# Patient Record
Sex: MALE | Race: WHITE | NOT HISPANIC OR LATINO | Employment: UNEMPLOYED | URBAN - NONMETROPOLITAN AREA
[De-identification: names, ages, dates, MRNs, and addresses within clinical notes are randomized per-mention and may not be internally consistent; named-entity substitution may affect disease eponyms.]

---

## 2023-09-26 ENCOUNTER — APPOINTMENT (OUTPATIENT)
Dept: GENERAL RADIOLOGY | Facility: HOSPITAL | Age: 78
DRG: 281 | End: 2023-09-26
Payer: COMMERCIAL

## 2023-09-26 ENCOUNTER — APPOINTMENT (OUTPATIENT)
Dept: CARDIOLOGY | Facility: HOSPITAL | Age: 78
DRG: 281 | End: 2023-09-26
Payer: COMMERCIAL

## 2023-09-26 ENCOUNTER — HOSPITAL ENCOUNTER (INPATIENT)
Facility: HOSPITAL | Age: 78
LOS: 2 days | Discharge: HOME | DRG: 281 | End: 2023-09-28
Attending: HOSPITALIST | Admitting: HOSPITALIST
Payer: COMMERCIAL

## 2023-09-26 DIAGNOSIS — I21.4 NSTEMI (NON-ST ELEVATED MYOCARDIAL INFARCTION): Primary | ICD-10-CM

## 2023-09-26 LAB
ALBUMIN SERPL-MCNC: 3.7 G/DL (ref 3.5–5.2)
ALBUMIN/GLOB SERPL: 1.1 G/DL
ALP SERPL-CCNC: 183 U/L (ref 39–117)
ALT SERPL W P-5'-P-CCNC: 20 U/L (ref 1–41)
ANION GAP SERPL CALCULATED.3IONS-SCNC: 10.3 MMOL/L (ref 5–15)
APTT PPP: >200 SECONDS (ref 26.5–34.5)
AST SERPL-CCNC: 48 U/L (ref 1–40)
BASOPHILS # BLD AUTO: 0 10*3/MM3 (ref 0–0.2)
BASOPHILS NFR BLD AUTO: 0 % (ref 0–1.5)
BH CV ECHO MEAS - ACS: 1.7 CM
BH CV ECHO MEAS - AI P1/2T: 1153 MSEC
BH CV ECHO MEAS - AO MAX PG: 11 MMHG
BH CV ECHO MEAS - AO MEAN PG: 5 MMHG
BH CV ECHO MEAS - AO ROOT DIAM: 3.7 CM
BH CV ECHO MEAS - AO V2 MAX: 166 CM/SEC
BH CV ECHO MEAS - AO V2 VTI: 39.9 CM
BH CV ECHO MEAS - EDV(CUBED): 249.5 ML
BH CV ECHO MEAS - EDV(MOD-SP4): 197 ML
BH CV ECHO MEAS - EF(MOD-SP4): 56.2 %
BH CV ECHO MEAS - ESV(CUBED): 123.1 ML
BH CV ECHO MEAS - ESV(MOD-SP4): 86.3 ML
BH CV ECHO MEAS - FS: 21 %
BH CV ECHO MEAS - IVS/LVPW: 0.87 CM
BH CV ECHO MEAS - IVSD: 0.98 CM
BH CV ECHO MEAS - LA DIMENSION: 5.1 CM
BH CV ECHO MEAS - LAT PEAK E' VEL: 8.3 CM/SEC
BH CV ECHO MEAS - LV DIASTOLIC VOL/BSA (35-75): 86.4 CM2
BH CV ECHO MEAS - LV MASS(C)D: 287.2 GRAMS
BH CV ECHO MEAS - LV SYSTOLIC VOL/BSA (12-30): 37.9 CM2
BH CV ECHO MEAS - LVIDD: 6.3 CM
BH CV ECHO MEAS - LVIDS: 5 CM
BH CV ECHO MEAS - LVOT AREA: 3.1 CM2
BH CV ECHO MEAS - LVOT DIAM: 2 CM
BH CV ECHO MEAS - LVPWD: 1.13 CM
BH CV ECHO MEAS - MED PEAK E' VEL: 5.8 CM/SEC
BH CV ECHO MEAS - MR MAX PG: 78.5 MMHG
BH CV ECHO MEAS - MR MAX VEL: 443 CM/SEC
BH CV ECHO MEAS - MR MEAN PG: 50 MMHG
BH CV ECHO MEAS - MR MEAN VEL: 329 CM/SEC
BH CV ECHO MEAS - MR VTI: 155 CM
BH CV ECHO MEAS - MV A MAX VEL: 35.2 CM/SEC
BH CV ECHO MEAS - MV E MAX VEL: 111 CM/SEC
BH CV ECHO MEAS - MV E/A: 3.2
BH CV ECHO MEAS - PA ACC TIME: 0.11 SEC
BH CV ECHO MEAS - RAP SYSTOLE: 10 MMHG
BH CV ECHO MEAS - RVSP: 32.7 MMHG
BH CV ECHO MEAS - SI(MOD-SP4): 48.6 ML/M2
BH CV ECHO MEAS - SV(MOD-SP4): 110.7 ML
BH CV ECHO MEAS - TAPSE (>1.6): 2.09 CM
BH CV ECHO MEAS - TR MAX PG: 22.7 MMHG
BH CV ECHO MEAS - TR MAX VEL: 238 CM/SEC
BH CV ECHO MEASUREMENTS AVERAGE E/E' RATIO: 15.74
BILIRUB SERPL-MCNC: 0.8 MG/DL (ref 0–1.2)
BUN SERPL-MCNC: 17 MG/DL (ref 8–23)
BUN/CREAT SERPL: 20.7 (ref 7–25)
CALCIUM SPEC-SCNC: 9.4 MG/DL (ref 8.6–10.5)
CHLORIDE SERPL-SCNC: 105 MMOL/L (ref 98–107)
CO2 SERPL-SCNC: 22.7 MMOL/L (ref 22–29)
CREAT SERPL-MCNC: 0.82 MG/DL (ref 0.76–1.27)
DEPRECATED RDW RBC AUTO: 51.8 FL (ref 37–54)
EGFRCR SERPLBLD CKD-EPI 2021: 91 ML/MIN/1.73
EOSINOPHIL # BLD AUTO: 0 10*3/MM3 (ref 0–0.4)
EOSINOPHIL NFR BLD AUTO: 0 % (ref 0.3–6.2)
ERYTHROCYTE [DISTWIDTH] IN BLOOD BY AUTOMATED COUNT: 14.7 % (ref 12.3–15.4)
GEN 5 2HR TROPONIN T REFLEX: 616 NG/L
GLOBULIN UR ELPH-MCNC: 3.5 GM/DL
GLUCOSE SERPL-MCNC: 161 MG/DL (ref 65–99)
HCT VFR BLD AUTO: 36.9 % (ref 37.5–51)
HGB BLD-MCNC: 11.9 G/DL (ref 13–17.7)
IMM GRANULOCYTES # BLD AUTO: 0.02 10*3/MM3 (ref 0–0.05)
IMM GRANULOCYTES NFR BLD AUTO: 0.4 % (ref 0–0.5)
INR PPP: 1.36 (ref 0.9–1.1)
LEFT ATRIUM VOLUME INDEX: 39.9 ML/M2
LYMPHOCYTES # BLD AUTO: 1.1 10*3/MM3 (ref 0.7–3.1)
LYMPHOCYTES NFR BLD AUTO: 23.7 % (ref 19.6–45.3)
MAGNESIUM SERPL-MCNC: 2.5 MG/DL (ref 1.6–2.4)
MCH RBC QN AUTO: 31 PG (ref 26.6–33)
MCHC RBC AUTO-ENTMCNC: 32.2 G/DL (ref 31.5–35.7)
MCV RBC AUTO: 96.1 FL (ref 79–97)
MONOCYTES # BLD AUTO: 0.07 10*3/MM3 (ref 0.1–0.9)
MONOCYTES NFR BLD AUTO: 1.5 % (ref 5–12)
NEUTROPHILS NFR BLD AUTO: 3.46 10*3/MM3 (ref 1.7–7)
NEUTROPHILS NFR BLD AUTO: 74.4 % (ref 42.7–76)
NRBC BLD AUTO-RTO: 0 /100 WBC (ref 0–0.2)
NT-PROBNP SERPL-MCNC: 2019 PG/ML (ref 0–1800)
PLATELET # BLD AUTO: 214 10*3/MM3 (ref 140–450)
PMV BLD AUTO: 9.4 FL (ref 6–12)
POTASSIUM SERPL-SCNC: 4.1 MMOL/L (ref 3.5–5.2)
PROT SERPL-MCNC: 7.2 G/DL (ref 6–8.5)
PROTHROMBIN TIME: 17.3 SECONDS (ref 12.1–14.7)
QT INTERVAL: 490 MS
QTC INTERVAL: 455 MS
RBC # BLD AUTO: 3.84 10*6/MM3 (ref 4.14–5.8)
SODIUM SERPL-SCNC: 138 MMOL/L (ref 136–145)
TROPONIN T DELTA: -62 NG/L
TROPONIN T SERPL HS-MCNC: 678 NG/L
TSH SERPL DL<=0.05 MIU/L-ACNC: 0.97 UIU/ML (ref 0.27–4.2)
UFH PPP CHRO-ACNC: >1.1 IU/ML (ref 0.3–0.7)
WBC NRBC COR # BLD: 4.65 10*3/MM3 (ref 3.4–10.8)

## 2023-09-26 PROCEDURE — C1769 GUIDE WIRE: HCPCS | Performed by: INTERNAL MEDICINE

## 2023-09-26 PROCEDURE — 93005 ELECTROCARDIOGRAM TRACING: CPT | Performed by: HOSPITALIST

## 2023-09-26 PROCEDURE — 85520 HEPARIN ASSAY: CPT | Performed by: HOSPITALIST

## 2023-09-26 PROCEDURE — 80053 COMPREHEN METABOLIC PANEL: CPT | Performed by: HOSPITALIST

## 2023-09-26 PROCEDURE — 25010000002 FENTANYL CITRATE (PF) 50 MCG/ML SOLUTION: Performed by: INTERNAL MEDICINE

## 2023-09-26 PROCEDURE — 83735 ASSAY OF MAGNESIUM: CPT | Performed by: HOSPITALIST

## 2023-09-26 PROCEDURE — 25510000001 IOPAMIDOL PER 1 ML: Performed by: INTERNAL MEDICINE

## 2023-09-26 PROCEDURE — 4A023N7 MEASUREMENT OF CARDIAC SAMPLING AND PRESSURE, LEFT HEART, PERCUTANEOUS APPROACH: ICD-10-PCS | Performed by: INTERNAL MEDICINE

## 2023-09-26 PROCEDURE — 99152 MOD SED SAME PHYS/QHP 5/>YRS: CPT | Performed by: INTERNAL MEDICINE

## 2023-09-26 PROCEDURE — 93306 TTE W/DOPPLER COMPLETE: CPT | Performed by: SPECIALIST

## 2023-09-26 PROCEDURE — 99222 1ST HOSP IP/OBS MODERATE 55: CPT | Performed by: INTERNAL MEDICINE

## 2023-09-26 PROCEDURE — 93454 CORONARY ARTERY ANGIO S&I: CPT | Performed by: INTERNAL MEDICINE

## 2023-09-26 PROCEDURE — 84443 ASSAY THYROID STIM HORMONE: CPT | Performed by: HOSPITALIST

## 2023-09-26 PROCEDURE — 83880 ASSAY OF NATRIURETIC PEPTIDE: CPT | Performed by: HOSPITALIST

## 2023-09-26 PROCEDURE — C1894 INTRO/SHEATH, NON-LASER: HCPCS | Performed by: INTERNAL MEDICINE

## 2023-09-26 PROCEDURE — 25010000002 MIDAZOLAM PER 1 MG: Performed by: INTERNAL MEDICINE

## 2023-09-26 PROCEDURE — 94799 UNLISTED PULMONARY SVC/PX: CPT

## 2023-09-26 PROCEDURE — 93010 ELECTROCARDIOGRAM REPORT: CPT | Performed by: INTERNAL MEDICINE

## 2023-09-26 PROCEDURE — 71045 X-RAY EXAM CHEST 1 VIEW: CPT | Performed by: RADIOLOGY

## 2023-09-26 PROCEDURE — 85610 PROTHROMBIN TIME: CPT | Performed by: HOSPITALIST

## 2023-09-26 PROCEDURE — 85025 COMPLETE CBC W/AUTO DIFF WBC: CPT | Performed by: HOSPITALIST

## 2023-09-26 PROCEDURE — 94640 AIRWAY INHALATION TREATMENT: CPT

## 2023-09-26 PROCEDURE — 84484 ASSAY OF TROPONIN QUANT: CPT | Performed by: HOSPITALIST

## 2023-09-26 PROCEDURE — 93306 TTE W/DOPPLER COMPLETE: CPT

## 2023-09-26 PROCEDURE — 94761 N-INVAS EAR/PLS OXIMETRY MLT: CPT

## 2023-09-26 PROCEDURE — 99223 1ST HOSP IP/OBS HIGH 75: CPT | Performed by: INTERNAL MEDICINE

## 2023-09-26 PROCEDURE — 71045 X-RAY EXAM CHEST 1 VIEW: CPT

## 2023-09-26 PROCEDURE — 94664 DEMO&/EVAL PT USE INHALER: CPT

## 2023-09-26 PROCEDURE — C1760 CLOSURE DEV, VASC: HCPCS | Performed by: INTERNAL MEDICINE

## 2023-09-26 PROCEDURE — 85730 THROMBOPLASTIN TIME PARTIAL: CPT | Performed by: HOSPITALIST

## 2023-09-26 PROCEDURE — B2111ZZ FLUOROSCOPY OF MULTIPLE CORONARY ARTERIES USING LOW OSMOLAR CONTRAST: ICD-10-PCS | Performed by: INTERNAL MEDICINE

## 2023-09-26 RX ORDER — POLYETHYLENE GLYCOL 3350 17 G/17G
17 POWDER, FOR SOLUTION ORAL DAILY PRN
Status: DISCONTINUED | OUTPATIENT
Start: 2023-09-26 | End: 2023-09-28 | Stop reason: HOSPADM

## 2023-09-26 RX ORDER — ALLOPURINOL 300 MG/1
300 TABLET ORAL DAILY
Status: CANCELLED | OUTPATIENT
Start: 2023-09-26

## 2023-09-26 RX ORDER — CARVEDILOL 3.12 MG/1
3.12 TABLET ORAL 2 TIMES DAILY WITH MEALS
Status: CANCELLED | OUTPATIENT
Start: 2023-09-26

## 2023-09-26 RX ORDER — GUAIFENESIN, PSEUDOEPHEDRINE HYDROCHLORIDE 600; 60 MG/1; MG/1
1 TABLET, EXTENDED RELEASE ORAL 2 TIMES DAILY
Status: CANCELLED | OUTPATIENT
Start: 2023-09-26

## 2023-09-26 RX ORDER — TAMSULOSIN HYDROCHLORIDE 0.4 MG/1
0.4 CAPSULE ORAL DAILY
Status: DISCONTINUED | OUTPATIENT
Start: 2023-09-26 | End: 2023-09-28 | Stop reason: HOSPADM

## 2023-09-26 RX ORDER — ATORVASTATIN CALCIUM 40 MG/1
80 TABLET, FILM COATED ORAL DAILY
Status: CANCELLED | OUTPATIENT
Start: 2023-09-26

## 2023-09-26 RX ORDER — LEVOTHYROXINE SODIUM 0.05 MG/1
50 TABLET ORAL NIGHTLY
Status: CANCELLED | OUTPATIENT
Start: 2023-09-26

## 2023-09-26 RX ORDER — BUMETANIDE 0.5 MG/1
1.5 TABLET ORAL 2 TIMES DAILY
COMMUNITY

## 2023-09-26 RX ORDER — BISACODYL 10 MG
10 SUPPOSITORY, RECTAL RECTAL DAILY PRN
Status: DISCONTINUED | OUTPATIENT
Start: 2023-09-26 | End: 2023-09-28 | Stop reason: HOSPADM

## 2023-09-26 RX ORDER — FEXOFENADINE HCL 180 MG/1
180 TABLET ORAL NIGHTLY
COMMUNITY

## 2023-09-26 RX ORDER — HEPARIN SODIUM 10000 [USP'U]/100ML
9.6 INJECTION, SOLUTION INTRAVENOUS
Status: DISCONTINUED | OUTPATIENT
Start: 2023-09-26 | End: 2023-09-26

## 2023-09-26 RX ORDER — FINASTERIDE 5 MG/1
5 TABLET, FILM COATED ORAL EVERY MORNING
Status: CANCELLED | OUTPATIENT
Start: 2023-09-26

## 2023-09-26 RX ORDER — MIDAZOLAM HYDROCHLORIDE 1 MG/ML
INJECTION INTRAMUSCULAR; INTRAVENOUS
Status: DISCONTINUED | OUTPATIENT
Start: 2023-09-26 | End: 2023-09-26 | Stop reason: HOSPADM

## 2023-09-26 RX ORDER — TAMSULOSIN HYDROCHLORIDE 0.4 MG/1
0.4 CAPSULE ORAL DAILY
Status: CANCELLED | OUTPATIENT
Start: 2023-09-26

## 2023-09-26 RX ORDER — ASPIRIN 81 MG/1
81 TABLET, CHEWABLE ORAL DAILY
Status: DISCONTINUED | OUTPATIENT
Start: 2023-09-26 | End: 2023-09-28 | Stop reason: HOSPADM

## 2023-09-26 RX ORDER — CARVEDILOL 3.12 MG/1
3.12 TABLET ORAL 2 TIMES DAILY WITH MEALS
COMMUNITY
End: 2023-09-28 | Stop reason: HOSPADM

## 2023-09-26 RX ORDER — PANTOPRAZOLE SODIUM 40 MG/1
40 TABLET, DELAYED RELEASE ORAL DAILY
COMMUNITY

## 2023-09-26 RX ORDER — GABAPENTIN 300 MG/1
300 CAPSULE ORAL 3 TIMES DAILY
COMMUNITY

## 2023-09-26 RX ORDER — FINASTERIDE 5 MG/1
5 TABLET, FILM COATED ORAL EVERY MORNING
Status: DISCONTINUED | OUTPATIENT
Start: 2023-09-26 | End: 2023-09-28 | Stop reason: HOSPADM

## 2023-09-26 RX ORDER — POTASSIUM CHLORIDE 20 MEQ/1
20 TABLET, EXTENDED RELEASE ORAL 2 TIMES DAILY
Status: CANCELLED | OUTPATIENT
Start: 2023-09-26

## 2023-09-26 RX ORDER — PANTOPRAZOLE SODIUM 40 MG/1
40 TABLET, DELAYED RELEASE ORAL DAILY
Status: CANCELLED | OUTPATIENT
Start: 2023-09-26

## 2023-09-26 RX ORDER — COLCHICINE 0.6 MG/1
0.6 TABLET ORAL 2 TIMES DAILY PRN
COMMUNITY

## 2023-09-26 RX ORDER — SODIUM CHLORIDE 0.9 % (FLUSH) 0.9 %
10 SYRINGE (ML) INJECTION EVERY 12 HOURS SCHEDULED
Status: DISCONTINUED | OUTPATIENT
Start: 2023-09-26 | End: 2023-09-28 | Stop reason: HOSPADM

## 2023-09-26 RX ORDER — POTASSIUM CHLORIDE 20 MEQ/1
20 TABLET, EXTENDED RELEASE ORAL 2 TIMES DAILY
Status: DISCONTINUED | OUTPATIENT
Start: 2023-09-26 | End: 2023-09-28 | Stop reason: HOSPADM

## 2023-09-26 RX ORDER — PANTOPRAZOLE SODIUM 40 MG/1
40 TABLET, DELAYED RELEASE ORAL DAILY
Status: DISCONTINUED | OUTPATIENT
Start: 2023-09-26 | End: 2023-09-28 | Stop reason: HOSPADM

## 2023-09-26 RX ORDER — ALLOPURINOL 300 MG/1
300 TABLET ORAL DAILY
COMMUNITY

## 2023-09-26 RX ORDER — HEPARIN SODIUM 5000 [USP'U]/ML
2000 INJECTION, SOLUTION INTRAVENOUS; SUBCUTANEOUS AS NEEDED
Status: DISCONTINUED | OUTPATIENT
Start: 2023-09-26 | End: 2023-09-26

## 2023-09-26 RX ORDER — LISINOPRIL 2.5 MG/1
2.5 TABLET ORAL NIGHTLY
Status: DISCONTINUED | OUTPATIENT
Start: 2023-09-26 | End: 2023-09-28 | Stop reason: HOSPADM

## 2023-09-26 RX ORDER — GUAIFENESIN, PSEUDOEPHEDRINE HYDROCHLORIDE 600; 60 MG/1; MG/1
1 TABLET, EXTENDED RELEASE ORAL 2 TIMES DAILY
COMMUNITY
End: 2023-09-28 | Stop reason: HOSPADM

## 2023-09-26 RX ORDER — COLCHICINE 0.6 MG/1
0.6 TABLET ORAL 2 TIMES DAILY PRN
Status: CANCELLED | OUTPATIENT
Start: 2023-09-26

## 2023-09-26 RX ORDER — SODIUM CHLORIDE 0.9 % (FLUSH) 0.9 %
10 SYRINGE (ML) INJECTION AS NEEDED
Status: DISCONTINUED | OUTPATIENT
Start: 2023-09-26 | End: 2023-09-28 | Stop reason: HOSPADM

## 2023-09-26 RX ORDER — ALLOPURINOL 300 MG/1
300 TABLET ORAL DAILY
Status: DISCONTINUED | OUTPATIENT
Start: 2023-09-26 | End: 2023-09-28 | Stop reason: HOSPADM

## 2023-09-26 RX ORDER — BUMETANIDE 1 MG/1
1.5 TABLET ORAL 2 TIMES DAILY
Status: DISCONTINUED | OUTPATIENT
Start: 2023-09-26 | End: 2023-09-28 | Stop reason: HOSPADM

## 2023-09-26 RX ORDER — AMOXICILLIN 250 MG
2 CAPSULE ORAL 2 TIMES DAILY
Status: DISCONTINUED | OUTPATIENT
Start: 2023-09-26 | End: 2023-09-28 | Stop reason: HOSPADM

## 2023-09-26 RX ORDER — CETIRIZINE HYDROCHLORIDE 10 MG/1
10 TABLET ORAL DAILY
Status: DISCONTINUED | OUTPATIENT
Start: 2023-09-26 | End: 2023-09-28 | Stop reason: HOSPADM

## 2023-09-26 RX ORDER — BUMETANIDE 1 MG/1
1.5 TABLET ORAL 2 TIMES DAILY
Status: CANCELLED | OUTPATIENT
Start: 2023-09-26

## 2023-09-26 RX ORDER — GABAPENTIN 300 MG/1
300 CAPSULE ORAL 3 TIMES DAILY
Status: DISCONTINUED | OUTPATIENT
Start: 2023-09-26 | End: 2023-09-28 | Stop reason: HOSPADM

## 2023-09-26 RX ORDER — FENTANYL CITRATE 50 UG/ML
INJECTION, SOLUTION INTRAMUSCULAR; INTRAVENOUS
Status: DISCONTINUED | OUTPATIENT
Start: 2023-09-26 | End: 2023-09-26 | Stop reason: HOSPADM

## 2023-09-26 RX ORDER — POTASSIUM CHLORIDE 20 MEQ/1
20 TABLET, EXTENDED RELEASE ORAL 2 TIMES DAILY
COMMUNITY

## 2023-09-26 RX ORDER — SODIUM CHLORIDE 9 MG/ML
40 INJECTION, SOLUTION INTRAVENOUS AS NEEDED
Status: DISCONTINUED | OUTPATIENT
Start: 2023-09-26 | End: 2023-09-28 | Stop reason: HOSPADM

## 2023-09-26 RX ORDER — CETIRIZINE HYDROCHLORIDE 10 MG/1
10 TABLET ORAL DAILY
Status: CANCELLED | OUTPATIENT
Start: 2023-09-26

## 2023-09-26 RX ORDER — ACETAMINOPHEN 325 MG/1
650 TABLET ORAL EVERY 4 HOURS PRN
Status: DISCONTINUED | OUTPATIENT
Start: 2023-09-26 | End: 2023-09-28 | Stop reason: HOSPADM

## 2023-09-26 RX ORDER — ATORVASTATIN CALCIUM 80 MG/1
80 TABLET, FILM COATED ORAL DAILY
COMMUNITY

## 2023-09-26 RX ORDER — LISINOPRIL 2.5 MG/1
2.5 TABLET ORAL NIGHTLY
COMMUNITY

## 2023-09-26 RX ORDER — SODIUM CHLORIDE 9 MG/ML
INJECTION, SOLUTION INTRAVENOUS
Status: COMPLETED | OUTPATIENT
Start: 2023-09-26 | End: 2023-09-26

## 2023-09-26 RX ORDER — GABAPENTIN 300 MG/1
300 CAPSULE ORAL 3 TIMES DAILY
Status: CANCELLED | OUTPATIENT
Start: 2023-09-26

## 2023-09-26 RX ORDER — LIDOCAINE HYDROCHLORIDE 20 MG/ML
INJECTION, SOLUTION INFILTRATION; PERINEURAL
Status: DISCONTINUED | OUTPATIENT
Start: 2023-09-26 | End: 2023-09-26 | Stop reason: HOSPADM

## 2023-09-26 RX ORDER — FINASTERIDE 5 MG/1
5 TABLET, FILM COATED ORAL EVERY MORNING
COMMUNITY

## 2023-09-26 RX ORDER — LEVOTHYROXINE SODIUM 0.05 MG/1
50 TABLET ORAL NIGHTLY
Status: DISCONTINUED | OUTPATIENT
Start: 2023-09-26 | End: 2023-09-28 | Stop reason: HOSPADM

## 2023-09-26 RX ORDER — COLCHICINE 0.6 MG/1
0.6 TABLET ORAL 2 TIMES DAILY PRN
Status: DISCONTINUED | OUTPATIENT
Start: 2023-09-26 | End: 2023-09-28 | Stop reason: HOSPADM

## 2023-09-26 RX ORDER — LEVOTHYROXINE SODIUM 0.05 MG/1
50 TABLET ORAL NIGHTLY
COMMUNITY

## 2023-09-26 RX ORDER — ATORVASTATIN CALCIUM 40 MG/1
80 TABLET, FILM COATED ORAL DAILY
Status: DISCONTINUED | OUTPATIENT
Start: 2023-09-26 | End: 2023-09-28 | Stop reason: HOSPADM

## 2023-09-26 RX ORDER — NITROGLYCERIN 0.4 MG/1
0.4 TABLET SUBLINGUAL
Status: DISCONTINUED | OUTPATIENT
Start: 2023-09-26 | End: 2023-09-28 | Stop reason: HOSPADM

## 2023-09-26 RX ORDER — HEPARIN SODIUM 5000 [USP'U]/ML
4000 INJECTION, SOLUTION INTRAVENOUS; SUBCUTANEOUS AS NEEDED
Status: DISCONTINUED | OUTPATIENT
Start: 2023-09-26 | End: 2023-09-26

## 2023-09-26 RX ORDER — SODIUM CHLORIDE 9 MG/ML
1 INJECTION, SOLUTION INTRAVENOUS CONTINUOUS
Status: ACTIVE | OUTPATIENT
Start: 2023-09-26 | End: 2023-09-26

## 2023-09-26 RX ORDER — BISACODYL 5 MG/1
5 TABLET, DELAYED RELEASE ORAL DAILY PRN
Status: DISCONTINUED | OUTPATIENT
Start: 2023-09-26 | End: 2023-09-28 | Stop reason: HOSPADM

## 2023-09-26 RX ORDER — BUDESONIDE 0.5 MG/2ML
0.5 INHALANT ORAL
Status: DISCONTINUED | OUTPATIENT
Start: 2023-09-26 | End: 2023-09-28 | Stop reason: HOSPADM

## 2023-09-26 RX ORDER — TAMSULOSIN HYDROCHLORIDE 0.4 MG/1
1 CAPSULE ORAL DAILY
COMMUNITY

## 2023-09-26 RX ORDER — ARFORMOTEROL TARTRATE 15 UG/2ML
15 SOLUTION RESPIRATORY (INHALATION)
Status: DISCONTINUED | OUTPATIENT
Start: 2023-09-26 | End: 2023-09-28 | Stop reason: HOSPADM

## 2023-09-26 RX ADMIN — ASPIRIN 81 MG: 81 TABLET, CHEWABLE ORAL at 08:41

## 2023-09-26 RX ADMIN — IPRATROPIUM BROMIDE 0.5 MG: 0.5 SOLUTION RESPIRATORY (INHALATION) at 13:52

## 2023-09-26 RX ADMIN — BUDESONIDE 0.5 MG: 0.5 SUSPENSION RESPIRATORY (INHALATION) at 10:45

## 2023-09-26 RX ADMIN — GABAPENTIN 300 MG: 300 CAPSULE ORAL at 21:53

## 2023-09-26 RX ADMIN — Medication 10 ML: at 22:09

## 2023-09-26 RX ADMIN — ATORVASTATIN CALCIUM 80 MG: 40 TABLET, FILM COATED ORAL at 08:41

## 2023-09-26 RX ADMIN — GABAPENTIN 300 MG: 300 CAPSULE ORAL at 15:46

## 2023-09-26 RX ADMIN — DOCUSATE SODIUM 50 MG AND SENNOSIDES 8.6 MG 2 TABLET: 8.6; 5 TABLET, FILM COATED ORAL at 08:41

## 2023-09-26 RX ADMIN — ALLOPURINOL 300 MG: 300 TABLET ORAL at 08:40

## 2023-09-26 RX ADMIN — ARFORMOTEROL TARTRATE 15 MCG: 15 SOLUTION RESPIRATORY (INHALATION) at 18:43

## 2023-09-26 RX ADMIN — ARFORMOTEROL TARTRATE 15 MCG: 15 SOLUTION RESPIRATORY (INHALATION) at 10:44

## 2023-09-26 RX ADMIN — APIXABAN 5 MG: 5 TABLET, FILM COATED ORAL at 21:53

## 2023-09-26 RX ADMIN — FINASTERIDE 5 MG: 5 TABLET, FILM COATED ORAL at 08:41

## 2023-09-26 RX ADMIN — BUMETANIDE 1.5 MG: 1 TABLET ORAL at 21:53

## 2023-09-26 RX ADMIN — ACETAMINOPHEN 650 MG: 325 TABLET ORAL at 12:37

## 2023-09-26 RX ADMIN — DOCUSATE SODIUM 50 MG AND SENNOSIDES 8.6 MG 2 TABLET: 8.6; 5 TABLET, FILM COATED ORAL at 21:53

## 2023-09-26 RX ADMIN — BUMETANIDE 1.5 MG: 1 TABLET ORAL at 08:41

## 2023-09-26 RX ADMIN — BUDESONIDE 0.5 MG: 0.5 SUSPENSION RESPIRATORY (INHALATION) at 18:43

## 2023-09-26 RX ADMIN — GABAPENTIN 300 MG: 300 CAPSULE ORAL at 08:42

## 2023-09-26 RX ADMIN — POTASSIUM CHLORIDE 20 MEQ: 1500 TABLET, EXTENDED RELEASE ORAL at 21:53

## 2023-09-26 RX ADMIN — LEVOTHYROXINE SODIUM 50 MCG: 50 TABLET ORAL at 21:53

## 2023-09-26 RX ADMIN — IPRATROPIUM BROMIDE 0.5 MG: 0.5 SOLUTION RESPIRATORY (INHALATION) at 18:43

## 2023-09-26 RX ADMIN — Medication 10 ML: at 08:42

## 2023-09-26 RX ADMIN — TAMSULOSIN HYDROCHLORIDE 0.4 MG: 0.4 CAPSULE ORAL at 08:40

## 2023-09-26 RX ADMIN — PANTOPRAZOLE SODIUM 40 MG: 40 TABLET, DELAYED RELEASE ORAL at 08:41

## 2023-09-26 RX ADMIN — POTASSIUM CHLORIDE 20 MEQ: 1500 TABLET, EXTENDED RELEASE ORAL at 08:41

## 2023-09-26 RX ADMIN — SODIUM CHLORIDE 1 ML/KG/HR: 9 INJECTION, SOLUTION INTRAVENOUS at 11:58

## 2023-09-26 RX ADMIN — CETIRIZINE HYDROCHLORIDE 10 MG: 10 TABLET, FILM COATED ORAL at 08:40

## 2023-09-26 NOTE — NURSING NOTE
Cardiac Rehab referral received on patient. After reviewing patient information there is no qualifying diagnosis noted at this time .Qualifications for Cardiac Rehab include Coronary Stenting, AMI (NSTEMI Type 1, STEMI), Stable Angina, CABG, Heart Valve Repair/Replacement, Heart Transplant or Stable CHF (with these specific qualifications, Left Ventricular Ejection Fraction of 35% or less, NYHA class II to IV symptoms despite optimal heart failure therapy for at least 6 weeks and has not had a recent (less than or equal to 6 weeks) or planned (less than or equal to 6 months) major cardiovascular hospitalizations or procedures. Due to patient being in the hospital, does not meet criteria at this time) Please contact us at [phone] with questions.    If the diagnosis is CHF when the patient meets the CHF criteria for Cardiac Rehab with a new order we will gladly schedule them.

## 2023-09-26 NOTE — CONSULTS
.Patient Identification:  Name:  Héctor Zheng  Age:  78 y.o.  Sex:  male  :  1945  MRN:  [id number]  Visit Number:  [id number]  Date of Admit: 2023  Date of Consult: 23  Provider, No Known    Chief Complaint:   Evaluate for left heart cardiac catheterization    Subjective:    Patient is a 78-year-old gentleman past medical history significant for coronary disease s/p 5 PCI in the past also with A-fib on chronic anticoagulation.  Last dose of Eliquis was on  a.m.  Patient was driving yesterday when he started having discomfort in the chest with palpitations.  His heart rate was noted to be in 200 range.  Patient was noted to have mildly elevated troponin and was transferred to our facility.  Overnight his troponins high-sensitivity are up to 600 range.  Patient is not complaining of any chest pain currently.  He has IV heparin running.      Past Medical History:   Diagnosis Date    CHF (congestive heart failure)     COPD (chronic obstructive pulmonary disease)     on 5L oxygen concentrator at home    Coronary artery disease     s/p 5 stents    Lung nodule     Permanent atrial fibrillation     Prostate cancer     monitoring, no active treatment    Thyroid nodule      Past Surgical History:   Procedure Laterality Date    CARDIAC CATHETERIZATION      5 stents total in the past     History reviewed. No pertinent family history.  Social History     Tobacco Use    Smoking status: Never   Vaping Use    Vaping Use: Never used   Substance Use Topics    Alcohol use: Never    Drug use: Never     Medications Prior to Admission   Medication Sig Dispense Refill Last Dose    allopurinol (ZYLOPRIM) 300 MG tablet Take 1 tablet by mouth Daily.   2023    apixaban (ELIQUIS) 5 MG tablet tablet Take 1 tablet by mouth 2 (Two) Times a Day.   2023    atorvastatin (LIPITOR) 80 MG tablet Take 1 tablet by mouth Daily.   2023    bumetanide (BUMEX) 0.5 MG tablet Take 3 tablets by mouth 2 (Two) Times  a Day.   9/25/2023    colchicine 0.6 MG tablet Take 1 tablet by mouth 2 (Two) Times a Day As Needed for Muscle / Joint Pain.   9/25/2023    fexofenadine (ALLEGRA) 180 MG tablet Take 1 tablet by mouth Every Night.   9/25/2023    finasteride (PROSCAR) 5 MG tablet Take 1 tablet by mouth Every Morning.   9/25/2023    gabapentin (NEURONTIN) 300 MG capsule Take 1 capsule by mouth 3 (Three) Times a Day.   9/25/2023    levothyroxine (SYNTHROID, LEVOTHROID) 50 MCG tablet Take 1 tablet by mouth Every Night.   9/25/2023    lisinopril (PRINIVIL,ZESTRIL) 2.5 MG tablet Take 1 tablet by mouth Every Night.   9/25/2023    pantoprazole (PROTONIX) 40 MG EC tablet Take 1 tablet by mouth Daily.   9/25/2023    potassium chloride (K-DUR,KLOR-CON) 20 MEQ CR tablet Take 1 tablet by mouth 2 (Two) Times a Day.   9/25/2023    pseudoephedrine-guaifenesin (MUCINEX D)  MG per 12 hr tablet Take 1 tablet by mouth 2 (Two) Times a Day.   9/25/2023    tamsulosin (FLOMAX) 0.4 MG capsule 24 hr capsule Take 1 capsule by mouth Daily.   9/25/2023    carvedilol (COREG) 3.125 MG tablet Take 1 tablet by mouth 2 (Two) Times a Day With Meals.        Allergies:  Patient has no known allergies.        ----------------------------------------------------------------------------------------------------------------------  Current Hospital Meds:  allopurinol, 300 mg, Oral, Daily  arformoterol, 15 mcg, Nebulization, BID - RT  aspirin, 81 mg, Oral, Daily  atorvastatin, 80 mg, Oral, Daily  budesonide, 0.5 mg, Nebulization, BID - RT  bumetanide, 1.5 mg, Oral, BID  cetirizine, 10 mg, Oral, Daily  finasteride, 5 mg, Oral, QAM  gabapentin, 300 mg, Oral, TID  ipratropium, 0.5 mg, Nebulization, 4x Daily - RT  levothyroxine, 50 mcg, Oral, Nightly  lisinopril, 2.5 mg, Oral, Nightly  pantoprazole, 40 mg, Oral, Daily  potassium chloride, 20 mEq, Oral, BID  senna-docusate sodium, 2 tablet, Oral, BID  sodium chloride, 10 mL, Intravenous, Q12H  tamsulosin, 0.4 mg, Oral,  Daily      heparin, 9.6 Units/kg/hr (Dosing Weight), Last Rate: 9.6 Units/kg/hr (09/26/23 0655)  Pharmacy to Dose Heparin,       ----------------------------------------------------------------------------------------------------------------------  Vital Signs:  Temp:  [97.7 °F (36.5 °C)] 97.7 °F (36.5 °C)  Heart Rate:  [39-51] 49  Resp:  [16-18] 18  BP: (101-141)/(56-71) 101/56      09/26/23  0453   Weight: 104 kg (229 lb 15 oz)     Body mass index is 30.34 kg/m².    Intake/Output Summary (Last 24 hours) at 9/26/2023 1056  Last data filed at 9/26/2023 0655  Gross per 24 hour   Intake 19.97 ml   Output 200 ml   Net -180.03 ml     NPO Diet NPO Type: Sips with Meds  ----------------------------------------------------------------------------------------------------------------------  Physical exam:  Constitutional:    HENT:  Head:  Normocephalic and atraumatic.    Eyes:  Conjunctivae and EOM are normal.  Pupils are equal, round, and reactive to light.  No scleral icterus.    Neck:  Neck supple.  No JVD present.    Cardiovascular: Normal rate, irregular rhythm, S1 S2+, NO S3 / S4  Pulmonary/Chest:  Vesicular breath sounds B/L  Abdominal:  Soft.  Bowel sounds are normal.  No distension and no tenderness.      Neurological:  Alert and oriented to person, place, and time. No focal deficits.  Skin:  Skin is warm and dry. No rash noted. No pallor.   Musculoskeletal:  No edema, no tenderness, and no deformity.  No red or swollen joints anywhere.   Peripheral vascular:  2+ Pulses B/L DP  ----------------------------------------------------------------------------------------------------------------------    ----------------------------------------------------------------------------------------------------------------------  Results from last 7 days   Lab Units 09/26/23  0656 09/26/23  0458   HSTROP T ng/L 616* 678*     Results from last 7 days   Lab Units 09/26/23  0515 09/26/23 0458   WBC 10*3/mm3  --  4.65   HEMOGLOBIN  g/dL  --  11.9*   HEMATOCRIT %  --  36.9*   MCV fL  --  96.1   MCHC g/dL  --  32.2   PLATELETS 10*3/mm3  --  214   INR  1.36*  --          Results from last 7 days   Lab Units 09/26/23  0458   SODIUM mmol/L 138   POTASSIUM mmol/L 4.1   MAGNESIUM mg/dL 2.5*   CHLORIDE mmol/L 105   CO2 mmol/L 22.7   BUN mg/dL 17   CREATININE mg/dL 0.82   CALCIUM mg/dL 9.4   GLUCOSE mg/dL 161*   ALBUMIN g/dL 3.7   BILIRUBIN mg/dL 0.8   ALK PHOS U/L 183*   AST (SGOT) U/L 48*   ALT (SGPT) U/L 20   Estimated Creatinine Clearance: 97 mL/min (by C-G formula based on SCr of 0.82 mg/dL).    No results found for: AMMONIA      No results found for: BLOODCX  No results found for: URINECX  No results found for: WOUNDCX  No results found for: STOOLCX  Echo:    ECG/EMG Results (last 24 hours)       Procedure Component Value Units Date/Time    ECG 12 Lead Bradycardia [[id number]] Collected: 09/26/23 0520     Updated: 09/26/23 0521     QT Interval 490 ms      QTC Interval 455 ms     Narrative:      Test Reason : Bradycardia  Blood Pressure :   */*   mmHG  Vent. Rate :  52 BPM     Atrial Rate :  67 BPM     P-R Int :   * ms          QRS Dur : 112 ms      QT Int : 490 ms       P-R-T Axes :   *  61  62 degrees     QTc Int : 455 ms    Atrial fibrillation with slow ventricular response with premature ventricular or aberrantly conducted complexes and with ventricular escape complexes  Incomplete left bundle branch block  Abnormal ECG  No previous ECGs available    Referred By:            Confirmed By:     SCANNED - TELEMETRY   [[id number]] Resulted: 09/26/23     Updated: 09/26/23 0706    Adult Transthoracic Echo Complete w/ Color, Spectral and Contrast if necessary per protocol [[id number]] Resulted: 09/26/23 0938     Updated: 09/26/23 1015          Imaging Results (Last 72 Hours)       Procedure Component Value Units Date/Time    XR Chest 1 View [[id number]] Collected: 09/26/23 0829     Updated: 09/26/23 0831    Narrative:      EXAM:    XR Chest, 1 View      EXAM DATE:    9/26/2023 7:12 AM     CLINICAL HISTORY:    short of breath     TECHNIQUE:    Frontal view of the chest.     COMPARISON:    No relevant prior studies available.     FINDINGS:    LUNGS AND PLEURAL SPACES:  Coarsened interstitial markings noted  throughout the lungs.  No pneumothorax.    HEART:  Mild cardiomegaly.    MEDIASTINUM:  Unremarkable as visualized.    BONES/JOINTS:  Unremarkable as visualized.       Impression:      1.  Mild cardiomegaly.  2.  Coarsened interstitial markings noted throughout the lungs.        This report was finalized on 9/26/2023 8:29 AM by Dr. Radhames Enriquez MD.                   I have personally looked at the labs and they are summarized above.  ----------------------------------------------------------------------------------------------------------------------  Imaging Results (Last 24 Hours)       Procedure Component Value Units Date/Time    XR Chest 1 View [[id number]] Collected: 09/26/23 0829     Updated: 09/26/23 0831    Narrative:      EXAM:    XR Chest, 1 View     EXAM DATE:    9/26/2023 7:12 AM     CLINICAL HISTORY:    short of breath     TECHNIQUE:    Frontal view of the chest.     COMPARISON:    No relevant prior studies available.     FINDINGS:    LUNGS AND PLEURAL SPACES:  Coarsened interstitial markings noted  throughout the lungs.  No pneumothorax.    HEART:  Mild cardiomegaly.    MEDIASTINUM:  Unremarkable as visualized.    BONES/JOINTS:  Unremarkable as visualized.       Impression:      1.  Mild cardiomegaly.  2.  Coarsened interstitial markings noted throughout the lungs.        This report was finalized on 9/26/2023 8:29 AM by Dr. Radhames Enriquez MD.             ----------------------------------------------------------------------------------------------------------------------    Assessment:  ACS with elevated high-sensitivity troponin  A-fib with fast ventricular response    Plan:  #1 cardiac.  Patient with history of coronary disease with multiple  stents in the past.  Came in with complaints of palpitations and chest pain.  Noted to be in A-fib with fast ventricular response.  Patient bumped his troponins in 600 range.  He is off his Eliquis for more than 24 hours.  We will get a left heart Cardiac catheterization done.  Aggressive risk factor modification for coronary disease to continue.      This document has been electronically signed by Kale Duran MD [facility], Interventional Cardiology  September 26, 2023 10:56 EDT

## 2023-09-26 NOTE — H&P
"Hospitalist History and Physical        Patient Identification  Name: Héctor Zheng  Age/Sex: 78 y.o. male  :  1945        MRN: [id number]  Visit Number: [id number]  Admit Date: 2023   PCP: Provider, No Known          Chief complaint chest pounding, short of breath    History of Present Illness:  Patient is a 78 y.o. male with history of CHF (unspecified), COPD on 5L oxygen concentrator at home, CAD s/p 5 stents total in the past, and and permanent afib s/p multiple failed cardioversions and ablations in the past, who presented to an outside facility on the afternoon of  with complaints of acute onset shortness of breath and palpitations. He reported a brief episode of chest pain as well which he described as \"pounding\" that had already resolved when he arrived. He was found to be in atrial fibrillation with RVR, with HR as high as 200. He dropped to the 60's without intervention soon after arrival, however, and HR has remained in the 40-60s since then. He denies any further palpitations or dyspnea. He does report ongoing belching and attributes this to a hiatal hernia which he notes has caused chest pain-like symptoms in the past. At the outside facility, pertinent labs include Na 140, K+ 3.5 and Cr 0.9. He had PVCs on telemetry monitoring for which he was given calcium and magnesium that the ED provider claimed \"smoothed out\" the PVCs. He was given full dose ASA after fist troponin came back mildly elevated at 0.78. Initially, it was felt he likely had a troponin leak from the severity of his afib w/RVR episode, and the ED provider planned to discharge him if his repeat troponin came back at a similar level. However, repeat troponin increased significantly to 3.93, raising concern for true NSTEMI. Thus, IV Heparin infusion was initiated and the patient was transferred to our facility for cardiology evaluation. Patient states currently he is chest pain free and is not short of breath. He is wearing " 2L NC and O2 saturation is upper 90s. BP is stable in the 110-120s systolic but HR is in the 40s mostly. STAT labs, EKG and CXR are pending.     Review of Systems  Review of Systems   Constitutional:  Negative for activity change, appetite change, chills, diaphoresis, fatigue, fever and unexpected weight change.   HENT:  Negative for congestion, postnasal drip, rhinorrhea, sinus pressure, sinus pain and sore throat.    Eyes:  Negative for photophobia, pain, discharge, redness, itching and visual disturbance.   Respiratory:  Positive for shortness of breath. Negative for cough and wheezing.    Cardiovascular:  Positive for chest pain and palpitations. Negative for leg swelling.   Gastrointestinal:  Negative for abdominal distention, abdominal pain, constipation, diarrhea, nausea and vomiting.   Endocrine: Negative for cold intolerance, heat intolerance, polydipsia, polyphagia and polyuria.   Genitourinary:  Negative for difficulty urinating, dysuria, flank pain, frequency and hematuria.   Musculoskeletal:  Negative for arthralgias, back pain, joint swelling, neck pain and neck stiffness.   Skin:  Negative for color change, pallor, rash and wound.   Neurological:  Negative for dizziness, seizures, light-headedness, numbness and headaches.   Hematological:  Negative for adenopathy. Does not bruise/bleed easily.   Psychiatric/Behavioral:  Negative for agitation, behavioral problems and confusion.      History  Past Medical History:   Diagnosis Date    CHF (congestive heart failure)     COPD (chronic obstructive pulmonary disease)     on 5L oxygen concentrator at home    Coronary artery disease     s/p 5 stents    Lung nodule     Permanent atrial fibrillation     Prostate cancer     monitoring, no active treatment    Thyroid nodule      Past Surgical History:   Procedure Laterality Date    CARDIAC CATHETERIZATION      5 stents total in the past     History reviewed. No pertinent family history.     No medications prior  to admission.     Allergies:  Patient has no known allergies.    Objective     Vital Signs     Body mass index is 30.34 kg/m².    Physical Exam:  Physical Exam  Constitutional:       General: He is not in acute distress.     Appearance: He is ill-appearing (chronically so).   HENT:      Head: Normocephalic and atraumatic.      Right Ear: External ear normal.      Left Ear: External ear normal.      Nose: Nose normal.      Mouth/Throat:      Mouth: Mucous membranes are moist.      Pharynx: Oropharynx is clear.   Eyes:      Extraocular Movements: Extraocular movements intact.      Conjunctiva/sclera: Conjunctivae normal.      Pupils: Pupils are equal, round, and reactive to light.   Cardiovascular:      Rate and Rhythm: Bradycardia present. Rhythm irregular.      Pulses: Normal pulses.      Heart sounds: Normal heart sounds. No murmur heard.  Pulmonary:      Effort: Pulmonary effort is normal. No respiratory distress.      Breath sounds: Normal breath sounds. No wheezing or rales.   Abdominal:      General: Abdomen is flat. Bowel sounds are normal. There is no distension.      Palpations: Abdomen is soft.      Tenderness: There is no abdominal tenderness.   Musculoskeletal:         General: Normal range of motion.      Cervical back: Normal range of motion and neck supple. No tenderness.      Right lower leg: No edema.      Left lower leg: No edema.   Lymphadenopathy:      Cervical: No cervical adenopathy.   Skin:     General: Skin is warm and dry.      Capillary Refill: Capillary refill takes less than 2 seconds.      Coloration: Skin is not jaundiced.      Findings: No bruising or lesion.   Neurological:      General: No focal deficit present.      Mental Status: He is alert and oriented to person, place, and time.   Psychiatric:         Mood and Affect: Mood normal.         Behavior: Behavior normal.         Results Review:       Lab Results:  Results from last 7 days   Lab Units 09/26/23  0458   WBC 10*3/mm3 4.65    HEMOGLOBIN g/dL 11.9*   PLATELETS 10*3/mm3 214                 No results found for: HGBA1C                        I have reviewed the patient's laboratory results.    Imaging:  Imaging Results (Last 72 Hours)       ** No results found for the last 72 hours. **            I have personally reviewed the patient's radiologic imaging.        EKG:   Atrial fibrillation with slow ventricular response with premature ventricular or aberrantly conducted complexes and with ventricular escape complexes, HR 52, QTc 455  Incomplete left bundle branch block  Abnormal ECG  No previous ECGs available    I have personally reviewed the patient's EKG. Frequent PVCs noted both on EKG and telemetry strip.         Assessment & Plan     - NSTEMI (non-ST elevated myocardial infarction): type 1 vs type 2 related to severely uncontrolled afib. Last dose of eliquis was morning of 9/25. Now on IV heparin infusion to treat possible NSTEMI. Given full dose ASA at outside ED; continue daily baby ASA here. Monitor on telemetry. Obtain ECHO in the morning. Await further recommendations from cardiology.   - Permanent afib, w/RVR initially, now SVR: HR up to 200 per outside facility reports, now down to 40s. Asymptomatic currently. Patient recalls he may be on metoprolol at home, awaiting pharmacy reconciliation of home meds. Heparinized as noted above. K+ 3.5 so will replace per protocol. Check mag and replace as well if indicated.   - CHF, unspecified: appears compensated at this time, but will get BNP and CXR to evaluate further, along with above-mentioned ECHO.   - COPD: appears compensated at this time. Review home meds once reconciled by pharmacy.     DVT Prophylaxis: IV heparin infusion    Estimated Length of Stay >2 midnights    I discussed the patient's findings, assessment and plan with the patient, his wife at bedside, nursing staff in the PCU, and ED provider Kurtis [facility]p at [facility].    Patient is high risk due to NSTEMI,  permanent afib with RVR initially and now SVR    Ej Burdick MD  09/26/23  05:15 EDT

## 2023-09-26 NOTE — CONSULTS
[facility]  CONSULT  NOTE      Patient information:  Date of Admit: 9/26/2023  Date of Consult: 09/26/23  Hospitalist/Referring MD:Ej Burdick MD  PCP: Provider, No Known  MRN:  [id number]  Visit Number:  [id number]    LOS: 0  CODE STATUS:  Code Status and Medical Interventions:   Ordered at: 09/26/23 1143     Level Of Support Discussed With:    Patient     Code Status (Patient has no pulse and is not breathing):    CPR (Attempt to Resuscitate)     Medical Interventions (Patient has pulse or is breathing):    Full Support       PROBLEM LIST: Principal Problem:    NSTEMI (non-ST elevated myocardial infarction)        General Cardiology Consulting Physician: Dr. Elvis Bain MD, [facility]    Assessment      Acute non-ST elevation myocardial infarction.  ASCVD, status post previous multiple PCI's in [place] (details unavailable).  Previous myocardial infarction in 2005 followed by PCI.  Ischemic cardiomyopathy, appears compensated at this time.  Paroxysmal atrial fibrillation with rapid ventricle response at admission, patient was converted back to sinus rhythm with sinus bradycardia in the 40s.  COPD.    Recommendations     Continue with aspirin and IV heparin.  Hold Eliquis in anticipation of cardiac catheterization.  Hold beta-blockers due to baseline sinus bradycardia.  Continue with atorvastatin.  I have recommended further cardiac evaluation cardiac catheterization.  I have discussed with Dr. Duran interventional cardiologist who is planning to do this today.  I have discussed his cardiac status in detail with patient's wife at bedside and his rosa Parekh in [place] on the telephone.    Reason for Cardiology consultation: NSTEMI with atrial fibrillation with RVR    Subjective Data   ADMISSION INFORMATION:    History of Present Illness    Héctor Zheng is a 78 y.o. male with a past medical history significant for history of CHF (unspecified), COPD on 5L  "oxygen concentrator at home, CAD s/p 5 stents total in the past, and and permanent afib s/p multiple failed cardioversions and ablations in the past, who presented to an outside facility on the afternoon of 9/25 with complaints of acute onset shortness of breath and palpitations. He reported a brief episode of chest pain as well which he described as \"pounding\" that had already resolved when he arrived. He was found to be in atrial fibrillation with RVR, with HR as high as 200. He dropped to the 60's without intervention soon after arrival, however, and HR has remained in the 40-60s since then. He denies any further palpitations or dyspnea. He does report ongoing belching and attributes this to a hiatal hernia which he notes has caused chest pain-like symptoms in the past. At the outside facility, pertinent labs include Na 140, K+ 3.5 and Cr 0.9. He had PVCs on telemetry monitoring for which he was given calcium and magnesium that the ED provider claimed \"smoothed out\" the PVCs. He was given full dose ASA after fist troponin 678-> 616.  proBNP was 2019.0.     Cardiology has been consulted for further evaluation and management NSTEMI with atrial fibrillation with RVR.     Primary Cardiologist has been [facility] cardiology     Patient is in room  and was examined by Dr. Dr. Bain.  Patient is lying in bed resting quietly.  No acute distress noted at this time.  Telemetry reveals sinus bradycardia 39 to 50s with frequent PVCs.  Patient currently denies any chest pain or shortness of breath.  Patient does report increased shortness of breath in the last 2 weeks that has progressively gotten worse.  [place] [place] he developed increased shortness of breath and chest pain.  After driving an hour he did stop and [place] at the [employer].  Police department did call EMS and they found him to have a heart rate around 200 at that time.  " Patient reports he is followed by [facility].  Patient reports his last MI was in 2005.  Patient report reports compliancy with his medicines and states his last dose of Eliquis was Monday morning.  He denies noticing any hematuria or bleeding through his bowels.  Patient does report a history of COPD and the use of his sleep and machine that recycles room air for him.  Patient reports he quit smoking in 2008.      ORDERS:   Consult for interventional cardiology for consideration of cardiac catheterization      Cardiac risk factors:arteriosclerotic heart disease, hypercholesterolemia, and hypertension      Last Echo:  None found in patient's chart      Last Stress: None found in patient's chart      Last Cath: Results for orders placed during the hospital encounter of 09/26/23    Cardiac Catheterization/Vascular Study (Needs Review)  This result has not been signed. Information might be incomplete.    Narrative    Mid LAD lesion is 30% stenosed.    Mid Cx lesion is 20% stenosed.    Prox RCA lesion is 20% stenosed.    Mid RCA lesion is 20% stenosed.    1.  Cardiac.  Patient with nonobstructive coronary artery disease.  Medical management will be continued                            Past Medical History:   Diagnosis Date    CHF (congestive heart failure)     COPD (chronic obstructive pulmonary disease)     on 5L oxygen concentrator at home    Coronary artery disease     s/p 5 stents    Lung nodule     Permanent atrial fibrillation     Prostate cancer     monitoring, no active treatment    Thyroid nodule      Past Surgical History:   Procedure Laterality Date    CARDIAC CATHETERIZATION      5 stents total in the past     History reviewed. No pertinent family history.  Social History     Tobacco Use    Smoking status: Never   Vaping Use    Vaping Use: Never used   Substance Use Topics    Alcohol use: Never    Drug use: Never       ALLERGIES: Patient has no known allergies.    Medications listed below  are reported home medications pulling from within the system:  Medications Prior to Admission   Medication Sig Dispense Refill Last Dose    allopurinol (ZYLOPRIM) 300 MG tablet Take 1 tablet by mouth Daily.   9/25/2023    apixaban (ELIQUIS) 5 MG tablet tablet Take 1 tablet by mouth 2 (Two) Times a Day.   9/25/2023    atorvastatin (LIPITOR) 80 MG tablet Take 1 tablet by mouth Daily.   9/25/2023    bumetanide (BUMEX) 0.5 MG tablet Take 3 tablets by mouth 2 (Two) Times a Day.   9/25/2023    colchicine 0.6 MG tablet Take 1 tablet by mouth 2 (Two) Times a Day As Needed for Muscle / Joint Pain.   9/25/2023    fexofenadine (ALLEGRA) 180 MG tablet Take 1 tablet by mouth Every Night.   9/25/2023    finasteride (PROSCAR) 5 MG tablet Take 1 tablet by mouth Every Morning.   9/25/2023    gabapentin (NEURONTIN) 300 MG capsule Take 1 capsule by mouth 3 (Three) Times a Day.   9/25/2023    levothyroxine (SYNTHROID, LEVOTHROID) 50 MCG tablet Take 1 tablet by mouth Every Night.   9/25/2023    lisinopril (PRINIVIL,ZESTRIL) 2.5 MG tablet Take 1 tablet by mouth Every Night.   9/25/2023    pantoprazole (PROTONIX) 40 MG EC tablet Take 1 tablet by mouth Daily.   9/25/2023    potassium chloride (K-DUR,KLOR-CON) 20 MEQ CR tablet Take 1 tablet by mouth 2 (Two) Times a Day.   9/25/2023    pseudoephedrine-guaifenesin (MUCINEX D)  MG per 12 hr tablet Take 1 tablet by mouth 2 (Two) Times a Day.   9/25/2023    tamsulosin (FLOMAX) 0.4 MG capsule 24 hr capsule Take 1 capsule by mouth Daily.   9/25/2023    carvedilol (COREG) 3.125 MG tablet Take 1 tablet by mouth 2 (Two) Times a Day With Meals.          Review of Systems   Constitutional:  Negative for activity change, diaphoresis and unexpected weight change.   HENT:  Negative for facial swelling and trouble swallowing.    Eyes: Negative.  Negative for visual disturbance.   Respiratory:  Positive for shortness of breath. Negative for apnea, cough, chest tightness, wheezing and stridor.     Cardiovascular:  Positive for chest pain and palpitations. Negative for leg swelling.   Gastrointestinal:  Negative for abdominal distention, nausea and vomiting.   Genitourinary: Negative.    Musculoskeletal: Negative.    Skin: Negative.    Neurological:  Negative for dizziness, syncope, speech difficulty and weakness.   Psychiatric/Behavioral:  Negative for agitation and behavioral problems.      Objective Data      Vital Signs  Temp:  [97.7 °F (36.5 °C)] 97.7 °F (36.5 °C)  Heart Rate:  [37-51] 37  Resp:  [12-18] 12  BP: (101-141)/(56-71) 106/58  Flow (L/min):  [2] 2  Device (Oxygen Therapy): nasal cannula  Vital Signs (last 72 hrs)         09/23 0700  09/24 0659 09/24 0700  09/25 0659 09/25 0700  09/26 0659 09/26 0700  09/26 1201   Most Recent      Temp (°F)       97.7      97.7     97.7 (36.5) 09/26 0800    Heart Rate       50    37 -  51     37 09/26 1157    Resp       18    12 -  18     12 09/26 1157    BP       141/67    101/56 -  125/62     106/58 09/26 1157    SpO2 (%)       100      100     100 09/26 1157          BMI:Body mass index is 30.34 kg/m².  WEIGHT:      09/26/23  0453   Weight: 104 kg (229 lb 15 oz)     DIET:Diet: Cardiac Diets; Healthy Heart (2-3 Na+); Texture: Regular Texture (IDDSI 7); Fluid Consistency: Thin (IDDSI 0)  I&O:  Intake/Output Summary (Last 24 hours) at 9/26/2023 1201  Last data filed at 9/26/2023 1030  Gross per 24 hour   Intake 19.97 ml   Output 600 ml   Net -580.03 ml       Physical Exam  Constitutional:       General: He is not in acute distress.     Appearance: Normal appearance. He is not ill-appearing, toxic-appearing or diaphoretic.   HENT:      Head: Normocephalic and atraumatic.      Mouth/Throat:      Mouth: Mucous membranes are moist.   Eyes:      Extraocular Movements: Extraocular movements intact.      Pupils: Pupils are equal, round, and reactive to light.   Cardiovascular:      Rate and Rhythm: Bradycardia present. Rhythm irregular.      Heart sounds: Normal  heart sounds.   Pulmonary:      Effort: Pulmonary effort is normal.      Breath sounds: Normal breath sounds.   Abdominal:      General: Bowel sounds are normal.      Palpations: Abdomen is soft.   Musculoskeletal:         General: Normal range of motion.      Cervical back: Normal range of motion.   Skin:     General: Skin is warm and dry.   Neurological:      General: No focal deficit present.      Mental Status: He is alert and oriented to person, place, and time. Mental status is at baseline.   Psychiatric:         Mood and Affect: Mood normal.         Behavior: Behavior normal.         Thought Content: Thought content normal.         Judgment: Judgment normal.       Results review   Results Review:    I have reviewed the patient's new clinical results.    Results from last 7 days   Lab Units 09/26/23  0656 09/26/23  0458   HSTROP T ng/L 616* 678*     Lab Results   Component Value Date    PROBNP 2,019.0 (H) 09/26/2023     Results from last 7 days   Lab Units 09/26/23  0458   WBC 10*3/mm3 4.65   HEMOGLOBIN g/dL 11.9*   PLATELETS 10*3/mm3 214     Results from last 7 days   Lab Units 09/26/23  0458   SODIUM mmol/L 138   POTASSIUM mmol/L 4.1   CHLORIDE mmol/L 105   CO2 mmol/L 22.7   BUN mg/dL 17   CREATININE mg/dL 0.82   CALCIUM mg/dL 9.4   GLUCOSE mg/dL 161*   ALT (SGPT) U/L 20   AST (SGOT) U/L 48*     Lab Results   Component Value Date    MG 2.5 (H) 09/26/2023    MG 1.9 08/24/2021     No results found for: CHOL, TRIG, HDL, LDL  Estimated Creatinine Clearance: 97 mL/min (by C-G formula based on SCr of 0.82 mg/dL).  No results found.  No results found for: HGBA1C  Lab Results   Component Value Date    INR 1.36 (H) 09/26/2023         Lab Results   Component Value Date    TSH 0.970 09/26/2023      No results found for: URICACID  Pain Management Panel           No data to display              Microbiology Results (last 10 days)       ** No results found for the last 240 hours. **           No results found for:  BLOODCX        ECG:        ECG/EMG Results (last 24 hours)       Procedure Component Value Units Date/Time    ECG 12 Lead Bradycardia [[id number]] Collected: 09/26/23 0520     Updated: 09/26/23 0521     QT Interval 490 ms      QTC Interval 455 ms     Narrative:      Test Reason : Bradycardia  Blood Pressure :   */*   mmHG  Vent. Rate :  52 BPM     Atrial Rate :  67 BPM     P-R Int :   * ms          QRS Dur : 112 ms      QT Int : 490 ms       P-R-T Axes :   *  61  62 degrees     QTc Int : 455 ms    Atrial fibrillation with slow ventricular response with premature ventricular or aberrantly conducted complexes and with ventricular escape complexes  Incomplete left bundle branch block  Abnormal ECG  No previous ECGs available    Referred By:            Confirmed By:     SCANNED - TELEMETRY   [[id number]] Resulted: 09/26/23     Updated: 09/26/23 0706    Adult Transthoracic Echo Complete w/ Color, Spectral and Contrast if necessary per protocol [[id number]] Resulted: 09/26/23 0938     Updated: 09/26/23 1015            TELEMETRY:     Atrial fibrillation 40 with PVC        RADIOLOGY STUDIES:  Imaging Results (Last 72 Hours)       Procedure Component Value Units Date/Time    XR Chest 1 View [[id number]] Collected: 09/26/23 0829     Updated: 09/26/23 0831    Narrative:      EXAM:    XR Chest, 1 View     EXAM DATE:    9/26/2023 7:12 AM     CLINICAL HISTORY:    short of breath     TECHNIQUE:    Frontal view of the chest.     COMPARISON:    No relevant prior studies available.     FINDINGS:    LUNGS AND PLEURAL SPACES:  Coarsened interstitial markings noted  throughout the lungs.  No pneumothorax.    HEART:  Mild cardiomegaly.    MEDIASTINUM:  Unremarkable as visualized.    BONES/JOINTS:  Unremarkable as visualized.       Impression:      1.  Mild cardiomegaly.  2.  Coarsened interstitial markings noted throughout the lungs.        This report was finalized on 9/26/2023 8:29 AM by Dr. Radhames Enriquez MD.               ALLERGIES:  Patient has no known allergies.    CURRENT MEDICATIONS:  Current list of medications may not reflect those currently placed in orders that are not signed or are being held.     sodium chloride, 1 mL/kg/hr, Last Rate: 1 mL/kg/hr (09/26/23 1158)        acetaminophen    senna-docusate sodium **AND** polyethylene glycol **AND** bisacodyl **AND** bisacodyl    colchicine    Magnesium Cardiology Dose Replacement - Follow Nurse / BPA Driven Protocol    nitroglycerin    Potassium Replacement - Follow Nurse / BPA Driven Protocol    sodium chloride    sodium chloride    Thank you very much for asking us to be involved in this patient's care.  We will follow along with you. Please do not hesitate to call for any questions or concerns.     I have discussed the patients findings and recommendations with patient.    Electronically signed by JEAN Villa, 09/26/23, 12:08 PM EDT.                       Please note that portions of this note were copied and has been reviewed and is accurate as of 9/26/2023 .      Please note that portions of this note were completed with a voice recognition program.

## 2023-09-26 NOTE — PAYOR COMM NOTE
"CONTACT: CORI TOBIAS RN  [facility] [place] [address]  PHONE: [phone]  FAX: [phone]      INPATIENT AUTH REQUEST    Roosevelt Gaytan (78 y.o. Male)       Date of Birth   1945    Social Security Number       Address   [address] [place] [address]    Home Phone   [phone]    MRN   [id number]       Orthodox   None    Marital Status   Unknown                            Admission Date   23    Admission Type   Urgent    Admitting Provider   Ej Burdick MD    Attending Provider   Nereyda Cortez MD    Department, Room/Bed   [facility] PROGRESS CARE, P214/S2       Discharge Date       Discharge Disposition       Discharge Destination                                 Attending Provider: Nereyda Cortez MD    Allergies: No Known Allergies    Isolation: None   Infection: None   Code Status: CPR    Ht: 185.4 cm (73\")   Wt: 104 kg (229 lb 15 oz)    Admission Cmt: None   Principal Problem: NSTEMI (non-ST elevated myocardial infarction) [I21.4]                   Active Insurance as of 2023       Primary Coverage       Payor Plan Insurance Group Employer/Plan Group    [employer] COMMERCIAL AETNA [id number]       Payor Plan Address Payor Plan Phone Number Payor Plan Fax Number Effective Dates    [address] [address]   2022 - None Entered    [place] [address]         Subscriber Name Subscriber Birth Date Member ID       ROOSEVELT GAYTAN 1945 [id number]                     Emergency Contacts        (Rel.) Home Phone Work Phone Mobile Phone    Keyla Gaytan (Other) [phone] -- --    Sanaz Castillo (Daughter) -- -- [phone]                 History & Physical        Ej Burdick MD at 23 0512          Hospitalist History and Physical        Patient Identification  Name: Roosevelt Gaytan  Age/Sex: 78 y.o. male  :  1945        MRN: [id number]  Visit Number: [id number]  Admit " "Date: 9/26/2023   PCP: Provider, No Known          Chief complaint chest pounding, short of breath    History of Present Illness:  Patient is a 78 y.o. male with history of CHF (unspecified), COPD on 5L oxygen concentrator at home, CAD s/p 5 stents total in the past, and and permanent afib s/p multiple failed cardioversions and ablations in the past, who presented to an outside facility on the afternoon of 9/25 with complaints of acute onset shortness of breath and palpitations. He reported a brief episode of chest pain as well which he described as \"pounding\" that had already resolved when he arrived. He was found to be in atrial fibrillation with RVR, with HR as high as 200. He dropped to the 60's without intervention soon after arrival, however, and HR has remained in the 40-60s since then. He denies any further palpitations or dyspnea. He does report ongoing belching and attributes this to a hiatal hernia which he notes has caused chest pain-like symptoms in the past. At the outside facility, pertinent labs include Na 140, K+ 3.5 and Cr 0.9. He had PVCs on telemetry monitoring for which he was given calcium and magnesium that the ED provider claimed \"smoothed out\" the PVCs. He was given full dose ASA after fist troponin came back mildly elevated at 0.78. Initially, it was felt he likely had a troponin leak from the severity of his afib w/RVR episode, and the ED provider planned to discharge him if his repeat troponin came back at a similar level. However, repeat troponin increased significantly to 3.93, raising concern for true NSTEMI. Thus, IV Heparin infusion was initiated and the patient was transferred to our facility for cardiology evaluation. Patient states currently he is chest pain free and is not short of breath. He is wearing 2L NC and O2 saturation is upper 90s. BP is stable in the 110-120s systolic but HR is in the 40s mostly. STAT labs, EKG and CXR are pending.     Review of Systems  Review of " Systems   Constitutional:  Negative for activity change, appetite change, chills, diaphoresis, fatigue, fever and unexpected weight change.   HENT:  Negative for congestion, postnasal drip, rhinorrhea, sinus pressure, sinus pain and sore throat.    Eyes:  Negative for photophobia, pain, discharge, redness, itching and visual disturbance.   Respiratory:  Positive for shortness of breath. Negative for cough and wheezing.    Cardiovascular:  Positive for chest pain and palpitations. Negative for leg swelling.   Gastrointestinal:  Negative for abdominal distention, abdominal pain, constipation, diarrhea, nausea and vomiting.   Endocrine: Negative for cold intolerance, heat intolerance, polydipsia, polyphagia and polyuria.   Genitourinary:  Negative for difficulty urinating, dysuria, flank pain, frequency and hematuria.   Musculoskeletal:  Negative for arthralgias, back pain, joint swelling, neck pain and neck stiffness.   Skin:  Negative for color change, pallor, rash and wound.   Neurological:  Negative for dizziness, seizures, light-headedness, numbness and headaches.   Hematological:  Negative for adenopathy. Does not bruise/bleed easily.   Psychiatric/Behavioral:  Negative for agitation, behavioral problems and confusion.      History  Past Medical History:   Diagnosis Date    CHF (congestive heart failure)     COPD (chronic obstructive pulmonary disease)     on 5L oxygen concentrator at home    Coronary artery disease     s/p 5 stents    Lung nodule     Permanent atrial fibrillation     Prostate cancer     monitoring, no active treatment    Thyroid nodule      Past Surgical History:   Procedure Laterality Date    CARDIAC CATHETERIZATION      5 stents total in the past     History reviewed. No pertinent family history.     No medications prior to admission.     Allergies:  Patient has no known allergies.    Objective    Vital Signs     Body mass index is 30.34 kg/m².    Physical Exam:  Physical Exam  Constitutional:        General: He is not in acute distress.     Appearance: He is ill-appearing (chronically so).   HENT:      Head: Normocephalic and atraumatic.      Right Ear: External ear normal.      Left Ear: External ear normal.      Nose: Nose normal.      Mouth/Throat:      Mouth: Mucous membranes are moist.      Pharynx: Oropharynx is clear.   Eyes:      Extraocular Movements: Extraocular movements intact.      Conjunctiva/sclera: Conjunctivae normal.      Pupils: Pupils are equal, round, and reactive to light.   Cardiovascular:      Rate and Rhythm: Bradycardia present. Rhythm irregular.      Pulses: Normal pulses.      Heart sounds: Normal heart sounds. No murmur heard.  Pulmonary:      Effort: Pulmonary effort is normal. No respiratory distress.      Breath sounds: Normal breath sounds. No wheezing or rales.   Abdominal:      General: Abdomen is flat. Bowel sounds are normal. There is no distension.      Palpations: Abdomen is soft.      Tenderness: There is no abdominal tenderness.   Musculoskeletal:         General: Normal range of motion.      Cervical back: Normal range of motion and neck supple. No tenderness.      Right lower leg: No edema.      Left lower leg: No edema.   Lymphadenopathy:      Cervical: No cervical adenopathy.   Skin:     General: Skin is warm and dry.      Capillary Refill: Capillary refill takes less than 2 seconds.      Coloration: Skin is not jaundiced.      Findings: No bruising or lesion.   Neurological:      General: No focal deficit present.      Mental Status: He is alert and oriented to person, place, and time.   Psychiatric:         Mood and Affect: Mood normal.         Behavior: Behavior normal.         Results Review:       Lab Results:  Results from last 7 days   Lab Units 09/26/23  0458   WBC 10*3/mm3 4.65   HEMOGLOBIN g/dL 11.9*   PLATELETS 10*3/mm3 214                 No results found for: HGBA1C                        I have reviewed the patient's laboratory  results.    Imaging:  Imaging Results (Last 72 Hours)       ** No results found for the last 72 hours. **            I have personally reviewed the patient's radiologic imaging.        EKG:   Atrial fibrillation with slow ventricular response with premature ventricular or aberrantly conducted complexes and with ventricular escape complexes, HR 52, QTc 455  Incomplete left bundle branch block  Abnormal ECG  No previous ECGs available    I have personally reviewed the patient's EKG. Frequent PVCs noted both on EKG and telemetry strip.         Assessment & Plan    - NSTEMI (non-ST elevated myocardial infarction): type 1 vs type 2 related to severely uncontrolled afib. Last dose of eliquis was morning of 9/25. Now on IV heparin infusion to treat possible NSTEMI. Given full dose ASA at outside ED; continue daily baby ASA here. Monitor on telemetry. Obtain ECHO in the morning. Await further recommendations from cardiology.   - Permanent afib, w/RVR initially, now SVR: HR up to 200 per outside facility reports, now down to 40s. Asymptomatic currently. Patient recalls he may be on metoprolol at home, awaiting pharmacy reconciliation of home meds. Heparinized as noted above. K+ 3.5 so will replace per protocol. Check mag and replace as well if indicated.   - CHF, unspecified: appears compensated at this time, but will get BNP and CXR to evaluate further, along with above-mentioned ECHO.   - COPD: appears compensated at this time. Review home meds once reconciled by pharmacy.     DVT Prophylaxis: IV heparin infusion    Estimated Length of Stay >2 midnights    I discussed the patient's findings, assessment and plan with the patient, his wife at bedside, nursing staff in the PCU, and ED provider Kurtis Yip at [facility].    Patient is high risk due to NSTEMI, permanent afib with RVR initially and now SVR    Ej Burdick MD  09/26/23  05:15 EDT      Electronically signed by Ej Burdick MD at  09/26/23 0535       Facility-Administered Medications as of 9/26/2023   Medication Dose Route Frequency Provider Last Rate Last Admin    acetaminophen (TYLENOL) tablet 650 mg  650 mg Oral Q4H PRN Kale Duran MD        allopurinol (ZYLOPRIM) tablet 300 mg  300 mg Oral Daily Kale Duran MD   300 mg at 09/26/23 0840    arformoterol (BROVANA) nebulizer solution 15 mcg  15 mcg Nebulization BID - RT Kale Duran MD   15 mcg at 09/26/23 1044    aspirin chewable tablet 81 mg  81 mg Oral Daily Kale Duran MD   81 mg at 09/26/23 0841    atorvastatin (LIPITOR) tablet 80 mg  80 mg Oral Daily Kale Duran MD   80 mg at 09/26/23 0841    sennosides-docusate (PERICOLACE) 8.6-50 MG per tablet 2 tablet  2 tablet Oral BID Kale Duran MD   2 tablet at 09/26/23 0841    And    polyethylene glycol (MIRALAX) packet 17 g  17 g Oral Daily PRN Kale Duran MD        And    bisacodyl (DULCOLAX) EC tablet 5 mg  5 mg Oral Daily PRN Kale Duran MD        And    bisacodyl (DULCOLAX) suppository 10 mg  10 mg Rectal Daily PRN Kale Duran MD        budesonide (PULMICORT) nebulizer solution 0.5 mg  0.5 mg Nebulization BID - RT Kale Duran MD   0.5 mg at 09/26/23 1045    bumetanide (BUMEX) tablet 1.5 mg  1.5 mg Oral BID Kale Duran MD   1.5 mg at 09/26/23 0841    cetirizine (zyrTEC) tablet 10 mg  10 mg Oral Daily Kale Duran MD   10 mg at 09/26/23 0840    colchicine tablet 0.6 mg  0.6 mg Oral BID PRN Kale Duran MD        finasteride (PROSCAR) tablet 5 mg  5 mg Oral QAM Kale Duran MD   5 mg at 09/26/23 0841    gabapentin (NEURONTIN) capsule 300 mg  300 mg Oral TID Kale Duran MD   300 mg at 09/26/23 0842    ipratropium (ATROVENT) nebulizer solution 0.5 mg  0.5 mg Nebulization 4x Daily - RT Kale Duran MD        levothyroxine (SYNTHROID, LEVOTHROID) tablet 50 mcg  50 mcg Oral Nightly Kale Duran MD        lisinopril (PRINIVIL,ZESTRIL) tablet 2.5 mg  2.5 mg Oral Nightly Kale Duran MD        Magnesium  Cardiology Dose Replacement - Follow Nurse / BPA Driven Protocol   Does not apply PRN Kale Duran MD        nitroglycerin (NITROSTAT) SL tablet 0.4 mg  0.4 mg Sublingual Q5 Min PRN Kale Duran MD        pantoprazole (PROTONIX) EC tablet 40 mg  40 mg Oral Daily Kale Duran MD   40 mg at 09/26/23 0841    potassium chloride (K-DUR,KLOR-CON) CR tablet 20 mEq  20 mEq Oral BID Kale Duran MD   20 mEq at 09/26/23 0841    Potassium Replacement - Follow Nurse / BPA Driven Protocol   Does not apply PRN Kale Duran MD        sodium chloride 0.9 % flush 10 mL  10 mL Intravenous Q12H Kale Duran MD   10 mL at 09/26/23 0842    sodium chloride 0.9 % flush 10 mL  10 mL Intravenous PRN Kale Duran MD        sodium chloride 0.9 % infusion 40 mL  40 mL Intravenous PRN Kale Duran MD        [COMPLETED] sodium chloride 0.9 % infusion    Code / Trauma / Sedation Continuous Med Kale Duran MD 75 mL/hr at 09/26/23 1107 75 mL/hr at 09/26/23 1107    sodium chloride 0.9 % infusion  1 mL/kg/hr Intravenous Continuous Kale Duran  mL/hr at 09/26/23 1158 1 mL/kg/hr at 09/26/23 1158    tamsulosin (FLOMAX) 24 hr capsule 0.4 mg  0.4 mg Oral Daily Kale Duran MD   0.4 mg at 09/26/23 0840     Orders (all)        Start     Ordered    09/27/23 0700  ECG 12 Lead Chest Pain  Once         09/26/23 1143    09/27/23 0600  CBC & Differential  Every Third Day      Comments: Discontinue After Heparin Stopped      09/26/23 0513    09/27/23 0600  CBC & Differential  Morning Draw         09/26/23 1018    09/27/23 0600  Basic Metabolic Panel  Morning Draw         09/26/23 1018    09/27/23 0600  CBC (No Diff)  Morning Draw         09/26/23 1143    09/27/23 0600  Hemoglobin A1c  Morning Draw         09/26/23 1143    09/27/23 0600  Lipid Panel  Morning Draw        Comments: Fasting      09/26/23 1143    09/26/23 2100  lisinopril (PRINIVIL,ZESTRIL) tablet 2.5 mg  Nightly         09/26/23 0632    09/26/23 2100  levothyroxine  (SYNTHROID, LEVOTHROID) tablet 50 mcg  Nightly         09/26/23 0632    09/26/23 1300  ipratropium (ATROVENT) nebulizer solution 0.5 mg  4 Times Daily - RT         09/26/23 1018    09/26/23 1200  aPTT  Timed,   Status:  Canceled         09/26/23 0547    09/26/23 1200  Strict Intake & Output  Every 4 Hours       09/26/23 1143    09/26/23 1200  Incentive Spirometry  Every 2 Hours While Awake       09/26/23 1143    09/26/23 1145  sodium chloride 0.9 % infusion  Continuous         09/26/23 1143    09/26/23 1144  Vital Signs, Site Check & Distal Extremity Check for Warmth, Color, Sensation & Pulses  Per Order Details         09/26/23 1143    09/26/23 1144  Continuous Cardiac Monitoring  Continuous        Comments: Follow Standing Orders As Outlined in Process Instructions (Open Order Report to View Full Instructions)    09/26/23 1143    09/26/23 1144  Telemetry - Maintain IV Access  Continuous         09/26/23 1143    09/26/23 1144  Telemetry - Place Orders & Notify Provider of Results When Patient Experiences Acute Chest Pain, Dysrhythmia or Respiratory Distress  Until Discontinued         09/26/23 1143    09/26/23 1144  May Be Off Telemetry for Tests  Continuous         09/26/23 1143    09/26/23 1144  Encourage Fluids  Until Discontinued         09/26/23 1143    09/26/23 1144  Continuous Pulse Oximetry  Continuous         09/26/23 1143    09/26/23 1144  Advance Diet As Tolerated -  Until Discontinued         09/26/23 1143    09/26/23 1144  Notify MD if platelet count is less than 100,000, is less than 1/2 baseline, or if Hgb drops by more than 3mg/dl.  Until Discontinued         09/26/23 1143    09/26/23 1144  Notify MD of hypotension (SBP less than 95), bleeding, or dysrythmia and follow Sheath Removal Policy if needed.  Continuous         09/26/23 1143    09/26/23 1144  Cardiac Rehab Evaluation and Enrollment  Once        Provider:  (Not yet assigned)    09/26/23 1143    09/26/23 1144  Hold metFORMIN (GLUCOPHAGE) for  48 Hours  Continuous         09/26/23 1143    09/26/23 1144  Code Status and Medical Interventions:  Continuous         09/26/23 1143    09/26/23 1144  Sheath Pulled in Lab  Once         09/26/23 1143    09/26/23 1144  Assess Puncture Site, Vital Signs, Distal Pulses & Observe Site for Bleeding or Swelling  Per Order Details        Comments: Every 15 Minutes x4, Every 30 Minutes x4, & Every 1 Hour x2    09/26/23 1143    09/26/23 1144  Closure Device Used  Once         09/26/23 1143    09/26/23 1144  Assess Puncture Site, Vital Signs, Distal Pulses & Observe Site for Bleeding or Swelling  Per Order Details        Comments: Every 15 Minutes x4, Every 30 Minutes x4, & Every 1 Hour x2    09/26/23 1143    09/26/23 1144  Diet: Cardiac Diets; Healthy Heart (2-3 Na+); Texture: Regular Texture (IDDSI 7); Fluid Consistency: Thin (IDDSI 0)  Diet Effective Now         09/26/23 1143    09/26/23 1143  acetaminophen (TYLENOL) tablet 650 mg  Every 4 Hours PRN         09/26/23 1143    09/26/23 1130  iopamidol (ISOVUE-370) 76 % injection  Code / Trauma / Sedation Medication,   Status:  Discontinued         09/26/23 1130    09/26/23 1115  budesonide (PULMICORT) nebulizer solution 0.5 mg  2 Times Daily - RT         09/26/23 1018    09/26/23 1115  arformoterol (BROVANA) nebulizer solution 15 mcg  2 Times Daily - RT         09/26/23 1018    09/26/23 1111  midazolam (VERSED) injection  Code / Trauma / Sedation Medication,   Status:  Discontinued         09/26/23 1111    09/26/23 1111  fentaNYL citrate (PF) (SUBLIMAZE) injection  Code / Trauma / Sedation Medication,   Status:  Discontinued         09/26/23 1111    09/26/23 1111  lidocaine (XYLOCAINE) 2% injection  Code / Trauma / Sedation Medication,   Status:  Discontinued         09/26/23 1112    09/26/23 1107  sodium chloride 0.9 % infusion  Code / Trauma / Sedation Continuous Med         09/26/23 1108    09/26/23 1054  Case Request Cath Lab: Left Heart Cath  Once         09/26/23 1053     09/26/23 1039  Cardiac Catheterization/Vascular Study  Once         09/26/23 1038    09/26/23 1002  Case Request Cath Lab: Left Heart Cath  Once         09/26/23 1001    09/26/23 0954  Inpatient Interventional Cardiology Consult  Once        Specialty:  Interventional Cardiology  Provider:  Kale Duran MD    09/26/23 0957    09/26/23 0932  Spiritual Care Consult  Once        Provider:  (Not yet assigned)    09/26/23 0931    09/26/23 0900  sodium chloride 0.9 % flush 10 mL  Every 12 Hours Scheduled         09/26/23 0443    09/26/23 0900  sennosides-docusate (PERICOLACE) 8.6-50 MG per tablet 2 tablet  2 Times Daily        See Hyperspace for full Linked Orders Report.    09/26/23 0443    09/26/23 0900  aspirin chewable tablet 81 mg  Daily         09/26/23 0443    09/26/23 0900  allopurinol (ZYLOPRIM) tablet 300 mg  Daily         09/26/23 0632    09/26/23 0900  atorvastatin (LIPITOR) tablet 80 mg  Daily         09/26/23 0632    09/26/23 0900  bumetanide (BUMEX) tablet 1.5 mg  2 Times Daily         09/26/23 0632    09/26/23 0900  cetirizine (zyrTEC) tablet 10 mg  Daily         09/26/23 0632    09/26/23 0900  gabapentin (NEURONTIN) capsule 300 mg  3 Times Daily         09/26/23 0632    09/26/23 0900  pantoprazole (PROTONIX) EC tablet 40 mg  Daily         09/26/23 0632    09/26/23 0900  potassium chloride (K-DUR,KLOR-CON) CR tablet 20 mEq  2 Times Daily         09/26/23 0632    09/26/23 0900  tamsulosin (FLOMAX) 24 hr capsule 0.4 mg  Daily         09/26/23 0632    09/26/23 0800  Vital Signs  Every 8 Hours        Comments: Per per hospital policy    09/26/23 0443    09/26/23 0800  Oral Care  2 Times Daily       09/26/23 0443    09/26/23 0730  finasteride (PROSCAR) tablet 5 mg  Every Morning         09/26/23 0632    09/26/23 0714  PT Consult: Eval & Treat Functional Mobility Below Baseline  Once         09/26/23 0713    09/26/23 0707  High Sensitivity Troponin T  STAT,   Status:  Canceled         09/26/23 0707     09/26/23 0700  Adult Transthoracic Echo Complete w/ Color, Spectral and Contrast if necessary per protocol  Once         09/26/23 0443    09/26/23 0658  High Sensitivity Troponin T 2Hr  PROCEDURE ONCE         09/26/23 0552    09/26/23 0633  TSH  STAT         09/26/23 0632    09/26/23 0630  colchicine tablet 0.6 mg  2 Times Daily PRN         09/26/23 0632    09/26/23 0600  Strict Intake & Output  Every 6 Hours         09/26/23 0443    09/26/23 0600  heparin 24762 units/250 mL (100 units/mL) in 0.45 % NaCl infusion  Titrated,   Status:  Discontinued         09/26/23 0513    09/26/23 0547  aPTT  STAT        Comments: Prior to Initial Heparin Bolus      09/26/23 0513    09/26/23 0534  BNP  STAT         09/26/23 0533    09/26/23 0533  High Sensitivity Troponin T  STAT         09/26/23 0533    09/26/23 0525  Magnesium  STAT         09/26/23 0524    09/26/23 0524  Magnesium Cardiology Dose Replacement - Follow Nurse / BPA Driven Protocol  As Needed         09/26/23 0524    09/26/23 0524  Potassium Replacement - Follow Nurse / BPA Driven Protocol  As Needed         09/26/23 0524    09/26/23 0524  XR Chest 1 View  1 Time Imaging         09/26/23 0523    09/26/23 0514  Initiate & Follow Heparin Protocol  Continuous,   Status:  Canceled         09/26/23 0513    09/26/23 0514  Notify Provider Platelet Count Less Than 78768  Until Discontinued         09/26/23 0513    09/26/23 0514  Stop Infusion & Notify Provider if Bleeding Occurs  Until Discontinued         09/26/23 0513    09/26/23 0514  CBC & Differential  STAT,   Status:  Canceled        Comments: Prior to Initial Heparin Bolus      09/26/23 0513    09/26/23 0514  Heparin Anti-Xa  STAT        Comments: Prior to Initial Heparin Bolus      09/26/23 0513    09/26/23 0514  Protime-INR  STAT        Comments: Prior to Initial Heparin Bolus      09/26/23 0513    09/26/23 0514  CBC Auto Differential  PROCEDURE ONCE,   Status:  Canceled        Comments: Prior to Initial Heparin  Bolus      09/26/23 0513    09/26/23 0513  heparin (porcine) 5000 UNIT/ML injection 4,000 Units  As Needed,   Status:  Discontinued         09/26/23 0513    09/26/23 0513  heparin (porcine) 5000 UNIT/ML injection 2,000 Units  As Needed,   Status:  Discontinued         09/26/23 0513    09/26/23 0513  Pharmacy to Dose Heparin  Continuous PRN,   Status:  Discontinued         09/26/23 0513    09/26/23 0513  ECG 12 Lead Bradycardia  STAT         09/26/23 0513    09/26/23 0444  Daily Weights  Daily       09/26/23 0443    09/26/23 0443  Inpatient Cardiology Consult  Once        Specialty:  Cardiology  Provider:  Rosa Govea MD    09/26/23 0443    09/26/23 0443  CBC Auto Differential  STAT         09/26/23 0443    09/26/23 0443  Comprehensive Metabolic Panel  STAT         09/26/23 0443    09/26/23 0442  Inpatient Admission  Once         09/26/23 0443    09/26/23 0442  Notify Physician (with Parameters)  Until Discontinued         09/26/23 0443    09/26/23 0442  Insert Peripheral IV  Once         09/26/23 0443    09/26/23 0442  Saline Lock & Maintain IV Access  Continuous,   Status:  Canceled         09/26/23 0443    09/26/23 0442  Code Status and Medical Interventions:  Continuous,   Status:  Canceled         09/26/23 0443    09/26/23 0442  VTE Prophylaxis Not Indicated: Other: Patient Currently Anticoagulated / Receiving Prophylaxis  Once         09/26/23 0443    09/26/23 0442  Continuous Cardiac Monitoring  Continuous,   Status:  Canceled        Comments: Follow Standing Orders As Outlined in Process Instructions (Open Order Report to View Full Instructions)    09/26/23 0443    09/26/23 0442  Telemetry - Maintain IV Access  Continuous,   Status:  Canceled         09/26/23 0443    09/26/23 0442  Telemetry - Place Orders & Notify Provider of Results When Patient Experiences Acute Chest Pain, Dysrhythmia or Respiratory Distress  Until Discontinued         09/26/23 0443    09/26/23 0442  May Be Off Telemetry for Tests   Continuous         09/26/23 0443 09/26/23 0442  Pulse Oximetry, Continuous  Continuous,   Status:  Canceled         09/26/23 0443 09/26/23 0442  NPO Diet NPO Type: Sips with Meds  Diet Effective Now,   Status:  Canceled         09/26/23 0443 09/26/23 0441  nitroglycerin (NITROSTAT) SL tablet 0.4 mg  Every 5 Minutes PRN         09/26/23 0443 09/26/23 0441  sodium chloride 0.9 % flush 10 mL  As Needed         09/26/23 0443 09/26/23 0441  sodium chloride 0.9 % infusion 40 mL  As Needed         09/26/23 0443 09/26/23 0441  polyethylene glycol (MIRALAX) packet 17 g  Daily PRN        See Hyperspace for full Linked Orders Report.    09/26/23 0443 09/26/23 0441  bisacodyl (DULCOLAX) EC tablet 5 mg  Daily PRN        See Hyperspace for full Linked Orders Report.    09/26/23 0443 09/26/23 0441  bisacodyl (DULCOLAX) suppository 10 mg  Daily PRN        See Hyperspace for full Linked Orders Report.    09/26/23 0443    Unscheduled  Up With Assistance  As Needed       09/26/23 0443    Unscheduled  Change Site Dressing  As Needed       09/26/23 1143    Unscheduled  Oxygen Therapy- Nasal Cannula; Titrate 1-6 LPM Per SpO2; 90 - 95%  Continuous PRN       09/26/23 1143    Unscheduled  Head of Bed: 30 degrees; 4 Hours; Elevate Head of Bed: 30 Degrees; 4 Hours; Activity Level: Strict Bed Rest; If No Bleeding; Total Bedrest Time? 4 Hours  As Needed       09/26/23 1143    Unscheduled  Head of Bed: 30 degrees; 4 Hours; Elevate Head of Bed: 30 Degrees; 4 Hours; Activity Level: Strict Bed Rest; Keep Affected Extremity/ Extremities Straight; Total Bedrest Time? 4 Hours  As Needed       09/26/23 1143    --  allopurinol (ZYLOPRIM) 300 MG tablet  Daily         09/26/23 0556    --  atorvastatin (LIPITOR) 80 MG tablet  Daily         09/26/23 0556    --  bumetanide (BUMEX) 0.5 MG tablet  2 Times Daily         09/26/23 0556    --  carvedilol (COREG) 3.125 MG tablet  2 Times Daily With Meals         09/26/23 0556    --   colchicine 0.6 MG tablet  2 Times Daily PRN         09/26/23 0556    --  fexofenadine (ALLEGRA) 180 MG tablet  Nightly         09/26/23 0556    --  finasteride (PROSCAR) 5 MG tablet  Every Morning         09/26/23 0556    --  gabapentin (NEURONTIN) 300 MG capsule  3 Times Daily         09/26/23 0556    --  levothyroxine (SYNTHROID, LEVOTHROID) 50 MCG tablet  Nightly         09/26/23 0556    --  pantoprazole (PROTONIX) 40 MG EC tablet  Daily         09/26/23 0556    --  potassium chloride (K-DUR,KLOR-CON) 20 MEQ CR tablet  2 Times Daily         09/26/23 0556    --  tamsulosin (FLOMAX) 0.4 MG capsule 24 hr capsule  Daily         09/26/23 0556    --  pseudoephedrine-guaifenesin (MUCINEX D)  MG per 12 hr tablet  2 Times Daily         09/26/23 0556    --  apixaban (ELIQUIS) 5 MG tablet tablet  2 Times Daily         09/26/23 0607    --  lisinopril (PRINIVIL,ZESTRIL) 2.5 MG tablet  Nightly         09/26/23 0622    --  SCANNED - TELEMETRY           09/26/23 0000                     Physician Progress Notes (all)        Nereyda Cortez MD at 09/26/23 1013          -History of chronic persistent atrial fibrillation with episode of RVR  -Troponin elevation with chest pain non-STEMI type I versus type II  -CHF with evidence of mild decompensation awaiting 2D echo EF evaluation  -COPD no signs of exacerbation at this time  -History of BPH    Awaiting for cardiology recommendation, keep the patient n.p.o. in case the patient needs stress test or procedure.  Start neb treatment with inhaled corticosteroids and long-acting beta agonist.  Monitor CBC and platelet count, electrolytes.        Electronically signed by Nereyda Cortez MD at 09/26/23 1017          Consult Notes (all)        Kale Duran MD at 09/26/23 1056        Consult Orders    1. Inpatient Interventional Cardiology Consult [[id number]] ordered by Cara Shultz APRN at 09/26/23 0957                 .Patient Identification:  Name:  Héctor  Zheng  Age:  78 y.o.  Sex:  male  :  1945  MRN:  [id number]  Visit Number:  [id number]  Date of Admit: 2023  Date of Consult: 23  Provider, No Known    Chief Complaint:   Evaluate for left heart cardiac catheterization    Subjective:    Patient is a 78-year-old gentleman past medical history significant for coronary disease s/p 5 PCI in the past also with A-fib on chronic anticoagulation.  Last dose of Eliquis was on  a.m.  Patient was driving yesterday when he started having discomfort in the chest with palpitations.  His heart rate was noted to be in 200 range.  Patient was noted to have mildly elevated troponin and was transferred to our facility.  Overnight his troponins high-sensitivity are up to 600 range.  Patient is not complaining of any chest pain currently.  He has IV heparin running.      Past Medical History:   Diagnosis Date    CHF (congestive heart failure)     COPD (chronic obstructive pulmonary disease)     on 5L oxygen concentrator at home    Coronary artery disease     s/p 5 stents    Lung nodule     Permanent atrial fibrillation     Prostate cancer     monitoring, no active treatment    Thyroid nodule      Past Surgical History:   Procedure Laterality Date    CARDIAC CATHETERIZATION      5 stents total in the past     History reviewed. No pertinent family history.  Social History     Tobacco Use    Smoking status: Never   Vaping Use    Vaping Use: Never used   Substance Use Topics    Alcohol use: Never    Drug use: Never     Medications Prior to Admission   Medication Sig Dispense Refill Last Dose    allopurinol (ZYLOPRIM) 300 MG tablet Take 1 tablet by mouth Daily.   2023    apixaban (ELIQUIS) 5 MG tablet tablet Take 1 tablet by mouth 2 (Two) Times a Day.   2023    atorvastatin (LIPITOR) 80 MG tablet Take 1 tablet by mouth Daily.   2023    bumetanide (BUMEX) 0.5 MG tablet Take 3 tablets by mouth 2 (Two) Times a Day.   2023    colchicine 0.6 MG  tablet Take 1 tablet by mouth 2 (Two) Times a Day As Needed for Muscle / Joint Pain.   9/25/2023    fexofenadine (ALLEGRA) 180 MG tablet Take 1 tablet by mouth Every Night.   9/25/2023    finasteride (PROSCAR) 5 MG tablet Take 1 tablet by mouth Every Morning.   9/25/2023    gabapentin (NEURONTIN) 300 MG capsule Take 1 capsule by mouth 3 (Three) Times a Day.   9/25/2023    levothyroxine (SYNTHROID, LEVOTHROID) 50 MCG tablet Take 1 tablet by mouth Every Night.   9/25/2023    lisinopril (PRINIVIL,ZESTRIL) 2.5 MG tablet Take 1 tablet by mouth Every Night.   9/25/2023    pantoprazole (PROTONIX) 40 MG EC tablet Take 1 tablet by mouth Daily.   9/25/2023    potassium chloride (K-DUR,KLOR-CON) 20 MEQ CR tablet Take 1 tablet by mouth 2 (Two) Times a Day.   9/25/2023    pseudoephedrine-guaifenesin (MUCINEX D)  MG per 12 hr tablet Take 1 tablet by mouth 2 (Two) Times a Day.   9/25/2023    tamsulosin (FLOMAX) 0.4 MG capsule 24 hr capsule Take 1 capsule by mouth Daily.   9/25/2023    carvedilol (COREG) 3.125 MG tablet Take 1 tablet by mouth 2 (Two) Times a Day With Meals.        Allergies:  Patient has no known allergies.        ----------------------------------------------------------------------------------------------------------------------  Current [facility] Meds:  allopurinol, 300 mg, Oral, Daily  arformoterol, 15 mcg, Nebulization, BID - RT  aspirin, 81 mg, Oral, Daily  atorvastatin, 80 mg, Oral, Daily  budesonide, 0.5 mg, Nebulization, BID - RT  bumetanide, 1.5 mg, Oral, BID  cetirizine, 10 mg, Oral, Daily  finasteride, 5 mg, Oral, QAM  gabapentin, 300 mg, Oral, TID  ipratropium, 0.5 mg, Nebulization, 4x Daily - RT  levothyroxine, 50 mcg, Oral, Nightly  lisinopril, 2.5 mg, Oral, Nightly  pantoprazole, 40 mg, Oral, Daily  potassium chloride, 20 mEq, Oral, BID  senna-docusate sodium, 2 tablet, Oral, BID  sodium chloride, 10 mL, Intravenous, Q12H  tamsulosin, 0.4 mg, Oral, Daily      heparin, 9.6 Units/kg/hr (Dosing  Weight), Last Rate: 9.6 Units/kg/hr (09/26/23 0655)  Pharmacy to Dose Heparin,       ----------------------------------------------------------------------------------------------------------------------  Vital Signs:  Temp:  [97.7 °F (36.5 °C)] 97.7 °F (36.5 °C)  Heart Rate:  [39-51] 49  Resp:  [16-18] 18  BP: (101-141)/(56-71) 101/56      09/26/23  0453   Weight: 104 kg (229 lb 15 oz)     Body mass index is 30.34 kg/m².    Intake/Output Summary (Last 24 hours) at 9/26/2023 1056  Last data filed at 9/26/2023 0655  Gross per 24 hour   Intake 19.97 ml   Output 200 ml   Net -180.03 ml     NPO Diet NPO Type: Sips with Meds  ----------------------------------------------------------------------------------------------------------------------  Physical exam:  Constitutional:    HENT:  Head:  Normocephalic and atraumatic.    Eyes:  Conjunctivae and EOM are normal.  Pupils are equal, round, and reactive to light.  No scleral icterus.    Neck:  Neck supple.  No JVD present.    Cardiovascular: Normal rate, irregular rhythm, S1 S2+, NO S3 / S4  Pulmonary/Chest:  Vesicular breath sounds B/L  Abdominal:  Soft.  Bowel sounds are normal.  No distension and no tenderness.      Neurological:  Alert and oriented to person, place, and time. No focal deficits.  Skin:  Skin is warm and dry. No rash noted. No pallor.   Musculoskeletal:  No edema, no tenderness, and no deformity.  No red or swollen joints anywhere.   Peripheral vascular:  2+ Pulses B/L DP  ----------------------------------------------------------------------------------------------------------------------    ----------------------------------------------------------------------------------------------------------------------  Results from last 7 days   Lab Units 09/26/23  0656 09/26/23  0458   HSTROP T ng/L 616* 678*     Results from last 7 days   Lab Units 09/26/23  0515 09/26/23  0458   WBC 10*3/mm3  --  4.65   HEMOGLOBIN g/dL  --  11.9*   HEMATOCRIT %  --  36.9*    MCV fL  --  96.1   MCHC g/dL  --  32.2   PLATELETS 10*3/mm3  --  214   INR  1.36*  --          Results from last 7 days   Lab Units 09/26/23  0458   SODIUM mmol/L 138   POTASSIUM mmol/L 4.1   MAGNESIUM mg/dL 2.5*   CHLORIDE mmol/L 105   CO2 mmol/L 22.7   BUN mg/dL 17   CREATININE mg/dL 0.82   CALCIUM mg/dL 9.4   GLUCOSE mg/dL 161*   ALBUMIN g/dL 3.7   BILIRUBIN mg/dL 0.8   ALK PHOS U/L 183*   AST (SGOT) U/L 48*   ALT (SGPT) U/L 20   Estimated Creatinine Clearance: 97 mL/min (by C-G formula based on SCr of 0.82 mg/dL).    No results found for: AMMONIA      No results found for: BLOODCX  No results found for: URINECX  No results found for: WOUNDCX  No results found for: STOOLCX  Echo:    ECG/EMG Results (last 24 hours)       Procedure Component Value Units Date/Time    ECG 12 Lead Bradycardia [[id number]] Collected: 09/26/23 0520     Updated: 09/26/23 0521     QT Interval 490 ms      QTC Interval 455 ms     Narrative:      Test Reason : Bradycardia  Blood Pressure :   */*   mmHG  Vent. Rate :  52 BPM     Atrial Rate :  67 BPM     P-R Int :   * ms          QRS Dur : 112 ms      QT Int : 490 ms       P-R-T Axes :   *  61  62 degrees     QTc Int : 455 ms    Atrial fibrillation with slow ventricular response with premature ventricular or aberrantly conducted complexes and with ventricular escape complexes  Incomplete left bundle branch block  Abnormal ECG  No previous ECGs available    Referred By:            Confirmed By:     SCANNED - TELEMETRY   [[id number]] Resulted: 09/26/23     Updated: 09/26/23 0706    Adult Transthoracic Echo Complete w/ Color, Spectral and Contrast if necessary per protocol [[id number]] Resulted: 09/26/23 0938     Updated: 09/26/23 1015          Imaging Results (Last 72 Hours)       Procedure Component Value Units Date/Time    XR Chest 1 View [[id number]] Collected: 09/26/23 0829     Updated: 09/26/23 0831    Narrative:      EXAM:    XR Chest, 1 View     EXAM DATE:    9/26/2023 7:12 AM     CLINICAL  HISTORY:    short of breath     TECHNIQUE:    Frontal view of the chest.     COMPARISON:    No relevant prior studies available.     FINDINGS:    LUNGS AND PLEURAL SPACES:  Coarsened interstitial markings noted  throughout the lungs.  No pneumothorax.    HEART:  Mild cardiomegaly.    MEDIASTINUM:  Unremarkable as visualized.    BONES/JOINTS:  Unremarkable as visualized.       Impression:      1.  Mild cardiomegaly.  2.  Coarsened interstitial markings noted throughout the lungs.        This report was finalized on 9/26/2023 8:29 AM by Dr. Radhames Enriquez MD.                   I have personally looked at the labs and they are summarized above.  ----------------------------------------------------------------------------------------------------------------------  Imaging Results (Last 24 Hours)       Procedure Component Value Units Date/Time    XR Chest 1 View [[id number]] Collected: 09/26/23 0829     Updated: 09/26/23 0831    Narrative:      EXAM:    XR Chest, 1 View     EXAM DATE:    9/26/2023 7:12 AM     CLINICAL HISTORY:    short of breath     TECHNIQUE:    Frontal view of the chest.     COMPARISON:    No relevant prior studies available.     FINDINGS:    LUNGS AND PLEURAL SPACES:  Coarsened interstitial markings noted  throughout the lungs.  No pneumothorax.    HEART:  Mild cardiomegaly.    MEDIASTINUM:  Unremarkable as visualized.    BONES/JOINTS:  Unremarkable as visualized.       Impression:      1.  Mild cardiomegaly.  2.  Coarsened interstitial markings noted throughout the lungs.        This report was finalized on 9/26/2023 8:29 AM by Dr. Radhames Enriquez MD.             ----------------------------------------------------------------------------------------------------------------------    Assessment:  ACS with elevated high-sensitivity troponin  A-fib with fast ventricular response    Plan:  #1 cardiac.  Patient with history of coronary disease with multiple stents in the past.  Came in with complaints of  palpitations and chest pain.  Noted to be in A-fib with fast ventricular response.  Patient bumped his troponins in 600 range.  He is off his Eliquis for more than 24 hours.  We will get a left heart Cardiac catheterization done.  Aggressive risk factor modification for coronary disease to continue.      This document has been electronically signed by Kale Duran MD [facility], Interventional Cardiology  September 26, 2023 10:56 EDT     Electronically signed by Kale Duran MD at 09/26/23 7954

## 2023-09-26 NOTE — PLAN OF CARE
Problem: Adult Inpatient Plan of Care  Goal: Plan of Care Review  Outcome: Ongoing, Progressing   Pt resting comfortably at this time. Clean heart cath. No c/o pain/discomfort. VSS on 2LNC. Pt and spouse at bedside updated on plan of care. No questions/concerns at this time.

## 2023-09-26 NOTE — NURSING NOTE
Patients family in emotional distress in the hallway. After speaking with patient, she calmed down. Spiritual consult requested and  contacted and en route.

## 2023-09-26 NOTE — Clinical Note
During bedside shift report patient and spouse voiced concerns with possible discharge tomorrow. Pt's wife stated she was working on a schedule of who would be with her  when he returned home. She feared that due to everybody's work schedule, and their teenage son attending [employer], that he may be home for a long period of time. Pt's wife asked if the palliative or hospice HH would be visiting him once or twice a day for 30 minutes, or if they would be there for a longer period of time. Pt's spouse concerned that he may need assistance at home while family is away. This RN and dayshift RN informed the couple that the  will be notified of their concerns so that they could discuss it tomorrow.  Electronically signed by Tatyana Waller RN on 4/23/2021 at 12:29 AM The patient was draped in a sterile fashion.

## 2023-09-26 NOTE — SIGNIFICANT NOTE
09/26/23 1408   OTHER   Discipline physical therapist   Rehab Time/Intention   Session Not Performed patient unavailable for evaluation

## 2023-09-26 NOTE — PROGRESS NOTES
-History of chronic persistent atrial fibrillation with episode of RVR  -Troponin elevation with chest pain non-STEMI type I versus type II  -CHF with evidence of mild decompensation awaiting 2D echo EF evaluation  -COPD no signs of exacerbation at this time  -History of BPH    Awaiting for cardiology recommendation, keep the patient n.p.o. in case the patient needs stress test or procedure.  Start neb treatment with inhaled corticosteroids and long-acting beta agonist.  Monitor CBC and platelet count, electrolytes.

## 2023-09-27 LAB
ANION GAP SERPL CALCULATED.3IONS-SCNC: 7.6 MMOL/L (ref 5–15)
BASOPHILS # BLD AUTO: 0.01 10*3/MM3 (ref 0–0.2)
BASOPHILS NFR BLD AUTO: 0.1 % (ref 0–1.5)
BUN SERPL-MCNC: 20 MG/DL (ref 8–23)
BUN/CREAT SERPL: 22.5 (ref 7–25)
CALCIUM SPEC-SCNC: 8.8 MG/DL (ref 8.6–10.5)
CHLORIDE SERPL-SCNC: 105 MMOL/L (ref 98–107)
CHOLEST SERPL-MCNC: 105 MG/DL (ref 0–200)
CO2 SERPL-SCNC: 25.4 MMOL/L (ref 22–29)
CREAT SERPL-MCNC: 0.89 MG/DL (ref 0.76–1.27)
DEPRECATED RDW RBC AUTO: 52.2 FL (ref 37–54)
EGFRCR SERPLBLD CKD-EPI 2021: 88.8 ML/MIN/1.73
EOSINOPHIL # BLD AUTO: 0.02 10*3/MM3 (ref 0–0.4)
EOSINOPHIL NFR BLD AUTO: 0.2 % (ref 0.3–6.2)
ERYTHROCYTE [DISTWIDTH] IN BLOOD BY AUTOMATED COUNT: 14.8 % (ref 12.3–15.4)
GLUCOSE SERPL-MCNC: 119 MG/DL (ref 65–99)
HBA1C MFR BLD: 5.7 % (ref 4.8–5.6)
HCT VFR BLD AUTO: 32.4 % (ref 37.5–51)
HDLC SERPL-MCNC: 57 MG/DL (ref 40–60)
HGB BLD-MCNC: 10.2 G/DL (ref 13–17.7)
IMM GRANULOCYTES # BLD AUTO: 0.06 10*3/MM3 (ref 0–0.05)
IMM GRANULOCYTES NFR BLD AUTO: 0.6 % (ref 0–0.5)
LDLC SERPL CALC-MCNC: 36 MG/DL (ref 0–100)
LDLC/HDLC SERPL: 0.66 {RATIO}
LYMPHOCYTES # BLD AUTO: 2.08 10*3/MM3 (ref 0.7–3.1)
LYMPHOCYTES NFR BLD AUTO: 19.4 % (ref 19.6–45.3)
MCH RBC QN AUTO: 30.6 PG (ref 26.6–33)
MCHC RBC AUTO-ENTMCNC: 31.5 G/DL (ref 31.5–35.7)
MCV RBC AUTO: 97.3 FL (ref 79–97)
MONOCYTES # BLD AUTO: 0.84 10*3/MM3 (ref 0.1–0.9)
MONOCYTES NFR BLD AUTO: 7.8 % (ref 5–12)
NEUTROPHILS NFR BLD AUTO: 7.73 10*3/MM3 (ref 1.7–7)
NEUTROPHILS NFR BLD AUTO: 71.9 % (ref 42.7–76)
NRBC BLD AUTO-RTO: 0 /100 WBC (ref 0–0.2)
PLATELET # BLD AUTO: 192 10*3/MM3 (ref 140–450)
PMV BLD AUTO: 9.9 FL (ref 6–12)
POTASSIUM SERPL-SCNC: 4 MMOL/L (ref 3.5–5.2)
RBC # BLD AUTO: 3.33 10*6/MM3 (ref 4.14–5.8)
SODIUM SERPL-SCNC: 138 MMOL/L (ref 136–145)
TRIGL SERPL-MCNC: 51 MG/DL (ref 0–150)
VLDLC SERPL-MCNC: 12 MG/DL (ref 5–40)
WBC NRBC COR # BLD: 10.74 10*3/MM3 (ref 3.4–10.8)

## 2023-09-27 PROCEDURE — 94761 N-INVAS EAR/PLS OXIMETRY MLT: CPT

## 2023-09-27 PROCEDURE — 85025 COMPLETE CBC W/AUTO DIFF WBC: CPT | Performed by: INTERNAL MEDICINE

## 2023-09-27 PROCEDURE — 93010 ELECTROCARDIOGRAM REPORT: CPT | Performed by: INTERNAL MEDICINE

## 2023-09-27 PROCEDURE — 99232 SBSQ HOSP IP/OBS MODERATE 35: CPT | Performed by: SPECIALIST

## 2023-09-27 PROCEDURE — 94799 UNLISTED PULMONARY SVC/PX: CPT

## 2023-09-27 PROCEDURE — 83036 HEMOGLOBIN GLYCOSYLATED A1C: CPT | Performed by: INTERNAL MEDICINE

## 2023-09-27 PROCEDURE — 93005 ELECTROCARDIOGRAM TRACING: CPT | Performed by: INTERNAL MEDICINE

## 2023-09-27 PROCEDURE — 93005 ELECTROCARDIOGRAM TRACING: CPT | Performed by: HOSPITALIST

## 2023-09-27 PROCEDURE — 80061 LIPID PANEL: CPT | Performed by: INTERNAL MEDICINE

## 2023-09-27 PROCEDURE — 97162 PT EVAL MOD COMPLEX 30 MIN: CPT

## 2023-09-27 PROCEDURE — 94664 DEMO&/EVAL PT USE INHALER: CPT

## 2023-09-27 PROCEDURE — 80048 BASIC METABOLIC PNL TOTAL CA: CPT | Performed by: INTERNAL MEDICINE

## 2023-09-27 RX ORDER — SODIUM CHLORIDE 0.9 % (FLUSH) 0.9 %
10 SYRINGE (ML) INJECTION AS NEEDED
Status: DISCONTINUED | OUTPATIENT
Start: 2023-09-27 | End: 2023-09-28 | Stop reason: HOSPADM

## 2023-09-27 RX ORDER — SODIUM CHLORIDE 9 MG/ML
40 INJECTION, SOLUTION INTRAVENOUS AS NEEDED
Status: DISCONTINUED | OUTPATIENT
Start: 2023-09-27 | End: 2023-09-28 | Stop reason: HOSPADM

## 2023-09-27 RX ORDER — SODIUM CHLORIDE 0.9 % (FLUSH) 0.9 %
10 SYRINGE (ML) INJECTION EVERY 12 HOURS SCHEDULED
Status: DISCONTINUED | OUTPATIENT
Start: 2023-09-27 | End: 2023-09-28 | Stop reason: HOSPADM

## 2023-09-27 RX ORDER — GABAPENTIN 300 MG/1
CAPSULE ORAL
Status: COMPLETED
Start: 2023-09-27 | End: 2023-09-27

## 2023-09-27 RX ADMIN — CETIRIZINE HYDROCHLORIDE 10 MG: 10 TABLET, FILM COATED ORAL at 08:51

## 2023-09-27 RX ADMIN — GABAPENTIN 300 MG: 300 CAPSULE ORAL at 21:19

## 2023-09-27 RX ADMIN — IPRATROPIUM BROMIDE 0.5 MG: 0.5 SOLUTION RESPIRATORY (INHALATION) at 00:16

## 2023-09-27 RX ADMIN — IPRATROPIUM BROMIDE 0.5 MG: 0.5 SOLUTION RESPIRATORY (INHALATION) at 19:21

## 2023-09-27 RX ADMIN — TAMSULOSIN HYDROCHLORIDE 0.4 MG: 0.4 CAPSULE ORAL at 08:51

## 2023-09-27 RX ADMIN — ARFORMOTEROL TARTRATE 15 MCG: 15 SOLUTION RESPIRATORY (INHALATION) at 06:52

## 2023-09-27 RX ADMIN — Medication 10 ML: at 21:19

## 2023-09-27 RX ADMIN — APIXABAN 5 MG: 5 TABLET, FILM COATED ORAL at 08:51

## 2023-09-27 RX ADMIN — BUMETANIDE 1.5 MG: 1 TABLET ORAL at 08:52

## 2023-09-27 RX ADMIN — BUDESONIDE 0.5 MG: 0.5 SUSPENSION RESPIRATORY (INHALATION) at 06:50

## 2023-09-27 RX ADMIN — ARFORMOTEROL TARTRATE 15 MCG: 15 SOLUTION RESPIRATORY (INHALATION) at 19:21

## 2023-09-27 RX ADMIN — GABAPENTIN 300 MG: 300 CAPSULE ORAL at 08:49

## 2023-09-27 RX ADMIN — POTASSIUM CHLORIDE 20 MEQ: 1500 TABLET, EXTENDED RELEASE ORAL at 21:15

## 2023-09-27 RX ADMIN — LEVOTHYROXINE SODIUM 50 MCG: 50 TABLET ORAL at 21:17

## 2023-09-27 RX ADMIN — BUDESONIDE 0.5 MG: 0.5 SUSPENSION RESPIRATORY (INHALATION) at 19:21

## 2023-09-27 RX ADMIN — DOCUSATE SODIUM 50 MG AND SENNOSIDES 8.6 MG 2 TABLET: 8.6; 5 TABLET, FILM COATED ORAL at 21:15

## 2023-09-27 RX ADMIN — APIXABAN 5 MG: 5 TABLET, FILM COATED ORAL at 21:17

## 2023-09-27 RX ADMIN — ASPIRIN 81 MG: 81 TABLET, CHEWABLE ORAL at 08:51

## 2023-09-27 RX ADMIN — ATORVASTATIN CALCIUM 80 MG: 40 TABLET, FILM COATED ORAL at 08:49

## 2023-09-27 RX ADMIN — Medication 10 ML: at 08:52

## 2023-09-27 RX ADMIN — PANTOPRAZOLE SODIUM 40 MG: 40 TABLET, DELAYED RELEASE ORAL at 08:51

## 2023-09-27 RX ADMIN — IPRATROPIUM BROMIDE 0.5 MG: 0.5 SOLUTION RESPIRATORY (INHALATION) at 06:50

## 2023-09-27 RX ADMIN — FINASTERIDE 5 MG: 5 TABLET, FILM COATED ORAL at 06:00

## 2023-09-27 RX ADMIN — ALLOPURINOL 300 MG: 300 TABLET ORAL at 08:51

## 2023-09-27 RX ADMIN — IPRATROPIUM BROMIDE 0.5 MG: 0.5 SOLUTION RESPIRATORY (INHALATION) at 13:16

## 2023-09-27 RX ADMIN — POTASSIUM CHLORIDE 20 MEQ: 1500 TABLET, EXTENDED RELEASE ORAL at 08:49

## 2023-09-27 NOTE — PAYOR COMM NOTE
"CONTACT: CORI TOBIAS RN  [employer] [place] [id number]  PHONE: [phone]  FAX: [phone]      INPATIENT AUTH REQUEST    REF#[id number]         Roosevelt Gaytan (78 y.o. Male)       Date of Birth   1945    Social Security Number       Address   [address]  [place] [address]    Home Phone   [phone]    MRN   [id number]       Christianity   None    Marital Status   Unknown                            Admission Date   23    Admission Type   Urgent    Admitting Provider   Ej Burdick MD    Attending Provider   Nereyda Cortez MD    Department, Room/Bed   [facility] PROGRESS CARE, P214/S2       Discharge Date       Discharge Disposition       Discharge Destination                                 Attending Provider: Nereyda Cortez MD    Allergies: No Known Allergies    Isolation: None   Infection: None   Code Status: CPR    Ht: 185.4 cm (73\")   Wt: 106 kg (233 lb 4.8 oz)    Admission Cmt: None   Principal Problem: NSTEMI (non-ST elevated myocardial infarction) [I21.4]                   Active Insurance as of 2023       Primary Coverage       Payor Plan Insurance Group Employer/Plan Group    [employer]CIAL AETNA [id number]       Payor Plan Address Payor Plan Phone Number Payor Plan Fax Number Effective Dates    [address] [address]   2022 - None Entered    [place] [address]         Subscriber Name Subscriber Birth Date Member ID       ROOSEVELT GAYTAN 1945 [id number]                     Emergency Contacts        (Rel.) Home Phone Work Phone Mobile Phone    Keyla Gaytan (Other) [phone] -- --    JonathanSanaz (Daughter) -- -- [phone]                 History & Physical        Ej Burdick MD at 23 0512          Hospitalist History and Physical        Patient Identification  Name: Roosevelt Gaytan  Age/Sex: 78 y.o. male  :  1945        MRN: [id number]  Visit " "Number: [id number]  Admit Date: 9/26/2023   PCP: Provider, No Known          Chief complaint chest pounding, short of breath    History of Present Illness:  Patient is a 78 y.o. male with history of CHF (unspecified), COPD on 5L oxygen concentrator at home, CAD s/p 5 stents total in the past, and and permanent afib s/p multiple failed cardioversions and ablations in the past, who presented to an outside facility on the afternoon of 9/25 with complaints of acute onset shortness of breath and palpitations. He reported a brief episode of chest pain as well which he described as \"pounding\" that had already resolved when he arrived. He was found to be in atrial fibrillation with RVR, with HR as high as 200. He dropped to the 60's without intervention soon after arrival, however, and HR has remained in the 40-60s since then. He denies any further palpitations or dyspnea. He does report ongoing belching and attributes this to a hiatal hernia which he notes has caused chest pain-like symptoms in the past. At the outside facility, pertinent labs include Na 140, K+ 3.5 and Cr 0.9. He had PVCs on telemetry monitoring for which he was given calcium and magnesium that the ED provider claimed \"smoothed out\" the PVCs. He was given full dose ASA after fist troponin came back mildly elevated at 0.78. Initially, it was felt he likely had a troponin leak from the severity of his afib w/RVR episode, and the ED provider planned to discharge him if his repeat troponin came back at a similar level. However, repeat troponin increased significantly to 3.93, raising concern for true NSTEMI. Thus, IV Heparin infusion was initiated and the patient was transferred to our facility for cardiology evaluation. Patient states currently he is chest pain free and is not short of breath. He is wearing 2L NC and O2 saturation is upper 90s. BP is stable in the 110-120s systolic but HR is in the 40s mostly. STAT labs, EKG and CXR are pending.     Review " of Systems  Review of Systems   Constitutional:  Negative for activity change, appetite change, chills, diaphoresis, fatigue, fever and unexpected weight change.   HENT:  Negative for congestion, postnasal drip, rhinorrhea, sinus pressure, sinus pain and sore throat.    Eyes:  Negative for photophobia, pain, discharge, redness, itching and visual disturbance.   Respiratory:  Positive for shortness of breath. Negative for cough and wheezing.    Cardiovascular:  Positive for chest pain and palpitations. Negative for leg swelling.   Gastrointestinal:  Negative for abdominal distention, abdominal pain, constipation, diarrhea, nausea and vomiting.   Endocrine: Negative for cold intolerance, heat intolerance, polydipsia, polyphagia and polyuria.   Genitourinary:  Negative for difficulty urinating, dysuria, flank pain, frequency and hematuria.   Musculoskeletal:  Negative for arthralgias, back pain, joint swelling, neck pain and neck stiffness.   Skin:  Negative for color change, pallor, rash and wound.   Neurological:  Negative for dizziness, seizures, light-headedness, numbness and headaches.   Hematological:  Negative for adenopathy. Does not bruise/bleed easily.   Psychiatric/Behavioral:  Negative for agitation, behavioral problems and confusion.      History  Past Medical History:   Diagnosis Date    CHF (congestive heart failure)     COPD (chronic obstructive pulmonary disease)     on 5L oxygen concentrator at home    Coronary artery disease     s/p 5 stents    Lung nodule     Permanent atrial fibrillation     Prostate cancer     monitoring, no active treatment    Thyroid nodule      Past Surgical History:   Procedure Laterality Date    CARDIAC CATHETERIZATION      5 stents total in the past     History reviewed. No pertinent family history.     No medications prior to admission.     Allergies:  Patient has no known allergies.    Objective    Vital Signs     Body mass index is 30.34 kg/m².    Physical Exam:  Physical  Exam  Constitutional:       General: He is not in acute distress.     Appearance: He is ill-appearing (chronically so).   HENT:      Head: Normocephalic and atraumatic.      Right Ear: External ear normal.      Left Ear: External ear normal.      Nose: Nose normal.      Mouth/Throat:      Mouth: Mucous membranes are moist.      Pharynx: Oropharynx is clear.   Eyes:      Extraocular Movements: Extraocular movements intact.      Conjunctiva/sclera: Conjunctivae normal.      Pupils: Pupils are equal, round, and reactive to light.   Cardiovascular:      Rate and Rhythm: Bradycardia present. Rhythm irregular.      Pulses: Normal pulses.      Heart sounds: Normal heart sounds. No murmur heard.  Pulmonary:      Effort: Pulmonary effort is normal. No respiratory distress.      Breath sounds: Normal breath sounds. No wheezing or rales.   Abdominal:      General: Abdomen is flat. Bowel sounds are normal. There is no distension.      Palpations: Abdomen is soft.      Tenderness: There is no abdominal tenderness.   Musculoskeletal:         General: Normal range of motion.      Cervical back: Normal range of motion and neck supple. No tenderness.      Right lower leg: No edema.      Left lower leg: No edema.   Lymphadenopathy:      Cervical: No cervical adenopathy.   Skin:     General: Skin is warm and dry.      Capillary Refill: Capillary refill takes less than 2 seconds.      Coloration: Skin is not jaundiced.      Findings: No bruising or lesion.   Neurological:      General: No focal deficit present.      Mental Status: He is alert and oriented to person, place, and time.   Psychiatric:         Mood and Affect: Mood normal.         Behavior: Behavior normal.         Results Review:       Lab Results:  Results from last 7 days   Lab Units 09/26/23  0458   WBC 10*3/mm3 4.65   HEMOGLOBIN g/dL 11.9*   PLATELETS 10*3/mm3 214                 No results found for: HGBA1C                        I have reviewed the patient's  laboratory results.    Imaging:  Imaging Results (Last 72 Hours)       ** No results found for the last 72 hours. **            I have personally reviewed the patient's radiologic imaging.        EKG:   Atrial fibrillation with slow ventricular response with premature ventricular or aberrantly conducted complexes and with ventricular escape complexes, HR 52, QTc 455  Incomplete left bundle branch block  Abnormal ECG  No previous ECGs available    I have personally reviewed the patient's EKG. Frequent PVCs noted both on EKG and telemetry strip.         Assessment & Plan    - NSTEMI (non-ST elevated myocardial infarction): type 1 vs type 2 related to severely uncontrolled afib. Last dose of eliquis was morning of 9/25. Now on IV heparin infusion to treat possible NSTEMI. Given full dose ASA at outside ED; continue daily baby ASA here. Monitor on telemetry. Obtain ECHO in the morning. Await further recommendations from cardiology.   - Permanent afib, w/RVR initially, now SVR: HR up to 200 per outside facility reports, now down to 40s. Asymptomatic currently. Patient recalls he may be on metoprolol at home, awaiting pharmacy reconciliation of home meds. Heparinized as noted above. K+ 3.5 so will replace per protocol. Check mag and replace as well if indicated.   - CHF, unspecified: appears compensated at this time, but will get BNP and CXR to evaluate further, along with above-mentioned ECHO.   - COPD: appears compensated at this time. Review home meds once reconciled by pharmacy.     DVT Prophylaxis: IV heparin infusion    Estimated Length of Stay >2 midnights    I discussed the patient's findings, assessment and plan with the patient, his wife at bedside, nursing staff in the PCU, and ED provider Kurtis Yip at [facility].    Patient is high risk due to NSTEMI, permanent afib with RVR initially and now SVR    Ej Burdick MD  09/26/23  05:15 EDT      Electronically signed by Ej Burdick,  MD at 09/26/23 0535       Facility-Administered Medications as of 9/27/2023   Medication Dose Route Frequency Provider Last Rate Last Admin    acetaminophen (TYLENOL) tablet 650 mg  650 mg Oral Q4H PRN Nereyda Cortez MD   650 mg at 09/26/23 1237    allopurinol (ZYLOPRIM) tablet 300 mg  300 mg Oral Daily Nereyda Cortez MD   300 mg at 09/27/23 0851    apixaban (ELIQUIS) tablet 5 mg  5 mg Oral Q12H Nereyda Cortez MD   5 mg at 09/27/23 0851    arformoterol (BROVANA) nebulizer solution 15 mcg  15 mcg Nebulization BID - RT Nereyda Cortez MD   15 mcg at 09/27/23 0652    aspirin chewable tablet 81 mg  81 mg Oral Daily Nereyda Cortez MD   81 mg at 09/27/23 0851    atorvastatin (LIPITOR) tablet 80 mg  80 mg Oral Daily Nereyda Cortez MD   80 mg at 09/27/23 0849    sennosides-docusate (PERICOLACE) 8.6-50 MG per tablet 2 tablet  2 tablet Oral BID Nereyda Cortez MD   2 tablet at 09/26/23 2153    And    polyethylene glycol (MIRALAX) packet 17 g  17 g Oral Daily PRN Nereyda Cortez MD        And    bisacodyl (DULCOLAX) EC tablet 5 mg  5 mg Oral Daily PRN Nereyda Cortez MD        And    bisacodyl (DULCOLAX) suppository 10 mg  10 mg Rectal Daily PRN Nereyda Cortez MD        budesonide (PULMICORT) nebulizer solution 0.5 mg  0.5 mg Nebulization BID - RT Nereyda Cortez MD   0.5 mg at 09/27/23 0650    bumetanide (BUMEX) tablet 1.5 mg  1.5 mg Oral BID Nereyda Cortez MD   1.5 mg at 09/27/23 0852    cetirizine (zyrTEC) tablet 10 mg  10 mg Oral Daily Nereyda Cortez MD   10 mg at 09/27/23 0851    colchicine tablet 0.6 mg  0.6 mg Oral BID PRN Nereyda Cortez MD        finasteride (PROSCAR) tablet 5 mg  5 mg Oral QAM Nereyda Cortez MD   5 mg at 09/27/23 0600    gabapentin (NEURONTIN) capsule 300 mg  300 mg Oral TID Nereyda Cortez MD   300 mg at 09/27/23 0849    ipratropium (ATROVENT) nebulizer solution 0.5 mg  0.5 mg Nebulization 4x Daily - RT Diego,  Nereyda Richardson MD   0.5 mg at 23 1316    levothyroxine (SYNTHROID, LEVOTHROID) tablet 50 mcg  50 mcg Oral Nightly Nereyda Cortez MD   50 mcg at 23 2153    lisinopril (PRINIVIL,ZESTRIL) tablet 2.5 mg  2.5 mg Oral Nightly Nereyda Cortez MD        Magnesium Cardiology Dose Replacement - Follow Nurse / BPA Driven Protocol   Does not apply PRN Nereyda Cortez MD        nitroglycerin (NITROSTAT) SL tablet 0.4 mg  0.4 mg Sublingual Q5 Min PRN Nereyda Cortez MD        pantoprazole (PROTONIX) EC tablet 40 mg  40 mg Oral Daily Nereyda Cortez MD   40 mg at 23 0851    potassium chloride (K-DUR,KLOR-CON) CR tablet 20 mEq  20 mEq Oral BID Nereyda Cortez MD   20 mEq at 23 0849    Potassium Replacement - Follow Nurse / BPA Driven Protocol   Does not apply PRN Nereyda Cortez MD        sodium chloride 0.9 % flush 10 mL  10 mL Intravenous Q12H Nereyda Cortez MD   10 mL at 23 0852    sodium chloride 0.9 % flush 10 mL  10 mL Intravenous PRN Nereyda Cortez MD        sodium chloride 0.9 % flush 10 mL  10 mL Intravenous Q12H Nereyda Cortez MD        sodium chloride 0.9 % flush 10 mL  10 mL Intravenous PRN Nereyda Cortez MD        sodium chloride 0.9 % infusion 40 mL  40 mL Intravenous PRN Nereyda Cortez MD        sodium chloride 0.9 % infusion 40 mL  40 mL Intravenous PRN Nereyda Cortez MD        [COMPLETED] sodium chloride 0.9 % infusion    Code / Trauma / Sedation Continuous Med Kale Duran MD 75 mL/hr at 23 1107 75 mL/hr at 23 1107    [] sodium chloride 0.9 % infusion  1 mL/kg/hr Intravenous Continuous Kale Duran  mL/hr at 23 1158 1 mL/kg/hr at 23 1158    tamsulosin (FLOMAX) 24 hr capsule 0.4 mg  0.4 mg Oral Daily Oculam, Nereyda Chin, MD   0.4 mg at 23 0851     Lab Results (all)       Procedure Component Value Units Date/Time    Hemoglobin A1c [[id number]]  (Abnormal) Collected:  09/27/23 0143    Specimen: Blood Updated: 09/27/23 0439     Hemoglobin A1C 5.70 %     Narrative:      Hemoglobin A1C Ranges:    Increased Risk for Diabetes  5.7% to 6.4%  Diabetes                     >= 6.5%  Diabetic Goal                < 7.0%    CBC & Differential [[id number]]  (Abnormal) Collected: 09/27/23 0143    Specimen: Blood Updated: 09/27/23 0427    Narrative:      The following orders were created for panel order CBC & Differential.  Procedure                               Abnormality         Status                     ---------                               -----------         ------                     CBC Auto Differential[[id number]]        Abnormal            Final result                 Please view results for these tests on the individual orders.    CBC Auto Differential [[id number]]  (Abnormal) Collected: 09/27/23 0143    Specimen: Blood Updated: 09/27/23 0427     WBC 10.74 10*3/mm3      RBC 3.33 10*6/mm3      Hemoglobin 10.2 g/dL      Hematocrit 32.4 %      MCV 97.3 fL      MCH 30.6 pg      MCHC 31.5 g/dL      RDW 14.8 %      RDW-SD 52.2 fl      MPV 9.9 fL      Platelets 192 10*3/mm3      Neutrophil % 71.9 %      Lymphocyte % 19.4 %      Monocyte % 7.8 %      Eosinophil % 0.2 %      Basophil % 0.1 %      Immature Grans % 0.6 %      Neutrophils, Absolute 7.73 10*3/mm3      Lymphocytes, Absolute 2.08 10*3/mm3      Monocytes, Absolute 0.84 10*3/mm3      Eosinophils, Absolute 0.02 10*3/mm3      Basophils, Absolute 0.01 10*3/mm3      Immature Grans, Absolute 0.06 10*3/mm3      nRBC 0.0 /100 WBC     Basic Metabolic Panel [[id number]]  (Abnormal) Collected: 09/27/23 0143    Specimen: Blood Updated: 09/27/23 0313     Glucose 119 mg/dL      BUN 20 mg/dL      Creatinine 0.89 mg/dL      Sodium 138 mmol/L      Potassium 4.0 mmol/L      Chloride 105 mmol/L      CO2 25.4 mmol/L      Calcium 8.8 mg/dL      BUN/Creatinine Ratio 22.5     Anion Gap 7.6 mmol/L      eGFR 88.8 mL/min/1.73     Narrative:      GFR Normal  >60  Chronic Kidney Disease <60  Kidney Failure <15    The GFR formula is only valid for adults with stable renal function between ages 18 and 70.    Lipid Panel [[id number]] Collected: 09/27/23 0143    Specimen: Blood Updated: 09/27/23 0313     Total Cholesterol 105 mg/dL      Triglycerides 51 mg/dL      HDL Cholesterol 57 mg/dL      LDL Cholesterol  36 mg/dL      VLDL Cholesterol 12 mg/dL      LDL/HDL Ratio 0.66    Narrative:      Cholesterol Reference Ranges  (U.S. Department of Health and Human Services ATP III Classifications)    Desirable          <200 mg/dL  Borderline High    200-239 mg/dL  High Risk          >240 mg/dL      Triglyceride Reference Ranges  (U.S. Department of Health and Human Services ATP III Classifications)    Normal           <150 mg/dL  Borderline High  150-199 mg/dL  High             200-499 mg/dL  Very High        >500 mg/dL    HDL Reference Ranges  (U.S. Department of Health and Human Services ATP III Classifications)    Low     <40 mg/dl (major risk factor for CHD)  High    >60 mg/dl ('negative' risk factor for CHD)        LDL Reference Ranges  (U.S. Department of Health and Human Services ATP III Classifications)    Optimal          <100 mg/dL  Near Optimal     100-129 mg/dL  Borderline High  130-159 mg/dL  High             160-189 mg/dL  Very High        >189 mg/dL    High Sensitivity Troponin T 2Hr [[id number]]  (Abnormal) Collected: 09/26/23 0656    Specimen: Blood Updated: 09/26/23 0809     HS Troponin T 616 ng/L      Troponin T Delta -62 ng/L     Narrative:      High Sensitive Troponin T Reference Range:  <10.0 ng/L- Negative Female for AMI  <15.0 ng/L- Negative Male for AMI  >=10 - Abnormal Female indicating possible myocardial injury.  >=15 - Abnormal Male indicating possible myocardial injury.   Clinicians would have to utilize clinical acumen, EKG, Troponin, and serial changes to determine if it is an Acute Myocardial Infarction or myocardial injury due to an underlying  chronic condition.         TSH [[id number]]  (Normal) Collected: 09/26/23 0458    Specimen: Blood Updated: 09/26/23 0704     TSH 0.970 uIU/mL     aPTT [[id number]]  (Abnormal) Collected: 09/26/23 0552    Specimen: Blood Updated: 09/26/23 0622     PTT >200.0 seconds     Narrative:      PTT Heparin Therapeutic Range:  59 - 95 seconds      High Sensitivity Troponin T [[id number]]  (Abnormal) Collected: 09/26/23 0458    Specimen: Blood Updated: 09/26/23 0552     HS Troponin T 678 ng/L     Narrative:      High Sensitive Troponin T Reference Range:  <10.0 ng/L- Negative Female for AMI  <15.0 ng/L- Negative Male for AMI  >=10 - Abnormal Female indicating possible myocardial injury.  >=15 - Abnormal Male indicating possible myocardial injury.   Clinicians would have to utilize clinical acumen, EKG, Troponin, and serial changes to determine if it is an Acute Myocardial Infarction or myocardial injury due to an underlying chronic condition.         BNP [[id number]]  (Abnormal) Collected: 09/26/23 0458    Specimen: Blood Updated: 09/26/23 0548     proBNP 2,019.0 pg/mL     Narrative:      Among patients with dyspnea, NT-proBNP is highly sensitive for the detection of acute congestive heart failure. In addition NT-proBNP of <300 pg/ml effectively rules out acute congestive heart failure with 99% negative predictive value.      Magnesium [[id number]]  (Abnormal) Collected: 09/26/23 0458    Specimen: Blood Updated: 09/26/23 0543     Magnesium 2.5 mg/dL     Heparin Anti-Xa [[id number]]  (Abnormal) Collected: 09/26/23 0515    Specimen: Blood Updated: 09/26/23 0538     Heparin Anti-Xa (UFH) >1.10 IU/ml     Protime-INR [[id number]]  (Abnormal) Collected: 09/26/23 0515    Specimen: Blood Updated: 09/26/23 0536     Protime 17.3 Seconds      INR 1.36    Narrative:      Suggested INR therapeutic range for stable oral anticoagulant therapy:    Low Intensity therapy:   1.5-2.0  Moderate Intensity therapy:   2.0-3.0  High Intensity therapy:    2.5-4.0    Comprehensive Metabolic Panel [[id number]]  (Abnormal) Collected: 09/26/23 0458    Specimen: Blood Updated: 09/26/23 0528     Glucose 161 mg/dL      BUN 17 mg/dL      Creatinine 0.82 mg/dL      Sodium 138 mmol/L      Potassium 4.1 mmol/L      Chloride 105 mmol/L      CO2 22.7 mmol/L      Calcium 9.4 mg/dL      Total Protein 7.2 g/dL      Albumin 3.7 g/dL      ALT (SGPT) 20 U/L      AST (SGOT) 48 U/L      Alkaline Phosphatase 183 U/L      Total Bilirubin 0.8 mg/dL      Globulin 3.5 gm/dL      A/G Ratio 1.1 g/dL      BUN/Creatinine Ratio 20.7     Anion Gap 10.3 mmol/L      eGFR 91.0 mL/min/1.73     Narrative:      GFR Normal >60  Chronic Kidney Disease <60  Kidney Failure <15    The GFR formula is only valid for adults with stable renal function between ages 18 and 70.    CBC Auto Differential [[id number]]  (Abnormal) Collected: 09/26/23 0458    Specimen: Blood Updated: 09/26/23 0504     WBC 4.65 10*3/mm3      RBC 3.84 10*6/mm3      Hemoglobin 11.9 g/dL      Hematocrit 36.9 %      MCV 96.1 fL      MCH 31.0 pg      MCHC 32.2 g/dL      RDW 14.7 %      RDW-SD 51.8 fl      MPV 9.4 fL      Platelets 214 10*3/mm3      Neutrophil % 74.4 %      Lymphocyte % 23.7 %      Monocyte % 1.5 %      Eosinophil % 0.0 %      Basophil % 0.0 %      Immature Grans % 0.4 %      Neutrophils, Absolute 3.46 10*3/mm3      Lymphocytes, Absolute 1.10 10*3/mm3      Monocytes, Absolute 0.07 10*3/mm3      Eosinophils, Absolute 0.00 10*3/mm3      Basophils, Absolute 0.00 10*3/mm3      Immature Grans, Absolute 0.02 10*3/mm3      nRBC 0.0 /100 WBC           Imaging Results (All)       Procedure Component Value Units Date/Time    XR Chest 1 View [[id number]] Collected: 09/26/23 0829     Updated: 09/26/23 0831    Narrative:      EXAM:    XR Chest, 1 View     EXAM DATE:    9/26/2023 7:12 AM     CLINICAL HISTORY:    short of breath     TECHNIQUE:    Frontal view of the chest.     COMPARISON:    No relevant prior studies available.     FINDINGS:     LUNGS AND PLEURAL SPACES:  Coarsened interstitial markings noted  throughout the lungs.  No pneumothorax.    HEART:  Mild cardiomegaly.    MEDIASTINUM:  Unremarkable as visualized.    BONES/JOINTS:  Unremarkable as visualized.       Impression:      1.  Mild cardiomegaly.  2.  Coarsened interstitial markings noted throughout the lungs.        This report was finalized on 9/26/2023 8:29 AM by Dr. Radhames Enriquez MD.             ECG/EMG Results (all)       Procedure Component Value Units Date/Time    SCANNED - TELEMETRY   [[id number]] Resulted: 09/26/23     Updated: 09/26/23 0706    ECG 12 Lead Bradycardia [[id number]] Collected: 09/26/23 0520     Updated: 09/26/23 1323     QT Interval 490 ms      QTC Interval 455 ms     Narrative:      Test Reason : Bradycardia  Blood Pressure :   */*   mmHG  Vent. Rate :  52 BPM     Atrial Rate :  67 BPM     P-R Int :   * ms          QRS Dur : 112 ms      QT Int : 490 ms       P-R-T Axes :   *  61  62 degrees     QTc Int : 455 ms    Atrial fibrillation with slow ventricular response with premature ventricular or aberrantly conducted complexes and with ventricular escape complexes  Incomplete left bundle branch block  Abnormal ECG  No previous ECGs available  Confirmed by Kale Duran ([id number]) on 9/26/2023 1:19:57 PM    Referred By:            Confirmed By: Kale Duran    Adult Transthoracic Echo Complete w/ Color, Spectral and Contrast if necessary per protocol [[id number]] Resulted: 09/26/23 1612     Updated: 09/26/23 1617     LVIDd 6.3 cm      LVIDs 5.0 cm      IVSd 0.98 cm      LVPWd 1.13 cm      FS 21.0 %      IVS/LVPW 0.87 cm      ESV(cubed) 123.1 ml      LV Sys Vol (BSA corrected) 37.9 cm2      EDV(cubed) 249.5 ml      LV Eaton Vol (BSA corrected) 86.4 cm2      LVOT area 3.1 cm2      LV mass(C)d 287.2 grams      LVOT diam 2.00 cm      EDV(MOD-sp4) 197.0 ml      ESV(MOD-sp4) 86.3 ml      SV(MOD-sp4) 110.7 ml      SI(MOD-sp4) 48.6 ml/m2      EF(MOD-sp4) 56.2 %      MV E max kris 111.0  cm/sec      MV A max francisco 35.2 cm/sec      MV E/A 3.2     LA ESV Index (BP) 39.9 ml/m2      Med Peak E' Francisco 5.8 cm/sec      Lat Peak E' Francisco 8.3 cm/sec      Avg E/e' ratio 15.74     TAPSE (>1.6) 2.09 cm      LA dimension (2D)  5.1 cm      Ao pk francisco 166.0 cm/sec      Ao max PG 11.0 mmHg      Ao mean PG 5.0 mmHg      Ao V2 VTI 39.9 cm      AI P1/2t 1,153 msec      MR max francisco 443.0 cm/sec      MR max PG 78.5 mmHg      MR mean francisco 329.0 cm/sec      MR mean PG 50.0 mmHg      MR .0 cm      TR max francisco 238.0 cm/sec      TR max PG 22.7 mmHg      RVSP(TR) 32.7 mmHg      RAP systole 10.0 mmHg      PA acc time 0.11 sec      Ao root diam 3.7 cm      ACS 1.70 cm     Narrative:        Left ventricular systolic function is moderately decreased. Left   ventricular ejection fraction appears to be 36 - 40%.    Left ventricular diastolic function is consistent with (grade III   w/high LAP) fixed restrictive pattern.    The right ventricular cavity is mildly dilated.    The left atrial cavity is moderately dilated.    The right atrial cavity is mild to moderately  dilated.    Moderate mitral valve regurgitation is present.    Estimated right ventricular systolic pressure from tricuspid   regurgitation is normal (<35 mmHg).      SCANNED - TELEMETRY   [[id number]] Resulted: 09/26/23     Updated: 09/26/23 2017    SCANNED - TELEMETRY   [[id number]] Resulted: 09/26/23     Updated: 09/26/23 2017    SCANNED - TELEMETRY   [[id number]] Resulted: 09/26/23     Updated: 09/26/23 2017    SCANNED - TELEMETRY   [[id number]] Resulted: 09/26/23     Updated: 09/26/23 2113    ECG 12 Lead Chest Pain [[id number]] Collected: 09/27/23 0635     Updated: 09/27/23 0637     QT Interval 522 ms      QTC Interval 435 ms     Narrative:      Test Reason : Chest Pain  Blood Pressure :   */*   mmHG  Vent. Rate :  42 BPM     Atrial Rate :  54 BPM     P-R Int :   * ms          QRS Dur : 112 ms      QT Int : 522 ms       P-R-T Axes :   *  57  28 degrees     QTc Int : 435  ms    Atrial fibrillation with slow ventricular response with premature ventricular or aberrantly conducted complexes and with ventricular escape complexes  Incomplete left bundle branch block  Abnormal ECG  When compared with ECG of 26-SEP-2023 05:20,  No significant change was found    Referred By: KATHERINE           Confirmed By:     SCANNED - TELEMETRY   [[id number]] Resulted: 09/26/23     Updated: 09/27/23 0653    SCANNED - TELEMETRY   [[id number]] Resulted: 09/26/23     Updated: 09/27/23 0728    ECG 12 Lead Chest Pain [[id number]] Collected: 09/27/23 1024     Updated: 09/27/23 1026     QT Interval 478 ms      QTC Interval 453 ms     Narrative:      Test Reason : Chest Pain  Blood Pressure :   */*   mmHG  Vent. Rate :  54 BPM     Atrial Rate :  57 BPM     P-R Int :   * ms          QRS Dur : 112 ms      QT Int : 478 ms       P-R-T Axes :   *  53  -7 degrees     QTc Int : 453 ms    Atrial fibrillation with slow ventricular response with premature ventricular or aberrantly conducted complexes and with ventricular escape complexes  Abnormal QRS-T angle, consider primary T wave abnormality  Abnormal ECG  When compared with ECG of 27-SEP-2023 06:35, (Unconfirmed)  Incomplete left bundle branch block is no longer present    Referred By: RAINER           Confirmed By:     SCANNED - TELEMETRY   [[id number]] Resulted: 09/26/23     Updated: 09/27/23 1420    SCANNED - TELEMETRY   [[id number]] Resulted: 09/26/23     Updated: 09/27/23 1420          Orders (all)        Start     Ordered    09/28/23 0600  Basic Metabolic Panel  Morning Draw         09/27/23 1355    09/27/23 1445  sodium chloride 0.9 % flush 10 mL  Every 12 Hours Scheduled         09/27/23 1359    09/27/23 1400  Connectors / Hubs Must Be Scrubbed 15 Seconds Using 70% Alcohol Before Access - Allow to Dry Before Accessing Line  Continuous         09/27/23 1359    09/27/23 1359  sodium chloride 0.9 % flush 10 mL  As Needed         09/27/23 1359    09/27/23 1359  sodium  chloride 0.9 % infusion 40 mL  As Needed         09/27/23 1359    09/27/23 1354  Transfer Patient  Once         09/27/23 1354    09/27/23 1018  ECG 12 Lead Chest Pain  STAT         09/27/23 1017    09/27/23 0803  Cardiac Catheterization/Vascular Study  Once         09/27/23 0802    09/27/23 0700  ECG 12 Lead Chest Pain  Once         09/26/23 1143    09/27/23 0600  CBC & Differential  Every Third Day      Comments: Discontinue After Heparin Stopped      09/26/23 0513    09/27/23 0600  CBC & Differential  Morning Draw,   Status:  Canceled         09/26/23 1018    09/27/23 0600  Basic Metabolic Panel  Morning Draw         09/26/23 1018    09/27/23 0600  CBC (No Diff)  Morning Draw,   Status:  Canceled         09/26/23 1143    09/27/23 0600  Hemoglobin A1c  Morning Draw         09/26/23 1143    09/27/23 0600  Lipid Panel  Morning Draw        Comments: Fasting      09/26/23 1143    09/27/23 0600  CBC Auto Differential  PROCEDURE ONCE        Comments: Discontinue After Heparin Stopped      09/26/23 2203    09/26/23 2100  lisinopril (PRINIVIL,ZESTRIL) tablet 2.5 mg  Nightly         09/26/23 0632    09/26/23 2100  levothyroxine (SYNTHROID, LEVOTHROID) tablet 50 mcg  Nightly         09/26/23 0632    09/26/23 2100  apixaban (ELIQUIS) tablet 5 mg  Every 12 Hours Scheduled         09/26/23 1537    09/26/23 1300  ipratropium (ATROVENT) nebulizer solution 0.5 mg  4 Times Daily - RT         09/26/23 1018    09/26/23 1200  aPTT  Timed,   Status:  Canceled         09/26/23 0547    09/26/23 1200  Strict Intake & Output  Every 4 Hours       09/26/23 1143    09/26/23 1200  Incentive Spirometry  Every 2 Hours While Awake       09/26/23 1143    09/26/23 1145  sodium chloride 0.9 % infusion  Continuous         09/26/23 1143    09/26/23 1144  Vital Signs, Site Check & Distal Extremity Check for Warmth, Color, Sensation & Pulses  Per Order Details         09/26/23 1143    09/26/23 1144  Continuous Cardiac Monitoring  Continuous         Comments: Follow Standing Orders As Outlined in Process Instructions (Open Order Report to View Full Instructions)    09/26/23 1143    09/26/23 1144  Telemetry - Maintain IV Access  Continuous         09/26/23 1143    09/26/23 1144  Telemetry - Place Orders & Notify Provider of Results When Patient Experiences Acute Chest Pain, Dysrhythmia or Respiratory Distress  Until Discontinued         09/26/23 1143    09/26/23 1144  May Be Off Telemetry for Tests  Continuous         09/26/23 1143    09/26/23 1144  Encourage Fluids  Until Discontinued         09/26/23 1143    09/26/23 1144  Continuous Pulse Oximetry  Continuous         09/26/23 1143    09/26/23 1144  Advance Diet As Tolerated -  Until Discontinued         09/26/23 1143    09/26/23 1144  Notify MD if platelet count is less than 100,000, is less than 1/2 baseline, or if Hgb drops by more than 3mg/dl.  Until Discontinued         09/26/23 1143    09/26/23 1144  Notify MD of hypotension (SBP less than 95), bleeding, or dysrythmia and follow Sheath Removal Policy if needed.  Continuous         09/26/23 1143    09/26/23 1144  Cardiac Rehab Evaluation and Enrollment  Once        Provider:  (Not yet assigned)    09/26/23 1143    09/26/23 1144  Hold metFORMIN (GLUCOPHAGE) for 48 Hours  Continuous         09/26/23 1143    09/26/23 1144  Code Status and Medical Interventions:  Continuous         09/26/23 1143    09/26/23 1144  Sheath Pulled in Lab  Once         09/26/23 1143    09/26/23 1144  Assess Puncture Site, Vital Signs, Distal Pulses & Observe Site for Bleeding or Swelling  Per Order Details        Comments: Every 15 Minutes x4, Every 30 Minutes x4, & Every 1 Hour x2    09/26/23 1143    09/26/23 1144  Closure Device Used  Once         09/26/23 1143    09/26/23 1144  Assess Puncture Site, Vital Signs, Distal Pulses & Observe Site for Bleeding or Swelling  Per Order Details        Comments: Every 15 Minutes x4, Every 30 Minutes x4, & Every 1 Hour x2    09/26/23 1143     09/26/23 1144  Diet: Cardiac Diets; Healthy Heart (2-3 Na+); Texture: Regular Texture (IDDSI 7); Fluid Consistency: Thin (IDDSI 0)  Diet Effective Now         09/26/23 1143    09/26/23 1143  acetaminophen (TYLENOL) tablet 650 mg  Every 4 Hours PRN         09/26/23 1143    09/26/23 1130  iopamidol (ISOVUE-370) 76 % injection  Code / Trauma / Sedation Medication,   Status:  Discontinued         09/26/23 1130    09/26/23 1115  budesonide (PULMICORT) nebulizer solution 0.5 mg  2 Times Daily - RT         09/26/23 1018    09/26/23 1115  arformoterol (BROVANA) nebulizer solution 15 mcg  2 Times Daily - RT         09/26/23 1018    09/26/23 1111  midazolam (VERSED) injection  Code / Trauma / Sedation Medication,   Status:  Discontinued         09/26/23 1111    09/26/23 1111  fentaNYL citrate (PF) (SUBLIMAZE) injection  Code / Trauma / Sedation Medication,   Status:  Discontinued         09/26/23 1111    09/26/23 1111  lidocaine (XYLOCAINE) 2% injection  Code / Trauma / Sedation Medication,   Status:  Discontinued         09/26/23 1112    09/26/23 1107  sodium chloride 0.9 % infusion  Code / Trauma / Sedation Continuous Med         09/26/23 1108    09/26/23 1054  Case Request Cath Lab: Left Heart Cath  Once         09/26/23 1053    09/26/23 1039  Cardiac Catheterization/Vascular Study  Once         09/26/23 1038    09/26/23 1002  Case Request Cath Lab: Left Heart Cath  Once         09/26/23 1001    09/26/23 0954  Inpatient Interventional Cardiology Consult  Once        Specialty:  Interventional Cardiology  Provider:  Kale Duran MD    09/26/23 0957    09/26/23 0932  Spiritual Care Consult  Once        Provider:  (Not yet assigned)    09/26/23 0931    09/26/23 0900  sodium chloride 0.9 % flush 10 mL  Every 12 Hours Scheduled         09/26/23 0443    09/26/23 0900  sennosides-docusate (PERICOLACE) 8.6-50 MG per tablet 2 tablet  2 Times Daily        See Hyperspace for full Linked Orders Report.    09/26/23 0443     09/26/23 0900  aspirin chewable tablet 81 mg  Daily         09/26/23 0443    09/26/23 0900  allopurinol (ZYLOPRIM) tablet 300 mg  Daily         09/26/23 0632    09/26/23 0900  atorvastatin (LIPITOR) tablet 80 mg  Daily         09/26/23 0632    09/26/23 0900  bumetanide (BUMEX) tablet 1.5 mg  2 Times Daily         09/26/23 0632    09/26/23 0900  cetirizine (zyrTEC) tablet 10 mg  Daily         09/26/23 0632    09/26/23 0900  gabapentin (NEURONTIN) capsule 300 mg  3 Times Daily         09/26/23 0632    09/26/23 0900  pantoprazole (PROTONIX) EC tablet 40 mg  Daily         09/26/23 0632    09/26/23 0900  potassium chloride (K-DUR,KLOR-CON) CR tablet 20 mEq  2 Times Daily         09/26/23 0632    09/26/23 0900  tamsulosin (FLOMAX) 24 hr capsule 0.4 mg  Daily         09/26/23 0632    09/26/23 0800  Vital Signs  Every 8 Hours        Comments: Per per hospital policy    09/26/23 0443    09/26/23 0800  Oral Care  2 Times Daily       09/26/23 0443    09/26/23 0730  finasteride (PROSCAR) tablet 5 mg  Every Morning         09/26/23 0632    09/26/23 0714  PT Consult: Eval & Treat Functional Mobility Below Baseline  Once         09/26/23 0713    09/26/23 0707  High Sensitivity Troponin T  STAT,   Status:  Canceled         09/26/23 0707    09/26/23 0700  Adult Transthoracic Echo Complete w/ Color, Spectral and Contrast if necessary per protocol  Once         09/26/23 0443    09/26/23 0658  High Sensitivity Troponin T 2Hr  PROCEDURE ONCE         09/26/23 0552    09/26/23 0633  TSH  STAT         09/26/23 0632    09/26/23 0630  colchicine tablet 0.6 mg  2 Times Daily PRN         09/26/23 0632    09/26/23 0600  Strict Intake & Output  Every 6 Hours         09/26/23 0443    09/26/23 0600  heparin 04247 units/250 mL (100 units/mL) in 0.45 % NaCl infusion  Titrated,   Status:  Discontinued         09/26/23 0513    09/26/23 0547  aPTT  STAT        Comments: Prior to Initial Heparin Bolus      09/26/23 0513    09/26/23 0534  BNP  STAT          09/26/23 0533    09/26/23 0533  High Sensitivity Troponin T  STAT         09/26/23 0533    09/26/23 0525  Magnesium  STAT         09/26/23 0524    09/26/23 0524  Magnesium Cardiology Dose Replacement - Follow Nurse / BPA Driven Protocol  As Needed         09/26/23 0524    09/26/23 0524  Potassium Replacement - Follow Nurse / BPA Driven Protocol  As Needed         09/26/23 0524    09/26/23 0524  XR Chest 1 View  1 Time Imaging         09/26/23 0523    09/26/23 0514  Initiate & Follow Heparin Protocol  Continuous,   Status:  Canceled         09/26/23 0513    09/26/23 0514  Notify Provider Platelet Count Less Than 53191  Until Discontinued         09/26/23 0513    09/26/23 0514  Stop Infusion & Notify Provider if Bleeding Occurs  Until Discontinued         09/26/23 0513    09/26/23 0514  CBC & Differential  STAT,   Status:  Canceled        Comments: Prior to Initial Heparin Bolus      09/26/23 0513    09/26/23 0514  Heparin Anti-Xa  STAT        Comments: Prior to Initial Heparin Bolus      09/26/23 0513    09/26/23 0514  Protime-INR  STAT        Comments: Prior to Initial Heparin Bolus      09/26/23 0513    09/26/23 0514  CBC Auto Differential  PROCEDURE ONCE,   Status:  Canceled        Comments: Prior to Initial Heparin Bolus      09/26/23 0513    09/26/23 0513  heparin (porcine) 5000 UNIT/ML injection 4,000 Units  As Needed,   Status:  Discontinued         09/26/23 0513    09/26/23 0513  heparin (porcine) 5000 UNIT/ML injection 2,000 Units  As Needed,   Status:  Discontinued         09/26/23 0513    09/26/23 0513  Pharmacy to Dose Heparin  Continuous PRN,   Status:  Discontinued         09/26/23 0513    09/26/23 0513  ECG 12 Lead Bradycardia  STAT         09/26/23 0513    09/26/23 0444  Daily Weights  Daily       09/26/23 0443    09/26/23 0443  Inpatient Cardiology Consult  Once        Specialty:  Cardiology  Provider:  Rosa Govea MD    09/26/23 0443    09/26/23 0443  CBC Auto Differential  STAT          09/26/23 0443    09/26/23 0443  Comprehensive Metabolic Panel  STAT         09/26/23 0443    09/26/23 0442  Inpatient Admission  Once         09/26/23 0443    09/26/23 0442  Notify Physician (with Parameters)  Until Discontinued         09/26/23 0443    09/26/23 0442  Insert Peripheral IV  Once         09/26/23 0443    09/26/23 0442  Saline Lock & Maintain IV Access  Continuous,   Status:  Canceled         09/26/23 0443    09/26/23 0442  Code Status and Medical Interventions:  Continuous,   Status:  Canceled         09/26/23 0443    09/26/23 0442  VTE Prophylaxis Not Indicated: Other: Patient Currently Anticoagulated / Receiving Prophylaxis  Once         09/26/23 0443    09/26/23 0442  Continuous Cardiac Monitoring  Continuous,   Status:  Canceled        Comments: Follow Standing Orders As Outlined in Process Instructions (Open Order Report to View Full Instructions)    09/26/23 0443    09/26/23 0442  Telemetry - Maintain IV Access  Continuous,   Status:  Canceled         09/26/23 0443    09/26/23 0442  Telemetry - Place Orders & Notify Provider of Results When Patient Experiences Acute Chest Pain, Dysrhythmia or Respiratory Distress  Until Discontinued         09/26/23 0443    09/26/23 0442  May Be Off Telemetry for Tests  Continuous         09/26/23 0443    09/26/23 0442  Pulse Oximetry, Continuous  Continuous,   Status:  Canceled         09/26/23 0443    09/26/23 0442  NPO Diet NPO Type: Sips with Meds  Diet Effective Now,   Status:  Canceled         09/26/23 0443    09/26/23 0441  nitroglycerin (NITROSTAT) SL tablet 0.4 mg  Every 5 Minutes PRN         09/26/23 0443    09/26/23 0441  sodium chloride 0.9 % flush 10 mL  As Needed         09/26/23 0443    09/26/23 0441  sodium chloride 0.9 % infusion 40 mL  As Needed         09/26/23 0443    09/26/23 0441  polyethylene glycol (MIRALAX) packet 17 g  Daily PRN        See Hyperspace for full Linked Orders Report.    09/26/23 0443    09/26/23 0441  bisacodyl (DULCOLAX)  EC tablet 5 mg  Daily PRN        See Hyperspace for full Linked Orders Report.    09/26/23 0443 09/26/23 0441  bisacodyl (DULCOLAX) suppository 10 mg  Daily PRN        See Hyperspace for full Linked Orders Report.    09/26/23 0443    Unscheduled  Up With Assistance  As Needed       09/26/23 0443    Unscheduled  Change Site Dressing  As Needed       09/26/23 1143    Unscheduled  Oxygen Therapy- Nasal Cannula; Titrate 1-6 LPM Per SpO2; 90 - 95%  Continuous PRN       09/26/23 1143    Unscheduled  Head of Bed: 30 degrees; 4 Hours; Elevate Head of Bed: 30 Degrees; 4 Hours; Activity Level: Strict Bed Rest; If No Bleeding; Total Bedrest Time? 4 Hours  As Needed       09/26/23 1143    Unscheduled  Head of Bed: 30 degrees; 4 Hours; Elevate Head of Bed: 30 Degrees; 4 Hours; Activity Level: Strict Bed Rest; Keep Affected Extremity/ Extremities Straight; Total Bedrest Time? 4 Hours  As Needed       09/26/23 1143    Unscheduled  Change Dressing to IV Site As Needed When Damp, Loose or Soiled  As Needed       09/27/23 1359    Unscheduled  Change Needleless Connectors  As Needed      Comments: Change Needleless Connectors When:  - Administration Set Changed  - Dressing Changed  - Removed For Any Reason  - Residual Blood or Debris Within Connector  - Prior to Drawing Blood Cultures  - Contamination of Connector  - After Administration of Blood or Blood Components    09/27/23 1359    Unscheduled  Insert New Peripheral IV  As Needed      Comments: Frequency Per Facility Policy    09/27/23 1359    --  allopurinol (ZYLOPRIM) 300 MG tablet  Daily         09/26/23 0556    --  atorvastatin (LIPITOR) 80 MG tablet  Daily         09/26/23 0556    --  bumetanide (BUMEX) 0.5 MG tablet  2 Times Daily         09/26/23 0556    --  carvedilol (COREG) 3.125 MG tablet  2 Times Daily With Meals         09/26/23 0556    --  colchicine 0.6 MG tablet  2 Times Daily PRN         09/26/23 0556    --  fexofenadine (ALLEGRA) 180 MG tablet  Nightly          23    --  finasteride (PROSCAR) 5 MG tablet  Every Morning         23    --  gabapentin (NEURONTIN) 300 MG capsule  3 Times Daily         23    --  levothyroxine (SYNTHROID, LEVOTHROID) 50 MCG tablet  Nightly         23    --  pantoprazole (PROTONIX) 40 MG EC tablet  Daily         23    --  potassium chloride (K-DUR,KLOR-CON) 20 MEQ CR tablet  2 Times Daily         23    --  tamsulosin (FLOMAX) 0.4 MG capsule 24 hr capsule  Daily         23    --  pseudoephedrine-guaifenesin (MUCINEX D)  MG per 12 hr tablet  2 Times Daily         23    --  apixaban (ELIQUIS) 5 MG tablet tablet  2 Times Daily         23    --  lisinopril (PRINIVIL,ZESTRIL) 2.5 MG tablet  Nightly         23    --  SCANNED - TELEMETRY           23 0000    --  SCANNED - TELEMETRY           23 0000    --  SCANNED - TELEMETRY           23 0000    --  SCANNED - TELEMETRY           23 0000    --  SCANNED - TELEMETRY           23 0000    --  SCANNED - TELEMETRY           23 0000    --  SCANNED - TELEMETRY           23 0000    --  SCANNED - TELEMETRY           23 0000    --  SCANNED - TELEMETRY           23 0000                  Operative/Procedure Notes (all)    No notes of this type exist for this encounter.          Physician Progress Notes (all)        Nereyda Cortez MD at 23 1346              [facility]     Patient Identification:  Name:  Héctor Zheng  Age:  78 y.o.  Sex:  male  :  1945  MRN:  [id number]  Visit Number:  [id number]  Primary Care Provider:  Provider, No Known    Length of stay:  1    Subjective: Patient seen and examined, he is not orthopneic, heart rate mid 40s beat per minute, no desaturation, patient heart rate increased to 65 bpm while sitting at the edge of the bed, no more A-fib with RVR, cardiac cath finding  nonobstructive coronary disease recommended medical management.    Chief Complaint: Short of breath  ----------------------------------------------------------------------------------------------------------------------  Current Hospital Meds:  allopurinol, 300 mg, Oral, Daily  apixaban, 5 mg, Oral, Q12H  arformoterol, 15 mcg, Nebulization, BID - RT  aspirin, 81 mg, Oral, Daily  atorvastatin, 80 mg, Oral, Daily  budesonide, 0.5 mg, Nebulization, BID - RT  bumetanide, 1.5 mg, Oral, BID  cetirizine, 10 mg, Oral, Daily  finasteride, 5 mg, Oral, QAM  gabapentin, 300 mg, Oral, TID  ipratropium, 0.5 mg, Nebulization, 4x Daily - RT  levothyroxine, 50 mcg, Oral, Nightly  lisinopril, 2.5 mg, Oral, Nightly  pantoprazole, 40 mg, Oral, Daily  potassium chloride, 20 mEq, Oral, BID  senna-docusate sodium, 2 tablet, Oral, BID  sodium chloride, 10 mL, Intravenous, Q12H  tamsulosin, 0.4 mg, Oral, Daily         ----------------------------------------------------------------------------------------------------------------------  Vital Signs:  Temp:  [96.3 °F (35.7 °C)-97.9 °F (36.6 °C)] 97.6 °F (36.4 °C)  Heart Rate:  [36-67] 52  Resp:  [15-25] 16  BP: ()/(52-87) 101/64       Tele: Sinus rhythm 45 bpm while at rest, increased to 6 7 bpm      09/26/23  0453 09/27/23  0400   Weight: 104 kg (229 lb 15 oz) 106 kg (233 lb 4.8 oz)     Body mass index is 30.78 kg/m².    Intake/Output Summary (Last 24 hours) at 9/27/2023 1346  Last data filed at 9/27/2023 0800  Gross per 24 hour   Intake 480 ml   Output 2450 ml   Net -1970 ml     Diet: Cardiac Diets; Healthy Heart (2-3 Na+); Texture: Regular Texture (IDDSI 7); Fluid Consistency: Thin (IDDSI 0)  ----------------------------------------------------------------------------------------------------------------------  Physical exam:  General: Comfortable,awake, alert, oriented to self, place, and time, well-developed and well-nourished.  No respiratory distress.    Skin:  Skin is warm and  dry. No rash noted. No pallor.    HENT:  Head:  Normocephalic and atraumatic.  Mouth:  Moist mucous membranes.    Eyes:  Conjunctivae and EOM are normal.  Pupils are equal, round, and reactive to light.  No scleral icterus.    Neck:  Neck supple.  No JVD present.  No hepatojugular reflux  Pulmonary/Chest:  No respiratory distress, no wheezes, no crackles, with normal breath sounds and good air movement.  Cardiovascular:  Normal rate, regular rhythm and normal heart sounds with no murmur.  Abdominal:  Soft.  Bowel sounds are normal.  No distension and no tenderness.   Extremities:  No edema, no tenderness, and no deformity.  No red or swollen joints anywhere.  Strong pulses in all 4 extremities with no clubbing, no cyanosis, no edema.  Neurological:  Motor strength equal no obvious deficit, sensory grossly intact.   No cranial nerve deficit.  No tongue deviation.  No facial droop.  No slurred speech.    Genitourinary: No Patricia catheter  Back:  ----------------------------------------------------------------------------------------------------------------------  ----------------------------------------------------------------------------------------------------------------------  Results from last 7 days   Lab Units 09/26/23  0656 09/26/23  0458   HSTROP T ng/L 616* 678*     Results from last 7 days   Lab Units 09/27/23  0143 09/26/23  0515 09/26/23  0458   WBC 10*3/mm3 10.74  --  4.65   HEMOGLOBIN g/dL 10.2*  --  11.9*   HEMATOCRIT % 32.4*  --  36.9*   MCV fL 97.3*  --  96.1   MCHC g/dL 31.5  --  32.2   PLATELETS 10*3/mm3 192  --  214   INR   --  1.36*  --          Results from last 7 days   Lab Units 09/27/23  0143 09/26/23  0458   SODIUM mmol/L 138 138   POTASSIUM mmol/L 4.0 4.1   MAGNESIUM mg/dL  --  2.5*   CHLORIDE mmol/L 105 105   CO2 mmol/L 25.4 22.7   BUN mg/dL 20 17   CREATININE mg/dL 0.89 0.82   CALCIUM mg/dL 8.8 9.4   GLUCOSE mg/dL 119* 161*   ALBUMIN g/dL  --  3.7   BILIRUBIN mg/dL  --  0.8   ALK PHOS U/L   --  183*   AST (SGOT) U/L  --  48*   ALT (SGPT) U/L  --  20   Estimated Creatinine Clearance: 90.2 mL/min (by C-G formula based on SCr of 0.89 mg/dL).    No results found for: AMMONIA  Results from last 7 days   Lab Units 23  0143   CHOLESTEROL mg/dL 105   TRIGLYCERIDES mg/dL 51   HDL CHOL mg/dL 57   LDL CHOL mg/dL 36     No results found for: BLOODCX  No results found for: URINECX  No results found for: WOUNDCX  No results found for: STOOLCX    I have personally looked at the labs and they are summarized above.  ----------------------------------------------------------------------------------------------------------------------  Imaging Results (Last 24 Hours)       ** No results found for the last 24 hours. 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  3 with evidence of mild decompensation now improved   -Bradycardia when asleep no desaturation, appropriate response with activity heart rate increases to 60+.    -History of BPH   -COPD without exacerbation   -Hyperlipidemia  -History of hypothyroidism    Patient on diuretics, monitor electrolytes, ambulate, out of bed to chair, if symptoms improved, function stable hopefully can be discharged soon.  We will transfer patient to medical floors      Disposition pending      Nereyda Cortez MD  23  13:46 EDT    Electronically signed by Nereyda Cortez MD at 23 7523       Rosa Govea MD at 23 0881              [facility]  PROGRESS NOTE    Patient information:  Name: Héctor Zheng  Age/Sex: 78 y.o. male  :  1945        PCP: Provider, No Known  Attending: Ej Burdick MD  MRN:   [id number]  Visit Number:  [id number]    LOS:  LOS: 1 day     CODE STATUS:    Code Status and Medical Interventions:   Ordered at: 09/26/23 1143     Level Of Support Discussed With:    Patient     Code Status (Patient has no pulse and is not breathing):    CPR (Attempt to Resuscitate)     Medical Interventions (Patient has pulse or is breathing):    Full Support       PROBLEM LIST:Principal Problem:    NSTEMI (non-ST elevated myocardial infarction)      Reason for Cardiology follow-up: NSTEMI with atrial fibrillation with RVR     Subjective   ADMISSION INFORMATION:  No chief complaint on file.      HPI:    Héctor Zheng is a 78 y.o. male with a past medical history significant for history of CHF (unspecified), COPD on 5L oxygen concentrator at home, CAD s/p 5 stents total in the past, and and permanent afib s/p multiple failed cardioversions and ablations in the past, who presented to an outside facility on the afternoon of 9/25 with complaints of acute onset shortness of breath and palpitations. He reported a brief episode of chest pain as well which he described as \"pounding\" that had already resolved when he arrived. He was found to be in atrial fibrillation with RVR, with HR as high as 200. He dropped to the 60's without intervention soon after arrival, however, and HR has remained in the 40-60s since then. He denies any further palpitations or dyspnea. He does report ongoing belching and attributes this to a hiatal hernia which he notes has caused chest pain-like symptoms in the past. At the outside facility, pertinent labs include Na 140, K+ 3.5 and Cr 0.9. He had PVCs on telemetry monitoring for which he was given calcium and magnesium that the ED provider claimed \"smoothed out\" the PVCs. He was given full dose ASA after fist troponin 678-> 616.  proBNP was 2019.0. Cardiology was consulted for further evaluation and management NSTEMI with atrial fibrillation with RVR.      Primary Cardiologist has been " [facility] cardiology    PROCEDURES:   Heart cath on 09/26/2023-please see full report attached below.    Interval History:   Patient is in room  and was examined by Dr. Govea.  Patient is completing a walk in the hallway with physical therapy at this time.  Tolerating activity well.  Denied any chest pain or shortness of breath.  Telemetry revealed atrial fibrillation 50s with frequent PVCs.  Oxygen saturation was 98%.  Wife is at bedside.    Vital Signs  Temp:  [96.3 °F (35.7 °C)-97.9 °F (36.6 °C)] 97.8 °F (36.6 °C)  Heart Rate:  [38-67] 67  Resp:  [12-25] 22  BP: ()/() 113/62  Flow (L/min):  [2] 2  Device (Oxygen Therapy): room air  Vital Signs (last 72 hrs)         09/24 0700  09/25 0659 09/25 0700 09/26 0659 09/26 0700  09/27 0659 09/27 0700  09/27 0842   Most Recent      Temp (°F)     97.7    96.3 -  97.9      97.8     97.8 (36.6) 09/27 0800    Heart Rate     50    36 -  58      53     53 09/27 0700    Resp     18    12 -  25      18     18 09/27 0700    BP     141/67    84/59 -  129/63      111/54     111/54 09/27 0700    SpO2 (%)     100    93 -  100      100     100 09/27 0700          BMI:Body mass index is 30.78 kg/m².  WEIGHT:      09/26/23  0453 09/27/23  0400   Weight: 104 kg (229 lb 15 oz) 106 kg (233 lb 4.8 oz)     DIET:Diet: Cardiac Diets; Healthy Heart (2-3 Na+); Texture: Regular Texture (IDDSI 7); Fluid Consistency: Thin (IDDSI 0)    I&O:  Intake/Output Summary (Last 24 hours) at 9/27/2023 1243  Last data filed at 9/27/2023 0800  Gross per 24 hour   Intake 480 ml   Output 2450 ml   Net -1970 ml       Objective     Physical Exam:      General Appearance:    Alert, cooperative, in no acute distress.   Head:    Normocephalic, without obvious abnormality, atraumatic.   Eyes:                          Conjunctivae and sclerae normal, no icterus, no pallor, corneas clear.   Neck:   No adenopathy, supple, trachea midline, no thyromegaly, no carotid bruit, no JVD.   Lungs:      Clear to auscultation, respirations regular, even and             unlabored.    Heart:    Regular rhythm and normal rate, normal S1 and S2, no          murmur, no gallop, no rub, no click   Chest Wall:    No abnormalities observed.   Abdomen:     Normal bowel sounds, no masses, no organomegaly, soft nontender, nondistended, no guarding, no rebound tenderness.   Extremities:   Moves all extremities well, no edema, no cyanosis, no           redness.   Pulses:   Pulses palpable and equal bilaterally.   Skin:   No bleeding, bruising or rash.   Neurologic:   Alert and Oriented x 3, Speech Clear & comprehensive.       Results review   Results Review:   Results from last 7 days   Lab Units 09/26/23  0656 09/26/23  0458   HSTROP T ng/L 616* 678*     Lab Results   Component Value Date    PROBNP 2,019.0 (H) 09/26/2023     Results from last 7 days   Lab Units 09/27/23  0143 09/26/23  0458   WBC 10*3/mm3 10.74 4.65   HEMOGLOBIN g/dL 10.2* 11.9*   PLATELETS 10*3/mm3 192 214     Results from last 7 days   Lab Units 09/27/23  0143 09/26/23  0458   SODIUM mmol/L 138 138   POTASSIUM mmol/L 4.0 4.1   CHLORIDE mmol/L 105 105   CO2 mmol/L 25.4 22.7   BUN mg/dL 20 17   CREATININE mg/dL 0.89 0.82   CALCIUM mg/dL 8.8 9.4   GLUCOSE mg/dL 119* 161*   ALT (SGPT) U/L  --  20   AST (SGOT) U/L  --  48*     Lab Results   Component Value Date    MG 2.5 (H) 09/26/2023    MG 1.9 08/24/2021     Estimated Creatinine Clearance: 90.2 mL/min (by C-G formula based on SCr of 0.89 mg/dL).  No results found.  Lab Results   Component Value Date    HGBA1C 5.70 (H) 09/27/2023     Lab Results   Component Value Date    CHOL 105 09/27/2023    TRIG 51 09/27/2023    LDL 36 09/27/2023    HDL 57 09/27/2023        Lab Results   Component Value Date    INR 1.36 (H) 09/26/2023     Lab Results   Component Value Date    TSH 0.970 09/26/2023      Pain Management Panel           No data to display              Microbiology Results (last 10 days)       ** No results found  for the last 240 hours. **           Imaging Results (Last 24 Hours)       ** No results found for the last 24 hours. **            ECHO:  Results for orders placed during the hospital encounter of 23    Adult Transthoracic Echo Complete w/ Color, Spectral and Contrast if necessary per protocol    Interpretation Summary    Left ventricular systolic function is moderately decreased. Left ventricular ejection fraction appears to be 36 - 40%.    Left ventricular diastolic function is consistent with (grade III w/high LAP) fixed restrictive pattern.    The right ventricular cavity is mildly dilated.    The left atrial cavity is moderately dilated.    The right atrial cavity is mild to moderately  dilated.    Moderate mitral valve regurgitation is present.    Estimated right ventricular systolic pressure from tricuspid regurgitation is normal (<35 mmHg).      STRESS TEST:   None found in patient's chart.    HEART CATH:  Results for orders placed during the hospital encounter of 23    Cardiac Catheterization/Vascular Study    Narrative    Mid LAD lesion is 30% stenosed.    Mid Cx lesion is 20% stenosed.    Prox RCA lesion is 20% stenosed.    Mid RCA lesion is 20% stenosed.    1.  Cardiac.  Patient with nonobstructive coronary artery disease.  Medical management will be continued      [id number]      TELEMETRY:     Atrial fibrillation 40s with frequent PVCs          I reviewed the patient's new clinical results.    ALLERGIES: Patient has no known allergies.    Medication Review:   Current list of medications may not reflect those currently placed in orders that are not signed or are being held.     allopurinol, 300 mg, Oral, Daily  apixaban, 5 mg, Oral, Q12H  arformoterol, 15 mcg, Nebulization, BID - RT  aspirin, 81 mg, Oral, Daily  atorvastatin, 80 mg, Oral, Daily  budesonide, 0.5 mg, Nebulization, BID - RT  bumetanide, 1.5 mg, Oral, BID  cetirizine, 10 mg, Oral, Daily  finasteride, 5 mg, Oral,  QAM  gabapentin, 300 mg, Oral, TID  ipratropium, 0.5 mg, Nebulization, 4x Daily - RT  levothyroxine, 50 mcg, Oral, Nightly  lisinopril, 2.5 mg, Oral, Nightly  pantoprazole, 40 mg, Oral, Daily  potassium chloride, 20 mEq, Oral, BID  senna-docusate sodium, 2 tablet, Oral, BID  sodium chloride, 10 mL, Intravenous, Q12H  tamsulosin, 0.4 mg, Oral, Daily           acetaminophen    senna-docusate sodium **AND** polyethylene glycol **AND** bisacodyl **AND** bisacodyl    colchicine    Magnesium Cardiology Dose Replacement - Follow Nurse / BPA Driven Protocol    nitroglycerin    Potassium Replacement - Follow Nurse / BPA Driven Protocol    sodium chloride    sodium chloride    Assessment    1.  Acute NSTEMI likely type I  2.  ASCVD status post previous multiple PCI's in [place] with previous myocardial infarction 2005 followed by PCI  3.  Ischemic cardiomyopathy no clinical CHF  4.  Paroxysmal atrial fibrillation [id number]: 3 currently in sinus rhythm              Plan   1.  Cardiac catheterization done was nonobstructive disease continue medical management  2.  Restart NOAC after 2 days  3.  Ambulate possible discharge home if is okay with medicine service follow-up with cardiology in 2 to 4 weeks          I have discussed the patients findings and recommendations with patient.    Thank you very much for asking us to be involved in this patient's care.  We will follow along with you.    Electronically signed by JEAN Villa, 09/27/23, 12:43 PM EDT.   Electronically signed by Rosa Govea MD, 09/27/23, 2:12 PM EDT.                      Please note that portions of this note were completed with a voice recognition program.    Please note that portions of this note were copied and has been reviewed and is accurate as of 9/27/2023 .       Electronically signed by Rosa Govea MD at 09/27/23 1414       Nereyda Cortez MD at 09/26/23 1013          -History of chronic persistent atrial fibrillation with episode of  RVR  -Troponin elevation with chest pain non-STEMI type I versus type II  -CHF with evidence of mild decompensation awaiting 2D echo EF evaluation  -COPD no signs of exacerbation at this time  -History of BPH    Awaiting for cardiology recommendation, keep the patient n.p.o. in case the patient needs stress test or procedure.  Start neb treatment with inhaled corticosteroids and long-acting beta agonist.  Monitor CBC and platelet count, electrolytes.        Electronically signed by Nereyda Cortez MD at 09/26/23 1017          Consult Notes (all)        Elvis Bain MD at 09/26/23 1201        Consult Orders    1. Inpatient Cardiology Consult [[id number]] ordered by Kale Duran MD at 09/26/23 0443                     [facility]  CONSULT  NOTE      Patient information:  Date of Admit: 9/26/2023  Date of Consult: 09/26/23  Hospitalist/Referring MD:Ej Burdick MD  PCP: Provider, No Known  MRN:  [id number]  Visit Number:  [id number]    LOS: 0  CODE STATUS:  Code Status and Medical Interventions:   Ordered at: 09/26/23 1143     Level Of Support Discussed With:    Patient     Code Status (Patient has no pulse and is not breathing):    CPR (Attempt to Resuscitate)     Medical Interventions (Patient has pulse or is breathing):    Full Support       PROBLEM LIST: Principal Problem:    NSTEMI (non-ST elevated myocardial infarction)        General Cardiology Consulting Physician: Dr. Elvis Bain MD, FACC    Assessment      Acute non-ST elevation myocardial infarction.  ASCVD, status post previous multiple PCI's in [place] (details unavailable).  Previous myocardial infarction in 2005 followed by PCI.  Ischemic cardiomyopathy, appears compensated at this time.  Paroxysmal atrial fibrillation with rapid ventricle response at admission, patient was converted back to sinus rhythm with sinus bradycardia in the 40s.  COPD.    Recommendations     Continue with aspirin and  "IV heparin.  Hold Eliquis in anticipation of cardiac catheterization.  Hold beta-blockers due to baseline sinus bradycardia.  Continue with atorvastatin.  I have recommended further cardiac evaluation cardiac catheterization.  I have discussed with Dr. Duran interventional cardiologist who is planning to do this today.  I have discussed his cardiac status in detail with patient's wife at bedside and his rosa Parekh in [place] on the telephone.    Reason for Cardiology consultation: NSTEMI with atrial fibrillation with RVR    Subjective Data   ADMISSION INFORMATION:    History of Present Illness    Héctor Zheng is a 78 y.o. male with a past medical history significant for history of CHF (unspecified), COPD on 5L oxygen concentrator at home, CAD s/p 5 stents total in the past, and and permanent afib s/p multiple failed cardioversions and ablations in the past, who presented to an outside facility on the afternoon of 9/25 with complaints of acute onset shortness of breath and palpitations. He reported a brief episode of chest pain as well which he described as \"pounding\" that had already resolved when he arrived. He was found to be in atrial fibrillation with RVR, with HR as high as 200. He dropped to the 60's without intervention soon after arrival, however, and HR has remained in the 40-60s since then. He denies any further palpitations or dyspnea. He does report ongoing belching and attributes this to a hiatal hernia which he notes has caused chest pain-like symptoms in the past. At the outside facility, pertinent labs include Na 140, K+ 3.5 and Cr 0.9. He had PVCs on telemetry monitoring for which he was given calcium and magnesium that the ED provider claimed \"smoothed out\" the PVCs. He was given full dose ASA after fist troponin 678-> 616.  proBNP was 2019.0.     Cardiology has been consulted for further evaluation and management NSTEMI with atrial fibrillation with RVR.     Primary Cardiologist has been " [facility] cardiology     Patient is in room  and was examined by Dr. Dr. Bain.  Patient is lying in bed resting quietly.  No acute distress noted at this time.  Telemetry reveals sinus bradycardia 39 to 50s with frequent PVCs.  Patient currently denies any chest pain or shortness of breath.  Patient does report increased shortness of breath in the last 2 weeks that has progressively gotten worse.  As he was driving from [employer] [facility] on his way home to [place] he developed increased shortness of breath and chest pain.  After driving an hour [place] [facility] [place] [facility] [employer]  Police department did call EMS and they found him to have a heart rate around 200 at that time.  Patient reports he is followed by [facility].  Patient reports his last MI was in 2005.  Patient report reports compliancy with his medicines and states his last dose of Eliquis was Monday morning.  He denies noticing any hematuria or bleeding through his bowels.  Patient does report a history of COPD and the use of his sleep and machine that recycles room air for him.  Patient reports he quit smoking in 2008.      ORDERS:   Consult for interventional cardiology for consideration of cardiac catheterization      Cardiac risk factors:arteriosclerotic heart disease, hypercholesterolemia, and hypertension      Last Echo:  None found in patient's chart      Last Stress: None found in patient's chart      Last Cath: Results for orders placed during the hospital encounter of 09/26/23    Cardiac Catheterization/Vascular Study (Needs Review)  This result has not been signed. Information might be incomplete.    Narrative    Mid LAD lesion is 30% stenosed.    Mid Cx lesion is 20% stenosed.    Prox RCA lesion is 20% stenosed.    Mid RCA lesion is 20% stenosed.    1.  Cardiac.  Patient with nonobstructive coronary artery disease.  Medical management will be continued                            Past  Medical History:   Diagnosis Date    CHF (congestive heart failure)     COPD (chronic obstructive pulmonary disease)     on 5L oxygen concentrator at home    Coronary artery disease     s/p 5 stents    Lung nodule     Permanent atrial fibrillation     Prostate cancer     monitoring, no active treatment    Thyroid nodule      Past Surgical History:   Procedure Laterality Date    CARDIAC CATHETERIZATION      5 stents total in the past     History reviewed. No pertinent family history.  Social History     Tobacco Use    Smoking status: Never   Vaping Use    Vaping Use: Never used   Substance Use Topics    Alcohol use: Never    Drug use: Never       ALLERGIES: Patient has no known allergies.    Medications listed below are reported home medications pulling from within the system:  Medications Prior to Admission   Medication Sig Dispense Refill Last Dose    allopurinol (ZYLOPRIM) 300 MG tablet Take 1 tablet by mouth Daily.   9/25/2023    apixaban (ELIQUIS) 5 MG tablet tablet Take 1 tablet by mouth 2 (Two) Times a Day.   9/25/2023    atorvastatin (LIPITOR) 80 MG tablet Take 1 tablet by mouth Daily.   9/25/2023    bumetanide (BUMEX) 0.5 MG tablet Take 3 tablets by mouth 2 (Two) Times a Day.   9/25/2023    colchicine 0.6 MG tablet Take 1 tablet by mouth 2 (Two) Times a Day As Needed for Muscle / Joint Pain.   9/25/2023    fexofenadine (ALLEGRA) 180 MG tablet Take 1 tablet by mouth Every Night.   9/25/2023    finasteride (PROSCAR) 5 MG tablet Take 1 tablet by mouth Every Morning.   9/25/2023    gabapentin (NEURONTIN) 300 MG capsule Take 1 capsule by mouth 3 (Three) Times a Day.   9/25/2023    levothyroxine (SYNTHROID, LEVOTHROID) 50 MCG tablet Take 1 tablet by mouth Every Night.   9/25/2023    lisinopril (PRINIVIL,ZESTRIL) 2.5 MG tablet Take 1 tablet by mouth Every Night.   9/25/2023    pantoprazole (PROTONIX) 40 MG EC tablet Take 1 tablet by mouth Daily.   9/25/2023    potassium chloride (K-DUR,KLOR-CON) 20 MEQ CR tablet  Take 1 tablet by mouth 2 (Two) Times a Day.   9/25/2023    pseudoephedrine-guaifenesin (MUCINEX D)  MG per 12 hr tablet Take 1 tablet by mouth 2 (Two) Times a Day.   9/25/2023    tamsulosin (FLOMAX) 0.4 MG capsule 24 hr capsule Take 1 capsule by mouth Daily.   9/25/2023    carvedilol (COREG) 3.125 MG tablet Take 1 tablet by mouth 2 (Two) Times a Day With Meals.          Review of Systems   Constitutional:  Negative for activity change, diaphoresis and unexpected weight change.   HENT:  Negative for facial swelling and trouble swallowing.    Eyes: Negative.  Negative for visual disturbance.   Respiratory:  Positive for shortness of breath. Negative for apnea, cough, chest tightness, wheezing and stridor.    Cardiovascular:  Positive for chest pain and palpitations. Negative for leg swelling.   Gastrointestinal:  Negative for abdominal distention, nausea and vomiting.   Genitourinary: Negative.    Musculoskeletal: Negative.    Skin: Negative.    Neurological:  Negative for dizziness, syncope, speech difficulty and weakness.   Psychiatric/Behavioral:  Negative for agitation and behavioral problems.      Objective Data      Vital Signs  Temp:  [97.7 °F (36.5 °C)] 97.7 °F (36.5 °C)  Heart Rate:  [37-51] 37  Resp:  [12-18] 12  BP: (101-141)/(56-71) 106/58  Flow (L/min):  [2] 2  Device (Oxygen Therapy): nasal cannula  Vital Signs (last 72 hrs)         09/23 0700  09/24 0659 09/24 0700 09/25 0659 09/25 0700 09/26 0659 09/26 0700  09/26 1201   Most Recent      Temp (°F)       97.7      97.7     97.7 (36.5) 09/26 0800    Heart Rate       50    37 -  51     37 09/26 1157    Resp       18    12 -  18     12 09/26 1157    BP       141/67    101/56 -  125/62     106/58 09/26 1157    SpO2 (%)       100      100     100 09/26 1157          BMI:Body mass index is 30.34 kg/m².  WEIGHT:      09/26/23  0453   Weight: 104 kg (229 lb 15 oz)     DIET:Diet: Cardiac Diets; Healthy Heart (2-3 Na+); Texture: Regular Texture (IDDSI 7);  Fluid Consistency: Thin (IDDSI 0)  I&O:  Intake/Output Summary (Last 24 hours) at 9/26/2023 1201  Last data filed at 9/26/2023 1030  Gross per 24 hour   Intake 19.97 ml   Output 600 ml   Net -580.03 ml       Physical Exam  Constitutional:       General: He is not in acute distress.     Appearance: Normal appearance. He is not ill-appearing, toxic-appearing or diaphoretic.   HENT:      Head: Normocephalic and atraumatic.      Mouth/Throat:      Mouth: Mucous membranes are moist.   Eyes:      Extraocular Movements: Extraocular movements intact.      Pupils: Pupils are equal, round, and reactive to light.   Cardiovascular:      Rate and Rhythm: Bradycardia present. Rhythm irregular.      Heart sounds: Normal heart sounds.   Pulmonary:      Effort: Pulmonary effort is normal.      Breath sounds: Normal breath sounds.   Abdominal:      General: Bowel sounds are normal.      Palpations: Abdomen is soft.   Musculoskeletal:         General: Normal range of motion.      Cervical back: Normal range of motion.   Skin:     General: Skin is warm and dry.   Neurological:      General: No focal deficit present.      Mental Status: He is alert and oriented to person, place, and time. Mental status is at baseline.   Psychiatric:         Mood and Affect: Mood normal.         Behavior: Behavior normal.         Thought Content: Thought content normal.         Judgment: Judgment normal.       Results review   Results Review:    I have reviewed the patient's new clinical results.    Results from last 7 days   Lab Units 09/26/23  0656 09/26/23  0458   HSTROP T ng/L 616* 678*     Lab Results   Component Value Date    PROBNP 2,019.0 (H) 09/26/2023     Results from last 7 days   Lab Units 09/26/23  0458   WBC 10*3/mm3 4.65   HEMOGLOBIN g/dL 11.9*   PLATELETS 10*3/mm3 214     Results from last 7 days   Lab Units 09/26/23  0458   SODIUM mmol/L 138   POTASSIUM mmol/L 4.1   CHLORIDE mmol/L 105   CO2 mmol/L 22.7   BUN mg/dL 17   CREATININE mg/dL  0.82   CALCIUM mg/dL 9.4   GLUCOSE mg/dL 161*   ALT (SGPT) U/L 20   AST (SGOT) U/L 48*     Lab Results   Component Value Date    MG 2.5 (H) 09/26/2023    MG 1.9 08/24/2021     No results found for: CHOL, TRIG, HDL, LDL  Estimated Creatinine Clearance: 97 mL/min (by C-G formula based on SCr of 0.82 mg/dL).  No results found.  No results found for: HGBA1C  Lab Results   Component Value Date    INR 1.36 (H) 09/26/2023         Lab Results   Component Value Date    TSH 0.970 09/26/2023      No results found for: URICACID  Pain Management Panel           No data to display              Microbiology Results (last 10 days)       ** No results found for the last 240 hours. **           No results found for: BLOODCX        ECG:        ECG/EMG Results (last 24 hours)       Procedure Component Value Units Date/Time    ECG 12 Lead Bradycardia [[id number]] Collected: 09/26/23 0520     Updated: 09/26/23 0521     QT Interval 490 ms      QTC Interval 455 ms     Narrative:      Test Reason : Bradycardia  Blood Pressure :   */*   mmHG  Vent. Rate :  52 BPM     Atrial Rate :  67 BPM     P-R Int :   * ms          QRS Dur : 112 ms      QT Int : 490 ms       P-R-T Axes :   *  61  62 degrees     QTc Int : 455 ms    Atrial fibrillation with slow ventricular response with premature ventricular or aberrantly conducted complexes and with ventricular escape complexes  Incomplete left bundle branch block  Abnormal ECG  No previous ECGs available    Referred By:            Confirmed By:     SCANNED - TELEMETRY   [[id number]] Resulted: 09/26/23     Updated: 09/26/23 0706    Adult Transthoracic Echo Complete w/ Color, Spectral and Contrast if necessary per protocol [[id number]] Resulted: 09/26/23 0938     Updated: 09/26/23 1015            TELEMETRY:     Atrial fibrillation 40 with PVC        RADIOLOGY STUDIES:  Imaging Results (Last 72 Hours)       Procedure Component Value Units Date/Time    XR Chest 1 View [[id number]] Collected: 09/26/23 0829      Updated: 09/26/23 0831    Narrative:      EXAM:    XR Chest, 1 View     EXAM DATE:    9/26/2023 7:12 AM     CLINICAL HISTORY:    short of breath     TECHNIQUE:    Frontal view of the chest.     COMPARISON:    No relevant prior studies available.     FINDINGS:    LUNGS AND PLEURAL SPACES:  Coarsened interstitial markings noted  throughout the lungs.  No pneumothorax.    HEART:  Mild cardiomegaly.    MEDIASTINUM:  Unremarkable as visualized.    BONES/JOINTS:  Unremarkable as visualized.       Impression:      1.  Mild cardiomegaly.  2.  Coarsened interstitial markings noted throughout the lungs.        This report was finalized on 9/26/2023 8:29 AM by Dr. Radhames Enriquez MD.               ALLERGIES: Patient has no known allergies.    CURRENT MEDICATIONS:  Current list of medications may not reflect those currently placed in orders that are not signed or are being held.     sodium chloride, 1 mL/kg/hr, Last Rate: 1 mL/kg/hr (09/26/23 1158)        acetaminophen    senna-docusate sodium **AND** polyethylene glycol **AND** bisacodyl **AND** bisacodyl    colchicine    Magnesium Cardiology Dose Replacement - Follow Nurse / BPA Driven Protocol    nitroglycerin    Potassium Replacement - Follow Nurse / BPA Driven Protocol    sodium chloride    sodium chloride    Thank you very much for asking us to be involved in this patient's care.  We will follow along with you. Please do not hesitate to call for any questions or concerns.     I have discussed the patients findings and recommendations with patient.    Electronically signed by JEAN Villa, 09/26/23, 12:08 PM EDT.                       Please note that portions of this note were copied and has been reviewed and is accurate as of 9/26/2023 .      Please note that portions of this note were completed with a voice recognition program.     Electronically signed by Elvis Bain MD at 09/26/23 3687       Kale Duran MD at 09/26/23 1050        Consult Orders    1.  Inpatient Interventional Cardiology Consult [[id number]] ordered by Cara Shultz APRN at 23 0957                 .Patient Identification:  Name:  Héctor Zheng  Age:  78 y.o.  Sex:  male  :  1945  MRN:  [id number]  Visit Number:  [id number]  Date of Admit: 2023  Date of Consult: 23  Provider, No Known    Chief Complaint:   Evaluate for left heart cardiac catheterization    Subjective:    Patient is a 78-year-old gentleman past medical history significant for coronary disease s/p 5 PCI in the past also with A-fib on chronic anticoagulation.  Last dose of Eliquis was on  a.m.  Patient was driving yesterday when he started having discomfort in the chest with palpitations.  His heart rate was noted to be in 200 range.  Patient was noted to have mildly elevated troponin and was transferred to our facility.  Overnight his troponins high-sensitivity are up to 600 range.  Patient is not complaining of any chest pain currently.  He has IV heparin running.      Past Medical History:   Diagnosis Date    CHF (congestive heart failure)     COPD (chronic obstructive pulmonary disease)     on 5L oxygen concentrator at home    Coronary artery disease     s/p 5 stents    Lung nodule     Permanent atrial fibrillation     Prostate cancer     monitoring, no active treatment    Thyroid nodule      Past Surgical History:   Procedure Laterality Date    CARDIAC CATHETERIZATION      5 stents total in the past     History reviewed. No pertinent family history.  Social History     Tobacco Use    Smoking status: Never   Vaping Use    Vaping Use: Never used   Substance Use Topics    Alcohol use: Never    Drug use: Never     Medications Prior to Admission   Medication Sig Dispense Refill Last Dose    allopurinol (ZYLOPRIM) 300 MG tablet Take 1 tablet by mouth Daily.   2023    apixaban (ELIQUIS) 5 MG tablet tablet Take 1 tablet by mouth 2 (Two) Times a Day.   2023    atorvastatin (LIPITOR) 80 MG  tablet Take 1 tablet by mouth Daily.   9/25/2023    bumetanide (BUMEX) 0.5 MG tablet Take 3 tablets by mouth 2 (Two) Times a Day.   9/25/2023    colchicine 0.6 MG tablet Take 1 tablet by mouth 2 (Two) Times a Day As Needed for Muscle / Joint Pain.   9/25/2023    fexofenadine (ALLEGRA) 180 MG tablet Take 1 tablet by mouth Every Night.   9/25/2023    finasteride (PROSCAR) 5 MG tablet Take 1 tablet by mouth Every Morning.   9/25/2023    gabapentin (NEURONTIN) 300 MG capsule Take 1 capsule by mouth 3 (Three) Times a Day.   9/25/2023    levothyroxine (SYNTHROID, LEVOTHROID) 50 MCG tablet Take 1 tablet by mouth Every Night.   9/25/2023    lisinopril (PRINIVIL,ZESTRIL) 2.5 MG tablet Take 1 tablet by mouth Every Night.   9/25/2023    pantoprazole (PROTONIX) 40 MG EC tablet Take 1 tablet by mouth Daily.   9/25/2023    potassium chloride (K-DUR,KLOR-CON) 20 MEQ CR tablet Take 1 tablet by mouth 2 (Two) Times a Day.   9/25/2023    pseudoephedrine-guaifenesin (MUCINEX D)  MG per 12 hr tablet Take 1 tablet by mouth 2 (Two) Times a Day.   9/25/2023    tamsulosin (FLOMAX) 0.4 MG capsule 24 hr capsule Take 1 capsule by mouth Daily.   9/25/2023    carvedilol (COREG) 3.125 MG tablet Take 1 tablet by mouth 2 (Two) Times a Day With Meals.        Allergies:  Patient has no known allergies.        ----------------------------------------------------------------------------------------------------------------------  Current [facility] Meds:  allopurinol, 300 mg, Oral, Daily  arformoterol, 15 mcg, Nebulization, BID - RT  aspirin, 81 mg, Oral, Daily  atorvastatin, 80 mg, Oral, Daily  budesonide, 0.5 mg, Nebulization, BID - RT  bumetanide, 1.5 mg, Oral, BID  cetirizine, 10 mg, Oral, Daily  finasteride, 5 mg, Oral, QAM  gabapentin, 300 mg, Oral, TID  ipratropium, 0.5 mg, Nebulization, 4x Daily - RT  levothyroxine, 50 mcg, Oral, Nightly  lisinopril, 2.5 mg, Oral, Nightly  pantoprazole, 40 mg, Oral, Daily  potassium chloride, 20 mEq, Oral,  BID  senna-docusate sodium, 2 tablet, Oral, BID  sodium chloride, 10 mL, Intravenous, Q12H  tamsulosin, 0.4 mg, Oral, Daily      heparin, 9.6 Units/kg/hr (Dosing Weight), Last Rate: 9.6 Units/kg/hr (09/26/23 0655)  Pharmacy to Dose Heparin,       ----------------------------------------------------------------------------------------------------------------------  Vital Signs:  Temp:  [97.7 °F (36.5 °C)] 97.7 °F (36.5 °C)  Heart Rate:  [39-51] 49  Resp:  [16-18] 18  BP: (101-141)/(56-71) 101/56      09/26/23  0453   Weight: 104 kg (229 lb 15 oz)     Body mass index is 30.34 kg/m².    Intake/Output Summary (Last 24 hours) at 9/26/2023 1056  Last data filed at 9/26/2023 0655  Gross per 24 hour   Intake 19.97 ml   Output 200 ml   Net -180.03 ml     NPO Diet NPO Type: Sips with Meds  ----------------------------------------------------------------------------------------------------------------------  Physical exam:  Constitutional:    HENT:  Head:  Normocephalic and atraumatic.    Eyes:  Conjunctivae and EOM are normal.  Pupils are equal, round, and reactive to light.  No scleral icterus.    Neck:  Neck supple.  No JVD present.    Cardiovascular: Normal rate, irregular rhythm, S1 S2+, NO S3 / S4  Pulmonary/Chest:  Vesicular breath sounds B/L  Abdominal:  Soft.  Bowel sounds are normal.  No distension and no tenderness.      Neurological:  Alert and oriented to person, place, and time. No focal deficits.  Skin:  Skin is warm and dry. No rash noted. No pallor.   Musculoskeletal:  No edema, no tenderness, and no deformity.  No red or swollen joints anywhere.   Peripheral vascular:  2+ Pulses B/L DP  ----------------------------------------------------------------------------------------------------------------------    ----------------------------------------------------------------------------------------------------------------------  Results from last 7 days   Lab Units 09/26/23  0656 09/26/23  0458   HSTROP T ng/L  616* 678*     Results from last 7 days   Lab Units 09/26/23  0515 09/26/23 0458   WBC 10*3/mm3  --  4.65   HEMOGLOBIN g/dL  --  11.9*   HEMATOCRIT %  --  36.9*   MCV fL  --  96.1   MCHC g/dL  --  32.2   PLATELETS 10*3/mm3  --  214   INR  1.36*  --          Results from last 7 days   Lab Units 09/26/23 0458   SODIUM mmol/L 138   POTASSIUM mmol/L 4.1   MAGNESIUM mg/dL 2.5*   CHLORIDE mmol/L 105   CO2 mmol/L 22.7   BUN mg/dL 17   CREATININE mg/dL 0.82   CALCIUM mg/dL 9.4   GLUCOSE mg/dL 161*   ALBUMIN g/dL 3.7   BILIRUBIN mg/dL 0.8   ALK PHOS U/L 183*   AST (SGOT) U/L 48*   ALT (SGPT) U/L 20   Estimated Creatinine Clearance: 97 mL/min (by C-G formula based on SCr of 0.82 mg/dL).    No results found for: AMMONIA      No results found for: BLOODCX  No results found for: URINECX  No results found for: WOUNDCX  No results found for: STOOLCX  Echo:    ECG/EMG Results (last 24 hours)       Procedure Component Value Units Date/Time    ECG 12 Lead Bradycardia [[id number]] Collected: 09/26/23 0520     Updated: 09/26/23 0521     QT Interval 490 ms      QTC Interval 455 ms     Narrative:      Test Reason : Bradycardia  Blood Pressure :   */*   mmHG  Vent. Rate :  52 BPM     Atrial Rate :  67 BPM     P-R Int :   * ms          QRS Dur : 112 ms      QT Int : 490 ms       P-R-T Axes :   *  61  62 degrees     QTc Int : 455 ms    Atrial fibrillation with slow ventricular response with premature ventricular or aberrantly conducted complexes and with ventricular escape complexes  Incomplete left bundle branch block  Abnormal ECG  No previous ECGs available    Referred By:            Confirmed By:     SCANNED - TELEMETRY   [[id number]] Resulted: 09/26/23     Updated: 09/26/23 0706    Adult Transthoracic Echo Complete w/ Color, Spectral and Contrast if necessary per protocol [[id number]] Resulted: 09/26/23 0938     Updated: 09/26/23 1015          Imaging Results (Last 72 Hours)       Procedure Component Value Units Date/Time    XR Chest 1  View [[id number]] Collected: 09/26/23 0829     Updated: 09/26/23 0831    Narrative:      EXAM:    XR Chest, 1 View     EXAM DATE:    9/26/2023 7:12 AM     CLINICAL HISTORY:    short of breath     TECHNIQUE:    Frontal view of the chest.     COMPARISON:    No relevant prior studies available.     FINDINGS:    LUNGS AND PLEURAL SPACES:  Coarsened interstitial markings noted  throughout the lungs.  No pneumothorax.    HEART:  Mild cardiomegaly.    MEDIASTINUM:  Unremarkable as visualized.    BONES/JOINTS:  Unremarkable as visualized.       Impression:      1.  Mild cardiomegaly.  2.  Coarsened interstitial markings noted throughout the lungs.        This report was finalized on 9/26/2023 8:29 AM by Dr. Radhames Enriquez MD.                   I have personally looked at the labs and they are summarized above.  ----------------------------------------------------------------------------------------------------------------------  Imaging Results (Last 24 Hours)       Procedure Component Value Units Date/Time    XR Chest 1 View [[id number]] Collected: 09/26/23 0829     Updated: 09/26/23 0831    Narrative:      EXAM:    XR Chest, 1 View     EXAM DATE:    9/26/2023 7:12 AM     CLINICAL HISTORY:    short of breath     TECHNIQUE:    Frontal view of the chest.     COMPARISON:    No relevant prior studies available.     FINDINGS:    LUNGS AND PLEURAL SPACES:  Coarsened interstitial markings noted  throughout the lungs.  No pneumothorax.    HEART:  Mild cardiomegaly.    MEDIASTINUM:  Unremarkable as visualized.    BONES/JOINTS:  Unremarkable as visualized.       Impression:      1.  Mild cardiomegaly.  2.  Coarsened interstitial markings noted throughout the lungs.        This report was finalized on 9/26/2023 8:29 AM by Dr. Radhames Enriquez MD.             ----------------------------------------------------------------------------------------------------------------------    Assessment:  ACS with elevated high-sensitivity  troponin  A-fib with fast ventricular response    Plan:  #1 cardiac.  Patient with history of coronary disease with multiple stents in the past.  Came in with complaints of palpitations and chest pain.  Noted to be in A-fib with fast ventricular response.  Patient bumped his troponins in 600 range.  He is off his Eliquis for more than 24 hours.  We will get a left heart Cardiac catheterization done.  Aggressive risk factor modification for coronary disease to continue.      This document has been electronically signed by Kale Duran MD [facility], Interventional Cardiology  September 26, 2023 10:56 EDT     Electronically signed by Kale Duran MD at 09/26/23 7635

## 2023-09-27 NOTE — PLAN OF CARE
Goal Outcome Evaluation:           Progress: improving  Outcome Evaluation: Patient vital signs currently stable. He is alert and oriented x4. Wife at bedside. Patient has ambulated in hallway today and inside room on several occassions. Patient did c/o chest pain after ambulating a long distance in hallway. Cardiology was made aware. No new orders. Otherwise, no significant changes noted.

## 2023-09-27 NOTE — PROGRESS NOTES
"    [facility]  PROGRESS NOTE    Patient information:  Name: Héctor Zheng  Age/Sex: 78 y.o. male  :  1945        PCP: Provider, No Known  Attending: Ej Burdick MD  MRN:  [id number]  Visit Number:  [id number]    LOS:  LOS: 1 day     CODE STATUS:    Code Status and Medical Interventions:   Ordered at: 23 1143     Level Of Support Discussed With:    Patient     Code Status (Patient has no pulse and is not breathing):    CPR (Attempt to Resuscitate)     Medical Interventions (Patient has pulse or is breathing):    Full Support       PROBLEM LIST:Principal Problem:    NSTEMI (non-ST elevated myocardial infarction)      Reason for Cardiology follow-up: NSTEMI with atrial fibrillation with RVR     Subjective   ADMISSION INFORMATION:  No chief complaint on file.      HPI:    Héctor Zheng is a 78 y.o. male with a past medical history significant for history of CHF (unspecified), COPD on 5L oxygen concentrator at home, CAD s/p 5 stents total in the past, and and permanent afib s/p multiple failed cardioversions and ablations in the past, who presented to an outside facility on the afternoon of  with complaints of acute onset shortness of breath and palpitations. He reported a brief episode of chest pain as well which he described as \"pounding\" that had already resolved when he arrived. He was found to be in atrial fibrillation with RVR, with HR as high as 200. He dropped to the 60's without intervention soon after arrival, however, and HR has remained in the 40-60s since then. He denies any further palpitations or dyspnea. He does report ongoing belching and attributes this to a hiatal hernia which he notes has caused chest pain-like symptoms in the past. At the outside facility, pertinent labs include Na 140, K+ 3.5 and Cr 0.9. He had PVCs on telemetry monitoring for which he was given calcium and magnesium that the ED provider claimed \"smoothed out\" " the PVCs. He was given full dose ASA after fist troponin 678-> 616.  proBNP was 2019.0. Cardiology was consulted for further evaluation and management NSTEMI with atrial fibrillation with RVR.      Primary Cardiologist has been [facility] cardiology    PROCEDURES:   Heart cath on 09/26/2023-please see full report attached below.    Interval History:   Patient is in room  and was examined by Dr. Govea.  Patient is completing a walk in the hallway with physical therapy at this time.  Tolerating activity well.  Denied any chest pain or shortness of breath.  Telemetry revealed atrial fibrillation 50s with frequent PVCs.  Oxygen saturation was 98%.  Wife is at bedside.    Vital Signs  Temp:  [96.3 °F (35.7 °C)-97.9 °F (36.6 °C)] 97.8 °F (36.6 °C)  Heart Rate:  [38-67] 67  Resp:  [12-25] 22  BP: ()/() 113/62  Flow (L/min):  [2] 2  Device (Oxygen Therapy): room air  Vital Signs (last 72 hrs)         09/24 0700  09/25 0659 09/25 0700  09/26 0659 09/26 0700  09/27 0659 09/27 0700  09/27 0842   Most Recent      Temp (°F)     97.7    96.3 -  97.9      97.8     97.8 (36.6) 09/27 0800    Heart Rate     50    36 -  58      53     53 09/27 0700    Resp     18    12 -  25      18     18 09/27 0700    BP     141/67    84/59 -  129/63      111/54     111/54 09/27 0700    SpO2 (%)     100    93 -  100      100     100 09/27 0700          BMI:Body mass index is 30.78 kg/m².  WEIGHT:      09/26/23  0453 09/27/23  0400   Weight: 104 kg (229 lb 15 oz) 106 kg (233 lb 4.8 oz)     DIET:Diet: Cardiac Diets; Healthy Heart (2-3 Na+); Texture: Regular Texture (IDDSI 7); Fluid Consistency: Thin (IDDSI 0)    I&O:  Intake/Output Summary (Last 24 hours) at 9/27/2023 1243  Last data filed at 9/27/2023 0800  Gross per 24 hour   Intake 480 ml   Output 2450 ml   Net -1970 ml       Objective     Physical Exam:      General Appearance:    Alert, cooperative, in no acute distress.   Head:    Normocephalic, without obvious  abnormality, atraumatic.   Eyes:                          Conjunctivae and sclerae normal, no icterus, no pallor, corneas clear.   Neck:   No adenopathy, supple, trachea midline, no thyromegaly, no carotid bruit, no JVD.   Lungs:     Clear to auscultation, respirations regular, even and             unlabored.    Heart:    Regular rhythm and normal rate, normal S1 and S2, no          murmur, no gallop, no rub, no click   Chest Wall:    No abnormalities observed.   Abdomen:     Normal bowel sounds, no masses, no organomegaly, soft nontender, nondistended, no guarding, no rebound tenderness.   Extremities:   Moves all extremities well, no edema, no cyanosis, no           redness.   Pulses:   Pulses palpable and equal bilaterally.   Skin:   No bleeding, bruising or rash.   Neurologic:   Alert and Oriented x 3, Speech Clear & comprehensive.       Results review   Results Review:   Results from last 7 days   Lab Units 09/26/23  0656 09/26/23  0458   HSTROP T ng/L 616* 678*     Lab Results   Component Value Date    PROBNP 2,019.0 (H) 09/26/2023     Results from last 7 days   Lab Units 09/27/23  0143 09/26/23  0458   WBC 10*3/mm3 10.74 4.65   HEMOGLOBIN g/dL 10.2* 11.9*   PLATELETS 10*3/mm3 192 214     Results from last 7 days   Lab Units 09/27/23  0143 09/26/23  0458   SODIUM mmol/L 138 138   POTASSIUM mmol/L 4.0 4.1   CHLORIDE mmol/L 105 105   CO2 mmol/L 25.4 22.7   BUN mg/dL 20 17   CREATININE mg/dL 0.89 0.82   CALCIUM mg/dL 8.8 9.4   GLUCOSE mg/dL 119* 161*   ALT (SGPT) U/L  --  20   AST (SGOT) U/L  --  48*     Lab Results   Component Value Date    MG 2.5 (H) 09/26/2023    MG 1.9 08/24/2021     Estimated Creatinine Clearance: 90.2 mL/min (by C-G formula based on SCr of 0.89 mg/dL).  No results found.  Lab Results   Component Value Date    HGBA1C 5.70 (H) 09/27/2023     Lab Results   Component Value Date    CHOL 105 09/27/2023    TRIG 51 09/27/2023    LDL 36 09/27/2023    HDL 57 09/27/2023        Lab Results   Component  Value Date    INR 1.36 (H) 2023     Lab Results   Component Value Date    TSH 0.970 2023      Pain Management Panel           No data to display              Microbiology Results (last 10 days)       ** No results found for the last 240 hours. **           Imaging Results (Last 24 Hours)       ** No results found for the last 24 hours. **            ECHO:  Results for orders placed during the hospital encounter of 23    Adult Transthoracic Echo Complete w/ Color, Spectral and Contrast if necessary per protocol    Interpretation Summary    Left ventricular systolic function is moderately decreased. Left ventricular ejection fraction appears to be 36 - 40%.    Left ventricular diastolic function is consistent with (grade III w/high LAP) fixed restrictive pattern.    The right ventricular cavity is mildly dilated.    The left atrial cavity is moderately dilated.    The right atrial cavity is mild to moderately  dilated.    Moderate mitral valve regurgitation is present.    Estimated right ventricular systolic pressure from tricuspid regurgitation is normal (<35 mmHg).      STRESS TEST:   None found in patient's chart.    HEART CATH:  Results for orders placed during the hospital encounter of 23    Cardiac Catheterization/Vascular Study    Narrative    Mid LAD lesion is 30% stenosed.    Mid Cx lesion is 20% stenosed.    Prox RCA lesion is 20% stenosed.    Mid RCA lesion is 20% stenosed.    1.  Cardiac.  Patient with nonobstructive coronary artery disease.  Medical management will be continued      [id number]      TELEMETRY:     Atrial fibrillation 40s with frequent PVCs          I reviewed the patient's new clinical results.    ALLERGIES: Patient has no known allergies.    Medication Review:   Current list of medications may not reflect those currently placed in orders that are not signed or are being held.     allopurinol, 300 mg, Oral, Daily  apixaban, 5 mg, Oral, Q12H  arformoterol, 15  mcg, Nebulization, BID - RT  aspirin, 81 mg, Oral, Daily  atorvastatin, 80 mg, Oral, Daily  budesonide, 0.5 mg, Nebulization, BID - RT  bumetanide, 1.5 mg, Oral, BID  cetirizine, 10 mg, Oral, Daily  finasteride, 5 mg, Oral, QAM  gabapentin, 300 mg, Oral, TID  ipratropium, 0.5 mg, Nebulization, 4x Daily - RT  levothyroxine, 50 mcg, Oral, Nightly  lisinopril, 2.5 mg, Oral, Nightly  pantoprazole, 40 mg, Oral, Daily  potassium chloride, 20 mEq, Oral, BID  senna-docusate sodium, 2 tablet, Oral, BID  sodium chloride, 10 mL, Intravenous, Q12H  tamsulosin, 0.4 mg, Oral, Daily           acetaminophen    senna-docusate sodium **AND** polyethylene glycol **AND** bisacodyl **AND** bisacodyl    colchicine    Magnesium Cardiology Dose Replacement - Follow Nurse / BPA Driven Protocol    nitroglycerin    Potassium Replacement - Follow Nurse / BPA Driven Protocol    sodium chloride    sodium chloride    Assessment    1.  Acute NSTEMI likely type I  2.  ASCVD status post previous multiple PCI's in [place] with previous myocardial infarction 2005 followed by PCI  3.  Ischemic cardiomyopathy no clinical CHF  4.  Paroxysmal atrial fibrillation [id number]: 3 currently in sinus rhythm              Plan   1.  Cardiac catheterization done was nonobstructive disease continue medical management  2.  Restart NOAC after 2 days  3.  Ambulate possible discharge home if is okay with medicine service follow-up with cardiology in 2 to 4 weeks          I have discussed the patients findings and recommendations with patient.    Thank you very much for asking us to be involved in this patient's care.  We will follow along with you.    Electronically signed by JEAN Villa, 09/27/23, 12:43 PM EDT.   Electronically signed by Rosa Govea MD, 09/27/23, 2:12 PM EDT.                      Please note that portions of this note were completed with a voice recognition program.    Please note that portions of this note were copied and has been  reviewed and is accurate as of 9/27/2023 .

## 2023-09-27 NOTE — THERAPY EVALUATION
Acute Care - Physical Therapy Initial Evaluation   Juan     Patient Name: Héctor Zheng  : 1945  MRN: [id number]  Today's Date: 2023   Onset of Illness/Injury or Date of Surgery: 23  Visit Dx:     ICD-10-CM ICD-9-CM   1. NSTEMI (non-ST elevated myocardial infarction)  I21.4 410.70     Patient Active Problem List   Diagnosis    NSTEMI (non-ST elevated myocardial infarction)     Past Medical History:   Diagnosis Date    CHF (congestive heart failure)     COPD (chronic obstructive pulmonary disease)     on 5L oxygen concentrator at home    Coronary artery disease     s/p 5 stents    Lung nodule     Permanent atrial fibrillation     Prostate cancer     monitoring, no active treatment    Thyroid nodule      Past Surgical History:   Procedure Laterality Date    CARDIAC CATHETERIZATION      5 stents total in the past    CARDIAC CATHETERIZATION N/A 2023    Procedure: Left Heart Cath;  Surgeon: Kale Duran MD;  Location: [facility] INVASIVE LOCATION;  Service: Cardiology;  Laterality: N/A;     PT Assessment (last 12 hours)       PT Evaluation and Treatment       Row Name 23 7834          Physical Therapy Time and Intention    Subjective Information complains of;weakness  -CT     Document Type evaluation  -CT     Mode of Treatment physical therapy  -CT     Patient Effort good  -CT     Comment Pt reports he lives at home with spouse and is independent PLOF. Pt uses a SC. Pt and spouse report they live in [place] and were traveling. Pt is SBA for mobility.  -CT       Row Name 23 1275          General Information    Patient Profile Reviewed yes  -CT     Onset of Illness/Injury or Date of Surgery 23  -CT     Referring Physician Renee  -CT     Patient Observations alert;cooperative;agree to therapy  -CT     Prior Level of Function independent:  -CT     Equipment Currently Used at Home cane, straight  -CT     Existing Precautions/Restrictions fall  -CT     Equipment Issued to Patient  gait belt  -CT     Risks Reviewed patient:  -CT     Benefits Reviewed patient:  -CT       Row Name 09/27/23 0950          Living Environment    Current Living Arrangements home  -CT     People in Home spouse  -CT       Row Name 09/27/23 0950          Cognition    Affect/Mental Status (Cognition) WFL  -CT     Orientation Status (Cognition) oriented x 4  -CT     Follows Commands (Cognition) WFL  -CT       Row Name 09/27/23 0950          Range of Motion Comprehensive    Comment, General Range of Motion BLE grossly WFL  pt with some knee flexion in standing but is able to ambulate with use of rollator  -CT       Row Name 09/27/23 0950          Strength Comprehensive (MMT)    Comment, General Manual Muscle Testing (MMT) Assessment BLE grossly 4-/5  -CT       Row Name 09/27/23 0950          Bed Mobility    Bed Mobility bed mobility (all) activities  -CT     All Activities, Island (Bed Mobility) set up  -CT     Assistive Device (Bed Mobility) bed rails  -CT       Row Name 09/27/23 0950          Transfers    Transfers sit-stand transfer;stand-sit transfer  -CT       Row Name 09/27/23 0950          Sit-Stand Transfer    Sit-Stand Island (Transfers) standby assist  -CT     Assistive Device (Sit-Stand Transfers) other (see comments)  rollator  -CT       Row Name 09/27/23 0950          Stand-Sit Transfer    Stand-Sit Island (Transfers) standby assist  -CT     Assistive Device (Stand-Sit Transfers) other (see comments)  rollator  -CT       Row Name 09/27/23 0950          Gait/Stairs (Locomotion)    Island Level (Gait) standby assist  -CT     Assistive Device (Gait) other (see comments)  rollator  -CT     Distance in Feet (Gait) 210  -CT     Pattern (Gait) swing-through  -CT     Deviations/Abnormal Patterns (Gait) gait speed decreased;weight shifting decreased;base of support, wide  -CT       Row Name 09/27/23 0950          Coping    Observed Emotional State calm;cooperative  -CT     Verbalized Emotional  State acceptance  -CT       Row Name 09/27/23 0950          Plan of Care Review    Plan of Care Reviewed With patient;spouse  -CT       Row Name 09/27/23 0950          Positioning and Restraints    Pre-Treatment Position in bed  -CT     Post Treatment Position bed  -CT     In Bed sitting EOB;call light within reach;encouraged to call for assist  -CT       Row Name 09/27/23 0950          Therapy Assessment/Plan (PT)    Patient/Family Therapy Goals Statement (PT) Pt goals are to return to PLOF  -CT     Functional Level at Time of Evaluation (PT) SBA  -CT     PT Diagnosis (PT) decreased functional mobility  -CT     Rehab Potential (PT) good, to achieve stated therapy goals  -CT     Criteria for Skilled Interventions Met (PT) yes;skilled treatment is necessary  -CT     Therapy Frequency (PT) 2 times/wk  2-5 times/wk  -CT     Predicted Duration of Therapy Intervention (PT) length of stay  -CT       Row Name 09/27/23 0950          Therapy Plan Review/Discharge Plan (PT)    Therapy Plan Review (PT) evaluation/treatment results reviewed;care plan/treatment goals reviewed;risks/benefits reviewed;current/potential barriers reviewed;participants voiced agreement with care plan;participants included;patient  -CT       Row Name 09/27/23 0950          Physical Therapy Goals    Transfer Goal Selection (PT) transfer, PT goal 1  -CT     Gait Training Goal Selection (PT) gait training, PT goal 1  -CT       Row Name 09/27/23 0950          Transfer Goal 1 (PT)    Activity/Assistive Device (Transfer Goal 1, PT) sit-to-stand/stand-to-sit;bed-to-chair/chair-to-bed  -CT     [place] Level/Cues Needed (Transfer Goal 1, PT) modified independence  -CT     Time Frame (Transfer Goal 1, PT) by discharge  -CT       Row Name 09/27/23 0950          Gait Training Goal 1 (PT)    Activity/Assistive Device (Gait Training Goal 1, PT) gait (walking locomotion);assistive device use  -CT     [place] Level (Gait Training Goal 1, PT) modified  independence  -CT     Distance (Gait Training Goal 1, PT) 250  -CT     Time Frame (Gait Training Goal 1, PT) by discharge  -CT               User Key  (r) = Recorded By, (t) = Taken By, (c) = Cosigned By      Initials Name Provider Type    CT Stacie Luz, PT Physical Therapist                      PT Recommendation and Plan  Anticipated Discharge Disposition (PT): home  Planned Therapy Interventions (PT): balance training, bed mobility training, gait training, home exercise program, manual therapy techniques, motor coordination training, neuromuscular re-education, patient/family education, postural re-education, stair training, transfer training  Therapy Frequency (PT): 2 times/wk (2-5 times/wk)  Plan of Care Reviewed With: patient, spouse       Time Calculation:    PT Charges       Row Name 09/27/23 1517             Time Calculation    PT Received On 09/27/23  -CT      PT Goal Re-Cert Due Date 10/11/23  -CT                User Key  (r) = Recorded By, (t) = Taken By, (c) = Cosigned By      Initials Name Provider Type    CT Stacie Luz, [facility] Physical Therapist                  Therapy Charges for Today       Code Description Service Date Service Provider Modifiers Qty    [id number]  PT EVAL MOD COMPLEXITY 4 9/27/2023 Stacie Luz, PT GP 1            PT G-Codes  AM-PAC 6 Clicks Score (PT): 18    Stacie Luz PT  9/27/2023

## 2023-09-27 NOTE — PROGRESS NOTES
[facility]     Patient Identification:  Name:  Héctor Zheng  Age:  78 y.o.  Sex:  male  :  1945  MRN:  [id number]  Visit Number:  [id number]  Primary Care Provider:  Provider, No Known    Length of stay:  1    Subjective: Patient seen and examined, he is not orthopneic, heart rate mid 40s beat per minute, no desaturation, patient heart rate increased to 65 bpm while sitting at the edge of the bed, no more A-fib with RVR, cardiac cath finding nonobstructive coronary disease recommended medical management.    Chief Complaint: Short of breath  ----------------------------------------------------------------------------------------------------------------------  Current Hospital Meds:  allopurinol, 300 mg, Oral, Daily  apixaban, 5 mg, Oral, Q12H  arformoterol, 15 mcg, Nebulization, BID - RT  aspirin, 81 mg, Oral, Daily  atorvastatin, 80 mg, Oral, Daily  budesonide, 0.5 mg, Nebulization, BID - RT  bumetanide, 1.5 mg, Oral, BID  cetirizine, 10 mg, Oral, Daily  finasteride, 5 mg, Oral, QAM  gabapentin, 300 mg, Oral, TID  ipratropium, 0.5 mg, Nebulization, 4x Daily - RT  levothyroxine, 50 mcg, Oral, Nightly  lisinopril, 2.5 mg, Oral, Nightly  pantoprazole, 40 mg, Oral, Daily  potassium chloride, 20 mEq, Oral, BID  senna-docusate sodium, 2 tablet, Oral, BID  sodium chloride, 10 mL, Intravenous, Q12H  tamsulosin, 0.4 mg, Oral, Daily         ----------------------------------------------------------------------------------------------------------------------  Vital Signs:  Temp:  [96.3 °F (35.7 °C)-97.9 °F (36.6 °C)] 97.6 °F (36.4 °C)  Heart Rate:  [36-67] 52  Resp:  [15-] 16  BP: ()/(52-87) 101/64       Tele: Sinus rhythm 45 bpm while at rest, increased to 6 7 bpm      23  0453 23  0400   Weight: 104 kg (229 lb 15 oz) 106 kg (233 lb 4.8 oz)     Body mass index is 30.78 kg/m².    Intake/Output Summary (Last 24 hours) at 2023 1346  Last data filed at  9/27/2023 0800  Gross per 24 hour   Intake 480 ml   Output 2450 ml   Net -1970 ml     Diet: Cardiac Diets; Healthy Heart (2-3 Na+); Texture: Regular Texture (IDDSI 7); Fluid Consistency: Thin (IDDSI 0)  ----------------------------------------------------------------------------------------------------------------------  Physical exam:  General: Comfortable,awake, alert, oriented to self, place, and time, well-developed and well-nourished.  No respiratory distress.    Skin:  Skin is warm and dry. No rash noted. No pallor.    HENT:  Head:  Normocephalic and atraumatic.  Mouth:  Moist mucous membranes.    Eyes:  Conjunctivae and EOM are normal.  Pupils are equal, round, and reactive to light.  No scleral icterus.    Neck:  Neck supple.  No JVD present.  No hepatojugular reflux  Pulmonary/Chest:  No respiratory distress, no wheezes, no crackles, with normal breath sounds and good air movement.  Cardiovascular:  Normal rate, regular rhythm and normal heart sounds with no murmur.  Abdominal:  Soft.  Bowel sounds are normal.  No distension and no tenderness.   Extremities:  No edema, no tenderness, and no deformity.  No red or swollen joints anywhere.  Strong pulses in all 4 extremities with no clubbing, no cyanosis, no edema.  Neurological:  Motor strength equal no obvious deficit, sensory grossly intact.   No cranial nerve deficit.  No tongue deviation.  No facial droop.  No slurred speech.    Genitourinary: No Patricia catheter  Back:  ----------------------------------------------------------------------------------------------------------------------  ----------------------------------------------------------------------------------------------------------------------  Results from last 7 days   Lab Units 09/26/23  0656 09/26/23 0458   HSTROP T ng/L 616* 678*     Results from last 7 days   Lab Units 09/27/23  0143 09/26/23  0515 09/26/23 0458   WBC 10*3/mm3 10.74  --  4.65   HEMOGLOBIN g/dL 10.2*  --  11.9*    HEMATOCRIT % 32.4*  --  36.9*   MCV fL 97.3*  --  96.1   MCHC g/dL 31.5  --  32.2   PLATELETS 10*3/mm3 192  --  214   INR   --  1.36*  --          Results from last 7 days   Lab Units 09/27/23  0143 09/26/23  0458   SODIUM mmol/L 138 138   POTASSIUM mmol/L 4.0 4.1   MAGNESIUM mg/dL  --  2.5*   CHLORIDE mmol/L 105 105   CO2 mmol/L 25.4 22.7   BUN mg/dL 20 17   CREATININE mg/dL 0.89 0.82   CALCIUM mg/dL 8.8 9.4   GLUCOSE mg/dL 119* 161*   ALBUMIN g/dL  --  3.7   BILIRUBIN mg/dL  --  0.8   ALK PHOS U/L  --  183*   AST (SGOT) U/L  --  48*   ALT (SGPT) U/L  --  20   Estimated Creatinine Clearance: 90.2 mL/min (by C-G formula based on SCr of 0.89 mg/dL).    No results found for: AMMONIA  Results from last 7 days   Lab Units 09/27/23  0143   CHOLESTEROL mg/dL 105   TRIGLYCERIDES mg/dL 51   HDL CHOL mg/dL 57   LDL CHOL mg/dL 36     No results found for: BLOODCX  No results found for: URINECX  No results found for: WOUNDCX  No results found for: STOOLCX    I have personally looked at the labs and they are summarized above.  ----------------------------------------------------------------------------------------------------------------------  Imaging Results (Last 24 Hours)       ** No results found for the last 24 hours. 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  3 with evidence of mild decompensation now improved   -Bradycardia when asleep no desaturation, appropriate response with activity heart rate increases to 60+.    -History of BPH   -COPD without exacerbation   -Hyperlipidemia  -History of hypothyroidism    Patient on diuretics, monitor electrolytes, ambulate, out of bed to chair, if symptoms improved, function stable  hopefully can be discharged soon.  We will transfer patient to medical floors      Disposition pending      Nereyda Cortez MD  09/27/23  13:46 EDT

## 2023-09-27 NOTE — PLAN OF CARE
Goal Outcome Evaluation:  Plan of Care Reviewed With: patient        Progress: no change  Outcome Evaluation: Patient AOx4, Vss patient bradycardic still this shift, Patient tolerating diet well, patient has no complaints at this time. Bed in lowest position and call light within reach.         Problem: Adult Inpatient Plan of Care  Goal: Plan of Care Review  Outcome: Ongoing, Progressing  Flowsheets (Taken 9/27/2023 0512)  Progress: no change  Plan of Care Reviewed With: patient  Outcome Evaluation: Patient AOx4, Vss patient bradycardic still this shift, Patient tolerating diet well, patient has no complaints at this time. Bed in lowest position and call light within reach.  Goal: Patient-Specific Goal (Individualized)  Outcome: Ongoing, Progressing  Goal: Absence of Hospital-Acquired Illness or Injury  Outcome: Ongoing, Progressing  Intervention: Identify and Manage Fall Risk  Recent Flowsheet Documentation  Taken 9/27/2023 0500 by Zaki Rosenthal RN  Safety Promotion/Fall Prevention: safety round/check completed  Taken 9/27/2023 0300 by Zaki Rosenthal RN  Safety Promotion/Fall Prevention: safety round/check completed  Taken 9/27/2023 0100 by Zaki Rosenthal RN  Safety Promotion/Fall Prevention: safety round/check completed  Taken 9/26/2023 2300 by Zaki Rosenthal RN  Safety Promotion/Fall Prevention: safety round/check completed  Taken 9/26/2023 2100 by Zaki Rosenthal RN  Safety Promotion/Fall Prevention: safety round/check completed  Taken 9/26/2023 1900 by Zaki Rosenthal RN  Safety Promotion/Fall Prevention: safety round/check completed  Intervention: Prevent Skin Injury  Recent Flowsheet Documentation  Taken 9/26/2023 2000 by Zaki Rosenthal RN  Skin Protection:   adhesive use limited   drying agents applied   incontinence pads utilized   protective footwear used   pulse oximeter probe site changed   skin-to-device areas padded   skin-to-skin areas padded   transparent dressing maintained    tubing/devices free from skin contact  Intervention: Prevent and Manage VTE (Venous Thromboembolism) Risk  Recent Flowsheet Documentation  Taken 9/26/2023 2000 by Zaki Rosenthal RN  VTE Prevention/Management: (See MAR) other (see comments)  Range of Motion: active ROM (range of motion) encouraged  Intervention: Prevent Infection  Recent Flowsheet Documentation  Taken 9/27/2023 0500 by Zaki Rosenthal RN  Infection Prevention:   single patient room provided   rest/sleep promoted  Taken 9/27/2023 0300 by Zaki Rosenthal RN  Infection Prevention:   single patient room provided   rest/sleep promoted  Taken 9/27/2023 0100 by Zaki Rosenthal RN  Infection Prevention:   rest/sleep promoted   single patient room provided  Taken 9/26/2023 2300 by Zaki Rosenthal RN  Infection Prevention:   single patient room provided   rest/sleep promoted  Taken 9/26/2023 2100 by Zaki Rosenthal RN  Infection Prevention:   rest/sleep promoted   single patient room provided  Taken 9/26/2023 1900 by Zaki Rosenthal RN  Infection Prevention:   single patient room provided   rest/sleep promoted  Goal: Optimal Comfort and Wellbeing  Outcome: Ongoing, Progressing  Intervention: Provide Person-Centered Care  Recent Flowsheet Documentation  Taken 9/26/2023 2000 by Zaki Rosenthal RN  Trust Relationship/Rapport:   care explained   choices provided   thoughts/feelings acknowledged  Goal: Readiness for Transition of Care  Outcome: Ongoing, Progressing     Problem: Fall Injury Risk  Goal: Absence of Fall and Fall-Related Injury  Outcome: Ongoing, Progressing  Intervention: Promote Injury-Free Environment  Recent Flowsheet Documentation  Taken 9/27/2023 0500 by Zaki Rosenthal RN  Safety Promotion/Fall Prevention: safety round/check completed  Taken 9/27/2023 0300 by Zaki Rosenthal RN  Safety Promotion/Fall Prevention: safety round/check completed  Taken 9/27/2023 0100 by Zaki Rosenthal RN  Safety Promotion/Fall Prevention:  safety round/check completed  Taken 9/26/2023 2300 by Zaki Rosenthal RN  Safety Promotion/Fall Prevention: safety round/check completed  Taken 9/26/2023 2100 by Zaki Rosenthal RN  Safety Promotion/Fall Prevention: safety round/check completed  Taken 9/26/2023 1900 by Zaki Rosenthal RN  Safety Promotion/Fall Prevention: safety round/check completed     Problem: Heart Failure Comorbidity  Goal: Maintenance of Heart Failure Symptom Control  Outcome: Ongoing, Progressing     Problem: Hypertension Comorbidity  Goal: Blood Pressure in Desired Range  Outcome: Ongoing, Progressing     Problem: Skin Injury Risk Increased  Goal: Skin Health and Integrity  Outcome: Ongoing, Progressing  Intervention: Optimize Skin Protection  Recent Flowsheet Documentation  Taken 9/26/2023 2000 by Zaki Rosenthal RN  Pressure Reduction Techniques:   heels elevated off bed   rest period provided between sit times   weight shift assistance provided  Pressure Reduction Devices:   pressure-redistributing mattress utilized   heel offloading device utilized  Skin Protection:   adhesive use limited   drying agents applied   incontinence pads utilized   protective footwear used   pulse oximeter probe site changed   skin-to-device areas padded   skin-to-skin areas padded   transparent dressing maintained   tubing/devices free from skin contact

## 2023-09-28 ENCOUNTER — READMISSION MANAGEMENT (OUTPATIENT)
Dept: CALL CENTER | Facility: HOSPITAL | Age: 78
End: 2023-09-28
Payer: COMMERCIAL

## 2023-09-28 VITALS
TEMPERATURE: 97.8 F | HEART RATE: 59 BPM | SYSTOLIC BLOOD PRESSURE: 108 MMHG | HEIGHT: 73 IN | BODY MASS INDEX: 30.85 KG/M2 | DIASTOLIC BLOOD PRESSURE: 61 MMHG | OXYGEN SATURATION: 96 % | WEIGHT: 232.81 LBS | RESPIRATION RATE: 12 BRPM

## 2023-09-28 PROBLEM — I50.23 ACUTE ON CHRONIC HFREF (HEART FAILURE WITH REDUCED EJECTION FRACTION): Status: ACTIVE | Noted: 2023-09-28

## 2023-09-28 PROBLEM — I50.23 ACUTE ON CHRONIC SYSTOLIC HEART FAILURE: Status: ACTIVE | Noted: 2023-09-28

## 2023-09-28 PROBLEM — I21.9 TYPE 1 MYOCARDIAL INFARCTION: Status: ACTIVE | Noted: 2023-09-28

## 2023-09-28 LAB
ANION GAP SERPL CALCULATED.3IONS-SCNC: 8.4 MMOL/L (ref 5–15)
BUN SERPL-MCNC: 19 MG/DL (ref 8–23)
BUN/CREAT SERPL: 20.9 (ref 7–25)
CALCIUM SPEC-SCNC: 8.6 MG/DL (ref 8.6–10.5)
CHLORIDE SERPL-SCNC: 107 MMOL/L (ref 98–107)
CO2 SERPL-SCNC: 26.6 MMOL/L (ref 22–29)
CREAT SERPL-MCNC: 0.91 MG/DL (ref 0.76–1.27)
EGFRCR SERPLBLD CKD-EPI 2021: 87.3 ML/MIN/1.73
GLUCOSE SERPL-MCNC: 92 MG/DL (ref 65–99)
POTASSIUM SERPL-SCNC: 3.9 MMOL/L (ref 3.5–5.2)
QT INTERVAL: 478 MS
QT INTERVAL: 522 MS
QTC INTERVAL: 435 MS
QTC INTERVAL: 453 MS
SODIUM SERPL-SCNC: 142 MMOL/L (ref 136–145)

## 2023-09-28 PROCEDURE — 94761 N-INVAS EAR/PLS OXIMETRY MLT: CPT

## 2023-09-28 PROCEDURE — 94799 UNLISTED PULMONARY SVC/PX: CPT

## 2023-09-28 PROCEDURE — 80048 BASIC METABOLIC PNL TOTAL CA: CPT | Performed by: HOSPITALIST

## 2023-09-28 PROCEDURE — 94664 DEMO&/EVAL PT USE INHALER: CPT

## 2023-09-28 RX ORDER — ASPIRIN 81 MG/1
81 TABLET, CHEWABLE ORAL DAILY
Qty: 30 TABLET | Refills: 3 | Status: SHIPPED | OUTPATIENT
Start: 2023-09-29

## 2023-09-28 RX ORDER — NITROGLYCERIN 0.4 MG/1
0.4 TABLET SUBLINGUAL
Qty: 25 TABLET | Refills: 12 | Status: SHIPPED | OUTPATIENT
Start: 2023-09-28

## 2023-09-28 RX ORDER — GUAIFENESIN 600 MG/1
600 TABLET, EXTENDED RELEASE ORAL 2 TIMES DAILY
Qty: 60 TABLET | Refills: 0 | Status: SHIPPED | OUTPATIENT
Start: 2023-09-28 | End: 2023-10-28

## 2023-09-28 RX ADMIN — ARFORMOTEROL TARTRATE 15 MCG: 15 SOLUTION RESPIRATORY (INHALATION) at 06:42

## 2023-09-28 RX ADMIN — CETIRIZINE HYDROCHLORIDE 10 MG: 10 TABLET, FILM COATED ORAL at 08:40

## 2023-09-28 RX ADMIN — IPRATROPIUM BROMIDE 0.5 MG: 0.5 SOLUTION RESPIRATORY (INHALATION) at 01:00

## 2023-09-28 RX ADMIN — Medication 10 ML: at 08:41

## 2023-09-28 RX ADMIN — APIXABAN 5 MG: 5 TABLET, FILM COATED ORAL at 08:39

## 2023-09-28 RX ADMIN — GABAPENTIN 300 MG: 300 CAPSULE ORAL at 08:39

## 2023-09-28 RX ADMIN — ASPIRIN 81 MG: 81 TABLET, CHEWABLE ORAL at 08:40

## 2023-09-28 RX ADMIN — TAMSULOSIN HYDROCHLORIDE 0.4 MG: 0.4 CAPSULE ORAL at 08:39

## 2023-09-28 RX ADMIN — ALLOPURINOL 300 MG: 300 TABLET ORAL at 08:39

## 2023-09-28 RX ADMIN — ATORVASTATIN CALCIUM 80 MG: 40 TABLET, FILM COATED ORAL at 08:40

## 2023-09-28 RX ADMIN — BUMETANIDE 1.5 MG: 1 TABLET ORAL at 08:40

## 2023-09-28 RX ADMIN — IPRATROPIUM BROMIDE 0.5 MG: 0.5 SOLUTION RESPIRATORY (INHALATION) at 13:11

## 2023-09-28 RX ADMIN — PANTOPRAZOLE SODIUM 40 MG: 40 TABLET, DELAYED RELEASE ORAL at 08:40

## 2023-09-28 RX ADMIN — IPRATROPIUM BROMIDE 0.5 MG: 0.5 SOLUTION RESPIRATORY (INHALATION) at 06:42

## 2023-09-28 RX ADMIN — BUDESONIDE 0.5 MG: 0.5 SUSPENSION RESPIRATORY (INHALATION) at 06:42

## 2023-09-28 RX ADMIN — POTASSIUM CHLORIDE 20 MEQ: 1500 TABLET, EXTENDED RELEASE ORAL at 08:39

## 2023-09-28 RX ADMIN — FINASTERIDE 5 MG: 5 TABLET, FILM COATED ORAL at 06:09

## 2023-09-28 NOTE — PHARMACY PATIENT ASSISTANCE
Pharmacy was asked to check on cost of Jardiance. Per patient's insurance, it is covered with a $38.70 copay at our retail pharmacy.    Thank you,  Esmer Yip, PharmD

## 2023-09-28 NOTE — SIGNIFICANT NOTE
As patient is stable from an interventional cardiology standpoint, will defer to general cardiology for medication management.  Thank you for allowing us to be involved in this patient.    Electronically signed by JEAN Mcpherson, 09/28/23, 9:04 AM EDT.

## 2023-09-28 NOTE — PAYOR COMM NOTE
"CONTACT: CORI TOBIAS RN  UTILIZATION MANAGEMENT DEPT.  [facility]  [address] [place] [address]  PHONE: [phone]  FAX: [phone]        DISCHARGE NOTIFICATION  DC DATE: 23 TO HOME    REF#[id number]    Roosevelt Gaytan (78 y.o. Male)       Date of Birth   1945    Social Security Number       Address   [address] [place] [address]    Home Phone   [phone]    MRN   [id number]       Mosque   None    Marital Status   Unknown                            Admission Date   23    Admission Type   Urgent    Admitting Provider   Ej Burdick MD    Attending Provider       Department, Room/Bed   [facility] PROGRESS CARE, P214/S2       Discharge Date   2023    Discharge Disposition   Home or Self Care    Discharge Destination                                 Attending Provider: (none)   Allergies: No Known Allergies    Isolation: None   Infection: None   Code Status: CPR    Ht: 185.4 cm (73\")   Wt: 106 kg (232 lb 12.9 oz)    Admission Cmt: None   Principal Problem: NSTEMI (non-ST elevated myocardial infarction) [I21.4]                   Active Insurance as of 2023       Primary Coverage       Payor Plan Insurance Group Employer/Plan Group    [employer]CIAL AETNA [id number]       Payor Plan Address Payor Plan Phone Number Payor Plan Fax Number Effective Dates    [address] [address]   2022 - None Entered    [place] [address]         Subscriber Name Subscriber Birth Date Member ID       ROOSEVELT GAYTAN 1945 [id number]                     Emergency Contacts        (Rel.) Home Phone Work Phone Mobile Phone    Keyla Gaytan (Other) [phone] -- --    JonathanSanaz (Daughter) -- -- [phone]                 Discharge Summary        Nereyda Cortez MD at 23 1221              [facility] HOSPITALIST MEDICINE DISCHARGE SUMMARY    Patient Identification:  Name:  Roosevelt Gaytan  Age:  78 y.o.  Sex:  male  : "  1945  MRN:  [id number]  Visit Number:  [id number]    Date of Admission: 9/26/2023  Date of Discharge: September 28, 2023  DISCHARGE DISPOSITION   Stable  PCP: Provider, No Known    DISCHARGE DIAGNOSIS : Acute non-STEMI probably type II A-fib with RVR and CHF exacerbationcardiac cath nonobstructive coronaries artery disease medical management, acute on chronic combined heart failure, bradycardia at rest with appropriate response with activity, COPD without exacerbation, advanced age, hyperlipidemia, history of hypothyroidism, history of BPH, chronic anticoagulation for stroke prophylaxis, , chronic persistent atrial fibrillation.    HOSPITAL COURSE  Patient is a 78 y.o. male presented to [facility] complaining of palpitations shortness of breath, on arrival to the emergency room he has atrial fibrillation with RVR, he reports he has chronic A-fib, he is not from our area he is visiting he is from [facility].  At the emergency room prior to intervention for VR, his heart rate dropped down to 40 to 60 bpm.  Palpitation and shortness of breath resolved.  High-sensitivity troponin was positive with a delta of 68, proBNP was 2019.his EKG shows cardiomegaly with increased interstitial markings, EKG shows atrial fibrillation with occasional PVCs and incomplete left bundle branch block.  On physical exam he appears to have early signs of CHF decompensation, he received a dose of diuretics after which she felt much better.  Due to his troponin elevation and risk factors, general cardiology recommended cardiac cath.  Patient was referred to interventional cardiology cardiac cath revealed 30% stenosis of mid LAD, 20% stenosis of mid circumflex, proximal RCA 20% and mid RCA 20% stenosis.  Recommended optimize medical management.  2D echo shows grade 3 diastolic dysfunction, ejection fraction is 36 to 40%, there is moderate mitral valve regurgitation.    The rest of her stay was uneventful, he was able to  ambulate without any difficulties, he is eating well, due to his bradycardia, Coreg was held.  Cardiology recommended Jardiance.    Plan is to discharge him home today, he is from [facility], patient and wife was instructed to follow-up with his primary provider in a week or so for BMP check, blood pressure check and posthospitalization follow-up.      During his admission he also chronically takes over-the-counter pseudoephedrine with Mucinex.  Because of his chronic on and off coughing.  I advised the patient to switch to Mucinex only and avoid pseudoephedrine.        VITAL SIGNS:      09/26/23  0453 09/27/23  0400 09/28/23  0500   Weight: 104 kg (229 lb 15 oz) 106 kg (233 lb 4.8 oz) 106 kg (232 lb 12.9 oz)     Body mass index is 30.71 kg/m².  Vitals:    09/28/23 0900   BP: 108/61   Pulse: 55   Resp:    Temp:    SpO2: 98%     PHYSICAL EXAM:  General: Eating breakfast, comfortable,awake, alert, oriented to self, place, and time, well-developed and well-nourished.  No respiratory distress.    Skin:  Skin is warm and dry. No rash noted. No pallor.    HENT:  Head:  Normocephalic and atraumatic.  Mouth:  Moist mucous membranes.    Eyes:  Conjunctivae and EOM are normal.  Pupils are equal, round, and reactive to light.  No scleral icterus.    Neck:  Neck supple.  No JVD present.  No hepatojugular reflux  Pulmonary/Chest:  No respiratory distress, no wheezes, no crackles, with normal breath sounds and good air movement.  Cardiovascular:  Normal rate, regular rhythm and normal heart sounds with no murmur.  Abdominal:  Soft.  Bowel sounds are normal.  No distension and no tenderness.   Extremities:  No edema, no tenderness, and no deformity.  No red or swollen joints anywhere.  Strong pulses in all 4 extremities with no clubbing, no cyanosis, no edema.  Neurological:  Motor strength equal no obvious deficit, sensory grossly intact.   No cranial nerve deficit.  No tongue deviation.  No facial droop.  No slurred speech.     Genitourinary: No Patricia catheter  Back:  ----------    DISCHARGE MEDICATIONS:     Discharge Medications        New Medications        Instructions Start Date   aspirin 81 MG chewable tablet   Chew and swallow 1 tablet Daily.   Start Date: September 29, 2023     empagliflozin 10 MG tablet tablet  Commonly known as: Jardiance   10 mg, Oral, Daily      guaiFENesin 600 MG 12 hr tablet  Commonly known as: Mucinex   600 mg, Oral, 2 Times Daily      guaiFENesin 600 MG 12 hr tablet  Commonly known as: MUCINEX   Take 1 tablet by mouth twice daily      nitroglycerin 0.4 MG SL tablet  Commonly known as: NITROSTAT   Place 1 tablet under the tongue every 5 minutes as needed for chest pain (only if systolic blood pressure greater than 100). Take no more than 3 doses in 15 minutes.             Continue These Medications        Instructions Start Date   allopurinol 300 MG tablet  Commonly known as: ZYLOPRIM   300 mg, Oral, Daily      apixaban 5 MG tablet tablet  Commonly known as: ELIQUIS   5 mg, Oral, 2 Times Daily      atorvastatin 80 MG tablet  Commonly known as: LIPITOR   80 mg, Oral, Daily      bumetanide 0.5 MG tablet  Commonly known as: BUMEX   1.5 mg, Oral, 2 Times Daily      colchicine 0.6 MG tablet   0.6 mg, Oral, 2 Times Daily PRN      fexofenadine 180 MG tablet  Commonly known as: ALLEGRA   180 mg, Oral, Nightly      finasteride 5 MG tablet  Commonly known as: PROSCAR   5 mg, Oral, Every Morning      gabapentin 300 MG capsule  Commonly known as: NEURONTIN   300 mg, Oral, 3 Times Daily      levothyroxine 50 MCG tablet  Commonly known as: SYNTHROID, LEVOTHROID   50 mcg, Oral, Nightly      lisinopril 2.5 MG tablet  Commonly known as: PRINIVIL,ZESTRIL   2.5 mg, Oral, Nightly      pantoprazole 40 MG EC tablet  Commonly known as: PROTONIX   40 mg, Oral, Daily      potassium chloride 20 MEQ CR tablet  Commonly known as: K-DUR,KLOR-CON   20 mEq, Oral, 2 Times Daily      tamsulosin 0.4 MG capsule 24 hr capsule  Commonly known  as: FLOMAX   1 capsule, Oral, Daily             Stop These Medications      carvedilol 3.125 MG tablet  Commonly known as: COREG     pseudoephedrine-guaifenesin  MG per 12 hr tablet  Commonly known as: MUCINEX D                Your Scheduled Appointments    The patient sees Dr. Belkis Krishnan in [facility] at the [facility]. Spoke with the  there about follow up appointment. She said she will have to try and work him in to be seen by Dr. Krishnan and will give the patient a call with that appointment. Also told her that they want him to be referred to a cardiologist and she said she will put in a note for Dr. Krishnan.             Additional Instructions for the Follow-ups that You Need to Schedule       Discharge Follow-up with PCP   As directed       Currently Documented PCP:    Provider, No Known    PCP Phone Number:    [phone]     Follow Up Details: Posthospitalization follow-up next week.  He sees primary care provider for posthospitalization follow-up and BMP check and cardiology in [facility]               Follow-up Information       Provider, No Known .    Why: Posthospitalization follow-up next week.  He sees primary care provider for posthospitalization follow-up and BMP check and cardiology in [facility]  Contact information:  [place] [address]  [phone]                                  The ASCVD Risk score (Mikie SANTILLAN, et al., 2019) failed to calculate for the following reasons:    The patient has a prior MI or stroke diagnosis     Nereyda Cortez MD  09/28/23  12:21 EDT    Please note that this discharge summary required more than 30 minutes to complete.      Electronically signed by Nereyda Cortez MD at 09/28/23 1232       Discharge Order (From admission, onward)       Start     Ordered    09/28/23 0851  Discharge patient  Once        Expected Discharge Date: 09/28/23   Discharge Disposition: Home or Self Care   Physician of Record for  Attribution - Please select from Treatment Team: ANYA SPEAR [[id number]]   Review needed by CMO to determine Physician of Record: No      Question Answer Comment   Physician of Record for Attribution - Please select from Treatment Team ANYA SPEAR    Review needed by CMO to determine Physician of Record No        09/28/23 0856

## 2023-09-28 NOTE — PROGRESS NOTES
"Enter Query Response Below      Query Response: As the cardiac catheterization showed nonobstructive coronary artery disease then his NSTEMI is consistent with type II             If applicable, please update the problem list.   Patient: Héctor Zheng        : 1945  Account: [id number]           Admit Date:         How to Respond to this query:       a. Click New Note     b. Answer query within the yellow box.                c. Update the Problem List, if applicable.      If you have any questions about this query contact me at: [phone]     ,   78 yr male noted with \"Acute NSTEMI likely type I\" noted on 23 and \"Cardiac catheterization done was nonobstructive disease continue medical management\". Troponin 678 and 616 on admit. Dr. Cortez reflects \"Troponin elevation, likely secondary to A-fib with RVR and CHF-Nonobstructive coronary disease on cardiac cath\".    Please clarify the type of NSTEMI the patient was treated/monitored for:    Type 1 MI due to ______(please specify- plaque erosion, rupture, fissure or thrombus)  Type 2 MI due to (Diagnosis)  Troponin elevation  Other- specify______  Unable to determine        By submitting this query, we are merely seeking further clarification of documentation to accurately reflect all conditions that you are monitoring, evaluating, treating or that extend the hospitalization or utilize additional resources of care. Please utilize your independent clinical judgment when addressing the question(s) above.     This query and your response, once completed, will be entered into the legal medical record.    Sincerely,  Nannette alex rn  Clinical Documentation Integrity Program   "

## 2023-09-28 NOTE — DISCHARGE INSTR - APPOINTMENTS
The patient sees Dr. Belkis Krishnan in [facility] at the [facility]. Spoke with the  there about follow up appointment. She said she will have to try and work him in to be seen by Dr. Krishnan and will give the patient a call with that appointment. Also told her that they want him to be referred to a cardiologist and she said she will put in a note for Dr. Krishnan.

## 2023-09-28 NOTE — PLAN OF CARE
Goal Outcome Evaluation:              Outcome Evaluation: Pt A/Ox4. 2L NC. Antonino/Afib with BBB. Afebrile. Adequate UOP. No complaints throughout shift. VSS. Bed in lowest position with wheels locked. Bed alarm set and call light within reach. Plan of care continued.

## 2023-09-28 NOTE — DISCHARGE SUMMARY
[facility] HOSPITALIST MEDICINE DISCHARGE SUMMARY    Patient Identification:  Name:  Héctor Zheng  Age:  78 y.o.  Sex:  male  :  1945  MRN:  [id number]  Visit Number:  [id number]    Date of Admission: 2023  Date of Discharge: 2023  DISCHARGE DISPOSITION   Stable  PCP: Provider, No Known    DISCHARGE DIAGNOSIS : Acute non-STEMI probably type II A-fib with RVR and CHF exacerbationcardiac cath nonobstructive coronaries artery disease medical management, acute on chronic combined heart failure, bradycardia at rest with appropriate response with activity, COPD without exacerbation, advanced age, hyperlipidemia, history of hypothyroidism, history of BPH, chronic anticoagulation for stroke prophylaxis, , chronic persistent atrial fibrillation.    HOSPITAL COURSE  Patient is a 78 y.o. male presented to [facility] complaining of palpitations shortness of breath, on arrival to the emergency room he has atrial fibrillation with RVR, he reports he has chronic A-fib, he is not from our area he is visiting he is from [facility].  At the emergency room prior to intervention for VR, his heart rate dropped down to 40 to 60 bpm.  Palpitation and shortness of breath resolved.  High-sensitivity troponin was positive with a delta of 68, proBNP was 2019.his EKG shows cardiomegaly with increased interstitial markings, EKG shows atrial fibrillation with occasional PVCs and incomplete left bundle branch block.  On physical exam he appears to have early signs of CHF decompensation, he received a dose of diuretics after which she felt much better.  Due to his troponin elevation and risk factors, general cardiology recommended cardiac cath.  Patient was referred to interventional cardiology cardiac cath revealed 30% stenosis of mid LAD, 20% stenosis of mid circumflex, proximal RCA 20% and mid RCA 20% stenosis.  Recommended optimize medical management.  2D echo shows grade 3 diastolic  dysfunction, ejection fraction is 36 to 40%, there is moderate mitral valve regurgitation.    The rest of her stay was uneventful, he was able to ambulate without any difficulties, he is eating well, due to his bradycardia, Coreg was held.  Cardiology recommended Jardiance.    Plan is to discharge him home today, he is from [facility], patient and wife was instructed to follow-up with his primary provider in a week or so for BMP check, blood pressure check and posthospitalization follow-up.      During his admission he also chronically takes over-the-counter pseudoephedrine with Mucinex.  Because of his chronic on and off coughing.  I advised the patient to switch to Mucinex only and avoid pseudoephedrine.        VITAL SIGNS:      09/26/23  0453 09/27/23  0400 09/28/23  0500   Weight: 104 kg (229 lb 15 oz) 106 kg (233 lb 4.8 oz) 106 kg (232 lb 12.9 oz)     Body mass index is 30.71 kg/m².  Vitals:    09/28/23 0900   BP: 108/61   Pulse: 55   Resp:    Temp:    SpO2: 98%     PHYSICAL EXAM:  General: Eating breakfast, comfortable,awake, alert, oriented to self, place, and time, well-developed and well-nourished.  No respiratory distress.    Skin:  Skin is warm and dry. No rash noted. No pallor.    HENT:  Head:  Normocephalic and atraumatic.  Mouth:  Moist mucous membranes.    Eyes:  Conjunctivae and EOM are normal.  Pupils are equal, round, and reactive to light.  No scleral icterus.    Neck:  Neck supple.  No JVD present.  No hepatojugular reflux  Pulmonary/Chest:  No respiratory distress, no wheezes, no crackles, with normal breath sounds and good air movement.  Cardiovascular:  Normal rate, regular rhythm and normal heart sounds with no murmur.  Abdominal:  Soft.  Bowel sounds are normal.  No distension and no tenderness.   Extremities:  No edema, no tenderness, and no deformity.  No red or swollen joints anywhere.  Strong pulses in all 4 extremities with no clubbing, no cyanosis, no edema.  Neurological:  Motor  strength equal no obvious deficit, sensory grossly intact.   No cranial nerve deficit.  No tongue deviation.  No facial droop.  No slurred speech.    Genitourinary: No Patricia catheter  Back:  ----------    DISCHARGE MEDICATIONS:     Discharge Medications        New Medications        Instructions Start Date   aspirin 81 MG chewable tablet   Chew and swallow 1 tablet Daily.   Start Date: September 29, 2023     empagliflozin 10 MG tablet tablet  Commonly known as: Jardiance   10 mg, Oral, Daily      guaiFENesin 600 MG 12 hr tablet  Commonly known as: Mucinex   600 mg, Oral, 2 Times Daily      guaiFENesin 600 MG 12 hr tablet  Commonly known as: MUCINEX   Take 1 tablet by mouth twice daily      nitroglycerin 0.4 MG SL tablet  Commonly known as: NITROSTAT   Place 1 tablet under the tongue every 5 minutes as needed for chest pain (only if systolic blood pressure greater than 100). Take no more than 3 doses in 15 minutes.             Continue These Medications        Instructions Start Date   allopurinol 300 MG tablet  Commonly known as: ZYLOPRIM   300 mg, Oral, Daily      apixaban 5 MG tablet tablet  Commonly known as: ELIQUIS   5 mg, Oral, 2 Times Daily      atorvastatin 80 MG tablet  Commonly known as: LIPITOR   80 mg, Oral, Daily      bumetanide 0.5 MG tablet  Commonly known as: BUMEX   1.5 mg, Oral, 2 Times Daily      colchicine 0.6 MG tablet   0.6 mg, Oral, 2 Times Daily PRN      fexofenadine 180 MG tablet  Commonly known as: ALLEGRA   180 mg, Oral, Nightly      finasteride 5 MG tablet  Commonly known as: PROSCAR   5 mg, Oral, Every Morning      gabapentin 300 MG capsule  Commonly known as: NEURONTIN   300 mg, Oral, 3 Times Daily      levothyroxine 50 MCG tablet  Commonly known as: SYNTHROID, LEVOTHROID   50 mcg, Oral, Nightly      lisinopril 2.5 MG tablet  Commonly known as: PRINIVIL,ZESTRIL   2.5 mg, Oral, Nightly      pantoprazole 40 MG EC tablet  Commonly known as: PROTONIX   40 mg, Oral, Daily      potassium  chloride 20 MEQ CR tablet  Commonly known as: K-DUR,KLOR-CON   20 mEq, Oral, 2 Times Daily      tamsulosin 0.4 MG capsule 24 hr capsule  Commonly known as: FLOMAX   1 capsule, Oral, Daily             Stop These Medications      carvedilol 3.125 MG tablet  Commonly known as: COREG     pseudoephedrine-guaifenesin  MG per 12 hr tablet  Commonly known as: MUCINEX D                Your Scheduled Appointments    The patient sees Dr. Belkis Krishnan in [facility] at the [facility]. Spoke with the  there about follow up appointment. She said she will have to try and work him in to be seen by Dr. Krishnan and will give the patient a call with that appointment. Also told her that they want him to be referred to a cardiologist and she said she will put in a note for Dr. Krishnan.             Additional Instructions for the Follow-ups that You Need to Schedule       Discharge Follow-up with PCP   As directed       Currently Documented PCP:    Provider, No Known    PCP Phone Number:    [phone]     Follow Up Details: Posthospitalization follow-up next week.  He sees primary care provider for posthospitalization follow-up and BMP check and cardiology in [facility]               Follow-up Information       Provider, No Known .    Why: Posthospitalization follow-up next week.  He sees primary care provider for posthospitalization follow-up and BMP check and cardiology in [facility]  Contact information:  [facility] [address] [phone]                                  The ASCVD Risk score (Mikie SANTILLAN, et al., 2019) failed to calculate for the following reasons:    The patient has a prior MI or stroke diagnosis     Nereyda Cortez MD  09/28/23  12:21 EDT    Please note that this discharge summary required more than 30 minutes to complete.

## 2023-09-29 NOTE — OUTREACH NOTE
Prep Survey      Flowsheet Row Responses   [facility]   Is LACE score < 7 ? Yes   Eligibility Readm Mgmt   Discharge diagnosis NSTEMI, A-fib with RVR, and CHF exacerbation   Does the patient have one of the following disease processes/diagnoses(primary or secondary)? Acute MI (STEMI,NSTEMI)   Does the patient have Home health ordered? No   Is there a DME ordered? No   Prep survey completed? Yes            Kiesha SOUZA - Registered Nurse

## 2023-10-02 ENCOUNTER — READMISSION MANAGEMENT (OUTPATIENT)
Dept: CALL CENTER | Facility: HOSPITAL | Age: 78
End: 2023-10-02
Payer: COMMERCIAL

## 2023-10-02 NOTE — OUTREACH NOTE
"AMI Week 1 Survey      Flowsheet Row Responses   [facility] patient discharged from? [facility]   Does the patient have one of the following disease processes/diagnoses(primary or secondary)? Acute MI (STEMI,NSTEMI)   Week 1 attempt successful? Yes   Call start time 1547   Call end time 1607   Discharge diagnosis NSTEMI, A-fib with RVR, and CHF exacerbation   Person spoke with today (if not patient) and relationship pt and wife   Medication alerts for this patient Pt and wife were not aware of the medication changes but were aware that the pt had new meds but have not started taking the new meds because they have been \"exhausted and overwhelmed since DC\" per wife's report. They have not felt well enough to review AVS or make f/u appts yet either, wife reports but will do so today. Reviewed AVS med changes at length with wife, they will begin new meds and stop the meds that are DC'd, wife reports.   Meds reviewed with patient/caregiver? Yes   Is the patient having any side effects they believe may be caused by any medication additions or changes? No   Does the patient have all prescriptions related to this admission filled (includes statins,anticoagulants,HTN meds,anti-arrhythmia meds) Yes   Is the patient taking all medications as directed (includes completed medication regime)? No   What is preventing the patient from taking all medications as directed? Other, Doesn't understand importance   Nursing Interventions Nurse provided patient education   Does the patient have a primary care provider?  Yes   Does the patient have an appointment with their PCP,cardiologist,or clinic within 7 days of discharge? No   Comments regarding PCP Dr. Belkis Krishnan in [facility] in [place].   What is preventing the patient from scheduling follow up appointments within 7 days of discharge? Haven't had time   Has the patient kept scheduled appointments due by today? N/A   Psychosocial issues? No   Comments Pt c/o fatigue, low stamina, " unable to monitor BP/HR at home. Pt denies chest pain or SOA. LHC site- right groin site bruising present but denies pain or other issues.   Did the patient receive a copy of their discharge instructions? Yes   Nursing interventions Reviewed instructions with patient   What is the patient's perception of their health status since discharge? Improving   Nursing interventions Nurse provided patient education   Is the patient/caregiver able to teach back signs and symptoms of when to call for help immediately: Sudden chest discomfort, Sudden discomfort in arms, back, neck or jaw, Dizziness or lightheadedness, Irregular or rapid heart rate, Shortness of breath at any time   Nursing interventions Nurse provided patient education   Is the patient/caregiver able to teach back lifestyle changes to help prevent MIs Heart healthy diet   Is the patient/caregiver able to teach back ways to prevent a second heart attack: Take medications, Follow up with MD   Is the patient/caregiver able to teach back the hierarchy of who to call/visit for symptoms/problems? PCP, Specialist, Home health nurse, Urgent Care, ED, 911 Yes   Week 1 call completed? Yes   Call end time 1607            Camille Orourke Registered Nurse

## 2023-10-12 ENCOUNTER — READMISSION MANAGEMENT (OUTPATIENT)
Dept: CALL CENTER | Facility: HOSPITAL | Age: 78
End: 2023-10-12
Payer: COMMERCIAL

## 2023-10-12 NOTE — OUTREACH NOTE
AMI Week 2 Survey      Flowsheet Row Responses   [facility] patient discharged from? [facility]   Does the patient have one of the following disease processes/diagnoses(primary or secondary)? Acute MI (STEMI,NSTEMI)   Week 2 attempt successful? Yes   Call start time 1043   Call end time 1047   Discharge diagnosis NSTEMI, A-fib with RVR, and CHF exacerbation   Person spoke with today (if not patient) and relationship spouse   Meds reviewed with patient/caregiver? Yes   Is the patient having any side effects they believe may be caused by any medication additions or changes? No   Is the patient taking all medications as directed (includes completed medication regime)? Yes   Does the patient have a primary care provider?  Yes   Does the patient have an appointment with their PCP,cardiologist,or clinic within 7 days of discharge? Yes   Has the patient kept scheduled appointments due by today? Yes   Has home health visited the patient within 72 hours of discharge? N/A   Psychosocial issues? No   What is the patient's perception of their health status since discharge? Improving   Nursing interventions Nurse provided patient education   Is the patient/caregiver able to teach back signs and symptoms of when to call for help immediately: Sudden chest discomfort, Sudden discomfort in arms, back, neck or jaw, Shortness of breath at any time, Irregular or rapid heart rate, Dizziness or lightheadedness   Nursing interventions Nurse provided patient education   Is the patient/caregiver able to teach back lifestyle changes to help prevent MIs Heart healthy diet, Maintaining a healthy weight   Is the patient/caregiver able to teach back ways to prevent a second heart attack: Take medications, Follow up with MD   Is the patient/caregiver able to teach back the hierarchy of who to call/visit for symptoms/problems? PCP, Specialist, Home health nurse, Urgent Care, ED, 911 Yes   Week 2 call completed? Yes   Revoked No further  contact(revokes)-requires comment   Is the patient interested in additional calls from an ambulatory ? No   Would this patient benefit from a Referral to [facility] Social Work? No   Graduated/Revoked comments spouse reports pt is doing well, tires easily but has improved. no concerns at this time   Call end time 1045            Marilin DENNIS - Registered Nurse

## 2024-01-05 ENCOUNTER — APPOINTMENT (OUTPATIENT)
Dept: RADIOLOGY | Facility: HOSPITAL | Age: 79
End: 2024-01-05
Payer: MEDICARE

## 2024-01-05 ENCOUNTER — HOSPITAL ENCOUNTER (EMERGENCY)
Facility: HOSPITAL | Age: 79
Discharge: ANOTHER HOSPITAL | End: 2024-01-05
Attending: EMERGENCY MEDICINE
Payer: MEDICARE

## 2024-01-05 VITALS
WEIGHT: 180 LBS | DIASTOLIC BLOOD PRESSURE: 56 MMHG | HEIGHT: 73 IN | TEMPERATURE: 98.6 F | SYSTOLIC BLOOD PRESSURE: 91 MMHG | OXYGEN SATURATION: 100 % | HEART RATE: 77 BPM | RESPIRATION RATE: 28 BRPM | BODY MASS INDEX: 23.86 KG/M2

## 2024-01-05 DIAGNOSIS — D72.825 BANDEMIA: ICD-10-CM

## 2024-01-05 DIAGNOSIS — J18.9 PNEUMONIA DUE TO INFECTIOUS ORGANISM, UNSPECIFIED LATERALITY, UNSPECIFIED PART OF LUNG: ICD-10-CM

## 2024-01-05 DIAGNOSIS — A41.9 SEPSIS, DUE TO UNSPECIFIED ORGANISM, UNSPECIFIED WHETHER ACUTE ORGAN DYSFUNCTION PRESENT (MULTI): Primary | ICD-10-CM

## 2024-01-05 DIAGNOSIS — I95.9 HYPOTENSION, UNSPECIFIED HYPOTENSION TYPE: ICD-10-CM

## 2024-01-05 DIAGNOSIS — N17.9 ACUTE KIDNEY INJURY (CMS-HCC): ICD-10-CM

## 2024-01-05 LAB
ALBUMIN SERPL BCP-MCNC: 3.4 G/DL (ref 3.4–5)
ALP SERPL-CCNC: 107 U/L (ref 33–136)
ALT SERPL W P-5'-P-CCNC: 106 U/L (ref 10–52)
AMORPH CRY #/AREA UR COMP ASSIST: ABNORMAL /HPF
ANION GAP SERPL CALC-SCNC: 20 MMOL/L (ref 10–20)
APPEARANCE UR: ABNORMAL
AST SERPL W P-5'-P-CCNC: 449 U/L (ref 9–39)
BASOPHILS # BLD AUTO: 0.04 X10*3/UL (ref 0–0.1)
BASOPHILS NFR BLD AUTO: 0.2 %
BILIRUB SERPL-MCNC: 2.2 MG/DL (ref 0–1.2)
BILIRUB UR STRIP.AUTO-MCNC: NEGATIVE MG/DL
BUN SERPL-MCNC: 26 MG/DL (ref 6–23)
CALCIUM SERPL-MCNC: 8.2 MG/DL (ref 8.6–10.3)
CHLORIDE SERPL-SCNC: 93 MMOL/L (ref 98–107)
CO2 SERPL-SCNC: 22 MMOL/L (ref 21–32)
COLOR UR: ABNORMAL
CREAT SERPL-MCNC: 1.4 MG/DL (ref 0.5–1.3)
EOSINOPHIL # BLD AUTO: 0 X10*3/UL (ref 0–0.4)
EOSINOPHIL NFR BLD AUTO: 0 %
ERYTHROCYTE [DISTWIDTH] IN BLOOD BY AUTOMATED COUNT: 15.8 % (ref 11.5–14.5)
FLUAV RNA RESP QL NAA+PROBE: NOT DETECTED
FLUBV RNA RESP QL NAA+PROBE: NOT DETECTED
GFR SERPL CREATININE-BSD FRML MDRD: 52 ML/MIN/1.73M*2
GLUCOSE SERPL-MCNC: 117 MG/DL (ref 74–99)
GLUCOSE UR STRIP.AUTO-MCNC: ABNORMAL MG/DL
HCT VFR BLD AUTO: 38.3 % (ref 41–52)
HGB BLD-MCNC: 12.9 G/DL (ref 13.5–17.5)
HYALINE CASTS #/AREA URNS AUTO: ABNORMAL /LPF
IMM GRANULOCYTES # BLD AUTO: 0.25 X10*3/UL (ref 0–0.5)
IMM GRANULOCYTES NFR BLD AUTO: 1.2 % (ref 0–0.9)
KETONES UR STRIP.AUTO-MCNC: NEGATIVE MG/DL
LACTATE SERPL-SCNC: 2.1 MMOL/L (ref 0.4–2)
LEUKOCYTE ESTERASE UR QL STRIP.AUTO: NEGATIVE
LYMPHOCYTES # BLD AUTO: 0.93 X10*3/UL (ref 0.8–3)
LYMPHOCYTES NFR BLD AUTO: 4.6 %
MCH RBC QN AUTO: 30.1 PG (ref 26–34)
MCHC RBC AUTO-ENTMCNC: 33.7 G/DL (ref 32–36)
MCV RBC AUTO: 89 FL (ref 80–100)
MONOCYTES # BLD AUTO: 1.12 X10*3/UL (ref 0.05–0.8)
MONOCYTES NFR BLD AUTO: 5.6 %
MUCOUS THREADS #/AREA URNS AUTO: ABNORMAL /LPF
NEUTROPHILS # BLD AUTO: 17.75 X10*3/UL (ref 1.6–5.5)
NEUTROPHILS NFR BLD AUTO: 88.4 %
NITRITE UR QL STRIP.AUTO: NEGATIVE
NRBC BLD-RTO: 0 /100 WBCS (ref 0–0)
PH UR STRIP.AUTO: 5 [PH]
PLATELET # BLD AUTO: 158 X10*3/UL (ref 150–450)
POTASSIUM SERPL-SCNC: 3.4 MMOL/L (ref 3.5–5.3)
PROT SERPL-MCNC: 7.4 G/DL (ref 6.4–8.2)
PROT UR STRIP.AUTO-MCNC: ABNORMAL MG/DL
RBC # BLD AUTO: 4.29 X10*6/UL (ref 4.5–5.9)
RBC # UR STRIP.AUTO: ABNORMAL /UL
RBC #/AREA URNS AUTO: ABNORMAL /HPF
RSV RNA RESP QL NAA+PROBE: NOT DETECTED
SARS-COV-2 RNA RESP QL NAA+PROBE: NOT DETECTED
SODIUM SERPL-SCNC: 132 MMOL/L (ref 136–145)
SP GR UR STRIP.AUTO: 1.01
UROBILINOGEN UR STRIP.AUTO-MCNC: <2 MG/DL
WBC # BLD AUTO: 20.1 X10*3/UL (ref 4.4–11.3)
WBC #/AREA URNS AUTO: ABNORMAL /HPF
WBC CLUMPS #/AREA URNS AUTO: ABNORMAL /HPF

## 2024-01-05 PROCEDURE — 96361 HYDRATE IV INFUSION ADD-ON: CPT

## 2024-01-05 PROCEDURE — 96367 TX/PROPH/DG ADDL SEQ IV INF: CPT

## 2024-01-05 PROCEDURE — 85025 COMPLETE CBC W/AUTO DIFF WBC: CPT | Performed by: EMERGENCY MEDICINE

## 2024-01-05 PROCEDURE — 96374 THER/PROPH/DIAG INJ IV PUSH: CPT | Mod: 59

## 2024-01-05 PROCEDURE — 87040 BLOOD CULTURE FOR BACTERIA: CPT | Mod: 91,GENLAB | Performed by: EMERGENCY MEDICINE

## 2024-01-05 PROCEDURE — 71260 CT THORAX DX C+: CPT | Performed by: RADIOLOGY

## 2024-01-05 PROCEDURE — 36415 COLL VENOUS BLD VENIPUNCTURE: CPT | Performed by: EMERGENCY MEDICINE

## 2024-01-05 PROCEDURE — 2500000004 HC RX 250 GENERAL PHARMACY W/ HCPCS (ALT 636 FOR OP/ED): Performed by: EMERGENCY MEDICINE

## 2024-01-05 PROCEDURE — 96366 THER/PROPH/DIAG IV INF ADDON: CPT

## 2024-01-05 PROCEDURE — 87637 SARSCOV2&INF A&B&RSV AMP PRB: CPT | Performed by: EMERGENCY MEDICINE

## 2024-01-05 PROCEDURE — 74177 CT ABD & PELVIS W/CONTRAST: CPT | Performed by: RADIOLOGY

## 2024-01-05 PROCEDURE — 99291 CRITICAL CARE FIRST HOUR: CPT | Mod: 25 | Performed by: EMERGENCY MEDICINE

## 2024-01-05 PROCEDURE — 2550000001 HC RX 255 CONTRASTS: Performed by: EMERGENCY MEDICINE

## 2024-01-05 PROCEDURE — 96365 THER/PROPH/DIAG IV INF INIT: CPT | Mod: 59

## 2024-01-05 PROCEDURE — 83605 ASSAY OF LACTIC ACID: CPT | Performed by: EMERGENCY MEDICINE

## 2024-01-05 PROCEDURE — 80053 COMPREHEN METABOLIC PANEL: CPT | Performed by: EMERGENCY MEDICINE

## 2024-01-05 PROCEDURE — 81001 URINALYSIS AUTO W/SCOPE: CPT | Performed by: EMERGENCY MEDICINE

## 2024-01-05 PROCEDURE — C9113 INJ PANTOPRAZOLE SODIUM, VIA: HCPCS | Performed by: EMERGENCY MEDICINE

## 2024-01-05 PROCEDURE — 74177 CT ABD & PELVIS W/CONTRAST: CPT

## 2024-01-05 RX ORDER — PANTOPRAZOLE SODIUM 40 MG/10ML
40 INJECTION, POWDER, LYOPHILIZED, FOR SOLUTION INTRAVENOUS ONCE
Status: COMPLETED | OUTPATIENT
Start: 2024-01-05 | End: 2024-01-05

## 2024-01-05 RX ORDER — VANCOMYCIN HYDROCHLORIDE 1 G/200ML
INJECTION, SOLUTION INTRAVENOUS
Status: DISCONTINUED
Start: 2024-01-05 | End: 2024-01-06 | Stop reason: HOSPADM

## 2024-01-05 RX ORDER — SODIUM CHLORIDE 9 MG/ML
125 INJECTION, SOLUTION INTRAVENOUS CONTINUOUS
Status: DISCONTINUED | OUTPATIENT
Start: 2024-01-05 | End: 2024-01-06 | Stop reason: HOSPADM

## 2024-01-05 RX ADMIN — VANCOMYCIN HYDROCHLORIDE 2 G: 1 INJECTION, POWDER, LYOPHILIZED, FOR SOLUTION INTRAVENOUS at 18:55

## 2024-01-05 RX ADMIN — SODIUM CHLORIDE 1000 ML: 9 INJECTION, SOLUTION INTRAVENOUS at 20:30

## 2024-01-05 RX ADMIN — IOHEXOL 75 ML: 350 INJECTION, SOLUTION INTRAVENOUS at 18:32

## 2024-01-05 RX ADMIN — PIPERACILLIN SODIUM AND TAZOBACTAM SODIUM 4.5 G: 4; .5 INJECTION, SOLUTION INTRAVENOUS at 18:20

## 2024-01-05 RX ADMIN — PANTOPRAZOLE SODIUM 40 MG: 40 INJECTION, POWDER, FOR SOLUTION INTRAVENOUS at 21:08

## 2024-01-05 RX ADMIN — SODIUM CHLORIDE 125 ML/HR: 9 INJECTION, SOLUTION INTRAVENOUS at 20:30

## 2024-01-05 ASSESSMENT — PAIN - FUNCTIONAL ASSESSMENT: PAIN_FUNCTIONAL_ASSESSMENT: 0-10

## 2024-01-05 ASSESSMENT — COLUMBIA-SUICIDE SEVERITY RATING SCALE - C-SSRS
2. HAVE YOU ACTUALLY HAD ANY THOUGHTS OF KILLING YOURSELF?: NO
1. IN THE PAST MONTH, HAVE YOU WISHED YOU WERE DEAD OR WISHED YOU COULD GO TO SLEEP AND NOT WAKE UP?: NO
6. HAVE YOU EVER DONE ANYTHING, STARTED TO DO ANYTHING, OR PREPARED TO DO ANYTHING TO END YOUR LIFE?: NO

## 2024-01-05 ASSESSMENT — PAIN SCALES - GENERAL
PAINLEVEL_OUTOF10: 0 - NO PAIN
PAINLEVEL_OUTOF10: 6

## 2024-01-05 NOTE — ED PROVIDER NOTES
HPI   Chief Complaint   Patient presents with    Flu Symptoms     Pt states he has been sick for a week and gradually getting weaker but was unable to come in as he cares for his wife.       HPI                    Josseline Coma Scale Score: 15                  Patient History   No past medical history on file.  No past surgical history on file.  No family history on file.  Social History     Tobacco Use    Smoking status: Not on file    Smokeless tobacco: Not on file   Substance Use Topics    Alcohol use: Not on file    Drug use: Not on file       Physical Exam   ED Triage Vitals [01/05/24 1612]   Temp Heart Rate Resp BP   37 °C (98.6 °F) 102 20 86/64      SpO2 Temp Source Heart Rate Source Patient Position   93 % Temporal Monitor Lying      BP Location FiO2 (%)     Right arm --       Physical Exam  Constitutional:       General: He is not in acute distress.     Appearance: Normal appearance. He is not toxic-appearing.   HENT:      Head: Normocephalic and atraumatic.      Right Ear: Tympanic membrane normal.      Left Ear: Tympanic membrane normal.      Mouth/Throat:      Mouth: Mucous membranes are moist.      Pharynx: Oropharynx is clear.   Eyes:      Conjunctiva/sclera: Conjunctivae normal.      Pupils: Pupils are equal, round, and reactive to light.   Cardiovascular:      Rate and Rhythm: Regular rhythm. Tachycardia present.      Pulses: Normal pulses.      Heart sounds: Normal heart sounds.   Pulmonary:      Effort: Pulmonary effort is normal. No respiratory distress.      Breath sounds: Normal breath sounds. No wheezing.   Abdominal:      General: Bowel sounds are normal.      Palpations: Abdomen is soft.      Tenderness: There is no abdominal tenderness. There is no guarding or rebound.   Musculoskeletal:         General: Normal range of motion.      Cervical back: Normal range of motion.   Skin:     General: Skin is warm and dry.   Neurological:      General: No focal deficit present.      Mental Status: He  is alert. He is disoriented.         ED Course & MDM        Medical Decision Making  78-year-old gentleman presents to the ER with increased shortness of breath copious amount of diarrhea not feeling well.  Patient came to the ED for evaluation patient again was recently traveled and is normally at 5 L of oxygen.      Initial patient EKG was at 107 a sinus tachycardia no ST elevation depression interpreted by me.    Was found to be tachycardic hypotensive.  Clinically appears to be dehydrated.  Patient does have history of congestive heart failure for this reason was cautious on his fluids we will start him on a liter of fluids and empirically cover with antibiotics.  At this time waiting for imaging and other results.  Patient care be signed out to the night physician.        Procedure  Procedures     Arie Hernandez, DO  01/05/24 [id number]

## 2024-01-06 ENCOUNTER — HOSPITAL ENCOUNTER (INPATIENT)
Facility: HOSPITAL | Age: 79
LOS: 13 days | Discharge: NURSING, REHAB OR LONG-TERM CARE | DRG: 871 | End: 2024-01-19
Attending: INTERNAL MEDICINE | Admitting: INTERNAL MEDICINE
Payer: MEDICARE

## 2024-01-06 DIAGNOSIS — A41.9 SEPSIS (MULTI): ICD-10-CM

## 2024-01-06 DIAGNOSIS — J96.11 CHRONIC RESPIRATORY FAILURE WITH HYPOXIA (MULTI): Primary | ICD-10-CM

## 2024-01-06 DIAGNOSIS — Z74.09 IMPAIRED MOBILITY: ICD-10-CM

## 2024-01-06 DIAGNOSIS — I95.89 HYPOTENSION DUE TO HYPOVOLEMIA: ICD-10-CM

## 2024-01-06 DIAGNOSIS — G93.40 ACUTE ENCEPHALOPATHY: ICD-10-CM

## 2024-01-06 DIAGNOSIS — Z78.9 DECREASED ACTIVITIES OF DAILY LIVING (ADL): ICD-10-CM

## 2024-01-06 DIAGNOSIS — B37.0 ORAL CANDIDIASIS: ICD-10-CM

## 2024-01-06 DIAGNOSIS — E86.1 HYPOTENSION DUE TO HYPOVOLEMIA: ICD-10-CM

## 2024-01-06 DIAGNOSIS — I50.22 CHRONIC SYSTOLIC HEART FAILURE (MULTI): ICD-10-CM

## 2024-01-06 PROBLEM — E86.0 DEHYDRATION: Status: ACTIVE | Noted: 2024-01-06

## 2024-01-06 PROBLEM — J96.10 CHRONIC RESPIRATORY FAILURE (MULTI): Status: ACTIVE | Noted: 2024-01-06

## 2024-01-06 PROBLEM — D72.829 LEUKOCYTOSIS: Status: ACTIVE | Noted: 2024-01-06

## 2024-01-06 PROBLEM — E87.20 LACTIC ACID ACIDOSIS: Status: ACTIVE | Noted: 2024-01-06

## 2024-01-06 PROBLEM — E87.6 HYPOKALEMIA: Status: ACTIVE | Noted: 2024-01-06

## 2024-01-06 PROBLEM — N17.9 ACUTE RENAL FAILURE (ARF) (CMS-HCC): Status: ACTIVE | Noted: 2024-01-06

## 2024-01-06 PROBLEM — I95.9 HYPOTENSION: Status: ACTIVE | Noted: 2024-01-06

## 2024-01-06 PROBLEM — R74.01 TRANSAMINITIS: Status: ACTIVE | Noted: 2024-01-06

## 2024-01-06 PROBLEM — J18.9 BILATERAL PNEUMONIA: Status: ACTIVE | Noted: 2024-01-06

## 2024-01-06 LAB
ALBUMIN SERPL-MCNC: 2.5 G/DL (ref 3.5–5)
ALP BLD-CCNC: 100 U/L (ref 35–125)
ALT SERPL-CCNC: 83 U/L (ref 5–40)
ANION GAP SERPL CALC-SCNC: 14 MMOL/L
AST SERPL-CCNC: 339 U/L (ref 5–40)
BILIRUB SERPL-MCNC: 1.6 MG/DL (ref 0.1–1.2)
BUN SERPL-MCNC: 28 MG/DL (ref 8–25)
C DIF TOX TCDA+TCDB STL QL NAA+PROBE: NOT DETECTED
CALCIUM SERPL-MCNC: 7.7 MG/DL (ref 8.5–10.4)
CHLORIDE SERPL-SCNC: 101 MMOL/L (ref 97–107)
CO2 SERPL-SCNC: 21 MMOL/L (ref 24–31)
CREAT SERPL-MCNC: 1.4 MG/DL (ref 0.4–1.6)
ERYTHROCYTE [DISTWIDTH] IN BLOOD BY AUTOMATED COUNT: 15.5 % (ref 11.5–14.5)
GFR SERPL CREATININE-BSD FRML MDRD: 52 ML/MIN/1.73M*2
GLUCOSE SERPL-MCNC: 108 MG/DL (ref 65–99)
HCT VFR BLD AUTO: 31.3 % (ref 41–52)
HGB BLD-MCNC: 10.9 G/DL (ref 13.5–17.5)
HOLD SPECIMEN: NORMAL
MCH RBC QN AUTO: 30.3 PG (ref 26–34)
MCHC RBC AUTO-ENTMCNC: 34.8 G/DL (ref 32–36)
MCV RBC AUTO: 87 FL (ref 80–100)
MRSA DNA SPEC QL NAA+PROBE: NOT DETECTED
NRBC BLD-RTO: 0 /100 WBCS (ref 0–0)
PLATELET # BLD AUTO: 132 X10*3/UL (ref 150–450)
POTASSIUM SERPL-SCNC: 3.1 MMOL/L (ref 3.4–5.1)
PROT SERPL-MCNC: 5.9 G/DL (ref 5.9–7.9)
RBC # BLD AUTO: 3.6 X10*6/UL (ref 4.5–5.9)
SODIUM SERPL-SCNC: 136 MMOL/L (ref 133–145)
VANCOMYCIN SERPL-MCNC: 12 UG/ML (ref 10–20)
WBC # BLD AUTO: 15 X10*3/UL (ref 4.4–11.3)

## 2024-01-06 PROCEDURE — 2500000004 HC RX 250 GENERAL PHARMACY W/ HCPCS (ALT 636 FOR OP/ED): Performed by: INTERNAL MEDICINE

## 2024-01-06 PROCEDURE — 87899 AGENT NOS ASSAY W/OPTIC: CPT | Mod: TRILAB | Performed by: INTERNAL MEDICINE

## 2024-01-06 PROCEDURE — 87493 C DIFF AMPLIFIED PROBE: CPT | Performed by: INTERNAL MEDICINE

## 2024-01-06 PROCEDURE — 2500000001 HC RX 250 WO HCPCS SELF ADMINISTERED DRUGS (ALT 637 FOR MEDICARE OP): Performed by: INTERNAL MEDICINE

## 2024-01-06 PROCEDURE — 84075 ASSAY ALKALINE PHOSPHATASE: CPT | Performed by: INTERNAL MEDICINE

## 2024-01-06 PROCEDURE — 9420000001 HC RT PATIENT EDUCATION 5 MIN

## 2024-01-06 PROCEDURE — 86738 MYCOPLASMA ANTIBODY: CPT | Performed by: NURSE PRACTITIONER

## 2024-01-06 PROCEDURE — 36415 COLL VENOUS BLD VENIPUNCTURE: CPT | Performed by: NURSE PRACTITIONER

## 2024-01-06 PROCEDURE — 2020000001 HC ICU ROOM DAILY

## 2024-01-06 PROCEDURE — 2500000005 HC RX 250 GENERAL PHARMACY W/O HCPCS: Performed by: INTERNAL MEDICINE

## 2024-01-06 PROCEDURE — 94760 N-INVAS EAR/PLS OXIMETRY 1: CPT

## 2024-01-06 PROCEDURE — 94664 DEMO&/EVAL PT USE INHALER: CPT

## 2024-01-06 PROCEDURE — 99222 1ST HOSP IP/OBS MODERATE 55: CPT | Performed by: INTERNAL MEDICINE

## 2024-01-06 PROCEDURE — 87449 NOS EACH ORGANISM AG IA: CPT | Mod: TRILAB | Performed by: INTERNAL MEDICINE

## 2024-01-06 PROCEDURE — 92610 EVALUATE SWALLOWING FUNCTION: CPT | Mod: GN

## 2024-01-06 PROCEDURE — 80202 ASSAY OF VANCOMYCIN: CPT

## 2024-01-06 PROCEDURE — 36415 COLL VENOUS BLD VENIPUNCTURE: CPT | Performed by: INTERNAL MEDICINE

## 2024-01-06 PROCEDURE — C9113 INJ PANTOPRAZOLE SODIUM, VIA: HCPCS | Performed by: INTERNAL MEDICINE

## 2024-01-06 PROCEDURE — 94640 AIRWAY INHALATION TREATMENT: CPT

## 2024-01-06 PROCEDURE — 2500000002 HC RX 250 W HCPCS SELF ADMINISTERED DRUGS (ALT 637 FOR MEDICARE OP, ALT 636 FOR OP/ED): Performed by: INTERNAL MEDICINE

## 2024-01-06 PROCEDURE — 85027 COMPLETE CBC AUTOMATED: CPT | Performed by: INTERNAL MEDICINE

## 2024-01-06 PROCEDURE — 87641 MR-STAPH DNA AMP PROBE: CPT

## 2024-01-06 RX ORDER — SACUBITRIL AND VALSARTAN 24; 26 MG/1; MG/1
1 TABLET, FILM COATED ORAL 2 TIMES DAILY
COMMUNITY

## 2024-01-06 RX ORDER — ASPIRIN 81 MG/1
81 TABLET ORAL DAILY
COMMUNITY

## 2024-01-06 RX ORDER — ONDANSETRON 4 MG/1
4 TABLET, ORALLY DISINTEGRATING ORAL EVERY 8 HOURS PRN
Status: DISCONTINUED | OUTPATIENT
Start: 2024-01-06 | End: 2024-01-19 | Stop reason: HOSPADM

## 2024-01-06 RX ORDER — IPRATROPIUM BROMIDE AND ALBUTEROL SULFATE 2.5; .5 MG/3ML; MG/3ML
3 SOLUTION RESPIRATORY (INHALATION)
Status: DISCONTINUED | OUTPATIENT
Start: 2024-01-06 | End: 2024-01-06

## 2024-01-06 RX ORDER — ALLOPURINOL 300 MG/1
300 TABLET ORAL DAILY
COMMUNITY
End: 2024-01-19 | Stop reason: HOSPADM

## 2024-01-06 RX ORDER — POTASSIUM CHLORIDE 14.9 MG/ML
20 INJECTION INTRAVENOUS
Status: COMPLETED | OUTPATIENT
Start: 2024-01-06 | End: 2024-01-06

## 2024-01-06 RX ORDER — PANTOPRAZOLE SODIUM 40 MG/1
40 TABLET, DELAYED RELEASE ORAL
COMMUNITY

## 2024-01-06 RX ORDER — MINERAL OIL
180 ENEMA (ML) RECTAL DAILY
COMMUNITY

## 2024-01-06 RX ORDER — ONDANSETRON HYDROCHLORIDE 2 MG/ML
4 INJECTION, SOLUTION INTRAVENOUS EVERY 8 HOURS PRN
Status: DISCONTINUED | OUTPATIENT
Start: 2024-01-06 | End: 2024-01-19 | Stop reason: HOSPADM

## 2024-01-06 RX ORDER — FINASTERIDE 5 MG/1
5 TABLET, FILM COATED ORAL DAILY
COMMUNITY

## 2024-01-06 RX ORDER — TAMSULOSIN HYDROCHLORIDE 0.4 MG/1
0.4 CAPSULE ORAL DAILY
COMMUNITY
End: 2024-01-19 | Stop reason: HOSPADM

## 2024-01-06 RX ORDER — IPRATROPIUM BROMIDE AND ALBUTEROL SULFATE 2.5; .5 MG/3ML; MG/3ML
3 SOLUTION RESPIRATORY (INHALATION)
Status: DISCONTINUED | OUTPATIENT
Start: 2024-01-06 | End: 2024-01-18

## 2024-01-06 RX ORDER — COLCHICINE 0.6 MG/1
0.6 TABLET ORAL 2 TIMES DAILY
COMMUNITY

## 2024-01-06 RX ORDER — GUAIFENESIN 600 MG/1
600 TABLET, EXTENDED RELEASE ORAL EVERY 12 HOURS PRN
Status: DISCONTINUED | OUTPATIENT
Start: 2024-01-06 | End: 2024-01-19 | Stop reason: HOSPADM

## 2024-01-06 RX ORDER — GUAIFENESIN 600 MG/1
600 TABLET, EXTENDED RELEASE ORAL 2 TIMES DAILY
COMMUNITY

## 2024-01-06 RX ORDER — SODIUM CHLORIDE 9 MG/ML
125 INJECTION, SOLUTION INTRAVENOUS CONTINUOUS
Status: DISCONTINUED | OUTPATIENT
Start: 2024-01-06 | End: 2024-01-06

## 2024-01-06 RX ORDER — PANTOPRAZOLE SODIUM 40 MG/10ML
40 INJECTION, POWDER, LYOPHILIZED, FOR SOLUTION INTRAVENOUS DAILY
Status: DISCONTINUED | OUTPATIENT
Start: 2024-01-06 | End: 2024-01-09

## 2024-01-06 RX ORDER — GUAIFENESIN/DEXTROMETHORPHAN 100-10MG/5
5 SYRUP ORAL EVERY 4 HOURS PRN
Status: DISCONTINUED | OUTPATIENT
Start: 2024-01-06 | End: 2024-01-19 | Stop reason: HOSPADM

## 2024-01-06 RX ORDER — NITROGLYCERIN 0.4 MG/1
0.4 TABLET SUBLINGUAL EVERY 5 MIN PRN
COMMUNITY

## 2024-01-06 RX ORDER — SODIUM CHLORIDE AND POTASSIUM CHLORIDE 150; 900 MG/100ML; MG/100ML
100 INJECTION, SOLUTION INTRAVENOUS CONTINUOUS
Status: DISCONTINUED | OUTPATIENT
Start: 2024-01-06 | End: 2024-01-08

## 2024-01-06 RX ORDER — ASPIRIN 81 MG/1
81 TABLET ORAL DAILY
Status: DISCONTINUED | OUTPATIENT
Start: 2024-01-06 | End: 2024-01-19 | Stop reason: HOSPADM

## 2024-01-06 RX ORDER — ATORVASTATIN CALCIUM 80 MG/1
80 TABLET, FILM COATED ORAL DAILY
COMMUNITY

## 2024-01-06 RX ORDER — POTASSIUM CHLORIDE 20 MEQ/1
20 TABLET, EXTENDED RELEASE ORAL 2 TIMES DAILY
COMMUNITY
End: 2024-01-19 | Stop reason: HOSPADM

## 2024-01-06 RX ORDER — FERROUS SULFATE 325(65) MG
1 TABLET ORAL
Status: DISCONTINUED | OUTPATIENT
Start: 2024-01-06 | End: 2024-01-19 | Stop reason: HOSPADM

## 2024-01-06 RX ORDER — LEVOTHYROXINE SODIUM 50 UG/1
50 TABLET ORAL
Status: DISCONTINUED | OUTPATIENT
Start: 2024-01-06 | End: 2024-01-19 | Stop reason: HOSPADM

## 2024-01-06 RX ORDER — HEPARIN SODIUM 5000 [USP'U]/ML
5000 INJECTION, SOLUTION INTRAVENOUS; SUBCUTANEOUS EVERY 8 HOURS
Status: DISCONTINUED | OUTPATIENT
Start: 2024-01-06 | End: 2024-01-06

## 2024-01-06 RX ORDER — BUMETANIDE 2 MG/1
2 TABLET ORAL 2 TIMES DAILY
COMMUNITY

## 2024-01-06 RX ORDER — METOPROLOL TARTRATE 25 MG/1
25 TABLET, FILM COATED ORAL 2 TIMES DAILY
Status: DISCONTINUED | OUTPATIENT
Start: 2024-01-06 | End: 2024-01-07

## 2024-01-06 RX ORDER — LEVOTHYROXINE SODIUM 50 UG/1
50 TABLET ORAL DAILY
COMMUNITY

## 2024-01-06 RX ORDER — FINASTERIDE 5 MG/1
5 TABLET, FILM COATED ORAL DAILY
Status: DISCONTINUED | OUTPATIENT
Start: 2024-01-06 | End: 2024-01-19 | Stop reason: HOSPADM

## 2024-01-06 RX ORDER — DEXTROSE MONOHYDRATE 100 MG/ML
0.3 INJECTION, SOLUTION INTRAVENOUS ONCE AS NEEDED
Status: DISCONTINUED | OUTPATIENT
Start: 2024-01-06 | End: 2024-01-19 | Stop reason: HOSPADM

## 2024-01-06 RX ORDER — VANCOMYCIN 1.5 G/300ML
1500 INJECTION, SOLUTION INTRAVENOUS EVERY 24 HOURS
Status: DISCONTINUED | OUTPATIENT
Start: 2024-01-06 | End: 2024-01-06

## 2024-01-06 RX ORDER — CALCIUM CARBONATE 200(500)MG
500 TABLET,CHEWABLE ORAL 4 TIMES DAILY PRN
Status: DISCONTINUED | OUTPATIENT
Start: 2024-01-06 | End: 2024-01-19 | Stop reason: HOSPADM

## 2024-01-06 RX ORDER — FERROUS SULFATE 325(65) MG
325 TABLET ORAL
COMMUNITY

## 2024-01-06 RX ORDER — DEXTROSE 50 % IN WATER (D50W) INTRAVENOUS SYRINGE
25
Status: DISCONTINUED | OUTPATIENT
Start: 2024-01-06 | End: 2024-01-19 | Stop reason: HOSPADM

## 2024-01-06 RX ADMIN — PANTOPRAZOLE SODIUM 40 MG: 40 INJECTION, POWDER, FOR SOLUTION INTRAVENOUS at 08:16

## 2024-01-06 RX ADMIN — APIXABAN 5 MG: 5 TABLET, FILM COATED ORAL at 21:19

## 2024-01-06 RX ADMIN — IPRATROPIUM BROMIDE AND ALBUTEROL SULFATE 3 ML: 2.5; .5 SOLUTION RESPIRATORY (INHALATION) at 19:20

## 2024-01-06 RX ADMIN — METOPROLOL TARTRATE 25 MG: 25 TABLET, FILM COATED ORAL at 11:09

## 2024-01-06 RX ADMIN — IPRATROPIUM BROMIDE AND ALBUTEROL SULFATE 3 ML: 2.5; .5 SOLUTION RESPIRATORY (INHALATION) at 08:10

## 2024-01-06 RX ADMIN — HEPARIN SODIUM 5000 UNITS: 5000 INJECTION, SOLUTION INTRAVENOUS; SUBCUTANEOUS at 02:15

## 2024-01-06 RX ADMIN — FERROUS SULFATE TAB 325 MG (65 MG ELEMENTAL FE) 1 TABLET: 325 (65 FE) TAB at 08:16

## 2024-01-06 RX ADMIN — PIPERACILLIN SODIUM AND TAZOBACTAM SODIUM 3.38 G: 3; .375 INJECTION, SOLUTION INTRAVENOUS at 02:07

## 2024-01-06 RX ADMIN — METOPROLOL TARTRATE 25 MG: 25 TABLET, FILM COATED ORAL at 21:19

## 2024-01-06 RX ADMIN — ASPIRIN 81 MG: 81 TABLET, COATED ORAL at 08:16

## 2024-01-06 RX ADMIN — FINASTERIDE 5 MG: 5 TABLET, FILM COATED ORAL at 08:16

## 2024-01-06 RX ADMIN — LEVOTHYROXINE SODIUM 50 MCG: 0.05 TABLET ORAL at 05:46

## 2024-01-06 RX ADMIN — SODIUM CHLORIDE AND POTASSIUM CHLORIDE 100 ML/HR: 9; 1.49 INJECTION, SOLUTION INTRAVENOUS at 13:44

## 2024-01-06 RX ADMIN — PIPERACILLIN SODIUM AND TAZOBACTAM SODIUM 3.38 G: 3; .375 INJECTION, SOLUTION INTRAVENOUS at 12:20

## 2024-01-06 RX ADMIN — PIPERACILLIN SODIUM AND TAZOBACTAM SODIUM 3.38 G: 3; .375 INJECTION, SOLUTION INTRAVENOUS at 08:15

## 2024-01-06 RX ADMIN — SODIUM CHLORIDE AND POTASSIUM CHLORIDE 100 ML/HR: 9; 1.49 INJECTION, SOLUTION INTRAVENOUS at 02:07

## 2024-01-06 RX ADMIN — POTASSIUM CHLORIDE 20 MEQ: 14.9 INJECTION, SOLUTION INTRAVENOUS at 17:09

## 2024-01-06 RX ADMIN — PIPERACILLIN SODIUM AND TAZOBACTAM SODIUM 3.38 G: 3; .375 INJECTION, SOLUTION INTRAVENOUS at 19:46

## 2024-01-06 RX ADMIN — IPRATROPIUM BROMIDE AND ALBUTEROL SULFATE 3 ML: 2.5; .5 SOLUTION RESPIRATORY (INHALATION) at 01:12

## 2024-01-06 RX ADMIN — IPRATROPIUM BROMIDE AND ALBUTEROL SULFATE 3 ML: 2.5; .5 SOLUTION RESPIRATORY (INHALATION) at 11:37

## 2024-01-06 RX ADMIN — Medication 5 L/MIN: at 08:13

## 2024-01-06 RX ADMIN — APIXABAN 5 MG: 5 TABLET, FILM COATED ORAL at 10:00

## 2024-01-06 RX ADMIN — POTASSIUM CHLORIDE 20 MEQ: 14.9 INJECTION, SOLUTION INTRAVENOUS at 15:30

## 2024-01-06 SDOH — SOCIAL STABILITY: SOCIAL INSECURITY: HAS ANYONE EVER THREATENED TO HURT YOUR FAMILY OR YOUR PETS?: NO

## 2024-01-06 SDOH — SOCIAL STABILITY: SOCIAL INSECURITY: DOES ANYONE TRY TO KEEP YOU FROM HAVING/CONTACTING OTHER FRIENDS OR DOING THINGS OUTSIDE YOUR HOME?: NO

## 2024-01-06 SDOH — SOCIAL STABILITY: SOCIAL INSECURITY: DO YOU FEEL UNSAFE GOING BACK TO THE PLACE WHERE YOU ARE LIVING?: NO

## 2024-01-06 SDOH — SOCIAL STABILITY: SOCIAL INSECURITY: ARE YOU OR HAVE YOU BEEN THREATENED OR ABUSED PHYSICALLY, EMOTIONALLY, OR SEXUALLY BY ANYONE?: NO

## 2024-01-06 SDOH — SOCIAL STABILITY: SOCIAL INSECURITY: ARE THERE ANY APPARENT SIGNS OF INJURIES/BEHAVIORS THAT COULD BE RELATED TO ABUSE/NEGLECT?: NO

## 2024-01-06 SDOH — SOCIAL STABILITY: SOCIAL INSECURITY: ABUSE: ADULT

## 2024-01-06 SDOH — SOCIAL STABILITY: SOCIAL INSECURITY: WERE YOU ABLE TO COMPLETE ALL THE BEHAVIORAL HEALTH SCREENINGS?: YES

## 2024-01-06 SDOH — SOCIAL STABILITY: SOCIAL INSECURITY: HAVE YOU HAD THOUGHTS OF HARMING ANYONE ELSE?: NO

## 2024-01-06 SDOH — SOCIAL STABILITY: SOCIAL INSECURITY: DO YOU FEEL ANYONE HAS EXPLOITED OR TAKEN ADVANTAGE OF YOU FINANCIALLY OR OF YOUR PERSONAL PROPERTY?: NO

## 2024-01-06 ASSESSMENT — ENCOUNTER SYMPTOMS
APPETITE CHANGE: 0
ABDOMINAL DISTENTION: 0
DIZZINESS: 0
CONFUSION: 1
RHINORRHEA: 0
VOMITING: 0
DIAPHORESIS: 0
JOINT SWELLING: 0
FEVER: 0
HEADACHES: 0
CHEST TIGHTNESS: 0
COUGH: 1
FATIGUE: 1
FREQUENCY: 0
CONSTIPATION: 0
COLOR CHANGE: 0
WOUND: 0
ABDOMINAL PAIN: 0
TROUBLE SWALLOWING: 0
TREMORS: 1
SORE THROAT: 0
DYSURIA: 0
ACTIVITY CHANGE: 1
DIARRHEA: 1
HEMATURIA: 0
SHORTNESS OF BREATH: 1
NAUSEA: 0
CHILLS: 1

## 2024-01-06 ASSESSMENT — COGNITIVE AND FUNCTIONAL STATUS - GENERAL
TOILETING: A LOT
PATIENT BASELINE BEDBOUND: NO
STANDING UP FROM CHAIR USING ARMS: A LOT
DAILY ACTIVITIY SCORE: 13
MOVING TO AND FROM BED TO CHAIR: A LOT
DRESSING REGULAR LOWER BODY CLOTHING: A LOT
CLIMB 3 TO 5 STEPS WITH RAILING: A LOT
HELP NEEDED FOR BATHING: A LOT
MOBILITY SCORE: 12
TURNING FROM BACK TO SIDE WHILE IN FLAT BAD: A LOT
PERSONAL GROOMING: A LOT
EATING MEALS: A LITTLE
MOVING FROM LYING ON BACK TO SITTING ON SIDE OF FLAT BED WITH BEDRAILS: A LOT
WALKING IN HOSPITAL ROOM: A LOT
DRESSING REGULAR UPPER BODY CLOTHING: A LOT

## 2024-01-06 ASSESSMENT — LIFESTYLE VARIABLES
SKIP TO QUESTIONS 9-10: 1
AUDIT-C TOTAL SCORE: 1
HOW OFTEN DO YOU HAVE 6 OR MORE DRINKS ON ONE OCCASION: NEVER
HOW OFTEN DO YOU HAVE A DRINK CONTAINING ALCOHOL: MONTHLY OR LESS
HOW MANY STANDARD DRINKS CONTAINING ALCOHOL DO YOU HAVE ON A TYPICAL DAY: 1 OR 2
AUDIT-C TOTAL SCORE: 1

## 2024-01-06 ASSESSMENT — PAIN SCALES - GENERAL
PAINLEVEL_OUTOF10: 0 - NO PAIN
PAINLEVEL_OUTOF10: 5 - MODERATE PAIN

## 2024-01-06 ASSESSMENT — PATIENT HEALTH QUESTIONNAIRE - PHQ9
2. FEELING DOWN, DEPRESSED OR HOPELESS: NOT AT ALL
1. LITTLE INTEREST OR PLEASURE IN DOING THINGS: NOT AT ALL
SUM OF ALL RESPONSES TO PHQ9 QUESTIONS 1 & 2: 0

## 2024-01-06 ASSESSMENT — ACTIVITIES OF DAILY LIVING (ADL)
DRESSING YOURSELF: INDEPENDENT
WALKS IN HOME: NEEDS ASSISTANCE
BATHING: NEEDS ASSISTANCE
GROOMING: NEEDS ASSISTANCE
JUDGMENT_ADEQUATE_SAFELY_COMPLETE_DAILY_ACTIVITIES: NO
HEARING - RIGHT EAR: FUNCTIONAL
TOILETING: NEEDS ASSISTANCE
HEARING - LEFT EAR: FUNCTIONAL
PATIENT'S MEMORY ADEQUATE TO SAFELY COMPLETE DAILY ACTIVITIES?: NO
ASSISTIVE_DEVICE: CANE;WALKER
FEEDING YOURSELF: INDEPENDENT
ADEQUATE_TO_COMPLETE_ADL: YES
LACK_OF_TRANSPORTATION: NO

## 2024-01-06 ASSESSMENT — COLUMBIA-SUICIDE SEVERITY RATING SCALE - C-SSRS
1. IN THE PAST MONTH, HAVE YOU WISHED YOU WERE DEAD OR WISHED YOU COULD GO TO SLEEP AND NOT WAKE UP?: NO
2. HAVE YOU ACTUALLY HAD ANY THOUGHTS OF KILLING YOURSELF?: NO
6. HAVE YOU EVER DONE ANYTHING, STARTED TO DO ANYTHING, OR PREPARED TO DO ANYTHING TO END YOUR LIFE?: NO

## 2024-01-06 ASSESSMENT — PAIN - FUNCTIONAL ASSESSMENT
PAIN_FUNCTIONAL_ASSESSMENT: UNABLE TO SELF-REPORT
PAIN_FUNCTIONAL_ASSESSMENT: 0-10

## 2024-01-06 NOTE — PROGRESS NOTES
Emergency Medicine Transition of Care Note.    I received Navarro Rabago in signout from Dr. JOANA Hernandez.  Please see the previous ED provider note for all HPI, PE and MDM up to the time of signout at 1800. This is in addition to the primary record.    In brief Navarro Rabago is an 78 y.o. male presenting for   Chief Complaint   Patient presents with    Flu Symptoms     Pt states he has been sick for a week and gradually getting weaker but was unable to come in as he cares for his wife.     At the time of signout we were awaiting: awaiting labs/imaging    78-year-old male here with illness over the last week.  Has had diarrhea.  He has had cough and some shortness of breath.  He has past medical history of CHF, atrial fibrillation, coronary artery disease, COPD, hypertension, hyperlipidemia, hiatal hernia,.    Wife reports significant cough over the last 2 days with some bloody phlegm.  Wife reports also a lot of diarrhea over the last couple of days.  Recent travel to [place] for the Christmas holiday.  No other known ill contacts.    ED Course as of 01/05/24 2138 Fri Jan 05, 2024 2021 CBC and Auto Differential(!)  White blood cell count 20.1, hemoglobin 12.9, hematocrit 38.3, platelet count 158,000, there is a left shift with neutrophils 17.7, and some immature cells. [EC]   2021 Lactate(!)  Lactate was minimally elevated 2.1 [EC]   2021 Sars-CoV-2 and Influenza A/B PCR  Coronavirus 2019 PCR is not detected  Influenza A/B is not detected  RSV is not detected [EC]   2022 Urinalysis with Reflex Culture and Microscopic(!)  Urinalysis is negative for leukocytes negative for nitrites.  No evidence of UTI. [EC]   2022 Comprehensive Metabolic Panel(!)  Glucose 117, sodium 132, potassium 3.4, chloride 93,  BUN is 26, creatinine 1.4 this is mildly elevated over baseline, GFR 52.  , , total bilirubin 2.2 [EC]   2024 CT of the chest abdomen pelvis::  IMPRESSION:  Consolidative opacity in the right upper  lobe and lower lobe  compatible with pneumonia and also mild left basilar opacity and  trace pleural effusions.      No definite evidence of acute abnormality of the abdominal viscera  within the limitations of the motion degraded examination.   [EC]      ED Course User Index  [EC] Ronny Dent DO         Diagnoses as of 01/05/24 2138   Sepsis, due to unspecified organism, unspecified whether acute organ dysfunction present (CMS/HCC)   Pneumonia due to infectious organism, unspecified laterality, unspecified part of lung   Bandemia   Hypotension, unspecified hypotension type   Acute kidney injury (CMS/HCC)       Medical Decision Making  Patient has significant right-sided pneumonia and left-sided pneumonia.  Patient has leukocytosis with left shift.  The patient did have blood cultures x 2 and had empiric IV Zosyn and IV vancomycin ordered.  The patient has been mildly hypotensive here and is getting IV fluid resuscitation but slowly secondary to his history of CHF.  Has had some recent diarrhea and I do think he is little dehydrated he does have some acute kidney injury.  We do not have any ICU beds here.  I discussed the case with his wife she is okay with transfer to [facility].  I did discuss the case with the intensivist at [facility] Dr. JULIÁN Matthews and patient was excepted to ICU at [facility]  The patient will be transferred there in guarded condition.          Final diagnoses:   [A41.9] Sepsis, due to unspecified organism, unspecified whether acute organ dysfunction present (CMS/HCC)   [J18.9] Pneumonia due to infectious organism, unspecified laterality, unspecified part of lung   [D72.825] Bandemia   [I95.9] Hypotension, unspecified hypotension type   [N17.9] Acute kidney injury (CMS/HCC)           Procedure  Procedures    Ronny Dent DO

## 2024-01-06 NOTE — PROGRESS NOTES
Speech-Language Pathology    Inpatient Speech-Language Pathology Clinical Swallow Evaluation    Patient Name: Navarro Rabago  MRN: [id number]  Today's Date: 1/6/2024         Current Problem:   1. Sepsis (CMS/HCC)        2. Hypotension due to hypovolemia  Transthoracic Echo (TTE) Complete    Transthoracic Echo (TTE) Complete      3. Chronic respiratory failure with hypoxia (CMS/HCC)  Transthoracic Echo (TTE) Complete    Transthoracic Echo (TTE) Complete        Recommendations:  Risk for Aspiration: Yes  Additional Recommendations: Modified barium swallow study  Solid Diet Recommendations : Pureed/extremely thick  (IDDSI Level 4)  Liquid Diet Recommendations: Nectar thick/mildly thick (IDDS Level 2)  Compensatory Swallowing Strategies: Upright 90 degrees as possible for all oral intake, Remain upright for 20-30 minutes after meals, One to one assist with meals, Small bites/sips, Eat/feed slowly (Nectar thick liquids VIA STAW)  Medication Administration Recommendations: Crushed, With Pureed  Follow up treatments: Diet tolerance monitoring (MBSS for further assessment)  Dysphagia Goals: Patient will tolerate recommended diet without observed clinical signs of aspiration, Patient will demonstrate appropriate strategies for swallowing safety, Patient will progress to advanced diet  *Only feed pt when alert and respiratory rate 28 or below.   *If pt begins to demonstrate s/s penetration/aspiration with Puree/[place], recommend making pt NPO and contacting ST.       Assessment:  Assessment Results: Dysphagia  Prognosis: Good  Barriers to Discharge: AMS  Treatment Tolerance: Patient limited by fatigue  Medical Staff Made Aware: Yes  Strengths: Motivation  Barriers: Cognition      Plan:  Inpatient/Swing Bed or Outpatient: Inpatient  Treatment/Interventions: Complete MBSS, Assess diet tolerance, Bolus trials  SLP Plan: Skilled SLP  SLP Frequency: 3x per week  Duration: Current admission  SLP Discharge Recommendations: Continue  skilled SLP services at the next level of care  Diet Recommendations: Solid, Liquid  Solid Consistency: Pureed/extremely thick (IDDSI Level 4)  Liquid Consistency: Nectar thick/mildly thick (IDDS Level 2)  Next Treatment Priority: MBSS; moises/strat, thin trials?  Discussed POC: Patient, Caregiver/family  Discussed Risks/Benefits: Yes  Patient/Caregiver Agreeable: Yes      Subjective   Current Problem:  Pt seen resting in bed on 5L O2 via NC. Verbal cues needed to maintain adequate levels of alertness. RN reports coughing episodes with thin water. Pt endorses this but unable to provide further details at this time.     General Visit Information:  Patient Class: Inpatient  Ordering Physician: Dr. Delvalle  Reason for Referral: Suspected dysphagia  Current Diet : Puree; Nectar Thick      Objective   Oral mucosa dry and cracked. OME grossly intact. Dentures not worn for assessment. Assistance needed for all PO trials. Immediate coughing following 3/3 single straw sips water. No overt s/s penetration/aspiration observed with 8oz nectar thick OJ (via large sequential swallows) and 2oz applesauce. Recommend continue with puree, nectar via straw, and meds crushed at this time. MBSS to further assess pharyngeal phase.     Oral/Motor Assessment:  Oral Hygiene: Dry mucosa; oral care provided  Dentition: Poor Dental/Oral Hygiene (Edentulous for testing)  Oral Motor: Within Functional Limits      Consistencies Trialed:  Consistencies Trialed: Yes  Consistencies Trialed: Thin (IDDSI Level 0) - Straw, Nectar thick/mildly thick (IDDSI Level 2) - Straw, Pureed/extremely thick (IDDSI Level 4)      Clinical Observations:  Patient Positioning: Upright in Bed  Management of Oral Secretions: Adequate  Changes in VS Noted: Increased respiratory rate (following coughing with thin trials)  Overt Signs or Symptoms of Aspiration: Thin (IDDSI Level 0) - Straw  Immediate Cough: Thin (IDDSI Level 0) - Straw  Delayed Cough: Thin (IDDSI Level 0) -  Straw    Education:  Adult Outpatient Education  Individual(s) Educated: Patient, Caregiver (Wilson SALDIVAR RN)  Risk and Benefits Discussed with Patient/Caregiver/Other: yes  Patient/Caregiver Demonstrated Understanding: no (AMS)  Education Comment: Needs reinforcement

## 2024-01-06 NOTE — CONSULTS
Inpatient consult to Infectious Diseases  Consult performed by: Ilene Granger, APRN-CNP  Consult ordered by: Serjio Matthews MD  Reason for consult: pneumonia          Primary MD: Tash Thakkar,       History Of Present Illness  Navarro Rabago is a 78 y.o. male presenting with shortness of breath and generalized weakness.  He has a PMH of heart failure, DM, HTN, on 5LNC chronically.  Onset was about a week ago-constant, gradual with interval worsening.  He presented to the ED for further evaluation and management.  Patient is drowsy on examination, he awakens easily and answers some questions but overall history is limited.  Partial history obtained from the chart.  Work-up was remarkable for leukocytosis, elevated creatinine.  CT chest suggestive of pneumonia in the right upper and lower lobes  Per the chart he recently returned from a trip to [facility].  He is on IV vancomycin and IV zosyn.  He is afebrile, reports chills.  Reports SOB and a productive cough. Denies chest pain.  Reports diarrhea. Denies vomiting, abdominal pain, diarrhea.    Past Medical History  He has a past medical history of Arthritis, CHF (congestive heart failure) (CMS/[facility]), COPD (chronic obstructive pulmonary disease) (CMS/[facility]), Coronary artery disease, Diabetes mellitus (CMS/[facility]), Disease of thyroid gland, and Hypertension.    Surgical History  He has a past surgical history that includes Tonsillectomy and Back surgery.     Social History     Occupational History    Not on file   Tobacco Use    Smoking status: Former     Types: Cigarettes    Smokeless tobacco: Never   Substance and Sexual Activity    Alcohol use: Not on file    Drug use: Not on file    Sexual activity: Not on file     Travel History   Travel since 12/06/23    No documented travel since 12/06/23                Family History  No family history on file.  Allergies  Patient has no known allergies.     Immunization History   Administered Date(s) Administered    Moderna  SARS-CoV-2 Vaccination 03/13/2021, 04/10/2021, 11/12/2021, 04/19/2022     Medications  Home medications:  Medications Prior to Admission   Medication Sig Dispense Refill Last Dose    allopurinol (Zyloprim) 300 mg tablet Take 1 tablet (300 mg) by mouth once daily.   1/5/2024    apixaban (Eliquis) 5 mg tablet Take 1 tablet (5 mg) by mouth 2 times a day.   1/5/2024    aspirin 81 mg EC tablet Take 1 tablet (81 mg) by mouth once daily.   1/5/2024    atorvastatin (Lipitor) 80 mg tablet Take 1 tablet (80 mg) by mouth once daily.   1/4/2024    bumetanide (Bumex) 2 mg tablet Take 1 tablet (2 mg) by mouth 2 times a day.   1/5/2024    colchicine 0.6 mg tablet Take 1 tablet (0.6 mg) by mouth 2 times a day. As needed   Unknown    empagliflozin (Jardiance) 10 mg Take 1 tablet (10 mg) by mouth once daily.   1/5/2024    ferrous sulfate, 325 mg ferrous sulfate, tablet Take 1 tablet by mouth once daily with breakfast.   1/5/2024    fexofenadine (Allegra) 180 mg tablet Take 1 tablet (180 mg) by mouth once daily.   Unknown    finasteride (Proscar) 5 mg tablet Take 1 tablet (5 mg) by mouth once daily. Do not crush, chew, or split.   1/5/2024    guaiFENesin (Mucinex) 600 mg 12 hr tablet Take 1 tablet (600 mg) by mouth 2 times a day. Do not crush, chew, or split.   Unknown    levothyroxine (Synthroid, Levoxyl) 50 mcg tablet Take 1 tablet (50 mcg) by mouth once daily.   Unknown    nitroglycerin (Nitrostat) 0.4 mg SL tablet Place 1 tablet (0.4 mg) under the tongue every 5 minutes if needed for chest pain.   Unknown    pantoprazole (ProtoNix) 40 mg EC tablet Take 1 tablet (40 mg) by mouth once daily in the morning. Take before meals. Do not crush, chew, or split.   1/5/2024    potassium chloride CR 20 mEq ER tablet Take 1 tablet (20 mEq) by mouth 2 times a day. Do not crush or chew.   1/5/2024    sacubitriL-valsartan (Entresto) 24-26 mg tablet Take 1 tablet by mouth 2 times a day.   1/5/2024    tamsulosin (Flomax) 0.4 mg 24 hr capsule Take 1  "capsule (0.4 mg) by mouth once daily.   1/5/2024     Current medications:  Scheduled medications  apixaban, 5 mg, oral, q12h  aspirin, 81 mg, oral, Daily  ferrous sulfate (325 mg ferrous sulfate), 1 tablet, oral, Daily with breakfast  finasteride, 5 mg, oral, Daily  ipratropium-albuteroL, 3 mL, nebulization, 4x daily  levothyroxine, 50 mcg, oral, Daily  metoprolol tartrate, 25 mg, oral, BID  pantoprazole, 40 mg, intravenous, Daily  piperacillin-tazobactam, 3.375 g, intravenous, q6h  vancomycin, 1,500 mg, intravenous, q24h      Continuous medications  potassium chloride in 0.9%NaCl, 100 mL/hr, Last Rate: 100 mL/hr (01/06/24 0700)      PRN medications  PRN medications: benzocaine-menthol, calcium carbonate, dextromethorphan-guaifenesin, dextrose 10 % in water (D10W), dextrose, glucagon, guaiFENesin, ondansetron ODT **OR** ondansetron, oxygen    Review of Systems   Constitutional:  Positive for activity change, chills and fatigue. Negative for diaphoresis and fever.   HENT:  Negative for rhinorrhea and sore throat.    Respiratory:  Positive for cough and shortness of breath. Negative for chest tightness.    Cardiovascular:  Negative for chest pain and leg swelling.   Gastrointestinal:  Positive for diarrhea. Negative for abdominal distention, abdominal pain, constipation, nausea and vomiting.   Genitourinary:  Negative for dysuria, frequency, hematuria and urgency.   Skin:  Negative for color change, rash and wound.   Neurological:  Negative for dizziness and headaches.   All other systems reviewed and are negative.         Objective  Range of Vitals (last 24 hours)  Heart Rate:  []   Temp:  [36.4 °C (97.5 °F)-37 °C (98.6 °F)]   Resp:  [18-34]   BP: ()/(36-87)   Height:  [185.4 cm (6' 1\")-195.6 cm (6' 5\")]   Weight:  [81.6 kg (180 lb)-104 kg (228 lb 9.9 oz)]   SpO2:  [9 %-100 %]   Daily Weight  01/06/24 : 104 kg (228 lb 9.9 oz)    Body mass index is 27.11 kg/m².     Physical Exam  Constitutional:       " "Comments: Drowsy but awakens easily   HENT:      Head: Normocephalic and atraumatic.      Nose: Nose normal.      Mouth/Throat:      Mouth: Mucous membranes are moist.      Pharynx: Oropharynx is clear.   Eyes:      Extraocular Movements: Extraocular movements intact.      Conjunctiva/sclera: Conjunctivae normal.   Cardiovascular:      Rate and Rhythm: Normal rate. Rhythm irregular.   Pulmonary:      Effort: Pulmonary effort is normal.      Breath sounds: Rhonchi present. No wheezing.   Abdominal:      General: Bowel sounds are normal.      Palpations: Abdomen is soft.      Tenderness: There is no abdominal tenderness.   Musculoskeletal:         General: No swelling. Normal range of motion.      Cervical back: Normal range of motion.   Skin:     General: Skin is warm and dry.   Neurological:      General: No focal deficit present.   Psychiatric:         Mood and Affect: Mood normal.          Relevant Results    Labs  Results from last 72 hours   Lab Units 01/06/24  0537 01/05/24  1726   WBC AUTO x10*3/uL 15.0* 20.1*   HEMOGLOBIN g/dL 10.9* 12.9*   HEMATOCRIT % 31.3* 38.3*   PLATELETS AUTO x10*3/uL 132* 158   NEUTROS PCT AUTO %  --  88.4   LYMPHS PCT AUTO %  --  4.6   MONOS PCT AUTO %  --  5.6   EOS PCT AUTO %  --  0.0     Results from last 72 hours   Lab Units 01/06/24  0537 01/05/24  1726   SODIUM mmol/L 136 132*   POTASSIUM mmol/L 3.1* 3.4*   CHLORIDE mmol/L 101 93*   CO2 mmol/L 21* 22   BUN mg/dL 28* 26*   CREATININE mg/dL 1.40 1.40*   GLUCOSE mg/dL 108* 117*   CALCIUM mg/dL 7.7* 8.2*   ANION GAP mmol/L 14 20   EGFR mL/min/1.73m*2 52* 52*     Results from last 72 hours   Lab Units 01/06/24  0537 01/05/24  1726   ALK PHOS U/L 100 107   BILIRUBIN TOTAL mg/dL 1.6* 2.2*   PROTEIN TOTAL g/dL 5.9 7.4   ALT U/L 83* 106*   AST U/L 339* 449*   ALBUMIN g/dL 2.5* 3.4     Estimated Creatinine Clearance: 56.6 mL/min (by C-G formula based on SCr of 1.4 mg/dL).  No results found for: \"CRP\", \"SEDRATE\"  No results found for: " "\"HIV1X2\", \"HIVCONF\", \"CORYZN8LG\"  No results found for: \"HEPCABINIT\", \"HEPCAB\", \"HCVPCRQUANT\"  Microbiology  C.diff PCR pending  MRSA PCR negative  Streptococcus antigen pending  Legionella antigen pending  Blood cultures pending   Imaging  CT chest abdomen pelvis w IV contrast    Result Date: 1/5/2024  Interpreted By:  Yordy Urena, STUDY: CT CHEST ABDOMEN PELVIS W IV CONTRAST;  1/5/2024 6:33 pm   INDICATION: Signs/Symptoms:abd pain, sob.   COMPARISON: None.   ACCESSION NUMBER(S): [id number]   ORDERING CLINICIAN: NELLA MELGOZA   TECHNIQUE: Contiguous axial images of the chest, abdomen and pelvis were obtained after the intravenous administration of  contrast. Coronal and sagittal reformatted images were obtained from the axial images.   FINDINGS: CT CHEST:   No axillary lymphadenopathy. 1.6 cm right paratracheal lymph node and 1.5 cm subcarinal lymph node. There is limited evaluation for hilar lymphadenopathy secondary stripping motion.   The heart is normal in size. Coronary artery vascular calcifications. No significant pericardial effusion.   There is pulmonary emphysematous change. There is consolidative opacity in the right upper lobe and right lower lobe compatible with pneumonia and also minimal opacity in the left lower lobe. Trace pleural effusions. No pneumothorax.   Multilevel degenerative change of the thoracic spine.     CT ABDOMEN PELVIS:   Evaluation of the abdomen and pelvis is limited secondary to patient motion. No evidence of liver mass. The gallbladder is present, not well evaluated secondary to motion. No dilatation common bile duct.   Atrophy of the pancreas.   No splenomegaly.   Question mild nodularity of the left adrenal gland. The right adrenal gland appears unremarkable.   Symmetric enhancement of the kidneys. Right renal cysts with the largest measuring 3.1 cm and several bowel subcentimeter hypodensity too small to characterize. No hydronephrosis.   Atherosclerotic calcification of " the aorta and abdominal and pelvic vasculature.   No evidence of bowel obstruction. Evaluation the bowel is otherwise limited secondary patient motion.   Urinary bladder is underdistended and not well evaluated. 18 mm right posterior bladder diverticulum.   Postsurgical change of left hip arthroplasty resulting in beam hardening artifact and limiting evaluation the pelvis.   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       Consolidative opacity in the right upper lobe and lower lobe compatible with pneumonia and also mild left basilar opacity and trace pleural effusions.   No definite evidence of acute abnormality of the abdominal viscera within the limitations of the motion degraded examination.   MACRO: None   Signed by: Yordy Urena 1/5/2024 6:49 PM Dictation workstation:   [id number]     Assessment/Plan   Sepsis  Acute renal failure-baseline creatinine around 0.7  Pneumonia-MRSA PCR negative   Diarrhea-c.diff pending   Transaminitis  Leukocytosis  Chronic respiratory failure-at baseline 5LNC    Continue IV zosyn  Discontinue IV vancomycin-MRSA PCR negative  Follow-up blood cultures  Sputum culture  Monitor WBC and temperature  Monitor renal function  Oxygen as needed  Follow-up legionella antigen and streptococcus antigen  Mycoplasma IgM  Supportive care  Further recommendations based on pending work-up    Discussed with Dr. Gisela SCHOFIELD Staff, APRN-CNP

## 2024-01-06 NOTE — H&P
History Of Present Illness      Navarro Rabago is a 78 y.o. male presenting with Dyspnea and Generalized Weakness.      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  Noted to have increased shortness of breath with copious amounts of diarrhea not feeling well.  His EKG was noted for sinus tachycardia 107 bpm.  No ST elevation or depression.  Patient has a history of congestive heart failure show judicious use of IV fluids was administered.  He was given empiric IV antibiotics.    Is stated that the patient has a history of CHF, atrial fibrillation, coronary artery disease, COPD, hypertension, hyperlipidemia, hiatal hernia, chronic respiratory failure.    Patient's ED diagnostic workup is noted for a marked leukocytosis of 20.1.  H&H was low at 12.9/38.3.  Patient's platelet count was noted to be 158.  There was a neutrophil predominance.  Patient's blood chemistry noted for a glucose of 117.  The sodium was 132.  Potassium was 3.4.  The patient's BUN was elevated to 26 with a creatinine of 1.4.  Patient's AST and ALT were elevated to 449 and 106, respectively.  Patient's total bilirubin was elevated 2.2.  Lactic acid was elevated 2.1.  Rapid corona, influenza a/b, and RSV were all negative.  Patient's urinalysis was suggestive of glucose and large blood.  Patient underwent a CT chest abdomen and pelvis with IV contrast that [place] [facility].  It was noted for consolidative opacity in the right upper lobe and lower lobe compatible with pneumonia and also mild left basilar opacity and trace pleural effusions.  No definitive evidence of acute abnormality of the abdominal viscera within the limitations of the motion degraded examination.  Patient was having several day's worth of diarrhea as per the ED physician.  The patient just returned from a trip to  [place] with his wife.  Patient wears supplemental oxygen at home at 5 L at baseline.    Patient's vital signs were noted to be unstable in the ED.  He was hypotensive and tachycardic.  He was presumed to be dehydrated.  Request made by ED physician to admit to MICU.    Patient also noted to be slightly encephalopathic from the stress of presumed infection.  Upon arrival to the ED the patient's temperature was Tmax 37, heart rate 102, respiratory rate 20, BP 86/64, saturating 93% on his baseline supplemental oxygen.  Has had episodes of blood pressure dropping down to 71/48 and heart rate patient's up to 111.  He was not started on IV pressors as he was responding to IV fluids.       It appears the patient is a [facility] patient.  Looking at his past medical records he follows with a cardiologist by the name of Dr. Lonny Stanton.  He recently saw his cardiologist on November 8, 2023.  Patient used to teach political science in the past.        Past Medical History       Abnormal serum level of alkaline phosphatase      Anticoagulated       on Eliquis    Arthritis/arthropathy of multiple joints      Atrial fibrillation (HCC)       DC cardioversion. Tikosyn.2/2011    BPH (benign prostatic hypertrophy)      CAD (coronary artery disease)       IMI.05.RCA and CX stent. 2009 RCA stent.. patent the stents7/2010    Chest pain 05/22/2014    CHF (congestive heart failure) ([facility]) 2005    Chronic obstructive pulmonary disease (COPD) ([facility])      CTS (carpal tunnel syndrome)       takes neurontin for relief    Duodenal ulcer      Generalized osteoarthrosis, unspecified site       back and knee    GERD (gastroesophageal reflux disease)      Hiatal hernia 10/07/2010    Hyperlipidemia      Hypertension      IGT (impaired glucose tolerance)      Osteoarthritis      Subclinical hypothyroidism 08/2020    Thyroid nodule 09/2021     1.8 cm - stable 3/22         Surgical History              PAST SURGICAL HISTORY   Procedure Laterality  Date    ARTHRP ACETBLR/PROX FEM PROSTC AGRFT/ALGRFT Left 2012    CARDIOVERSION   2014    CARDIOVERSION   2016    CC CORONARY STENT   2006    CORONARY STENT INITIAL   2005    INGUINAL HERNIA REPAIR HX Right 2013    LAMINECTOMY W/O FFD 1/2 VERT SEG LUMBAR   1983     Laminectomy, diskectomy    LASER GREENLIGHT        [facility] CARDIAC RADIOFREQUENCY ABLATION         Dr. Angel    TONSILLECTOMY PRIMARY/SECONDARY <AGE 14        TOOTH EXTRACTION   2016     all upper teeth          Social History      Tobacco Use    Smoking status: Former       Packs/day: 0.50       Years: 20.00       Additional pack years: 0.00       Total pack years: 10.00       Types: Cigarettes       Quit date: 2008       Years since quittin.9    Smokeless tobacco: Never    Tobacco comments:       quit   Vaping Use    Vaping Use: Never used   Substance Use Topics    Alcohol use: No       Comment: 2-3 times a year    Drug use: No         Family History            FAMILY HISTORY    Problem Relation Age of Onset    None Mother           D. 92 after hip fracture    other (Tuberculosis) Father           D. age 72    None Sister      Pneumonia Brother           aspiration    Heart Paternal Uncle           MI early 50's.           Allergies      Patient has no known allergies.      Review of Systems    14-point ROS otherwise negative, as per HPI/Interval History.    General: No change in weight. Yes weakenss. No Fevers/Chills/Night Sweats   Skin: No skin/hair/nail changes. No rashes or sores.  Head:  No trauma. No Headache/nasuea/vomitting.   Eyes: No visual changes. No tearing. No itching.   Ears: No hearing loss. No tinnitus. No vertigo. No discharge.  Nose, Sinuses: No rhinorrhea, No nasal congestion. No epistaxis.  Mouth, Throat, Neck: No bleeding gums, hoarseness, sore throat or swollen neck  Cardiac: No palpitations. No VERGARA. No PND. No Orthopnea.   Respiratory: No Shortness of Breath. No wheezing. No cough. No hemoptysis.   GI: No  nausea/vomiting. No indigestion. No diarrhea. No constipation.   Extremities: No numbness or tingling. No paresthesias.   Urinary: No change in urinary frequency. No change in hesitancy. No hematuria. No incontinence.       Physical Exam        Constitutional:  Pleasant  Eyes: PERRL, EOMI,   ENMT: mucous membranes moist  Head/Neck: Neck supple, No JVD,   Respiratory/Thorax: Patent airways, CTAB,   Cardiovascular: S1-S2 variable, irregularly irregular, no murmurs  Gastrointestinal: Soft, non-distended, +BS.  Musculoskeletal: ROM intact, no joint swelling, normal strength  Extremities: peripheral pulses intact; no edema  Neurological: Alert and Oriented x 3; no focal deficits; gross motor and sensation intact; CN II-XII intact. No asterixis.  Psychological: Appropriate mood and behavior  Skin: No lesions, No rashes.         Last Recorded Vitals  Blood pressure 111/61, pulse 97, temperature 36.6 °C (97.9 °F), temperature source Temporal, resp. rate (!) 30, weight 104 kg (228 lb 9.9 oz), SpO2 95 %.    Relevant Results    Lab Results   Component Value Date    WBC 20.1 (H) 01/05/2024    HGB 12.9 (L) 01/05/2024    HCT 38.3 (L) 01/05/2024    MCV 89 01/05/2024     01/05/2024       Lab Results   Component Value Date    GLUCOSE 117 (H) 01/05/2024    CALCIUM 8.2 (L) 01/05/2024     (L) 01/05/2024    K 3.4 (L) 01/05/2024    CO2 22 01/05/2024    CL 93 (L) 01/05/2024    BUN 26 (H) 01/05/2024    CREATININE 1.40 (H) 01/05/2024       Lab Results   Component Value Date    HGBA1C 5.5 06/19/2023         No CT head results found for the past 12 months      Scheduled medications  heparin (porcine), 5,000 Units, subcutaneous, q8h  ipratropium-albuteroL, 3 mL, nebulization, q6h      Continuous medications  potassium chloride in 0.9%NaCl, 100 mL/hr      PRN medications  PRN medications: benzocaine-menthol, calcium carbonate, dextromethorphan-guaifenesin, dextrose 10 % in water (D10W), dextrose, glucagon, guaiFENesin, ondansetron  **OR** ondansetron, oxygen        Assessment/Plan   Principal Problem:    Sepsis (CMS/[facility])  Active Problems:    Lactic acid acidosis    Acute renal failure (ARF) (CMS/[facility])    Leukocytosis    Bilateral pneumonia    Chronic respiratory failure (CMS/[facility])    Hypokalemia    Dehydration    Transaminitis    Acute encephalopathy    Hypotension          Navarro Rabago is a 78 y.o. male presenting with Dyspnea and Generalized Weakness.  Patient admitted for further evaluation and management.        Acute on Chronic Respiratory Failure      Admit to MICU  Continuous cardiac and pulse oximetry monitoring  CT imaging reviewed  Patient has bilateral pneumonia  Follow-up blood cultures from [facility]  Spectrum IV antibiotics  Pulmonary consultation  ID consultation  Follow-up urine streptococcal antigen and Legionella antigen levels  Aerosolized bronchodilators  I was told the patient wears 5 L supplemental oxygen via nasal cannula at baseline.      Severe Sepsis     Potential sources include bilateral pneumonia and possible colitis  Colitis was not evident on CT imaging  Continue with broad-spectrum IV antibiotics  ID consultation has been placed  Continue volume expansion with IVF      Hypotension      Management as above      Tachycardia      Management as above    Diarrhea      Follow-up stool studies from [facility]      Leukocytosis/Lactic Acidosis      Secondary to above      Hypokalemia      Replace and recheck levels      Transaminitis    CT imaging reviewed  Gastroenterology consultation  Avoid hepatotoxic agents  Possibly in the setting of hypotension  Hold statin therapy    Acute Renal Failure    Continue volume expansion with IVF  Nephrotoxic agents  Holding Entresto  Holding Bumex      Persistent atrial fibrillation    - S/p PVI on 4/25/2014 and subsequent cardioverion on 6/10/2014.   - He underwent redo DCC by Dr. Angel. Now again in AF.  - chronic anticoagulation and  rate control for treatment of atrial  fibrillation. Amiodarone previously discontinued.   - ECG 11/11/20: HR 67 well contolled.   - 5/4/22: Bradycardia HF 45 bpm.  - Stop Metoprolol XL 25 mg daily -> Carvedilol 6.25 mg po bid._Carvedilol 3.125-> Off ( ECG with AF HR 43)   - Presentation to [place] with chest pain, AF, RVR diastolic CHF. Underwent repeat cahth and echo. Medically manageable coronary artery disease . Decline in LVEF to 36-40%.       HFrEF    Meticulous volume management      Gout    Holding urate lowering therapy in the setting of acute renal failure      Hx of BPH, Now with suspected Prostate CA    Plan: Need MRI Fusion prostate Bx for confirmation of suspect dx of prostate CA.  Scheduling pending   Under Care of Dr Saeed Pandey.   He had significant urinary bleeding with green light Lasar tereatment for BPH.   -Need MRI Fusion prostate Bx for confirmation of suspect dx of prostate CA.  Scheduling pending   Under Care of Dr Saeed Pandey. Had significant urinary bleeding with green light Lasar. Was off Eliquis 3 days prior to that procedure.  Recommend holding Eliquis for one week prior to procedure and resume 2-3 days after or when all urinary bleeding stops.       GI + DVT PPX    PPI PO Q Daily  DOAC         This Dictation was Transcribed using a Nuance Dragon Voice Recognition System Device (with Compatible Computer + Software) and as such may contain Grammatical Errors and Unintentional Typing Misprints.      I spent 42 minutes in the professional and overall care of this patient.      Serjio Matthews MD

## 2024-01-06 NOTE — ED PROCEDURE NOTE
Procedure  Critical Care    Performed by: Ronny Dent DO  Authorized by: Arie Hernandez DO    Critical care provider statement:     Critical care time (minutes):  44    Critical care was necessary to treat or prevent imminent or life-threatening deterioration of the following conditions:  Sepsis and shock    Critical care was time spent personally by me on the following activities:  Blood draw for specimens, development of treatment plan with patient or surrogate, discussions with consultants, evaluation of patient's response to treatment, examination of patient, obtaining history from patient or surrogate, ordering and performing treatments and interventions, ordering and review of laboratory studies, ordering and review of radiographic studies and pulse oximetry    Care discussed with: accepting provider at another facility    Comments:      Discussed with Dr MICHELLE ABEBE and nery.    D/w wife.  Ok with transfer to [facility]               Ronny Dent DO  01/05/24 [id number]

## 2024-01-06 NOTE — PROGRESS NOTES
Vancomycin Dosing by Pharmacy- INITIAL    Navarro Rabago is a 78 y.o. year old male who Pharmacy has been consulted for vancomycin dosing for pneumonia. Based on the patient's indication and renal status this patient will be dosed based on a goal AUC of 400-600.     Renal function is currently declining.    Visit Vitals  /61 (BP Location: Left arm, Patient Position: Lying)   Pulse 97   Temp 36.6 °C (97.9 °F) (Temporal)   Resp (!) 30        Lab Results   Component Value Date    CREATININE 1.40 (H) 01/05/2024    CREATININE 0.72 05/14/2021    CREATININE 0.74 05/13/2021    CREATININE 0.8 04/27/2021        Patient weight is 104 kg    Assessment/Plan     Patient was given 1x dose of vancomycin 2000 mg  in ER at [facility]..  Will initiate vancomycin maintenance,  1500 mg every 24 hours. To begin at 1900 1/6.    This dosing regimen is predicted by [employer] to result in the following pharmacokinetic parameters:  Loading dose: N/A  Regimen: 1500 mg IV every 24 hours.  Start time: 06:55 on 01/06/2024  Exposure target: AUC24 (range)400-600 mg/L.hr   AUC24,ss: 528 mg/L.hr  Probability of AUC24 > 400: 79 %  Ctrough,ss: 16.3 mg/L  Probability of Ctrough,ss > 20: 32 %  Probability of nephrotoxicity (Lodise ALEJANDRO 2009): 12 %    Follow-up level will be ordered on 1/6 at 0500 unless clinically indicated sooner.  Will continue to monitor renal function daily while on vancomycin and order serum creatinine at least every 48 hours if not already ordered.  Follow for continued vancomycin needs, clinical response, and signs/symptoms of toxicity.       Anna Ricci, PharmD

## 2024-01-06 NOTE — CONSULTS
Consults  History Of Present Illness:    Navarro Rabago is a 78 y.o. male presenting with shortness of breath.     The patient is a 78-year-old white male who has a longstanding history of coronary artery disease with acute coronary syndrome that required [facility] to [facility] in 2005 where you underwent PCI with bare-metal stents to both the RCA and to the LCx.  The patient had repeat PCI and intervention in 2009 to the proximal RCA with a Xience drug-eluting stent and to the distal RCA again with a Xience drug-eluting stent.  He had a repeat cardiac cath in 2010 that showed patency of the previously deployed stent symptoms at that time thought to be related to hiatal hernia.  The patient had a nuclear stress test in 2020 that showed no evidence of ischemia with a fixed perfusion defect in the right area of the LCx.  He had a repeat cardiac catheterization 9/20/2022 due to shortness of breath and a decrease in his LV function in the setting of his known prior PCI procedures.  This demonstrated patency of the previously deployed stents in the RCA and LCx otherwise nonobstructive disease.  The left ventriculogram showed mild LV dysfunction estimated ejection fraction 45-50%.  The patient was admitted to a hospital in [place] on 9/26/2023 with a type II non-STEMI at which time he was complaining of palpitations shortness of breath.  He was noted to be in atrial fibrillation with a rapid ventricular rate at that time.  Cardiac catheterization was performed and showed a 30% stenosis of the mid LAD 20% stenosis of mid LCx 20% stenosis proximal RCA and mid RCA medical management recommended estimated LV ejection fraction by echo was 35-40% with 2+ mitral valve regurgitation.  Patient also has a history of atrial fibrillation beginning in 2005 after his acute coronary syndrome.  He has had approximately 5 electrical DC cardioversions did receive Tikosyn therapy had radiofrequency ablation pulmonary vein  isolation 4/25/2014 at [facility].  His Tikosyn was discontinued at that point.  He had a electrical DC cardioversion 6/10/2014 amiodarone therapy at that time was discontinued.  The patient subsequently developed permanent atrial fibrillation.  The patient does have a history of former long-term smoking COPD and is required continuous nasal oxygen at 5 L/min.    The patient has not felt well over the prior week due to generalized weakness shortness of breath increased cough and diarrhea.  [facility] room.  Initial evaluation included EKG showing atrial fibrillation with a slightly rapid ventricular rate.  CBC included a WBC of 20,100 hematocrit 38.3.  Comprehensive metabolic panel notable for creatinine 1.4 sodium 132 with elevation of AST and ALT at 449 and 106 respectively with total bilirubin 2.2.  The nasal swab was negative for influenza AB coronavirus and RSV.  CT scan of the chest abdomen and pelvis with contrast performed initially at the [facility] emergency room was notable for a consolidation in the right upper lobe and lower lobe compatible with pneumonia also mild left basilar opacity and trace pleural effusion.  There was no acute pathology within the abdomen.  The patient had evidently just returned from a trip to [place] with his wife.  The patient had transient hypotension in the recent emergency room that responded to IV fluids.  Repeat CBC today WBC of 15,000 hematocrit 31.3.  Comprehensive metabolic panel with a creatinine of 1.4 potassium of 3.1.  Last Recorded Vitals:  Vitals:    01/06/24 0400 01/06/24 0500 01/06/24 0600 01/06/24 0813   BP: 91/63 106/55 94/62    BP Location:       Patient Position:       Pulse: 87 86 90    Resp: (!) 29 (!) 28 (!) 29    Temp:       TempSrc:       SpO2: 95% 93% 90% (!) 9%   Weight:       Height:           Last Labs:  CBC - 1/6/2024:  5:37 AM  15.0 10.9 132    31.3      CMP - 1/6/2024:  5:37 AM  7.7 5.9 339  "--- 1.6   _ 2.5 83 100      PTT - No results in last year.  _   _ _     Hemoglobin A1C   Date/Time Value Ref Range Status   06/19/2023 02:07 PM 5.5 4.3 - 5.6 % Final     Comment:     American Diabetes Association guidelines indicate that patients with HgbA1c in the range 5.7-6.4% are at increased risk for development of diabetes, and intervention by lifestyle modification may be beneficial. HgbA1c greater or equal to 6.5% is considered diagnostic of diabetes.   03/09/2023 12:45 PM 5.5 4.3 - 5.6 % Final     Comment:     American Diabetes Association guidelines indicate that patients with HgbA1c in the range 5.7-6.4% are at increased risk for development of diabetes, and intervention by lifestyle modification may be beneficial. HgbA1c greater or equal to 6.5% is considered diagnostic of diabetes.      Last I/O:  I/O last 3 completed shifts:  In: 438.3 (4.2 mL/kg) [I.V.:388.3 (3.7 mL/kg); IV Piggyback:50]  Out: 350 (3.4 mL/kg) [Urine:350 (0.1 mL/kg/hr)]  Weight: 103.7 kg     Past Cardiology Tests (Last 3 Years):  EKG:  No results found for this or any previous visit from the past 1095 days.    Echo:  No results found for this or any previous visit from the past 1095 days.    Ejection Fractions:  No results found for: \"EF\"  Cath:  No results found for this or any previous visit from the past 1095 days.    Stress Test:  No results found for this or any previous visit from the past 1095 days.    Cardiac Imaging:  No results found for this or any previous visit from the past 1095 days.      Past Medical History:  He has a past medical history of Arthritis, CHF (congestive heart failure) (CMS/HCC), COPD (chronic obstructive pulmonary disease) (CMS/HCC), Coronary artery disease, Diabetes mellitus (CMS/HCC), Disease of thyroid gland, and Hypertension.    Past Surgical History:  He has a past surgical history that includes Tonsillectomy and Back surgery.      Social History:  He reports that he has quit smoking. His smoking " use included cigarettes. He has never used smokeless tobacco. No history on file for alcohol use and drug use.    Family History:  No family history on file.     Allergies:  Patient has no known allergies.    Inpatient Medications:  Scheduled medications   Medication Dose Route Frequency    apixaban  5 mg oral q12h    aspirin  81 mg oral Daily    ferrous sulfate (325 mg ferrous sulfate)  1 tablet oral Daily with breakfast    finasteride  5 mg oral Daily    ipratropium-albuteroL  3 mL nebulization 4x daily    levothyroxine  50 mcg oral Daily    pantoprazole  40 mg intravenous Daily    piperacillin-tazobactam  3.375 g intravenous q6h    vancomycin  1,500 mg intravenous q24h     PRN medications   Medication    benzocaine-menthol    calcium carbonate    dextromethorphan-guaifenesin    dextrose 10 % in water (D10W)    dextrose    glucagon    guaiFENesin    ondansetron ODT    Or    ondansetron    oxygen     Continuous Medications   Medication Dose Last Rate    potassium chloride in 0.9%NaCl  100 mL/hr 100 mL/hr (01/06/24 0700)     Outpatient Medications:  Current Outpatient Medications   Medication Instructions    allopurinol (ZYLOPRIM) 300 mg, oral, Daily    apixaban (ELIQUIS) 5 mg, oral, 2 times daily    aspirin 81 mg, oral, Daily    atorvastatin (LIPITOR) 80 mg, oral, Daily    bumetanide (BUMEX) 2 mg, oral, 2 times daily    colchicine 0.6 mg, oral, 2 times daily, As needed    empagliflozin (JARDIANCE) 10 mg, oral, Daily    ferrous sulfate (325 mg ferrous sulfate) 325 mg, oral, Daily with breakfast    fexofenadine (ALLEGRA) 180 mg, oral, Daily    finasteride (PROSCAR) 5 mg, oral, Daily, Do not crush, chew, or split.    guaiFENesin (MUCINEX) 600 mg, oral, 2 times daily, Do not crush, chew, or split.    levothyroxine (SYNTHROID, LEVOXYL) 50 mcg, oral, Daily    nitroglycerin (NITROSTAT) 0.4 mg, sublingual, Every 5 min PRN    pantoprazole (PROTONIX) 40 mg, oral, Daily before breakfast, Do not crush, chew, or split.     potassium chloride CR 20 mEq ER tablet 20 mEq, oral, 2 times daily, Do not crush or chew.    sacubitriL-valsartan (Entresto) 24-26 mg tablet 1 tablet, oral, 2 times daily    tamsulosin (FLOMAX) 0.4 mg, oral, Daily       Physical Exam:  Patient is a elderly white male sitting in bed conversant not short of breath on standard O2 nasal cannula.  He has an occasional congested cough.  JVP not elevated carotid impulses 2+  Chest with fair air movement scattered expiratory coarse wheeze  Cardiac rhythm irregularly irregular mildly variable S1-S2 normal no gallop or rub  Abdomen is benign  No peripheral edema.     Assessment/Plan   1/6: The patient is a 78-year-old white male with a history of longstanding coronary artery disease with multiple PCI procedures.  He does have ischemic congestive cardiomyopathy with an echocardiogram performed on 9/26/2023 estimating his LV ejection fraction of 35-40% with 2+ mitral valve regurgitation moderate left atrial enlargement mild RV chamber dilatation and a normal estimated PA systolic pressure.  He also has a history of longstanding persistent now permanent atrial fibrillation for which she underwent multiple electrical DC cardioversions and a radiofrequency ablation procedure.  He is currently on a rate control strategy.  He has a history of former smoking COPD with O2 dependence.  He is admitted with increasing weakness shortness of breath cough with evidence of right-sided pneumonia by the initial chest CT for which she has been started on empiric IV antibiotics.  He does remain in atrial fibrillation borderline increase in ventricular rate and will begin low-dose metoprolol 25 mg twice daily.  He has been continued on Eliquis anticoagulation 5 mg twice daily along with a low-dose aspirin for CAD as well as statin therapy.  Will continue the modest IV fluid hydration given the transient hypotension in the emergency room with the potassium supplement.  Will check a repeat  echocardiogram.  Follow-up chest x-ray tomorrow.    Peripheral IV 01/05/24 20 G Distal;Left;Posterior Forearm (Active)   Site Assessment Clean;Dry;Intact 01/06/24 0800   Dressing Status Clean;Dry;Occlusive 01/06/24 0800   Number of days: 1       Peripheral IV 01/05/24 16 G Right Forearm (Active)   Site Assessment Clean;Dry;Intact 01/06/24 0026   Dressing Type Transparent 01/06/24 0026   Line Status Flushed 01/06/24 0026   Dressing Status Clean;Dry 01/06/24 0026   Number of days: 1       Urethral Catheter Coude 18 Fr. (Active)   Output (mL) 200 mL 01/06/24 0600   Number of days: 0       Code Status:  Full Code    I spent 30 minutes in the professional and overall care of this patient      Lb Nance MD

## 2024-01-06 NOTE — CARE PLAN
Problem: Pain  Goal: My pain/discomfort is manageable  Outcome: Progressing     Problem: Safety  Goal: Patient will be injury free during hospitalization  Outcome: Progressing  Goal: I will remain free of falls  Outcome: Progressing     Problem: Daily Care  Goal: Daily care needs are met  Outcome: Progressing     Problem: Psychosocial Needs  Goal: Demonstrates ability to cope with hospitalization/illness  Outcome: Progressing  Goal: Collaborate with me, my family, and caregiver to identify my specific goals  Outcome: Progressing     Problem: Discharge Barriers  Goal: My discharge needs are met  Outcome: Progressing     Problem: Skin  Goal: Prevent/manage excess moisture  Outcome: Progressing  Goal: Prevent/minimize sheer/friction injuries  Outcome: Progressing  Goal: Promote/optimize nutrition  Outcome: Progressing  Goal: Promote skin healing  Outcome: Progressing     Problem: Fall/Injury  Goal: Not fall by end of shift  Outcome: Progressing  Goal: Be free from injury by end of the shift  Outcome: Progressing  Goal: Verbalize understanding of personal risk factors for fall in the hospital  Outcome: Progressing  Goal: Verbalize understanding of risk factor reduction measures to prevent injury from fall in the home  Outcome: Progressing  Goal: Use assistive devices by end of the shift  Outcome: Progressing  Goal: Pace activities to prevent fatigue by end of the shift  Outcome: Progressing     Problem: Infection related to problem list condition  Goal: Infection will resolve through treatment  Outcome: Progressing

## 2024-01-06 NOTE — PROGRESS NOTES
Patient seen and examined today.  Agree with initial plan per my colleague who did the admission this morning.  Appreciate cardiology input.  At this point we will continue with antibiotics, low-dose metoprolol, with keeping blood pressure in mind, for baseline A-fib and low EF.  Awaiting consultants from pulmonary and GI.  However patient's transaminitis is likely secondary to hypotension and probably due to shock liver, this is improving today.

## 2024-01-06 NOTE — CONSULTS
"Nutrition Assessment Note    Attempted to Meet with Pt for MST 3. Pt's wife able to answer questions and states that Pt is a little confused right now. Pt's SO denies any wt changes aside from fluid related. Pt wife denies any nutritional concerns with Pt at this time.    Nutrition Assessment    Reason for Assessment: Admission nursing screening (MST 3)    Reason for Hospital Admission:  Navarro Rabago is a 78 y.o. male who is admitted for Sepsis, lactic acidosis and acute renal failure. Pt has Hx of CHF, a-fib, COPD, CVD, and HTN.    Nutrition History:  Food and Nutrient History: Spoke with Pt's SO wo states that there haven't been appetite changes, but she says Pt doesn't usually eat much while in the hospital.  Energy Intake: Good > 75 %      Anthropometrics:  Ht: 195.6 cm (6' 5\"), Wt: 104 kg (228 lb 9.9 oz), BMI: 27.1  IBW/kg (Dietitian Calculated): 94.54 kg     Weight Change:  Weight History / % Weight Change: Pt's SO denies any wt changes aside from fluid related.       Nutrition Focused Physical Exam Findings: defer: not appropriate at this time         Nutrition Significant Labs:  Lab Results   Component Value Date    WBC 15.0 (H) 01/06/2024    HGB 10.9 (L) 01/06/2024    HCT 31.3 (L) 01/06/2024     (L) 01/06/2024    ALT 83 (H) 01/06/2024     (H) 01/06/2024     01/06/2024    K 3.1 (L) 01/06/2024     01/06/2024    CREATININE 1.40 01/06/2024    BUN 28 (H) 01/06/2024    CO2 21 (L) 01/06/2024    TSH 3.74 05/19/2021    PSA 1.36 05/19/2021    INR 1.2 (H) 05/13/2021    HGBA1C 5.5 06/19/2023       Current Facility-Administered Medications:     apixaban (Eliquis) tablet 5 mg, 5 mg, oral, q12h, Serjio Matthews MD, 5 mg at 01/06/24 1000    aspirin EC tablet 81 mg, 81 mg, oral, Daily, Serjio Matthews MD, 81 mg at 01/06/24 0816    benzocaine-menthol (Cepastat Sore Throat) 15-3.6 mg lozenge 1 lozenge, 1 lozenge, Mouth/Throat, q2h PRN, Serjio Matthews MD    calcium carbonate (Tums) chewable tablet " 500 mg, 500 mg, oral, 4x daily PRN, Serjio Matthews MD    dextromethorphan-guaifenesin (Robitussin DM)  mg/5 mL oral liquid 5 mL, 5 mL, oral, q4h PRN, Serjio Matthews MD    dextrose 10 % in water (D10W) infusion, 0.3 g/kg/hr, intravenous, Once PRN, Serjio Matthews MD    dextrose 50 % injection 25 g, 25 g, intravenous, q15 min PRN, Serjio Matthews MD    ferrous sulfate (325 mg ferrous sulfate) tablet 1 tablet, 1 tablet, oral, Daily with breakfast, Serjio Matthews MD, 1 tablet at 01/06/24 0816    finasteride (Proscar) tablet 5 mg, 5 mg, oral, Daily, Serjio Matthews MD, 5 mg at 01/06/24 0816    glucagon (Glucagen) injection 1 mg, 1 mg, intramuscular, q15 min PRN, Serjio Matthews MD    guaiFENesin (Mucinex) 12 hr tablet 600 mg, 600 mg, oral, q12h PRN, Serjio Matthews MD    ipratropium-albuteroL (Duo-Neb) 0.5-2.5 mg/3 mL nebulizer solution 3 mL, 3 mL, nebulization, 4x daily, Serjio Matthews MD, 3 mL at 01/06/24 1137    levothyroxine (Synthroid, Levoxyl) tablet 50 mcg, 50 mcg, oral, Daily, Serjio Matthews MD, 50 mcg at 01/06/24 0546    metoprolol tartrate (Lopressor) tablet 25 mg, 25 mg, oral, BID, Lb Nance MD, 25 mg at 01/06/24 1109    ondansetron ODT (Zofran-ODT) disintegrating tablet 4 mg, 4 mg, oral, q8h PRN **OR** ondansetron (Zofran) injection 4 mg, 4 mg, intravenous, q8h PRN, Sejrio Matthews MD    oxygen (O2) therapy, , inhalation, Continuous PRN - O2/gases, Serjio Matthews MD, 5 L/min at 01/06/24 0813    pantoprazole (ProtoNix) injection 40 mg, 40 mg, intravenous, Daily, Serjio Matthews MD, 40 mg at 01/06/24 0816    piperacillin-tazobactam-dextrose (Zosyn) IV 3.375 g, 3.375 g, intravenous, q6h, Serjio Matthews MD, Stopped at 01/06/24 1250    potassium chloride 20 mEq in 100 mL IV premix, 20 mEq, intravenous, q2h, Ulises Tariq MD, Last Rate: 50 mL/hr at 01/06/24 1709, 20 mEq at 01/06/24 1709    sodium chloride 0.9 % with KCl 20 mEq/L infusion, 100 mL/hr, intravenous, Continuous, Serjio CRISTOBAL  MD Byron, Last Rate: 100 mL/hr at 01/06/24 1344, 100 mL/hr at 01/06/24 1344    Dietary Orders (From admission, onward)       Start     Ordered    01/06/24 1142  Adult diet Carb Controlled; 60 gram carb/meal, 30 gram Carb evening snack; Pureed 4; Mild thick 2  Diet effective now        Question Answer Comment   Diet type Carb Controlled    Carb diet selection: 60 gram carb/meal, 30 gram Carb evening snack    Texture Pureed 4    Fluid consistency Mild thick 2        01/06/24 1144                  Estimated Needs:   Estimated Energy Needs  Total Energy Estimated Needs (kCal):  (6446-6094 kcals)  Total Estimated Energy Need per Day (kCal/kg):  (20-30 kcals/kg)  Method for Estimating Needs: actual wt    Estimated Protein Needs  Total Protein Estimated Needs (g):  (156-208 g protein)  Total Protein Estimated Needs (g/kg):  (1.5-2 g/kg)  Method for Estimating Needs: actual wt    Estimated Fluid Needs  Total Fluid Estimated Needs (mL):  (0543-3442 mL)  Method for Estimating Needs: 1 mL/kcal        Nutrition Diagnosis   Nutrition Diagnosis:       Nutrition Diagnosis  Patient has Nutrition Diagnosis: Yes  Diagnosis Status (1): New  Nutrition Diagnosis 1: Increased nutrient needs  Related to (1): Increased demand for nutrient  As Evidenced by (1): CHF       Nutrition Interventions/Recommendations   Nutrition Interventions and Recommendations:    Nutrition Prescription:  Individualized Nutrition Prescription Provided for : 2034-1594 kcals, 156-208 g protein, and 2524-7690 mL fluid, provided by diet    Nutrition Interventions:   Food and/or Nutrient Delivery Interventions  Interventions: Meals and snacks  Meals and Snacks: Carbohydrate-modified diet, Texture-modified diet  Goal: Provide diet as ordered    Education Documentation  No documentation found.           Nutrition Monitoring and Evaluation   Monitoring/Evaluation:   Food/Nutrient Related History Monitoring  Monitoring and Evaluation Plan: Energy intake, Amount of  food  Energy Intake: Estimated energy intake  Criteria: Patient will consume >/= 75% of estimated needs with PO intake  Amount of Food: Estimated amout of food  Criteria: Patient will consume >/= 75% of meals and snacks    __       Time Spent/Follow-up:   Follow Up  Time Spent (min): 30 minutes  Last Date of Nutrition Visit: 01/06/24  Nutrition Follow-Up Needed?: 3-5 days  Follow up Comment: 1/11/24

## 2024-01-06 NOTE — CONSULTS
Vancomycin Dosing by Pharmacy- Cessation of Therapy    Consult to pharmacy for vancomycin dosing has been discontinued by the prescriber, pharmacy will sign off at this time.    Please call pharmacy if there are further questions or re-enter a consult if vancomycin is resumed.     Mela Allen, CarolynD

## 2024-01-06 NOTE — CONSULTS
"Pulmonary Consult Note    Reason for consultation:  Respiratory failure, pneumonia    History Of Present Illness  Navarro Rabago is a 78 y.o. male presenting with shortness of breath and generalized weakness.  History obtained mainly by the spouse who is currently at the bedside.  Patient is drowsy and not a great historian.  Apparently they just returned from a trip to [facility].  At home patient reportedly had significant amount of diarrhea.  Wife tried to get him up but she was unable to move from secondary to significant weakness.  Brought in by EMS.  He admits to a productive cough.  Denies any nausea or vomiting.  Workup revealing for probable pneumonia and he was initiated on antibiotics.  Were asked to assist in his management.     Past Medical History  He has a past medical history of Arthritis, CHF (congestive heart failure) (CMS/[facility]), COPD (chronic obstructive pulmonary disease) (CMS/[facility]), Coronary artery disease, Diabetes mellitus (CMS/[facility]), Disease of thyroid gland, and Hypertension.    Surgical History  He has a past surgical history that includes Tonsillectomy and Back surgery.     Social History  He reports that he has quit smoking. His smoking use included cigarettes. He has never used smokeless tobacco. No history on file for alcohol use and drug use.    Family History  No family history on file.     Allergies  Patient has no known allergies.    Review of Systems   All other systems reviewed and are negative.        Last Recorded Vitals  Blood pressure 88/52, pulse 90, temperature 37.4 °C (99.3 °F), temperature source Temporal, resp. rate 24, height 1.956 m (6' 5\"), weight 104 kg (228 lb 9.9 oz), SpO2 93 %.     Physical Exam  Constitutional:       General: He is not in acute distress.     Appearance: He is ill-appearing.   HENT:      Head: Normocephalic and atraumatic.      Nose:      Comments: Nasal cannula     Mouth/Throat:      Mouth: Mucous membranes are dry.   Eyes:      Extraocular " Movements: Extraocular movements intact.      Conjunctiva/sclera: Conjunctivae normal.   Cardiovascular:      Rate and Rhythm: Normal rate and regular rhythm.      Heart sounds: No murmur heard.  Pulmonary:      Effort: Pulmonary effort is normal.      Breath sounds: Rhonchi and rales present.   Abdominal:      General: Bowel sounds are normal.      Palpations: Abdomen is soft.   Musculoskeletal:      Right lower leg: No edema.      Left lower leg: No edema.   Skin:     General: Skin is warm and dry.   Neurological:      Mental Status: He is lethargic.   Psychiatric:         Mood and Affect: Mood and affect normal.         Meds  Scheduled medications  apixaban, 5 mg, oral, q12h  aspirin, 81 mg, oral, Daily  ferrous sulfate (325 mg ferrous sulfate), 1 tablet, oral, Daily with breakfast  finasteride, 5 mg, oral, Daily  ipratropium-albuteroL, 3 mL, nebulization, 4x daily  levothyroxine, 50 mcg, oral, Daily  metoprolol tartrate, 25 mg, oral, BID  pantoprazole, 40 mg, intravenous, Daily  piperacillin-tazobactam, 3.375 g, intravenous, q6h  potassium chloride, 20 mEq, intravenous, q2h      Continuous medications  potassium chloride in 0.9%NaCl, 100 mL/hr, Last Rate: 100 mL/hr (01/06/24 1344)      PRN medications  PRN medications: benzocaine-menthol, calcium carbonate, dextromethorphan-guaifenesin, dextrose 10 % in water (D10W), dextrose, glucagon, guaiFENesin, ondansetron ODT **OR** ondansetron, oxygen       Relevant Results  CHEM comprehensive panel reviewed.  Notable for transaminitis.  Creatinine of 1.40 mg/dL  Low albumin at 2.5 g/dL  CBC reviewed and notable for leukocytosis of 15,000  C. difficile PCR is negative  Legionella, MRSA and strep pneumonia urinary antigens all pending at the time of note  CTA of the chest personally reviewed and shows right upper lobe and right lower lobe consolidative changes consistent with pneumonia    Principal Problem:    Sepsis (CMS/HCC)  Active Problems:    Lactic acid acidosis     Acute renal failure (ARF) (CMS/HCC)    Leukocytosis    Bilateral pneumonia    Chronic respiratory failure (CMS/HCC)    Hypokalemia    Dehydration    Transaminitis    Acute encephalopathy    Hypotension       Recommendations  IV fluids  Continue with empiric antibiotics  Follow-up on all cultures  Hold all antihypertensives  Follow-up on all cultures   prophylaxis    Thank you for this consultation.  Will follow with you.      I spent 20 minutes in the professional and overall care of this patient.      Jaren Zapien MD  Pulmonary/Critical Care Physician  [facility]

## 2024-01-06 NOTE — CONSULTS
Reason For Consult  Elevated liver enzymes and diarrhea    History Of Present Illness  Navarro Rabago is a 78 y.o. male presenting with dyspnea and weakness. He is admitted with sepsis and bilateral pneumonia. He has lactic acidosis, acute renal failure, leukocytosis, dehydration, acute encephalopathy, hypotension.  Elevated liver enzymes noted in ER and have come down some today. He denies any history of liver disease or EtOH use.  He reports 10 years of diarrhea 1x day and requires him to shower after BM to clean himself. Stool study for Cdiff pending.     Past Medical History  He has a past medical history of Arthritis, CHF (congestive heart failure) (CMS/HCC), COPD (chronic obstructive pulmonary disease) (CMS/HCC), Coronary artery disease, Diabetes mellitus (CMS/HCC), Disease of thyroid gland, and Hypertension.    Surgical History  He has a past surgical history that includes Tonsillectomy and Back surgery.     Social History  He reports that he has quit smoking. His smoking use included cigarettes. He has never used smokeless tobacco. No history on file for alcohol use and drug use.    Family History  No family history on file.     Allergies  Patient has no known allergies.    Review of Systems  Review of Systems   Constitutional:  Positive for fatigue. Negative for appetite change and fever.   HENT:  Negative for trouble swallowing.    Respiratory:  Positive for shortness of breath. Negative for chest tightness.    Cardiovascular:  Negative for chest pain.   Gastrointestinal:  Positive for diarrhea. Negative for abdominal distention, abdominal pain, constipation, nausea and vomiting.   Musculoskeletal:  Negative for joint swelling.   Skin:  Negative for color change and rash.   Neurological:  Positive for tremors. Negative for dizziness and headaches.   Psychiatric/Behavioral:  Positive for confusion.          Physical Exam  Physical Exam  HENT:      Head: Normocephalic and atraumatic.      Mouth/Throat:       "Mouth: Mucous membranes are moist.   Eyes:      General: No scleral icterus.  Cardiovascular:      Rate and Rhythm: Normal rate and regular rhythm.   Pulmonary:      Effort: Pulmonary effort is normal.      Breath sounds: Normal breath sounds.   Abdominal:      General: Bowel sounds are normal.      Palpations: Abdomen is soft.   Skin:     General: Skin is warm and dry.   Neurological:      Mental Status: He is alert.           Last Recorded Vitals  Blood pressure 94/56, pulse 90, temperature 36.5 °C (97.7 °F), temperature source Temporal, resp. rate (!) 29, height 1.956 m (6' 5\"), weight 104 kg (228 lb 9.9 oz), SpO2 92 %.    Relevant Results      Current Facility-Administered Medications on File Prior to Encounter   Medication Dose Route Frequency Provider Last Rate Last Admin    [COMPLETED] iohexol (OMNIPaque) 350 mg iodine/mL solution 75 mL  75 mL intravenous Once in imaging Arie Cuadrac, DO   75 mL at 01/05/24 1832    [COMPLETED] pantoprazole (ProtoNix) injection 40 mg  40 mg intravenous Once Ronny ELLYN Csernyik, DO   40 mg at 01/05/24 2108    [COMPLETED] piperacillin-tazobactam-dextrose (Zosyn) IV 4.5 g  4.5 g intravenous Once Arie OROZCO Andryc, DO   Stopped at 01/05/24 1850    [COMPLETED] sodium chloride 0.9 % bolus 1,000 mL  1,000 mL intravenous Once Ronny J Csernyik, DO   Stopped at 01/05/24 2151    [COMPLETED] vancomycin (Vancocin) 2 g in dextrose 5 % in water (D5W) 500 mL IV  2 g intravenous Once Arie OROZCO Andryc, DO   Stopped at 01/05/24 2055    [DISCONTINUED] sodium chloride 0.9 % bolus 1,000 mL  1,000 mL intravenous Once Arie OROZCO Andryc, DO        [DISCONTINUED] sodium chloride 0.9% infusion  125 mL/hr intravenous Continuous Ronny J Csernyik,  mL/hr at 01/05/24 2030 125 mL/hr at 01/05/24 2030    [DISCONTINUED] vancomycin (Vancocin) in dextrose 5% water  - Omnicell Override Pull              Current Outpatient Medications on File Prior to Encounter   Medication Sig Dispense Refill    allopurinol (Zyloprim) " 300 mg tablet Take 1 tablet (300 mg) by mouth once daily.      apixaban (Eliquis) 5 mg tablet Take 1 tablet (5 mg) by mouth 2 times a day.      aspirin 81 mg EC tablet Take 1 tablet (81 mg) by mouth once daily.      atorvastatin (Lipitor) 80 mg tablet Take 1 tablet (80 mg) by mouth once daily.      bumetanide (Bumex) 2 mg tablet Take 1 tablet (2 mg) by mouth 2 times a day.      colchicine 0.6 mg tablet Take 1 tablet (0.6 mg) by mouth 2 times a day. As needed      empagliflozin (Jardiance) 10 mg Take 1 tablet (10 mg) by mouth once daily.      ferrous sulfate, 325 mg ferrous sulfate, tablet Take 1 tablet by mouth once daily with breakfast.      fexofenadine (Allegra) 180 mg tablet Take 1 tablet (180 mg) by mouth once daily.      finasteride (Proscar) 5 mg tablet Take 1 tablet (5 mg) by mouth once daily. Do not crush, chew, or split.      guaiFENesin (Mucinex) 600 mg 12 hr tablet Take 1 tablet (600 mg) by mouth 2 times a day. Do not crush, chew, or split.      levothyroxine (Synthroid, Levoxyl) 50 mcg tablet Take 1 tablet (50 mcg) by mouth once daily.      nitroglycerin (Nitrostat) 0.4 mg SL tablet Place 1 tablet (0.4 mg) under the tongue every 5 minutes if needed for chest pain.      pantoprazole (ProtoNix) 40 mg EC tablet Take 1 tablet (40 mg) by mouth once daily in the morning. Take before meals. Do not crush, chew, or split.      potassium chloride CR 20 mEq ER tablet Take 1 tablet (20 mEq) by mouth 2 times a day. Do not crush or chew.      sacubitriL-valsartan (Entresto) 24-26 mg tablet Take 1 tablet by mouth 2 times a day.      tamsulosin (Flomax) 0.4 mg 24 hr capsule Take 1 capsule (0.4 mg) by mouth once daily.       Results for orders placed or performed during the hospital encounter of 01/06/24 (from the past 24 hour(s))   MRSA Surveillance for Vancomycin De-escalation, PCR    Specimen: Anterior Nares; Swab   Result Value Ref Range    MRSA PCR Not Detected Not Detected   CBC   Result Value Ref Range    WBC  15.0 (H) 4.4 - 11.3 x10*3/uL    nRBC 0.0 0.0 - 0.0 /100 WBCs    RBC 3.60 (L) 4.50 - 5.90 x10*6/uL    Hemoglobin 10.9 (L) 13.5 - 17.5 g/dL    Hematocrit 31.3 (L) 41.0 - 52.0 %    MCV 87 80 - 100 fL    MCH 30.3 26.0 - 34.0 pg    MCHC 34.8 32.0 - 36.0 g/dL    RDW 15.5 (H) 11.5 - 14.5 %    Platelets 132 (L) 150 - 450 x10*3/uL   Comprehensive metabolic panel   Result Value Ref Range    Glucose 108 (H) 65 - 99 mg/dL    Sodium 136 133 - 145 mmol/L    Potassium 3.1 (L) 3.4 - 5.1 mmol/L    Chloride 101 97 - 107 mmol/L    Bicarbonate 21 (L) 24 - 31 mmol/L    Urea Nitrogen 28 (H) 8 - 25 mg/dL    Creatinine 1.40 0.40 - 1.60 mg/dL    eGFR 52 (L) >60 mL/min/1.73m*2    Calcium 7.7 (L) 8.5 - 10.4 mg/dL    Albumin 2.5 (L) 3.5 - 5.0 g/dL    Alkaline Phosphatase 100 35 - 125 U/L    Total Protein 5.9 5.9 - 7.9 g/dL     (H) 5 - 40 U/L    Bilirubin, Total 1.6 (H) 0.1 - 1.2 mg/dL    ALT 83 (H) 5 - 40 U/L    Anion Gap 14 <=19 mmol/L   Vancomycin   Result Value Ref Range    Vancomycin 12.0 10.0 - 20.0 ug/mL    CT chest abdomen pelvis w IV contrast    Result Date: 1/5/2024  Interpreted By:  Yordy Urena, STUDY: CT CHEST ABDOMEN PELVIS W IV CONTRAST;  1/5/2024 6:33 pm   INDICATION: Signs/Symptoms:abd pain, sob.   COMPARISON: None.   ACCESSION NUMBER(S): [id number]   ORDERING CLINICIAN: NELLA MELGOZA   TECHNIQUE: Contiguous axial images of the chest, abdomen and pelvis were obtained after the intravenous administration of  contrast. Coronal and sagittal reformatted images were obtained from the axial images.   FINDINGS: CT CHEST:   No axillary lymphadenopathy. 1.6 cm right paratracheal lymph node and 1.5 cm subcarinal lymph node. There is limited evaluation for hilar lymphadenopathy secondary stripping motion.   The heart is normal in size. Coronary artery vascular calcifications. No significant pericardial effusion.   There is pulmonary emphysematous change. There is consolidative opacity in the right upper lobe and right lower lobe  compatible with pneumonia and also minimal opacity in the left lower lobe. Trace pleural effusions. No pneumothorax.   Multilevel degenerative change of the thoracic spine.     CT ABDOMEN PELVIS:   Evaluation of the abdomen and pelvis is limited secondary to patient motion. No evidence of liver mass. The gallbladder is present, not well evaluated secondary to motion. No dilatation common bile duct.   Atrophy of the pancreas.   No splenomegaly.   Question mild nodularity of the left adrenal gland. The right adrenal gland appears unremarkable.   Symmetric enhancement of the kidneys. Right renal cysts with the largest measuring 3.1 cm and several bowel subcentimeter hypodensity too small to characterize. No hydronephrosis.   Atherosclerotic calcification of the aorta and abdominal and pelvic vasculature.   No evidence of bowel obstruction. Evaluation the bowel is otherwise limited secondary patient motion.   Urinary bladder is underdistended and not well evaluated. 18 mm right posterior bladder diverticulum.   Postsurgical change of left hip arthroplasty resulting in beam hardening artifact and limiting evaluation the pelvis.   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       Consolidative opacity in the right upper lobe and lower lobe compatible with pneumonia and also mild left basilar opacity and trace pleural effusions.   No definite evidence of acute abnormality of the abdominal viscera within the limitations of the motion degraded examination.   MACRO: None   Signed by: Yordy Urena 1/5/2024 6:49 PM Dictation workstation:   [id number]       Assessment/Plan     Sepsis (CMS/HCC)   Elevated liver enzymes  Diarrhea  - suspect elevated liver enzymes from sepsis/hypotension, will monitor trend  - diarrhea sounds chronic, C  diff pending    I spent 30 minutes in the professional and overall care of this patient.      Isabella Barnes, DO

## 2024-01-07 ENCOUNTER — APPOINTMENT (OUTPATIENT)
Dept: RADIOLOGY | Facility: HOSPITAL | Age: 79
DRG: 871 | End: 2024-01-07
Payer: MEDICARE

## 2024-01-07 LAB
ALBUMIN SERPL-MCNC: 2.2 G/DL (ref 3.5–5)
ALP BLD-CCNC: 99 U/L (ref 35–125)
ALT SERPL-CCNC: 68 U/L (ref 5–40)
ANION GAP SERPL CALC-SCNC: 11 MMOL/L
AST SERPL-CCNC: 221 U/L (ref 5–40)
BASOPHILS # BLD AUTO: 0.02 X10*3/UL (ref 0–0.1)
BASOPHILS NFR BLD AUTO: 0.2 %
BILIRUB SERPL-MCNC: 1.4 MG/DL (ref 0.1–1.2)
BUN SERPL-MCNC: 42 MG/DL (ref 8–25)
CALCIUM SERPL-MCNC: 7.6 MG/DL (ref 8.5–10.4)
CHLORIDE SERPL-SCNC: 102 MMOL/L (ref 97–107)
CO2 SERPL-SCNC: 19 MMOL/L (ref 24–31)
CREAT SERPL-MCNC: 2.2 MG/DL (ref 0.4–1.6)
EOSINOPHIL # BLD AUTO: 0 X10*3/UL (ref 0–0.4)
EOSINOPHIL NFR BLD AUTO: 0 %
ERYTHROCYTE [DISTWIDTH] IN BLOOD BY AUTOMATED COUNT: 15.6 % (ref 11.5–14.5)
GFR SERPL CREATININE-BSD FRML MDRD: 30 ML/MIN/1.73M*2
GLUCOSE BLD MANUAL STRIP-MCNC: 114 MG/DL (ref 74–99)
GLUCOSE SERPL-MCNC: 125 MG/DL (ref 65–99)
HCT VFR BLD AUTO: 27.9 % (ref 41–52)
HGB BLD-MCNC: 9.9 G/DL (ref 13.5–17.5)
IMM GRANULOCYTES # BLD AUTO: 0.17 X10*3/UL (ref 0–0.5)
IMM GRANULOCYTES NFR BLD AUTO: 1.3 % (ref 0–0.9)
LEGIONELLA AG UR QL: NEGATIVE
LYMPHOCYTES # BLD AUTO: 0.51 X10*3/UL (ref 0.8–3)
LYMPHOCYTES NFR BLD AUTO: 4 %
MCH RBC QN AUTO: 29.7 PG (ref 26–34)
MCHC RBC AUTO-ENTMCNC: 35.5 G/DL (ref 32–36)
MCV RBC AUTO: 84 FL (ref 80–100)
MONOCYTES # BLD AUTO: 0.78 X10*3/UL (ref 0.05–0.8)
MONOCYTES NFR BLD AUTO: 6.1 %
NEUTROPHILS # BLD AUTO: 11.38 X10*3/UL (ref 1.6–5.5)
NEUTROPHILS NFR BLD AUTO: 88.4 %
NRBC BLD-RTO: 0 /100 WBCS (ref 0–0)
PLATELET # BLD AUTO: 143 X10*3/UL (ref 150–450)
POTASSIUM SERPL-SCNC: 3.8 MMOL/L (ref 3.4–5.1)
PROT SERPL-MCNC: 5.6 G/DL (ref 5.9–7.9)
RBC # BLD AUTO: 3.33 X10*6/UL (ref 4.5–5.9)
S PNEUM AG UR QL: NEGATIVE
SODIUM SERPL-SCNC: 132 MMOL/L (ref 133–145)
VANCOMYCIN SERPL-MCNC: 7 UG/ML (ref 10–20)
WBC # BLD AUTO: 12.9 X10*3/UL (ref 4.4–11.3)

## 2024-01-07 PROCEDURE — C9113 INJ PANTOPRAZOLE SODIUM, VIA: HCPCS | Performed by: INTERNAL MEDICINE

## 2024-01-07 PROCEDURE — 80202 ASSAY OF VANCOMYCIN: CPT | Performed by: INTERNAL MEDICINE

## 2024-01-07 PROCEDURE — 9420000001 HC RT PATIENT EDUCATION 5 MIN

## 2024-01-07 PROCEDURE — 87205 SMEAR GRAM STAIN: CPT | Mod: TRILAB | Performed by: NURSE PRACTITIONER

## 2024-01-07 PROCEDURE — 2500000005 HC RX 250 GENERAL PHARMACY W/O HCPCS: Performed by: INTERNAL MEDICINE

## 2024-01-07 PROCEDURE — 80053 COMPREHEN METABOLIC PANEL: CPT | Performed by: INTERNAL MEDICINE

## 2024-01-07 PROCEDURE — 36415 COLL VENOUS BLD VENIPUNCTURE: CPT | Performed by: INTERNAL MEDICINE

## 2024-01-07 PROCEDURE — 2500000002 HC RX 250 W HCPCS SELF ADMINISTERED DRUGS (ALT 637 FOR MEDICARE OP, ALT 636 FOR OP/ED): Performed by: INTERNAL MEDICINE

## 2024-01-07 PROCEDURE — 85025 COMPLETE CBC W/AUTO DIFF WBC: CPT | Performed by: INTERNAL MEDICINE

## 2024-01-07 PROCEDURE — 94640 AIRWAY INHALATION TREATMENT: CPT

## 2024-01-07 PROCEDURE — 71045 X-RAY EXAM CHEST 1 VIEW: CPT

## 2024-01-07 PROCEDURE — 3E033XZ INTRODUCTION OF VASOPRESSOR INTO PERIPHERAL VEIN, PERCUTANEOUS APPROACH: ICD-10-PCS | Performed by: INTERNAL MEDICINE

## 2024-01-07 PROCEDURE — 82947 ASSAY GLUCOSE BLOOD QUANT: CPT

## 2024-01-07 PROCEDURE — 2500000004 HC RX 250 GENERAL PHARMACY W/ HCPCS (ALT 636 FOR OP/ED): Performed by: INTERNAL MEDICINE

## 2024-01-07 PROCEDURE — 2500000001 HC RX 250 WO HCPCS SELF ADMINISTERED DRUGS (ALT 637 FOR MEDICARE OP): Performed by: INTERNAL MEDICINE

## 2024-01-07 PROCEDURE — 2020000001 HC ICU ROOM DAILY

## 2024-01-07 PROCEDURE — 99233 SBSQ HOSP IP/OBS HIGH 50: CPT | Performed by: INTERNAL MEDICINE

## 2024-01-07 RX ORDER — NOREPINEPHRINE BITARTRATE/D5W 8 MG/250ML
.01-1 PLASTIC BAG, INJECTION (ML) INTRAVENOUS CONTINUOUS
Status: DISCONTINUED | OUTPATIENT
Start: 2024-01-07 | End: 2024-01-13

## 2024-01-07 RX ORDER — ACETAMINOPHEN 325 MG/1
650 TABLET ORAL EVERY 6 HOURS PRN
Status: DISCONTINUED | OUTPATIENT
Start: 2024-01-07 | End: 2024-01-19 | Stop reason: HOSPADM

## 2024-01-07 RX ORDER — MIDODRINE HYDROCHLORIDE 5 MG/1
5 TABLET ORAL ONCE
Status: COMPLETED | OUTPATIENT
Start: 2024-01-07 | End: 2024-01-07

## 2024-01-07 RX ORDER — VANCOMYCIN 1 G/200ML
1000 INJECTION, SOLUTION INTRAVENOUS
Status: DISCONTINUED | OUTPATIENT
Start: 2024-01-07 | End: 2024-01-08

## 2024-01-07 RX ORDER — NOREPINEPHRINE BITARTRATE/D5W 8 MG/250ML
.01-1 PLASTIC BAG, INJECTION (ML) INTRAVENOUS CONTINUOUS
Status: DISCONTINUED | OUTPATIENT
Start: 2024-01-07 | End: 2024-01-07

## 2024-01-07 RX ORDER — VANCOMYCIN 1.5 G/300ML
15 INJECTION, SOLUTION INTRAVENOUS EVERY 12 HOURS
Status: DISCONTINUED | OUTPATIENT
Start: 2024-01-07 | End: 2024-01-07

## 2024-01-07 RX ADMIN — FERROUS SULFATE TAB 325 MG (65 MG ELEMENTAL FE) 1 TABLET: 325 (65 FE) TAB at 08:44

## 2024-01-07 RX ADMIN — Medication 5 L/MIN: at 07:36

## 2024-01-07 RX ADMIN — ACETAMINOPHEN 650 MG: 325 TABLET ORAL at 22:45

## 2024-01-07 RX ADMIN — ONDANSETRON 4 MG: 2 INJECTION INTRAMUSCULAR; INTRAVENOUS at 03:21

## 2024-01-07 RX ADMIN — IPRATROPIUM BROMIDE AND ALBUTEROL SULFATE 3 ML: 2.5; .5 SOLUTION RESPIRATORY (INHALATION) at 11:52

## 2024-01-07 RX ADMIN — LEVOTHYROXINE SODIUM 50 MCG: 0.05 TABLET ORAL at 06:11

## 2024-01-07 RX ADMIN — AZITHROMYCIN MONOHYDRATE 500 MG: 500 INJECTION, POWDER, LYOPHILIZED, FOR SOLUTION INTRAVENOUS at 11:00

## 2024-01-07 RX ADMIN — IPRATROPIUM BROMIDE AND ALBUTEROL SULFATE 3 ML: 2.5; .5 SOLUTION RESPIRATORY (INHALATION) at 07:33

## 2024-01-07 RX ADMIN — MIDODRINE HYDROCHLORIDE 5 MG: 5 TABLET ORAL at 01:36

## 2024-01-07 RX ADMIN — VANCOMYCIN 1000 MG: 1 INJECTION, SOLUTION INTRAVENOUS at 09:53

## 2024-01-07 RX ADMIN — APIXABAN 2.5 MG: 2.5 TABLET, FILM COATED ORAL at 08:30

## 2024-01-07 RX ADMIN — PANTOPRAZOLE SODIUM 40 MG: 40 INJECTION, POWDER, FOR SOLUTION INTRAVENOUS at 08:44

## 2024-01-07 RX ADMIN — IPRATROPIUM BROMIDE AND ALBUTEROL SULFATE 3 ML: 2.5; .5 SOLUTION RESPIRATORY (INHALATION) at 19:47

## 2024-01-07 RX ADMIN — SODIUM CHLORIDE 250 ML: 9 INJECTION, SOLUTION INTRAVENOUS at 08:15

## 2024-01-07 RX ADMIN — SODIUM CHLORIDE 250 ML: 9 INJECTION, SOLUTION INTRAVENOUS at 07:15

## 2024-01-07 RX ADMIN — SODIUM CHLORIDE AND POTASSIUM CHLORIDE 100 ML/HR: 9; 1.49 INJECTION, SOLUTION INTRAVENOUS at 02:25

## 2024-01-07 RX ADMIN — PIPERACILLIN SODIUM AND TAZOBACTAM SODIUM 3.38 G: 3; .375 INJECTION, SOLUTION INTRAVENOUS at 18:52

## 2024-01-07 RX ADMIN — NOREPINEPHRINE BITARTRATE 0.01 MCG/KG/MIN: 8 INJECTION, SOLUTION INTRAVENOUS at 06:00

## 2024-01-07 RX ADMIN — PIPERACILLIN SODIUM AND TAZOBACTAM SODIUM 3.38 G: 3; .375 INJECTION, SOLUTION INTRAVENOUS at 01:07

## 2024-01-07 RX ADMIN — PIPERACILLIN SODIUM AND TAZOBACTAM SODIUM 3.38 G: 3; .375 INJECTION, SOLUTION INTRAVENOUS at 13:00

## 2024-01-07 RX ADMIN — APIXABAN 2.5 MG: 2.5 TABLET, FILM COATED ORAL at 21:09

## 2024-01-07 RX ADMIN — SODIUM CHLORIDE AND POTASSIUM CHLORIDE 100 ML/HR: 9; 1.49 INJECTION, SOLUTION INTRAVENOUS at 17:43

## 2024-01-07 RX ADMIN — PIPERACILLIN SODIUM AND TAZOBACTAM SODIUM 3.38 G: 3; .375 INJECTION, SOLUTION INTRAVENOUS at 06:14

## 2024-01-07 RX ADMIN — ASPIRIN 81 MG: 81 TABLET, COATED ORAL at 08:44

## 2024-01-07 RX ADMIN — IPRATROPIUM BROMIDE AND ALBUTEROL SULFATE 3 ML: 2.5; .5 SOLUTION RESPIRATORY (INHALATION) at 15:40

## 2024-01-07 RX ADMIN — FINASTERIDE 5 MG: 5 TABLET, FILM COATED ORAL at 08:44

## 2024-01-07 RX ADMIN — SODIUM CHLORIDE 500 ML: 900 INJECTION, SOLUTION INTRAVENOUS at 04:33

## 2024-01-07 ASSESSMENT — PAIN - FUNCTIONAL ASSESSMENT
PAIN_FUNCTIONAL_ASSESSMENT: 0-10

## 2024-01-07 ASSESSMENT — PAIN SCALES - GENERAL
PAINLEVEL_OUTOF10: 0 - NO PAIN
PAINLEVEL_OUTOF10: 3
PAINLEVEL_OUTOF10: 0 - NO PAIN
PAINLEVEL_OUTOF10: 0 - NO PAIN
PAINLEVEL_OUTOF10: 3
PAINLEVEL_OUTOF10: 5 - MODERATE PAIN

## 2024-01-07 ASSESSMENT — PAIN DESCRIPTION - LOCATION: LOCATION: BACK

## 2024-01-07 ASSESSMENT — PAIN DESCRIPTION - DESCRIPTORS: DESCRIPTORS: ACHING

## 2024-01-07 NOTE — PROGRESS NOTES
"Navarro Rabago is a 78 y.o. male on day 1 of admission presenting with Sepsis (CMS/HCC).    Subjective   Patient not feeling well- feels tired. Thinks diarrhea is better. Not sure if having Bms. Review of chart indicates hypotension on levophed and received bolus of fluid.       Objective     Physical Exam  HENT:      Head: Normocephalic and atraumatic.   Cardiovascular:      Rate and Rhythm: Normal rate and regular rhythm.   Pulmonary:      Breath sounds: Wheezing present.   Abdominal:      General: Abdomen is flat. Bowel sounds are normal.      Palpations: Abdomen is soft.   Skin:     General: Skin is warm and dry.   Neurological:      Mental Status: He is alert.         Last Recorded Vitals  Blood pressure (!) 79/41, pulse 72, temperature 36.7 °C (98.1 °F), temperature source Temporal, resp. rate (!) 27, height 1.956 m (6' 5\"), weight 104 kg (228 lb 9.9 oz), SpO2 91 %.  Intake/Output last 3 Shifts:  I/O last 3 completed shifts:  In: 2010 (19.4 mL/kg) [I.V.:1660 (16 mL/kg); IV Piggyback:350]  Out: 900 (8.7 mL/kg) [Urine:900 (0.2 mL/kg/hr)]  Weight: 103.7 kg     Relevant Results      XR chest 1 view    Result Date: 1/7/2024  Interpreted By:  Cathy Hernandez, STUDY: XR CHEST 1 VIEW 1/7/2024 7:36 am   INDICATION: Follow-up pneumonia   COMPARISON: 05/29/2014 as well as CT examination of the chest done on 01/05/2024.   ACCESSION NUMBER(S): [id number]   ORDERING CLINICIAN: EMILY MAKI   TECHNIQUE: AP erect view of the chest   FINDINGS: The heart is at the upper limits of normal in size. There is infiltrate and airspace consolidation observed centrally within the right lung with upper lobar predominance. There is also some patchy infiltrate at the left lung base. No obvious pleural abnormality is seen.       Infiltrate and airspace consolidation identified within the right lung most pronounced centrally and within the right upper lobe with mild left basilar infiltrate.   Signed by: Cathy Hernandez 1/7/2024 8:11 AM Dictation " workstation:   [id number]    CT chest abdomen pelvis w IV contrast    Result Date: 1/5/2024  Interpreted By:  Yordy Urena, STUDY: CT CHEST ABDOMEN PELVIS W IV CONTRAST;  1/5/2024 6:33 pm   INDICATION: Signs/Symptoms:abd pain, sob.   COMPARISON: None.   ACCESSION NUMBER(S): [id number]   ORDERING CLINICIAN: NELLA MELGOZA   TECHNIQUE: Contiguous axial images of the chest, abdomen and pelvis were obtained after the intravenous administration of  contrast. Coronal and sagittal reformatted images were obtained from the axial images.   FINDINGS: CT CHEST:   No axillary lymphadenopathy. 1.6 cm right paratracheal lymph node and 1.5 cm subcarinal lymph node. There is limited evaluation for hilar lymphadenopathy secondary stripping motion.   The heart is normal in size. Coronary artery vascular calcifications. No significant pericardial effusion.   There is pulmonary emphysematous change. There is consolidative opacity in the right upper lobe and right lower lobe compatible with pneumonia and also minimal opacity in the left lower lobe. Trace pleural effusions. No pneumothorax.   Multilevel degenerative change of the thoracic spine.     CT ABDOMEN PELVIS:   Evaluation of the abdomen and pelvis is limited secondary to patient motion. No evidence of liver mass. The gallbladder is present, not well evaluated secondary to motion. No dilatation common bile duct.   Atrophy of the pancreas.   No splenomegaly.   Question mild nodularity of the left adrenal gland. The right adrenal gland appears unremarkable.   Symmetric enhancement of the kidneys. Right renal cysts with the largest measuring 3.1 cm and several bowel subcentimeter hypodensity too small to characterize. No hydronephrosis.   Atherosclerotic calcification of the aorta and abdominal and pelvic vasculature.   No evidence of bowel obstruction. Evaluation the bowel is otherwise limited secondary patient motion.   Urinary bladder is underdistended and not well evaluated.  18 mm right posterior bladder diverticulum.   Postsurgical change of left hip arthroplasty resulting in beam hardening artifact and limiting evaluation the pelvis.   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       Consolidative opacity in the right upper lobe and lower lobe compatible with pneumonia and also mild left basilar opacity and trace pleural effusions.   No definite evidence of acute abnormality of the abdominal viscera within the limitations of the motion degraded examination.   MACRO: None   Signed by: Yordy Urena 1/5/2024 6:49 PM Dictation workstation:   [id number]    * Cannot find OR log *  Last relevant procedure:        This patient currently has cardiac telemetry ordered; if you would like to modify or discontinue the telemetry order, click here to go to the orders activity to modify/discontinue the order.    This patient has a urinary catheter   Reason for the urinary catheter remaining today?              Assessment/Plan   Principal Problem:    Sepsis (CMS/HCC)  Active Problems:    Lactic acid acidosis    Acute renal failure (ARF) (CMS/HCC)    Leukocytosis    Bilateral pneumonia    Chronic respiratory failure (CMS/HCC)    Hypokalemia    Dehydration    Transaminitis    Acute encephalopathy    Hypotension  Diarrhea    -Liver enzymes trending down likely related to sepsis/hypotenstion, may fluctuate until hypotension stablized  -Diarrhea is likely chronic and once a day, C diff if negative  - No active GI issue. Will sign off. Please re-contact us with any new concerns.       I spent 20 minutes in the professional and overall care of this patient.      Isabella Barnes, DO

## 2024-01-07 NOTE — PROGRESS NOTES
"Vancomycin Dosing by Pharmacy- INITIAL    Navarro Rabago is a 78 y.o. year old male who Pharmacy has been consulted for vancomycin dosing for pneumonia. Based on the patient's indication and renal status this patient will be dosed based on a goal AUC of 400-600.     Renal function is currently declining.    Visit Vitals  BP (!) 79/41   Pulse 72   Temp 36.7 °C (98.1 °F) (Temporal)   Resp (!) 27        Lab Results   Component Value Date    CREATININE 2.20 (H) 01/07/2024    CREATININE 1.40 01/06/2024    CREATININE 1.40 (H) 01/05/2024    CREATININE 0.72 05/14/2021    CREATININE 0.74 05/13/2021    CREATININE 0.8 04/27/2021        Patient weight is No results found for: \"PTWEIGHT\"    No results found for: \"CULTURE\"     I/O last 3 completed shifts:  In: 2010 (19.4 mL/kg) [I.V.:1660 (16 mL/kg); IV Piggyback:350]  Out: 900 (8.7 mL/kg) [Urine:900 (0.2 mL/kg/hr)]  Weight: 103.7 kg   [unfilled]    No results found for: \"PATIENTTEMP\"       Assessment/Plan     Patient will not be given a loading dose.  Will initiate vancomycin maintenance,  1000 mg every 24 hours.    This dosing regimen is predicted by [employer] to result in the following pharmacokinetic parameters:  Loading dose: N/A  Regimen: 1000 mg IV every 24 hours.  Start time: 09:13 on 01/07/2024  Exposure target: AUC24 (range)400-600 mg/L.hr   AUC24,ss: 484 mg/L.hr  Probability of AUC24 > 400: 83 %  Ctrough,ss: 16.5 mg/L  Probability of Ctrough,ss > 20: 23 %  Probability of nephrotoxicity (Lodise ALEJANDRO 2009): 12 %    Follow-up level will be ordered on 1/8 at 0500 unless clinically indicated sooner.  Will continue to monitor renal function daily while on vancomycin and order serum creatinine at least every 48 hours if not already ordered.  Follow for continued vancomycin needs, clinical response, and signs/symptoms of toxicity.       Mela Allen, PharmD       "

## 2024-01-07 NOTE — PROGRESS NOTES
"Navarro Rabago is a 78 y.o. male on day 1 of admission presenting with Sepsis (CMS/[facility]).    Subjective   Patient is a little confused today, overnight patient did need Levophed started.  Today blood pressures were little bit softer.  However patient does not have any acute complaints       Objective     Physical Exam  Generally no acute distress  HEENT PERRL EOMI  Cardiovascular S1-S2 heard regular rate rhythm  Lungs diminished  Abdomen bowel sounds present  Extremities no clubbing cyanosis edema  Last Recorded Vitals  Blood pressure 90/53, pulse 87, temperature 36.7 °C (98.1 °F), temperature source Temporal, resp. rate 26, height 1.956 m (6' 5\"), weight 104 kg (228 lb 9.9 oz), SpO2 92 %.  Intake/Output last 3 Shifts:  I/O last 3 completed shifts:  In: 3760 (36.3 mL/kg) [P.O.:1750; I.V.:1660 (16 mL/kg); IV Piggyback:350]  Out: 900 (8.7 mL/kg) [Urine:900 (0.2 mL/kg/hr)]  Weight: 103.7 kg     Relevant Results           This patient currently has cardiac telemetry ordered; if you would like to modify or discontinue the telemetry order, click here to go to the orders activity to modify/discontinue the order.    This patient has a urinary catheter   Reason for the urinary catheter remaining today? critically ill patient who need accurate urinary output measurements    Impression: 78-year-old gentleman with likely aspiration pneumonia and hypotension in the setting of a history of ischemic cardiomyopathy.    Plan:    1.  Pneumonia/sepsis  -Continue broad-spectrum antibiotics, small fluid boluses to address hypotension, attempt to wean pressors.  Need to monitor for signs of volume overload patient with an EF of around 35 to 40%.  Does not appear to be in failure at this time.  Discussed with cardiology  -Appreciate pulmonary input continue with pulmonary toilet antibiotics for pneumonia  -Diarrhea resolved, C. difficile negative  -White blood cell count decreasing    2.  Acute renal insufficiency  -Likely due to " hypotension, will continue IV fluids and boluses to keep blood pressure maps above 60    3.  Ischemic cardiomyopathy  -Holding all cardiac meds at this time in the setting of hypotension, continue monitor for signs of heart failure    4.  Aspiration  -Patient did not pass his swallow eval and speech recommended dysphagia diet with thickened liquids.  Patient get formal MBS tomorrow.    GI and DVT prophylaxis        Assessment/Plan   Principal Problem:    Sepsis (CMS/HCC)  Active Problems:    Lactic acid acidosis    Acute renal failure (ARF) (CMS/HCC)    Leukocytosis    Bilateral pneumonia    Chronic respiratory failure (CMS/HCC)    Hypokalemia    Dehydration    Transaminitis    Acute encephalopathy    Hypotension           I spent 25 minutes in the professional and overall care of this patient.      Ulises Tariq MD

## 2024-01-07 NOTE — CARE PLAN
Problem: Respiratory  Goal: Verbalize decreased shortness of breath this shift  Outcome: Progressing  Goal: Wean oxygen to maintain O2 saturation per order/standard this shift  Outcome: Progressing

## 2024-01-07 NOTE — PROGRESS NOTES
"Subjective Data:      Overnight Events:         Objective Data:  Last Recorded Vitals:  Vitals:    01/07/24 0600 01/07/24 0615 01/07/24 0630 01/07/24 0736   BP: 81/55 82/53 78/52 (!) 79/41   Pulse: 72 77 72    Resp: 24 (!) 29 (!) 27    Temp:    36.7 °C (98.1 °F)   TempSrc:    Temporal   SpO2: (!) 87% (!) 85% (!) 87% 91%   Weight:       Height:           Last Labs:  CBC - 1/7/2024:  5:30 AM  12.9 9.9 143    27.9      CMP - 1/7/2024:  5:30 AM  7.6 5.6 221 --- 1.4   _ 2.2 68 99      PTT - No results in last year.  _   _ _     HGBA1C   Date/Time Value Ref Range Status   06/19/2023 02:07 PM 5.5 4.3 - 5.6 % Final     Comment:     American Diabetes Association guidelines indicate that patients with HgbA1c in the range 5.7-6.4% are at increased risk for development of diabetes, and intervention by lifestyle modification may be beneficial. HgbA1c greater or equal to 6.5% is considered diagnostic of diabetes.   03/09/2023 12:45 PM 5.5 4.3 - 5.6 % Final     Comment:     American Diabetes Association guidelines indicate that patients with HgbA1c in the range 5.7-6.4% are at increased risk for development of diabetes, and intervention by lifestyle modification may be beneficial. HgbA1c greater or equal to 6.5% is considered diagnostic of diabetes.      Last I/O:  I/O last 3 completed shifts:  In: 2010 (19.4 mL/kg) [I.V.:1660 (16 mL/kg); IV Piggyback:350]  Out: 900 (8.7 mL/kg) [Urine:900 (0.2 mL/kg/hr)]  Weight: 103.7 kg     Past Cardiology Tests (Last 3 Years):  EKG:  No results found for this or any previous visit from the past 1095 days.    Echo:  No results found for this or any previous visit from the past 1095 days.    Ejection Fractions:  No results found for: \"EF\"  Cath:  No results found for this or any previous visit from the past 1095 days.    Stress Test:  No results found for this or any previous visit from the past 1095 days.    Cardiac Imaging:  No results found for this or any previous visit from the past 1095 " days.      Inpatient Medications:  Scheduled medications   Medication Dose Route Frequency    apixaban  5 mg oral q12h    aspirin  81 mg oral Daily    azithromycin  500 mg intravenous q24h DARIA    ferrous sulfate (325 mg ferrous sulfate)  1 tablet oral Daily with breakfast    finasteride  5 mg oral Daily    ipratropium-albuteroL  3 mL nebulization 4x daily    levothyroxine  50 mcg oral Daily    pantoprazole  40 mg intravenous Daily    piperacillin-tazobactam  3.375 g intravenous q6h    sodium chloride  250 mL intravenous Once    vancomycin  15 mg/kg intravenous q12h     PRN medications   Medication    benzocaine-menthol    calcium carbonate    dextromethorphan-guaifenesin    dextrose 10 % in water (D10W)    dextrose    glucagon    guaiFENesin    ondansetron ODT    Or    ondansetron    oxygen     Continuous Medications   Medication Dose Last Rate    norepinephrine  0.01-1 mcg/kg/min 0.01 mcg/kg/min (01/07/24 0600)    potassium chloride in 0.9%NaCl  100 mL/hr 100 mL/hr (01/07/24 0225)       Physical Exam:  Patient is a elderly white male sitting in bed conversant not short of breath on standard O2 nasal cannula.  He has an occasional congested cough.  JVP not elevated carotid impulses 2+  Chest with fair air movement scattered expiratory coarse wheeze  Cardiac rhythm irregularly irregular mildly variable S1-S2 normal no gallop or rub  Abdomen is benign  No peripheral edema.     Assessment/Plan   1/6: The patient is a 78-year-old white male with a history of longstanding coronary artery disease with multiple PCI procedures.  He does have ischemic congestive cardiomyopathy with an echocardiogram performed on 9/26/2023 estimating his LV ejection fraction of 35-40% with 2+ mitral valve regurgitation moderate left atrial enlargement mild RV chamber dilatation and a normal estimated PA systolic pressure.  He also has a history of longstanding persistent now permanent atrial fibrillation for which she underwent multiple  electrical DC cardioversions and a radiofrequency ablation procedure.  He is currently on a rate control strategy.  He has a history of former smoking COPD with O2 dependence.  He is admitted with increasing weakness shortness of breath cough with evidence of right-sided pneumonia by the initial chest CT for which she has been started on empiric IV antibiotics.  He does remain in atrial fibrillation borderline increase in ventricular rate and will begin low-dose metoprolol 25 mg twice daily.  He has been continued on Eliquis anticoagulation 5 mg twice daily along with a low-dose aspirin for CAD as well as statin therapy.  Will continue the modest IV fluid hydration given the transient hypotension in the emergency room with the potassium supplement.  Will check a repeat echocardiogram.  Follow-up chest x-ray tomorrow.     1/7: Patient's seen and events reviewed in detail.  He is lying supine somewhat fatigued no overt respiratory distress on nasal cannula.  He did develop hypotension with systolic blood pressure values in the lower 80 mmHg range.  He was given 500 cc of IV normal saline fluid bolus remains on the maintenance infusion at 100 cc/h.  He was placed on low-dose Levophed for pressure support.  Comprehensive metabolic panel noted of both for acute on chronic renal insuf transaminases remain elevated  ALT 68 bilirubin 1.4.  Transaminitis presumably related to sepsis hypotension.  Gastroenterology is following.  Patient remains in the atrial fibrillation with controlled ventricular rate and low-dose metoprolol.  Will reduce dose of Eliquis to 2.5 mg twice daily.  Acute on chronic renal insufficiency presumably due to sepsis and hypotension creatinine is risen from 1.4-2.2 today and urine output has diminished.  The repeat chest x-ray again demonstrates infiltration consolidation within the right lung predominantly centrally and in the right upper lobe.  Patient currently on IV Zosyn azithromycin and  vancomycin.    Peripheral IV 01/05/24 20 G Distal;Left;Posterior Forearm (Active)   Site Assessment Clean;Dry;Intact 01/07/24 0400   Dressing Status Clean;Dry;Occlusive 01/07/24 0400   Number of days: 2       Peripheral IV 01/05/24 16 G Right Forearm (Active)   Site Assessment Clean;Dry;Intact 01/07/24 0400   Dressing Status Clean;Dry;Occlusive 01/07/24 0400   Number of days: 2       Urethral Catheter Coude 18 Fr. (Active)   Site Assessment Clean;Skin intact 01/07/24 0336   Number of days: 1       Code Status:  Full Code    I spent  minutes in the professional and overall care of this patient.        Lb Nance MD

## 2024-01-07 NOTE — PROGRESS NOTES
Navarro Rabago is a 78 y.o. male on day 1 of admission presenting with Sepsis (CMS/HCC).    Subjective   Interval History:   Afebrile  Confused, intermittently  Interval placement on pressors  Vancomycin and azithromycin added    Review of Systems   Unable to perform ROS: Mental status change       Objective   Range of Vitals (last 24 hours)  Heart Rate:  []   Temp:  [36.5 °C (97.7 °F)-37.4 °C (99.3 °F)]   Resp:  [9-35]   BP: ()/(26-69)   SpO2:  [78 %-99 %]   Daily Weight  01/06/24 : 104 kg (228 lb 9.9 oz)    Body mass index is 27.11 kg/m².    Physical Exam  Constitutional:       Comments: Awake, confused  HENT:      Head: Normocephalic and atraumatic.      Nose: Nose normal.      Mouth/Throat:      Mouth: Mucous membranes are moist.      Pharynx: Oropharynx is clear.   Eyes:      Extraocular Movements: Extraocular movements intact.      Conjunctiva/sclera: Conjunctivae normal.   Cardiovascular:      Rate and Rhythm: Normal rate. Rhythm irregular.   Pulmonary:      Effort: Pulmonary effort is normal.      Breath sounds: Rhonchi present. No wheezing.   Abdominal:      General: Bowel sounds are normal.      Palpations: Abdomen is soft.      Tenderness: There is no abdominal tenderness.   Musculoskeletal:         General: No swelling. Normal range of motion.      Cervical back: Normal range of motion.   Skin:     General: Skin is warm and dry.   Neurological:      General: No focal deficit present.   Psychiatric:         Mood and Affect: Mood normal.     Antibiotics  sodium chloride 0.9% infusion  heparin (porcine) injection 5,000 Units  sodium chloride 0.9 % with KCl 20 mEq/L infusion  calcium carbonate (Tums) chewable tablet 500 mg  ondansetron ODT (Zofran-ODT) disintegrating tablet 4 mg  ondansetron (Zofran) injection 4 mg  benzocaine-menthol (Cepastat Sore Throat) 15-3.6 mg lozenge 1 lozenge  guaiFENesin (Mucinex) 12 hr tablet 600 mg  dextromethorphan-guaifenesin (Robitussin DM)  mg/5 mL oral  liquid 5 mL  dextrose 50 % injection 25 g  glucagon (Glucagen) injection 1 mg  dextrose 10 % in water (D10W) infusion  ipratropium-albuteroL (Duo-Neb) 0.5-2.5 mg/3 mL nebulizer solution 3 mL  oxygen (O2) therapy  piperacillin-tazobactam-dextrose (Zosyn) IV 3.375 g  pantoprazole (ProtoNix) injection 40 mg  ipratropium-albuteroL (Duo-Neb) 0.5-2.5 mg/3 mL nebulizer solution 3 mL  allopurinol (Zyloprim) tablet  apixaban (Eliquis) tablet  aspirin EC tablet  atorvastatin (Lipitor) tablet  bumetanide (Bumex) tablet  colchicine tablet  empagliflozin (Jardiance) tablet  fexofenadine (Allegra) tablet  finasteride (Propecia, Proscar) tablet  guaiFENesin (Mucinex) 12 hr tablet  levothyroxine (Synthroid, Levoxyl) tablet  nitroglycerin (Nitrostat) SL tablet  pantoprazole (ProtoNix) EC tablet  potassium chloride SA (Klor-Con M20) ER tablet  tamsulosin (Flomax) 24 hr capsule  vancomycin 1.5 mg in 300 mL (Xellia) IVPB 1.5 g      levothyroxine (Synthroid, Levoxyl) tablet 50 mcg  finasteride (Proscar) tablet 5 mg  ferrous sulfate (325 mg ferrous sulfate) tablet 1 tablet  aspirin EC tablet 81 mg  apixaban (Eliquis) tablet 5 mg  metoprolol tartrate (Lopressor) tablet 25 mg  potassium chloride 20 mEq in 100 mL IV premix  midodrine (Proamatine) tablet 5 mg  sodium chloride 0.9 % bolus 500 mL  norepinephrine (Levophed) 8 mg in dextrose 5% 250 mL (0.032 mg/mL) infusion (premix)  sodium chloride 0.9 % bolus 250 mL  sodium chloride 0.9 % bolus 250 mL  azithromycin (Zithromax) in dextrose 5 % in water (D5W) 250 mL  mg  vancomycin 1.5 mg in 300 mL (Xellia) IVPB 1.5 g  apixaban (Eliquis) tablet 2.5 mg  vancomycin (Xellia) 1 g in 200 mL (Xellia) IVPB 1,000 mg      Relevant Results  Labs  Results from last 72 hours   Lab Units 01/07/24  0530 01/06/24  0537 01/05/24  1726   WBC AUTO x10*3/uL 12.9* 15.0* 20.1*   HEMOGLOBIN g/dL 9.9* 10.9* 12.9*   HEMATOCRIT % 27.9* 31.3* 38.3*   PLATELETS AUTO x10*3/uL 143* 132* 158   NEUTROS PCT AUTO % 88.4   "--  88.4   LYMPHS PCT AUTO % 4.0  --  4.6   MONOS PCT AUTO % 6.1  --  5.6   EOS PCT AUTO % 0.0  --  0.0     Results from last 72 hours   Lab Units 01/07/24  0530 01/06/24  0537 01/05/24  1726   SODIUM mmol/L 132* 136 132*   POTASSIUM mmol/L 3.8 3.1* 3.4*   CHLORIDE mmol/L 102 101 93*   CO2 mmol/L 19* 21* 22   BUN mg/dL 42* 28* 26*   CREATININE mg/dL 2.20* 1.40 1.40*   GLUCOSE mg/dL 125* 108* 117*   CALCIUM mg/dL 7.6* 7.7* 8.2*   ANION GAP mmol/L 11 14 20   EGFR mL/min/1.73m*2 30* 52* 52*     Results from last 72 hours   Lab Units 01/07/24 0530 01/06/24  0537 01/05/24  1726   ALK PHOS U/L 99 100 107   BILIRUBIN TOTAL mg/dL 1.4* 1.6* 2.2*   PROTEIN TOTAL g/dL 5.6* 5.9 7.4   ALT U/L 68* 83* 106*   AST U/L 221* 339* 449*   ALBUMIN g/dL 2.2* 2.5* 3.4     Estimated Creatinine Clearance: 36 mL/min (A) (by C-G formula based on SCr of 2.2 mg/dL (H)).  No results found for: \"CRP\"  Microbiology  Reviewed-cultures pending  Imaging  XR chest 1 view    Result Date: 1/7/2024  Interpreted By:  Cathy Hernandez, STUDY: XR CHEST 1 VIEW 1/7/2024 7:36 am   INDICATION: Follow-up pneumonia   COMPARISON: 05/29/2014 as well as CT examination of the chest done on 01/05/2024.   ACCESSION NUMBER(S): [id number]   ORDERING CLINICIAN: EMILY MAKI   TECHNIQUE: AP erect view of the chest   FINDINGS: The heart is at the upper limits of normal in size. There is infiltrate and airspace consolidation observed centrally within the right lung with upper lobar predominance. There is also some patchy infiltrate at the left lung base. No obvious pleural abnormality is seen.       Infiltrate and airspace consolidation identified within the right lung most pronounced centrally and within the right upper lobe with mild left basilar infiltrate.   Signed by: Cathy Hernandez 1/7/2024 8:11 AM Dictation workstation:   [id number]    CT chest abdomen pelvis w IV contrast    Result Date: 1/5/2024  Interpreted By:  Yordy Urena, STUDY: CT CHEST ABDOMEN PELVIS W IV " CONTRAST;  1/5/2024 6:33 pm   INDICATION: Signs/Symptoms:abd pain, sob.   COMPARISON: None.   ACCESSION NUMBER(S): [id number]   ORDERING CLINICIAN: NELLA MELGOZA   TECHNIQUE: Contiguous axial images of the chest, abdomen and pelvis were obtained after the intravenous administration of  contrast. Coronal and sagittal reformatted images were obtained from the axial images.   FINDINGS: CT CHEST:   No axillary lymphadenopathy. 1.6 cm right paratracheal lymph node and 1.5 cm subcarinal lymph node. There is limited evaluation for hilar lymphadenopathy secondary stripping motion.   The heart is normal in size. Coronary artery vascular calcifications. No significant pericardial effusion.   There is pulmonary emphysematous change. There is consolidative opacity in the right upper lobe and right lower lobe compatible with pneumonia and also minimal opacity in the left lower lobe. Trace pleural effusions. No pneumothorax.   Multilevel degenerative change of the thoracic spine.     CT ABDOMEN PELVIS:   Evaluation of the abdomen and pelvis is limited secondary to patient motion. No evidence of liver mass. The gallbladder is present, not well evaluated secondary to motion. No dilatation common bile duct.   Atrophy of the pancreas.   No splenomegaly.   Question mild nodularity of the left adrenal gland. The right adrenal gland appears unremarkable.   Symmetric enhancement of the kidneys. Right renal cysts with the largest measuring 3.1 cm and several bowel subcentimeter hypodensity too small to characterize. No hydronephrosis.   Atherosclerotic calcification of the aorta and abdominal and pelvic vasculature.   No evidence of bowel obstruction. Evaluation the bowel is otherwise limited secondary patient motion.   Urinary bladder is underdistended and not well evaluated. 18 mm right posterior bladder diverticulum.   Postsurgical change of left hip arthroplasty resulting in beam hardening artifact and limiting evaluation the pelvis.    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       Consolidative opacity in the right upper lobe and lower lobe compatible with pneumonia and also mild left basilar opacity and trace pleural effusions.   No definite evidence of acute abnormality of the abdominal viscera within the limitations of the motion degraded examination.   MACRO: None   Signed by: Yordy Urena 1/5/2024 6:49 PM Dictation workstation:   [id number]     Assessment/Plan     Sepsis, with shock   acute renal failure-interval worsening  Pneumonia-MRSA PCR negative   Diarrhea-c.diff negative  Transaminitis-resolving  Leukocytosis-resolving  Chronic respiratory failure-at baseline 5LNC     Continue IV zosyn  IV vancomycin-will discontinue if sputum culture is negative  IV azithromycin for now  Follow-up blood cultures  Sputum culture  Monitor WBC and temperature  Monitor renal function  Oxygen as needed  Follow-up Mycoplasma IgM  Supportive care  Further recommendations based on pending work-up    Eduardo Higgins MD

## 2024-01-07 NOTE — PROGRESS NOTES
Critical Care Daily Progress        Subjective   Patient is a 78 y.o. male admitted on 1/6/2024 12:08 AM with the following indication(s) for ICU care in septic shock and acute respiratory failure secondary to bacterial pneumonia.     Interval History: Reported overall but confused per wife. initiated on Levophed overnight arterial catheter just placed by hospitalist..     Complaints: Patient without any significant complaints    Scheduled Medications:   apixaban, 2.5 mg, oral, q12h DARIA  aspirin, 81 mg, oral, Daily  azithromycin, 500 mg, intravenous, q24h DARIA  ferrous sulfate (325 mg ferrous sulfate), 1 tablet, oral, Daily with breakfast  finasteride, 5 mg, oral, Daily  ipratropium-albuteroL, 3 mL, nebulization, 4x daily  levothyroxine, 50 mcg, oral, Daily  pantoprazole, 40 mg, intravenous, Daily  piperacillin-tazobactam, 3.375 g, intravenous, q6h  vancomycin, 1,000 mg, intravenous, q24h DARIA         Continuous Medications:   norepinephrine, 0.01-1 mcg/kg/min, Last Rate: 0.07 mcg/kg/min (01/07/24 1200)  potassium chloride in 0.9%NaCl, 100 mL/hr, Last Rate: 100 mL/hr (01/07/24 0225)         PRN Medications:   PRN medications: benzocaine-menthol, calcium carbonate, dextromethorphan-guaifenesin, dextrose 10 % in water (D10W), dextrose, glucagon, guaiFENesin, ondansetron ODT **OR** ondansetron, oxygen    Objective   Vitals:  Most Recent:  Vitals:    01/07/24 1400   BP: 81/63   Pulse: 77   Resp: 18   Temp:    SpO2:        24hr Min/Max:  Temp  Min: 36.7 °C (98.1 °F)  Max: 37.2 °C (99 °F)  Pulse  Min: 61  Max: 92  BP  Min: 64/26  Max: 143/121  Resp  Min: 9  Max: 35  SpO2  Min: 82 %  Max: 99 %    LDA:  Urethral Catheter Coude 18 Fr. (Active)   Placement Date/Time: 01/06/24 0300   Hand Hygiene Completed: Yes  Catheter Type: Coude  Tube Size (Fr.): 18 Fr.   Number of days: 1         Vent settings:  FiO2 (%):  [40 %] 40 %    Hemodynamic parameters for last 24 hours:       I/O:  I/O last 2 completed shifts:  In: 3321.7 (32  mL/kg) [P.O.:1750; I.V.:1271.7 (12.3 mL/kg); IV Piggyback:300]  Out: 550 (5.3 mL/kg) [Urine:550 (0.2 mL/kg/hr)]  Weight: 103.7 kg     Physical Exam:   Vital signs reviewed  More alert for me than yesterday, elderly, wife at the bedside  Nasal cannula  Lungs are diminished with slight rhonchi bilaterally  Abdomen soft, nontender  No CCE  Normal mood and affect        Lab/Radiology/Diagnostic Review:  Results for orders placed or performed during the hospital encounter of 01/06/24 (from the past 24 hour(s))   CBC and Auto Differential   Result Value Ref Range    WBC 12.9 (H) 4.4 - 11.3 x10*3/uL    nRBC 0.0 0.0 - 0.0 /100 WBCs    RBC 3.33 (L) 4.50 - 5.90 x10*6/uL    Hemoglobin 9.9 (L) 13.5 - 17.5 g/dL    Hematocrit 27.9 (L) 41.0 - 52.0 %    MCV 84 80 - 100 fL    MCH 29.7 26.0 - 34.0 pg    MCHC 35.5 32.0 - 36.0 g/dL    RDW 15.6 (H) 11.5 - 14.5 %    Platelets 143 (L) 150 - 450 x10*3/uL    Neutrophils % 88.4 40.0 - 80.0 %    Immature Granulocytes %, Automated 1.3 (H) 0.0 - 0.9 %    Lymphocytes % 4.0 13.0 - 44.0 %    Monocytes % 6.1 2.0 - 10.0 %    Eosinophils % 0.0 0.0 - 6.0 %    Basophils % 0.2 0.0 - 2.0 %    Neutrophils Absolute 11.38 (H) 1.60 - 5.50 x10*3/uL    Immature Granulocytes Absolute, Automated 0.17 0.00 - 0.50 x10*3/uL    Lymphocytes Absolute 0.51 (L) 0.80 - 3.00 x10*3/uL    Monocytes Absolute 0.78 0.05 - 0.80 x10*3/uL    Eosinophils Absolute 0.00 0.00 - 0.40 x10*3/uL    Basophils Absolute 0.02 0.00 - 0.10 x10*3/uL   Comprehensive metabolic panel   Result Value Ref Range    Glucose 125 (H) 65 - 99 mg/dL    Sodium 132 (L) 133 - 145 mmol/L    Potassium 3.8 3.4 - 5.1 mmol/L    Chloride 102 97 - 107 mmol/L    Bicarbonate 19 (L) 24 - 31 mmol/L    Urea Nitrogen 42 (H) 8 - 25 mg/dL    Creatinine 2.20 (H) 0.40 - 1.60 mg/dL    eGFR 30 (L) >60 mL/min/1.73m*2    Calcium 7.6 (L) 8.5 - 10.4 mg/dL    Albumin 2.2 (L) 3.5 - 5.0 g/dL    Alkaline Phosphatase 99 35 - 125 U/L    Total Protein 5.6 (L) 5.9 - 7.9 g/dL     (H)  5 - 40 U/L    Bilirubin, Total 1.4 (H) 0.1 - 1.2 mg/dL    ALT 68 (H) 5 - 40 U/L    Anion Gap 11 <=19 mmol/L   Vancomycin   Result Value Ref Range    Vancomycin 7.0 (L) 10.0 - 20.0 ug/mL   POCT GLUCOSE   Result Value Ref Range    POCT Glucose 114 (H) 74 - 99 mg/dL     XR chest 1 view    Result Date: 1/7/2024  Interpreted By:  Cathy Hernandez, STUDY: XR CHEST 1 VIEW 1/7/2024 7:36 am   INDICATION: Follow-up pneumonia   COMPARISON: 05/29/2014 as well as CT examination of the chest done on 01/05/2024.   ACCESSION NUMBER(S): [id number]   ORDERING CLINICIAN: EMILY MAKI   TECHNIQUE: AP erect view of the chest   FINDINGS: The heart is at the upper limits of normal in size. There is infiltrate and airspace consolidation observed centrally within the right lung with upper lobar predominance. There is also some patchy infiltrate at the left lung base. No obvious pleural abnormality is seen.       Infiltrate and airspace consolidation identified within the right lung most pronounced centrally and within the right upper lobe with mild left basilar infiltrate.   Signed by: Cathy Hernandez 1/7/2024 8:11 AM Dictation workstation:   [id number]    CT chest abdomen pelvis w IV contrast    Result Date: 1/5/2024  Interpreted By:  Yordy Urena, STUDY: CT CHEST ABDOMEN PELVIS W IV CONTRAST;  1/5/2024 6:33 pm   INDICATION: Signs/Symptoms:abd pain, sob.   COMPARISON: None.   ACCESSION NUMBER(S): [id number]   ORDERING CLINICIAN: NELLA MELGOZA   TECHNIQUE: Contiguous axial images of the chest, abdomen and pelvis were obtained after the intravenous administration of  contrast. Coronal and sagittal reformatted images were obtained from the axial images.   FINDINGS: CT CHEST:   No axillary lymphadenopathy. 1.6 cm right paratracheal lymph node and 1.5 cm subcarinal lymph node. There is limited evaluation for hilar lymphadenopathy secondary stripping motion.   The heart is normal in size. Coronary artery vascular calcifications. No significant  pericardial effusion.   There is pulmonary emphysematous change. There is consolidative opacity in the right upper lobe and right lower lobe compatible with pneumonia and also minimal opacity in the left lower lobe. Trace pleural effusions. No pneumothorax.   Multilevel degenerative change of the thoracic spine.     CT ABDOMEN PELVIS:   Evaluation of the abdomen and pelvis is limited secondary to patient motion. No evidence of liver mass. The gallbladder is present, not well evaluated secondary to motion. No dilatation common bile duct.   Atrophy of the pancreas.   No splenomegaly.   Question mild nodularity of the left adrenal gland. The right adrenal gland appears unremarkable.   Symmetric enhancement of the kidneys. Right renal cysts with the largest measuring 3.1 cm and several bowel subcentimeter hypodensity too small to characterize. No hydronephrosis.   Atherosclerotic calcification of the aorta and abdominal and pelvic vasculature.   No evidence of bowel obstruction. Evaluation the bowel is otherwise limited secondary patient motion.   Urinary bladder is underdistended and not well evaluated. 18 mm right posterior bladder diverticulum.   Postsurgical change of left hip arthroplasty resulting in beam hardening artifact and limiting evaluation the pelvis.   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       Consolidative opacity in the right upper lobe and lower lobe compatible with pneumonia and also mild left basilar opacity and trace pleural effusions.   No definite evidence of acute abnormality of the abdominal viscera within the limitations of the motion degraded examination.   MACRO: None   Signed by: Yordy Urena 1/5/2024 6:49 PM Dictation workstation:   [id number]             Assessment/Plan   Principal Problem:    Sepsis (CMS/HCC)  Active Problems:    Lactic acid acidosis    Acute renal failure (ARF) (CMS/HCC)    Leukocytosis    Bilateral pneumonia    Chronic respiratory failure (CMS/HCC)     Hypokalemia    Dehydration    Transaminitis    Acute encephalopathy    Hypotension       Bacterial pneumonia leading to septic shock and acute respiratory failure.    Given his cardiomyopathy, initiation of norepinephrine to maintain MAP of greater than 60 to 65 mmHg  Continue with empiric antibiotics  Eliquis  Follow-up on cultures  Speech evaluation, plans for MBS  Guarded prognosis    Discussed with hospitalist    Jaren Zapien MD  Pulmonary & Critical care Medicine  [facility]

## 2024-01-08 ENCOUNTER — APPOINTMENT (OUTPATIENT)
Dept: RADIOLOGY | Facility: HOSPITAL | Age: 79
DRG: 871 | End: 2024-01-08
Payer: MEDICARE

## 2024-01-08 ENCOUNTER — APPOINTMENT (OUTPATIENT)
Dept: CARDIOLOGY | Facility: HOSPITAL | Age: 79
DRG: 871 | End: 2024-01-08
Payer: MEDICARE

## 2024-01-08 ENCOUNTER — APPOINTMENT (OUTPATIENT)
Dept: CARDIOLOGY | Facility: HOSPITAL | Age: 79
End: 2024-01-08
Payer: MEDICARE

## 2024-01-08 LAB
ALBUMIN SERPL-MCNC: 2.1 G/DL (ref 3.5–5)
ALP BLD-CCNC: 99 U/L (ref 35–125)
ALT SERPL-CCNC: 59 U/L (ref 5–40)
ANION GAP SERPL CALC-SCNC: 13 MMOL/L
AORTIC VALVE MEAN GRADIENT: 6
AORTIC VALVE PEAK VELOCITY: 1.64
AST SERPL-CCNC: 151 U/L (ref 5–40)
AV PEAK GRADIENT: 10.8
AVA (PEAK VEL): 1.89
AVA (VTI): 1.95
BACTERIA SPEC RESP CULT: ABNORMAL
BASOPHILS # BLD AUTO: 0.02 X10*3/UL (ref 0–0.1)
BASOPHILS NFR BLD AUTO: 0.1 %
BILIRUB SERPL-MCNC: 1.3 MG/DL (ref 0.1–1.2)
BUN SERPL-MCNC: 51 MG/DL (ref 8–25)
CALCIUM SERPL-MCNC: 7.7 MG/DL (ref 8.5–10.4)
CHLORIDE SERPL-SCNC: 98 MMOL/L (ref 97–107)
CO2 SERPL-SCNC: 17 MMOL/L (ref 24–31)
CREAT SERPL-MCNC: 2.6 MG/DL (ref 0.4–1.6)
EJECTION FRACTION APICAL 4 CHAMBER: 54.1
EJECTION FRACTION: 54
EOSINOPHIL # BLD AUTO: 0.03 X10*3/UL (ref 0–0.4)
EOSINOPHIL NFR BLD AUTO: 0.2 %
ERYTHROCYTE [DISTWIDTH] IN BLOOD BY AUTOMATED COUNT: 16.2 % (ref 11.5–14.5)
GFR SERPL CREATININE-BSD FRML MDRD: 25 ML/MIN/1.73M*2
GLUCOSE SERPL-MCNC: 115 MG/DL (ref 65–99)
GRAM STN SPEC: ABNORMAL
HCT VFR BLD AUTO: 28.8 % (ref 41–52)
HGB BLD-MCNC: 9.9 G/DL (ref 13.5–17.5)
IMM GRANULOCYTES # BLD AUTO: 0.32 X10*3/UL (ref 0–0.5)
IMM GRANULOCYTES NFR BLD AUTO: 1.8 % (ref 0–0.9)
LEFT VENTRICLE INTERNAL DIMENSION DIASTOLE: 5.65 (ref 3.5–6)
LEFT VENTRICULAR OUTFLOW TRACT DIAMETER: 2
LYMPHOCYTES # BLD AUTO: 0.9 X10*3/UL (ref 0.8–3)
LYMPHOCYTES NFR BLD AUTO: 5 %
MCH RBC QN AUTO: 30.3 PG (ref 26–34)
MCHC RBC AUTO-ENTMCNC: 34.4 G/DL (ref 32–36)
MCV RBC AUTO: 88 FL (ref 80–100)
MONOCYTES # BLD AUTO: 1.27 X10*3/UL (ref 0.05–0.8)
MONOCYTES NFR BLD AUTO: 7.1 %
NEUTROPHILS # BLD AUTO: 15.29 X10*3/UL (ref 1.6–5.5)
NEUTROPHILS NFR BLD AUTO: 85.8 %
NRBC BLD-RTO: 0 /100 WBCS (ref 0–0)
PLATELET # BLD AUTO: 198 X10*3/UL (ref 150–450)
POTASSIUM SERPL-SCNC: 3.5 MMOL/L (ref 3.4–5.1)
PROT SERPL-MCNC: 5.5 G/DL (ref 5.9–7.9)
RBC # BLD AUTO: 3.27 X10*6/UL (ref 4.5–5.9)
RIGHT VENTRICLE PEAK SYSTOLIC PRESSURE: 43
SODIUM SERPL-SCNC: 128 MMOL/L (ref 133–145)
VANCOMYCIN SERPL-MCNC: 12 UG/ML (ref 10–20)
WBC # BLD AUTO: 17.8 X10*3/UL (ref 4.4–11.3)

## 2024-01-08 PROCEDURE — 2500000004 HC RX 250 GENERAL PHARMACY W/ HCPCS (ALT 636 FOR OP/ED): Performed by: INTERNAL MEDICINE

## 2024-01-08 PROCEDURE — 2500000005 HC RX 250 GENERAL PHARMACY W/O HCPCS: Performed by: INTERNAL MEDICINE

## 2024-01-08 PROCEDURE — C9113 INJ PANTOPRAZOLE SODIUM, VIA: HCPCS | Performed by: INTERNAL MEDICINE

## 2024-01-08 PROCEDURE — 85025 COMPLETE CBC W/AUTO DIFF WBC: CPT | Performed by: INTERNAL MEDICINE

## 2024-01-08 PROCEDURE — 37799 UNLISTED PX VASCULAR SURGERY: CPT | Performed by: INTERNAL MEDICINE

## 2024-01-08 PROCEDURE — 80202 ASSAY OF VANCOMYCIN: CPT | Performed by: INTERNAL MEDICINE

## 2024-01-08 PROCEDURE — 93306 TTE W/DOPPLER COMPLETE: CPT | Performed by: INTERNAL MEDICINE

## 2024-01-08 PROCEDURE — 9420000001 HC RT PATIENT EDUCATION 5 MIN

## 2024-01-08 PROCEDURE — 2500000001 HC RX 250 WO HCPCS SELF ADMINISTERED DRUGS (ALT 637 FOR MEDICARE OP): Performed by: NURSE PRACTITIONER

## 2024-01-08 PROCEDURE — 80053 COMPREHEN METABOLIC PANEL: CPT | Performed by: INTERNAL MEDICINE

## 2024-01-08 PROCEDURE — 93306 TTE W/DOPPLER COMPLETE: CPT

## 2024-01-08 PROCEDURE — 2500000001 HC RX 250 WO HCPCS SELF ADMINISTERED DRUGS (ALT 637 FOR MEDICARE OP): Performed by: HOSPITALIST

## 2024-01-08 PROCEDURE — 97530 THERAPEUTIC ACTIVITIES: CPT | Mod: GP

## 2024-01-08 PROCEDURE — 2500000001 HC RX 250 WO HCPCS SELF ADMINISTERED DRUGS (ALT 637 FOR MEDICARE OP): Performed by: INTERNAL MEDICINE

## 2024-01-08 PROCEDURE — 97163 PT EVAL HIGH COMPLEX 45 MIN: CPT | Mod: GP

## 2024-01-08 PROCEDURE — 92526 ORAL FUNCTION THERAPY: CPT | Mod: GN | Performed by: SPEECH-LANGUAGE PATHOLOGIST

## 2024-01-08 PROCEDURE — 97166 OT EVAL MOD COMPLEX 45 MIN: CPT | Mod: GO

## 2024-01-08 PROCEDURE — 99232 SBSQ HOSP IP/OBS MODERATE 35: CPT | Performed by: INTERNAL MEDICINE

## 2024-01-08 PROCEDURE — 74230 X-RAY XM SWLNG FUNCJ C+: CPT

## 2024-01-08 PROCEDURE — 92611 MOTION FLUOROSCOPY/SWALLOW: CPT | Mod: GN | Performed by: SPEECH-LANGUAGE PATHOLOGIST

## 2024-01-08 PROCEDURE — 2500000002 HC RX 250 W HCPCS SELF ADMINISTERED DRUGS (ALT 637 FOR MEDICARE OP, ALT 636 FOR OP/ED): Performed by: INTERNAL MEDICINE

## 2024-01-08 PROCEDURE — 94640 AIRWAY INHALATION TREATMENT: CPT

## 2024-01-08 PROCEDURE — 93005 ELECTROCARDIOGRAM TRACING: CPT

## 2024-01-08 PROCEDURE — 76770 US EXAM ABDO BACK WALL COMP: CPT

## 2024-01-08 PROCEDURE — 2020000001 HC ICU ROOM DAILY

## 2024-01-08 RX ORDER — NYSTATIN 100000 [USP'U]/ML
5 SUSPENSION ORAL 4 TIMES DAILY
Status: DISCONTINUED | OUTPATIENT
Start: 2024-01-08 | End: 2024-01-19 | Stop reason: HOSPADM

## 2024-01-08 RX ORDER — VANCOMYCIN 1.5 G/300ML
1500 INJECTION, SOLUTION INTRAVENOUS ONCE
Status: COMPLETED | OUTPATIENT
Start: 2024-01-08 | End: 2024-01-08

## 2024-01-08 RX ORDER — MIDODRINE HYDROCHLORIDE 5 MG/1
5 TABLET ORAL EVERY 8 HOURS
Status: DISCONTINUED | OUTPATIENT
Start: 2024-01-08 | End: 2024-01-18

## 2024-01-08 RX ADMIN — IPRATROPIUM BROMIDE AND ALBUTEROL SULFATE 3 ML: 2.5; .5 SOLUTION RESPIRATORY (INHALATION) at 14:54

## 2024-01-08 RX ADMIN — APIXABAN 2.5 MG: 2.5 TABLET, FILM COATED ORAL at 09:53

## 2024-01-08 RX ADMIN — IPRATROPIUM BROMIDE AND ALBUTEROL SULFATE 3 ML: 2.5; .5 SOLUTION RESPIRATORY (INHALATION) at 11:53

## 2024-01-08 RX ADMIN — MIDODRINE HYDROCHLORIDE 5 MG: 5 TABLET ORAL at 18:04

## 2024-01-08 RX ADMIN — LEVOTHYROXINE SODIUM 50 MCG: 0.05 TABLET ORAL at 06:10

## 2024-01-08 RX ADMIN — VANCOMYCIN 1.5 G: 1.5 INJECTION, SOLUTION INTRAVENOUS at 09:54

## 2024-01-08 RX ADMIN — BARIUM SULFATE 10 ML: 400 SUSPENSION ORAL at 13:23

## 2024-01-08 RX ADMIN — PIPERACILLIN SODIUM AND TAZOBACTAM SODIUM 3.38 G: 3; .375 INJECTION, SOLUTION INTRAVENOUS at 06:10

## 2024-01-08 RX ADMIN — FINASTERIDE 5 MG: 5 TABLET, FILM COATED ORAL at 09:54

## 2024-01-08 RX ADMIN — PANTOPRAZOLE SODIUM 40 MG: 40 INJECTION, POWDER, FOR SOLUTION INTRAVENOUS at 09:54

## 2024-01-08 RX ADMIN — IPRATROPIUM BROMIDE AND ALBUTEROL SULFATE 3 ML: 2.5; .5 SOLUTION RESPIRATORY (INHALATION) at 07:20

## 2024-01-08 RX ADMIN — ASPIRIN 81 MG: 81 TABLET, COATED ORAL at 09:53

## 2024-01-08 RX ADMIN — MIDODRINE HYDROCHLORIDE 5 MG: 5 TABLET ORAL at 12:17

## 2024-01-08 RX ADMIN — BARIUM SULFATE 10 ML: 0.81 POWDER, FOR SUSPENSION ORAL at 13:24

## 2024-01-08 RX ADMIN — NYSTATIN 500000 UNITS: 100000 SUSPENSION ORAL at 21:04

## 2024-01-08 RX ADMIN — PIPERACILLIN SODIUM AND TAZOBACTAM SODIUM 3.38 G: 3; .375 INJECTION, SOLUTION INTRAVENOUS at 14:01

## 2024-01-08 RX ADMIN — IPRATROPIUM BROMIDE AND ALBUTEROL SULFATE 3 ML: 2.5; .5 SOLUTION RESPIRATORY (INHALATION) at 19:26

## 2024-01-08 RX ADMIN — FERROUS SULFATE TAB 325 MG (65 MG ELEMENTAL FE) 1 TABLET: 325 (65 FE) TAB at 09:54

## 2024-01-08 RX ADMIN — PIPERACILLIN SODIUM AND TAZOBACTAM SODIUM 3.38 G: 3; .375 INJECTION, SOLUTION INTRAVENOUS at 01:17

## 2024-01-08 RX ADMIN — Medication: at 19:30

## 2024-01-08 RX ADMIN — PIPERACILLIN SODIUM AND TAZOBACTAM SODIUM 3.38 G: 3; .375 INJECTION, SOLUTION INTRAVENOUS at 18:04

## 2024-01-08 RX ADMIN — BARIUM SULFATE 5 ML: 400 PASTE ORAL at 13:25

## 2024-01-08 RX ADMIN — SODIUM BICARBONATE 100 ML/HR: 84 INJECTION, SOLUTION INTRAVENOUS at 10:46

## 2024-01-08 RX ADMIN — AZITHROMYCIN MONOHYDRATE 500 MG: 500 INJECTION, POWDER, LYOPHILIZED, FOR SOLUTION INTRAVENOUS at 09:54

## 2024-01-08 RX ADMIN — NOREPINEPHRINE BITARTRATE 0.08 MCG/KG/MIN: 8 INJECTION, SOLUTION INTRAVENOUS at 04:19

## 2024-01-08 RX ADMIN — APIXABAN 2.5 MG: 2.5 TABLET, FILM COATED ORAL at 21:04

## 2024-01-08 RX ADMIN — NYSTATIN 500000 UNITS: 100000 SUSPENSION ORAL at 17:51

## 2024-01-08 RX ADMIN — SODIUM CHLORIDE AND POTASSIUM CHLORIDE 100 ML/HR: 9; 1.49 INJECTION, SOLUTION INTRAVENOUS at 04:20

## 2024-01-08 ASSESSMENT — ENCOUNTER SYMPTOMS
SEIZURES: 0
COUGH: 1
BACK PAIN: 0
FREQUENCY: 0
DYSURIA: 0
SLEEP DISTURBANCE: 0
PHOTOPHOBIA: 0
LIGHT-HEADEDNESS: 0
SINUS PRESSURE: 0
BRUISES/BLEEDS EASILY: 0
WEAKNESS: 0
SPEECH DIFFICULTY: 0
WOUND: 0
SHORTNESS OF BREATH: 1
HEADACHES: 0
NECK PAIN: 0
VOMITING: 0
SORE THROAT: 0
NAUSEA: 0
HEMATURIA: 0
FATIGUE: 1
CONSTIPATION: 0
FEVER: 0
CONFUSION: 0
JOINT SWELLING: 0
POLYDIPSIA: 0
DIZZINESS: 0
RECTAL PAIN: 0
NUMBNESS: 0
DIARRHEA: 0
CHILLS: 0
COLOR CHANGE: 0
ABDOMINAL PAIN: 0
PALPITATIONS: 0
TREMORS: 0
APNEA: 0
BLOOD IN STOOL: 0
WHEEZING: 0
MYALGIAS: 0
ADENOPATHY: 0
HALLUCINATIONS: 0
POLYPHAGIA: 0
ARTHRALGIAS: 0

## 2024-01-08 ASSESSMENT — PAIN - FUNCTIONAL ASSESSMENT
PAIN_FUNCTIONAL_ASSESSMENT: 0-10

## 2024-01-08 ASSESSMENT — PAIN SCALES - GENERAL
PAINLEVEL_OUTOF10: 4
PAINLEVEL_OUTOF10: 4
PAINLEVEL_OUTOF10: 5 - MODERATE PAIN
PAINLEVEL_OUTOF10: 3
PAINLEVEL_OUTOF10: 4

## 2024-01-08 ASSESSMENT — COGNITIVE AND FUNCTIONAL STATUS - GENERAL
PERSONAL GROOMING: A LOT
STANDING UP FROM CHAIR USING ARMS: TOTAL
CLIMB 3 TO 5 STEPS WITH RAILING: TOTAL
MOVING FROM LYING ON BACK TO SITTING ON SIDE OF FLAT BED WITH BEDRAILS: TOTAL
WALKING IN HOSPITAL ROOM: TOTAL
DRESSING REGULAR LOWER BODY CLOTHING: TOTAL
MOVING TO AND FROM BED TO CHAIR: TOTAL
DAILY ACTIVITIY SCORE: 10
DRESSING REGULAR UPPER BODY CLOTHING: A LOT
EATING MEALS: A LOT
TURNING FROM BACK TO SIDE WHILE IN FLAT BAD: TOTAL
TOILETING: TOTAL
HELP NEEDED FOR BATHING: A LOT
MOBILITY SCORE: 6

## 2024-01-08 ASSESSMENT — ACTIVITIES OF DAILY LIVING (ADL)
ADL_ASSISTANCE: INDEPENDENT
ADL_ASSISTANCE: INDEPENDENT
LACK_OF_TRANSPORTATION: NO
BATHING_ASSISTANCE: MAXIMAL

## 2024-01-08 NOTE — PROGRESS NOTES
Physical Therapy                 Therapy Communication Note    Patient Name: Navarro Rabago  MRN: [id number]  Today's Date: 1/8/2024   1300  Discipline: Physical Therapy    Missed Visit Reason: Missed Visit Reason: Patient in a medical procedure (MBS Study)    Missed Time: Attempt    Comment:Pt cleared for PT Eval later.  Per nurse olvin at EOB only today. Pt more alert today.

## 2024-01-08 NOTE — PROGRESS NOTES
Speech-Language Pathology    Inpatient Speech Language Pathology Dysphagia Treatment note     Patient Name: Navarro Rabago  MRN: [id number]  : 1945  Today's Date: 24  Time Calculation  Start Time: 1030  Stop Time: 1046  Time Calculation (min): 16 min       Total Number of Visits: 1/3 (sw, SAT )    PLAN:  Skilled speech therapy for dysphagia treatment continues to be warranted to provide training and instruction regarding the use of compensatory swallow strategies, for pt/caregiver education in order to reduce risk of aspiration, dehydration and malnutrition. , to assess tolerance of diet , to determine ability to upgrade diet after PO trials with SLP     SLP Frequency: 3x per week  Discussed POC: Patient, Caregiver/family  Discussed Risks/Benefits: Yes  Patient/Caregiver Agreeable: Yes       MBSS TODAY AT 1:00.     Recommended Diet:      Liquid Diet Recommendations: Nectar thick/mildly thick (IDDS Level 2)  Compensatory strategies: small bites/sips, upright 90 degrees for intake   Medication administration: crushed or whole in puree    Subjective:  Pt. Seen at bedside for skilled dysphagia treatment.   Pain:    Denies pain        Oxygen Status:   nasal cannula      Goals:   1) Patient will tolerate recommended diet without observed clinical signs of aspiration,   2) Patient will demonstrate appropriate strategies for swallowing safety,   3) Patient will progress to advanced diet   4) Pt to participate in assessment of oropharyngeal swallow function via MBSS for assessment of possible aspiration and to determine least restrictive diet for meeting pt's nutritional and hydration needs.      SLP Assessment:  Pt participates in diagnostic trials of nectar thick liquids without overt s/s of aspiration.     Pt participates in lengthy discussion regarding need for swallowing assessment via MBSS. We discussed rationale, reasoning, procedure, and plan. Pt verbalizes understanding and agreement. All questions  answered to satisfaction.       Treatment Outcome:       Treatment Tolerance: Patient limited by fatigue   Prognosis: Good   Barriers: Cognition   Medical Staff Made Aware: Yes       Education:  Pt. Given skilled instruction on plan of care  Pt. gave verbal understanding

## 2024-01-08 NOTE — PROGRESS NOTES
Occupational Therapy    Evaluation    Patient Name: Navarro Rabago  MRN: [id number]  Today's Date: 1/8/2024  Time Calculation  Start Time: 1501  Stop Time: 1523  Time Calculation (min): 22 min    Assessment  IP OT Assessment  OT Assessment: 79 y/o male here with severe sepsis.  On eval, he presents significantly below baseline, requiring assist x2-3 for xfers, mobility and self care d/t profound deficits in strength, balance, activity tolerance and a high fear of falling.  Skilled OT services are required to maximize safety/IND prior to DC  Prognosis: Good  Barriers to Discharge: None  Evaluation/Treatment Tolerance: Patient limited by fatigue  Medical Staff Made Aware: Yes  End of Session Communication: Bedside nurse  End of Session Patient Position: Bed, 4 rail up    Plan:  Treatment Interventions: ADL retraining, Functional transfer training, UE strengthening/ROM, Patient/family training, Cognitive reorientation, Endurance training, Equipment evaluation/education, Compensatory technique education  OT Frequency: 4 times per week  OT Discharge Recommendations: Moderate intensity level of continued care  Equipment Recommended upon Discharge: Lift  OT Recommended Transfer Status: Assist of 2  OT - OK to Discharge: Yes    Subjective   Current Problem:  1. Sepsis (CMS/HCC)        2. Hypotension due to hypovolemia  Transthoracic Echo (TTE) Complete    Transthoracic Echo (TTE) Complete      3. Chronic respiratory failure with hypoxia (CMS/HCC)  Transthoracic Echo (TTE) Complete    Transthoracic Echo (TTE) Complete      4. Acute encephalopathy [G93.40]          General:  General  Reason for Referral: Decreased ADLs  Referred By: Dr. Bates  Past Medical History Relevant to Rehab: CHF, afib, CAD, COPD, HTN, hernia  Family/Caregiver Present: Yes  Caregiver Feedback: spouse in room  Co-Treatment: PT  Co-Treatment Reason: d/t increased need for skilled intervention  Prior to Session Communication: Bedside nurse  Patient  "Position Received: Bed, 4 rail up  Preferred Learning Style: verbal  General Comment: Cleared by nsg, pt met in supine, spouse in room, agreeable to OT session. very fearful of falling    Precautions:  Hearing/Visual Limitations: [place]  Medical Precautions: Fall precautions, Oxygen therapy device and L/min (on 6L O2 via NC)  Precautions Comment: + rock cath, IV, Rt wrist arterial line, 6L O2 via NC, telemetry    Vital Signs:  Heart Rate: 80  Heart Rate Source: Monitor  SpO2: 95 %  BP: (!) 107/49  MAP (mmHg): 62  BP Location: Right arm  BP Method: Arterial line  Patient Position: Lying    Pain:  Pain Assessment  Pain Assessment: 0-10  Pain Score: 4  Pain Type: Acute pain  Pain Location: Generalized    Objective   Cognition:  Overall Cognitive Status: Impaired (very fearful of falling at times. reassurance required. decreased insight/safety awareness at times)     Home Living:  Type of Home: House  Lives With: Spouse  Home Adaptive Equipment: Walker rolling or standard, Other (Comment) (rollators at home)  Home Layout: Two level  Home Access: Stairs to enter with rails  Entrance Stairs-Rails: Both  Entrance Stairs-Number of Steps: 3  Home Living Comments: laundry in basement, patient typically manages     Prior Function:  Level of [place]: Independent with ADLs and functional transfers, Independent with homemaking with ambulation  Receives Help From: Family  ADL Assistance: Independent  Homemaking Assistance: Independent (pt typically manages all IADLs per spouse)  Ambulatory Assistance: Independent  Vocational: Retired  Prior Function Comments: spouse provides PLOF. Pt typically active and IND. Uses rollator \"in the house but he doesn't want anyone else to see him use it.\" spouse drives? several falls prior to admission    ADL:  Eating Assistance: Maximal  Eating Deficit:  (per clinical judgement)  Grooming Assistance: Maximal (per clinical judgement d/t weakness in UEs)  Bathing Assistance: Maximal  UE Dressing " Assistance: Maximal  LE Dressing Assistance: Total  LE Dressing Deficit: Don/doff R sock, Don/doff L sock  Toileting Assistance with Device: Total  Toileting Deficit: Urinary Catheter  ADL Comments: bed level ADLs required at this time d/t significant weakness and decreased activity tolerance    Activity Tolerance:  Endurance: Decreased tolerance for upright activites  Activity Tolerance Comments: on 6L O2 via NC; fatigues quickly. returned to supine after ~5 mins EOB sitting    Bed Mobility/Transfers:   Bed Mobility  Bed Mobility: Yes  Bed Mobility 1  Bed Mobility 1: Supine to sitting  Level of Assistance 1: Dependent  Bed Mobility Comments 1: max A x2-3 for trunk/BLE mgmt, highly effortful completion  Bed Mobility 2  Bed Mobility  2: Sitting to supine  Level of Assistance 2: Dependent  Bed Mobility Comments 2: max A x2-3 for safe/controlled descent into supine    Transfers  Transfer: No (not safe to attempt. sat EOB for ~5 minutes with fair- sitting balance. requires mod-max A posteriorly to maintain midline upright/posture. fatigued quickly.)    Vision: Vision - Basic Assessment  Current Vision: Wears glasses all the time  Sensation:  Light Touch: Partial deficits in the RLE, Partial deficits in the LLE (chronic?)  Strength:  Strength Comments: BUEs 2-/5 grossly  Coordination:  Movements are Fluid and Coordinated: No   Hand Function:  Hand Function  Gross Grasp: Impaired  Coordination: Impaired  Extremities: RUE   RUE : Exceptions to WFL (2-/5 grossly) and LUE   LUE: Exceptions to WFL (2-/5 grossly)    Outcome Measures: [facility] Daily Activity  Putting on and taking off regular lower body clothing: Total  Bathing (including washing, rinsing, drying): A lot  Putting on and taking off regular upper body clothing: A lot  Toileting, which includes using toilet, bedpan or urinal: Total  Taking care of personal grooming such as brushing teeth: A lot  Eating Meals: A lot  Daily Activity - Total Score: 10      Education  Documentation  Precautions, taught by Kelechi Bravo, OT at 1/8/2024  3:41 PM.  Learner: Patient  Readiness: Acceptance  Method: Explanation  Response: Needs Reinforcement    Education Comments  No comments found.      Goals:   Encounter Problems       Encounter Problems (Active)       OT Goals       ADLs (Progressing)       Start:  01/08/24    Expected End:  01/22/24       Patient will perform ADLs with MOD A using AE/compensatory techniques as needed.          Sitting Tolerance (Progressing)       Start:  01/08/24    Expected End:  01/22/24       Patient will demonstrate improved sitting tolerance AEB completing EOB activities for >/= 15 minutes with supervision assist for stability.         UE Strengthening (Progressing)       Start:  01/08/24    Expected End:  01/22/24       Patient will improve UE strength to 3-/5 in preparation for functional transfers.

## 2024-01-08 NOTE — PROCEDURES
"Speech-Language Pathology        Modified Barium Swallow Study     Patient Name: Navarro Rabago  MRN: [id number]  : 1945  Today's Date: 24  Time Calculation  Start Time: 1300  Stop Time: 1330  Time Calculation (min): 30 min       Recommendations:  Minced/moist solids with thin liquids, NO STRAWS.     Upright seating, no straw. Meds crushed in puree. Please downgrade to nectar thick and notify SLP if s/s of aspiration present with PO intake.      Assessment/Impression:    Full detailed SLP/Radiologist Modified Barium Swallow study report can be found under Chart Review tab, Imaging tab and  titled \"[place] Modified Barium Swallow Study\"      Pt. Presenting with singular episode of aspiration during initial trial of thin liquid.  Penetration with thin liquid via straw (successive swallows) and with nectar liquids via tsp. No further penetration nor aspiration presents with remaining trials of thin liquids via cup, nectar liquids via cup, honey liquids, puree, nor cracker trials.     Pt noted with mild oral and pharyngeal dysphagia as characterized by prolonged mastication, premature posterior pharyngeal spillage, delayed swallow onset, and diminished tongue base retraction.     Plan:  Treatment/Interventions: Pharyngeal exercises, Oral motor exercises, Patient/family education, Bolus trials  SLP Plan: Skilled SLP warranted  SLP Frequency: 3x per week  Duration: current admission    Discussed POC: Patient, Nursing   Discussed Risks/Benefits: Yes  Patient/Caregiver Agreeable: Yes    Pain:   Rating 0-10: Denies pain  Location:        GOALS:  1) Patient will tolerate recommended diet without observed clinical signs of aspiration,   2) Patient will demonstrate appropriate strategies for swallowing safety,   3) Patient will progress to advanced diet   4) Pt to participate in assessment of oropharyngeal swallow function via MBSS for assessment of possible aspiration and to determine least restrictive diet for meeting " pt's nutritional and hydration needs.   GOAL MET 1/8/24    Education:   Pt. Educated on results of MBS study, recommended diet and recommended safe swallow strategies. Results relayed to FRANCISCO SALDIVAR verbally And Dr. Evan Bates via EPIC chat

## 2024-01-08 NOTE — PROGRESS NOTES
Physical Therapy    Physical Therapy Evaluation & Treatment    Patient Name: Navarro Rabago  MRN: [id number]  Today's Date: 1/8/2024   Time Calculation  Start Time: 1504  Stop Time: 1528  Time Calculation (min): 24 min    Assessment/Plan   PT Assessment  PT Assessment Results: Decreased strength, Decreased range of motion, Decreased endurance, Impaired balance, Decreased mobility, Decreased coordination, Decreased cognition, Impaired judgement, Decreased safety awareness  Rehab Prognosis: Fair  Evaluation/Treatment Tolerance: Patient limited by fatigue  Medical Staff Made Aware: Yes  End of Session Communication: Bedside nurse  End of Session Patient Position: Bed, 4 rail up, Alarm on   IP OR SWING BED PT PLAN  Inpatient or Swing Bed: Inpatient  PT Plan  Treatment/Interventions: Bed mobility, Transfer training, Gait training, Balance training, Strengthening, Endurance training, Range of motion, Therapeutic exercise, Therapeutic activity, Home exercise program  PT Plan: Skilled PT  PT Frequency: 5 times per week  PT Discharge Recommendations: Moderate intensity level of continued care  PT Recommended Transfer Status: Total assist  PT - OK to Discharge: Yes      Subjective     General Visit Information:  General  Reason for Referral: Impaired Mobility  Referred By: Dr. Bates  Past Medical History Relevant to Rehab: CHF, afib, CAD, COPD, HTN, hernia  Family/Caregiver Present: Yes  Caregiver Feedback: spouse  Co-Treatment: OT  Co-Treatment Reason: Safe Mobility  Prior to Session Communication: Bedside nurse  Patient Position Received: Bed, 4 rail up  Preferred Learning Style: verbal  General Comment: 78 year old male admits to the ICU with sepsis and acute encephalopathy  Home Living:  Home Living  Type of Home: House  Lives With: Spouse  Home Adaptive Equipment: Walker rolling or standard, Other (Comment)  Home Layout: Two level  Home Access: Stairs to enter with rails  Entrance Stairs-Rails: Both  Entrance  Stairs-Number of Steps: 3  Prior Level of Function:  Prior Function Per Pt/Caregiver Report  Level of [place]: Independent with ADLs and functional transfers, Independent with homemaking with ambulation  Receives Help From: Family  ADL Assistance: Independent  Homemaking Assistance: Independent  Ambulatory Assistance: Independent  Prior Function Comments: Spouse reports Pt had fallen at least 3x recently leading up to admission  Precautions:  Precautions  Hearing/Visual Limitations: [place]  Medical Precautions: Fall precautions, Oxygen therapy device and L/min    Objective   Pain:  Pain Assessment  Pain Assessment: 0-10  Pain Score: 4  Pain Type: Acute pain  Pain Location: Generalized  Pain Interventions: Repositioned  Cognition:  Cognition  Overall Cognitive Status: Impaired (Very fearful and emotional towards falling with movement. Limited insight into safety)  Orientation Level: Disoriented to place    General Assessments:  Activity Tolerance  Endurance: Decreased tolerance for upright activites  Activity Tolerance Comments: 6L NC    Sensation  Light Touch: Partial deficits in the RLE, Partial deficits in the LLE     Functional Assessments:  Bed Mobility  Bed Mobility: Yes  Bed Mobility 1  Bed Mobility 1: Supine to sitting  Level of Assistance 1: Maximum assistance (X3)  Bed Mobility Comments 1: Cues for technique  Bed Mobility 2  Bed Mobility  2: Sitting to supine  Level of Assistance 2: Maximum assistance (x3)  Bed Mobility Comments 2: cues for technique    Transfers  Transfer: No  Extremity/Trunk Assessments:  RLE   RLE : Exceptions to WFL  Strength RLE  RLE Overall Strength: Deficits  R Hip Flexion: 2/5  R Knee Flexion: 2/5  R Knee Extension: 2/5  LLE   LLE : Exceptions to WFL  Strength LLE  LLE Overall Strength: Deficits  L Hip Flexion: 2/5  L Knee Flexion: 2/5  L Knee Extension: 2/5  Treatments:  Bed Mobility  Bed Mobility: Yes  Bed Mobility 1  Bed Mobility 1: Supine to sitting  Level of Assistance 1:  Maximum assistance (X3)  Bed Mobility Comments 1: Cues for technique  Bed Mobility 2  Bed Mobility  2: Sitting to supine  Level of Assistance 2: Maximum assistance (x3)  Bed Mobility Comments 2: cues for technique    Transfers  Transfer: No    Dep x3 scooting in bed with use of draw sheet    Tolerates sitting EOB for ~5mins with variable assist. Very fearful of falling often emotional needing motivation and safety reassurance.  Assist ranging from Max A x3 to Mod A x2 at best. Heavy R and posterior lean.   Outcome Measures:  [facility] Basic Mobility  Turning from your back to your side while in a flat bed without using bedrails: Total  Moving from lying on your back to sitting on the side of a flat bed without using bedrails: Total  Moving to and from bed to chair (including a wheelchair): Total  Standing up from a chair using your arms (e.g. wheelchair or bedside chair): Total  To walk in hospital room: Total  Climbing 3-5 steps with railing: Total  Basic Mobility - Total Score: 6    Encounter Problems       Encounter Problems (Active)       Balance       Sitting Balance (Progressing)       Start:  01/08/24    Expected End:  01/22/24       Pt will demonstrate good sitting balance to promote safe engagement with out of bed activities           Standing Balance (Progressing)       Start:  01/08/24    Expected End:  01/22/24       Pt will demonstrate good static standing balance to promote safe participation with out of bed activity, transfers, and mobility              Mobility       Ambulation (Progressing)       Start:  01/08/24    Expected End:  01/22/24       Pt will ambulate 50' modified independent assist with walker to promote safe home mobility              Safety       Safe Mobility Techniques (Progressing)       Start:  01/08/24    Expected End:  01/22/24       Pt will correctly identify and demonstrate safe mobility techniques to reduce their risks for falls during their acute care stay               Transfers        Supine to sit (Progressing)       Start:  01/08/24    Expected End:  01/22/24       Pt will transfer supine to sitting at edge of bed with modified independent assist to promote acute care out of bed activity           Sit to stand (Progressing)       Start:  01/08/24    Expected End:  01/22/24       Pt will transfer sit to standing position with modified independent assist and walker to promote safe out of bed activity           Bed to chair (Progressing)       Start:  01/08/24    Expected End:  01/22/24       Pt will transfer from sitting edge of bed to the chair with modified independent assist and walker to promote out of bed activity and reduce the risks of prolonged acute care bedrest                  Education Documentation  Mobility Training, taught by Saeed Lock, PT at 1/8/2024  3:57 PM.  Learner: Patient  Readiness: Acceptance  Method: Explanation, Demonstration  Response: Needs Reinforcement  Comment: safe mobility techniques, fall risk management    Education Comments  No comments found.

## 2024-01-08 NOTE — PROGRESS NOTES
Navarro Rabago is a 78 y.o. male on day 2 of admission presenting with Sepsis (CMS/HCC).      Subjective   Patient looks awake/confused. Partially follow commands        Objective     Last Recorded Vitals  BP 97/60   Pulse 64   Temp 36.6 °C (97.9 °F) (Temporal)   Resp 23   Wt 104 kg (228 lb 9.9 oz)   SpO2 94%   Intake/Output last 3 Shifts:    Intake/Output Summary (Last 24 hours) at 1/8/2024 1051  Last data filed at 1/8/2024 0516  Gross per 24 hour   Intake 4292.35 ml   Output 650 ml   Net 3642.35 ml       Admission Weight  Weight: 104 kg (228 lb 9.9 oz) (01/06/24 0026)    Daily Weight  01/06/24 : 104 kg (228 lb 9.9 oz)    Image Results  XR chest 1 view  Narrative: Interpreted By:  Cathy Hernandez,   STUDY:  XR CHEST 1 VIEW 1/7/2024 7:36 am      INDICATION:  Follow-up pneumonia      COMPARISON:  05/29/2014 as well as CT examination of the chest done on 01/05/2024.      ACCESSION NUMBER(S):  [id number]      ORDERING CLINICIAN:  EMILY MAKI      TECHNIQUE:  AP erect view of the chest      FINDINGS:  The heart is at the upper limits of normal in size. There is  infiltrate and airspace consolidation observed centrally within the  right lung with upper lobar predominance. There is also some patchy  infiltrate at the left lung base. No obvious pleural abnormality is  seen.      Impression: Infiltrate and airspace consolidation identified within the right  lung most pronounced centrally and within the right upper lobe with  mild left basilar infiltrate.      Signed by: Cathy Hernandez 1/7/2024 8:11 AM  Dictation workstation:   [id number]      Physical Exam  Constitutional:       Appearance: He is ill-appearing and diaphoretic.   HENT:      Head: Normocephalic.      Mouth/Throat:      Mouth: Mucous membranes are moist.   Eyes:      Extraocular Movements: Extraocular movements intact.      Pupils: Pupils are equal, round, and reactive to light.   Cardiovascular:      Rate and Rhythm: Normal rate. Rhythm irregular.    Pulmonary:      Effort: Pulmonary effort is normal.      Breath sounds: Normal breath sounds.   Abdominal:      General: Abdomen is flat.   Musculoskeletal:         General: Normal range of motion.      Cervical back: Normal range of motion and neck supple.   Skin:     General: Skin is warm.         Relevant Results             Scheduled medications  apixaban, 2.5 mg, oral, q12h DARIA  aspirin, 81 mg, oral, Daily  azithromycin, 500 mg, intravenous, q24h DARIA  ferrous sulfate (325 mg ferrous sulfate), 1 tablet, oral, Daily with breakfast  finasteride, 5 mg, oral, Daily  ipratropium-albuteroL, 3 mL, nebulization, 4x daily  levothyroxine, 50 mcg, oral, Daily  midodrine, 5 mg, oral, q8h  pantoprazole, 40 mg, intravenous, Daily  piperacillin-tazobactam, 3.375 g, intravenous, q6h  vancomycin, 1,500 mg, intravenous, Once      Continuous medications  norepinephrine, 0.01-1 mcg/kg/min, Last Rate: 0.05 mcg/kg/min (01/08/24 0544)  sodium bicarbonate, 100 mL/hr, Last Rate: 100 mL/hr (01/08/24 1046)  potassium chloride in 0.9%NaCl, 100 mL/hr, Last Rate: 100 mL/hr (01/08/24 0516)      PRN medications  PRN medications: acetaminophen, benzocaine-menthol, calcium carbonate, dextromethorphan-guaifenesin, dextrose 10 % in water (D10W), dextrose, glucagon, guaiFENesin, ondansetron ODT **OR** ondansetron, oxygen    Assessment/Plan   This patient currently has cardiac telemetry ordered; if you would like to modify or discontinue the telemetry order, click here to go to the orders activity to modify/discontinue the order.              Principal Problem:    Sepsis (CMS/[facility])  Active Problems:    Lactic acid acidosis    Acute renal failure (ARF) (CMS/[facility])    Leukocytosis    Bilateral pneumonia    Chronic respiratory failure (CMS/HCC)    Hypokalemia    Dehydration    Transaminitis    Acute encephalopathy    Hypotension    Aspiration/pneumonia- atbs as ordered  Dysphagia- MBS per speech therapy today  Sepsis/hypotension- on levophed, continue  management in ICU  Encephalopathy/confusion= supportive care   A fib- rate controlled, on apixaban, appreciate cardiology recommendations   Worsening in renal function- IV hydration, follow up with BMP, nephrology on case, p[performing renal US    Medically stable, will follow along with consultants                  Evan Bates MD

## 2024-01-08 NOTE — CONSULTS
.Reason For Consult  Acute kidney injury and low urine output    History Of Present Illness  Navarro Rabago is a 78 y.o. male known to have a history of COPD, history of congestive heart failure Chronic atrial fibrillation however based on his lab data he does not have any underlying kidney disease the patient has not been feeling well over the last week or 10 days of generalized weakness difficulty ambulating and increased shortness of breath she was tested for COVID-19 RSV and influenza all of them are negative he did have a CT angiogram of the chest which confirmed the diagnosis of pneumonia asked to see the patient on renal consultation because of low urine output with acute kidney injury creatinine is up to 2.4 mg/dL patient is still short of breath however he denies any chest pain he denies any nausea vomiting diarrhea     Review of Systems  Review of Systems   Constitutional:  Positive for fatigue. Negative for chills and fever.   HENT:  Negative for sinus pressure, sore throat and tinnitus.    Eyes:  Negative for photophobia and visual disturbance.   Respiratory:  Positive for cough and shortness of breath. Negative for apnea and wheezing.    Cardiovascular:  Positive for leg swelling. Negative for chest pain and palpitations.   Gastrointestinal:  Negative for abdominal pain, blood in stool, constipation, diarrhea, nausea, rectal pain and vomiting.   Endocrine: Negative for cold intolerance, heat intolerance, polydipsia, polyphagia and polyuria.   Genitourinary:  Negative for decreased urine volume, dysuria, frequency, hematuria and urgency.   Musculoskeletal:  Negative for arthralgias, back pain, joint swelling, myalgias and neck pain.   Skin:  Negative for color change, pallor, rash and wound.   Neurological:  Negative for dizziness, tremors, seizures, syncope, speech difficulty, weakness, light-headedness, numbness and headaches.   Hematological:  Negative for adenopathy. Does not bruise/bleed easily.    Psychiatric/Behavioral:  Negative for confusion, hallucinations, sleep disturbance and suicidal ideas.         Past Medical History  He has a past medical history of Arthritis, CHF (congestive heart failure) (CMS/[facility]), COPD (chronic obstructive pulmonary disease) (CMS/[facility]), Coronary artery disease, Diabetes mellitus (CMS/[facility]), Disease of thyroid gland, and Hypertension.    Surgical History  He has a past surgical history that includes Tonsillectomy and Back surgery.     Social History  He reports that he has quit smoking. His smoking use included cigarettes. He has never used smokeless tobacco. No history on file for alcohol use and drug use.    Family History  No family history on file.     Current Facility-Administered Medications:     acetaminophen (Tylenol) tablet 650 mg, 650 mg, oral, q6h PRN, Josephine Castro MD, 650 mg at 01/07/24 2245    apixaban (Eliquis) tablet 2.5 mg, 2.5 mg, oral, q12h DARIA, Lb Nance MD, 2.5 mg at 01/08/24 0953    aspirin EC tablet 81 mg, 81 mg, oral, Daily, Serjio Matthews MD, 81 mg at 01/08/24 0953    azithromycin (Zithromax) in dextrose 5 % in water (D5W) 250 mL  mg, 500 mg, intravenous, q24h [facility], Ulises Tariq MD, Stopped at 01/08/24 1054    benzocaine-menthol (Cepastat Sore Throat) 15-3.6 mg lozenge 1 lozenge, 1 lozenge, Mouth/Throat, q2h PRN, Serjio Matthews MD    calcium carbonate (Tums) chewable tablet 500 mg, 500 mg, oral, 4x daily PRN, Serjio Matthews MD    dextromethorphan-guaifenesin (Robitussin DM)  mg/5 mL oral liquid 5 mL, 5 mL, oral, q4h PRN, Serjio Matthews MD    dextrose 10 % in water (D10W) infusion, 0.3 g/kg/hr, intravenous, Once PRN, Serjio Matthews MD    dextrose 50 % injection 25 g, 25 g, intravenous, q15 min PRN, Serjio Matthews MD    ferrous sulfate (325 mg ferrous sulfate) tablet 1 tablet, 1 tablet, oral, Daily with breakfast, Serjio Matthews MD, 1 tablet at 01/08/24 0954    finasteride (Proscar) tablet 5 mg, 5 mg, oral, Daily,  Serjio Matthews MD, 5 mg at 01/08/24 0954    glucagon (Glucagen) injection 1 mg, 1 mg, intramuscular, q15 min PRN, Serjio Matthews MD    guaiFENesin (Mucinex) 12 hr tablet 600 mg, 600 mg, oral, q12h PRN, Serjio Matthews MD    ipratropium-albuteroL (Duo-Neb) 0.5-2.5 mg/3 mL nebulizer solution 3 mL, 3 mL, nebulization, 4x daily, Serjio Matthews MD, 3 mL at 01/08/24 0720    levothyroxine (Synthroid, Levoxyl) tablet 50 mcg, 50 mcg, oral, Daily, Serjio Matthews MD, 50 mcg at 01/08/24 0610    midodrine (Proamatine) tablet 5 mg, 5 mg, oral, q8h, Jaren Zapien MD    norepinephrine (Levophed) 8 mg in dextrose 5% 250 mL (0.032 mg/mL) infusion (premix), 0.01-1 mcg/kg/min, intravenous, Continuous, Ulises Tariq MD, Stopped at 01/08/24 1115    ondansetron ODT (Zofran-ODT) disintegrating tablet 4 mg, 4 mg, oral, q8h PRN **OR** ondansetron (Zofran) injection 4 mg, 4 mg, intravenous, q8h PRN, Serjio Matthews MD, 4 mg at 01/07/24 0321    oxygen (O2) therapy, , inhalation, Continuous PRN - O2/gases, Serjio Matthews MD, 5 L/min at 01/07/24 0736    pantoprazole (ProtoNix) injection 40 mg, 40 mg, intravenous, Daily, Serjio Matthews MD, 40 mg at 01/08/24 0954    piperacillin-tazobactam-dextrose (Zosyn) IV 3.375 g, 3.375 g, intravenous, q6h, Serjio Matthews MD, Stopped at 01/08/24 0640    sodium bicarbonate 150 mEq in dextrose 5 % in water (D5W) 1,000 mL infusion, 100 mL/hr, intravenous, Continuous, Gal James MD, Last Rate: 100 mL/hr at 01/08/24 1046, 100 mL/hr at 01/08/24 1046    sodium chloride 0.9 % with KCl 20 mEq/L infusion, 100 mL/hr, intravenous, Continuous, Srejio Matthews MD, Last Rate: 100 mL/hr at 01/08/24 0516, 100 mL/hr at 01/08/24 0516   Allergies  Patient has no known allergies.         Physical Exam  Physical Exam  Constitutional:       General: He is not in acute distress.     Appearance: He is not toxic-appearing.   HENT:      Head: Normocephalic and atraumatic.   Eyes:      Extraocular Movements:  Extraocular movements intact.      Pupils: Pupils are equal, round, and reactive to light.   Neck:      Vascular: No carotid bruit.   Cardiovascular:      Rate and Rhythm: Normal rate and regular rhythm.   Pulmonary:      Effort: No respiratory distress.      Breath sounds: No stridor. Rhonchi present. No wheezing or rales.   Chest:      Chest wall: No tenderness.   Abdominal:      General: There is no distension.      Palpations: There is no mass.      Tenderness: There is no abdominal tenderness. There is no right CVA tenderness, left CVA tenderness or guarding.      Hernia: No hernia is present.   Musculoskeletal:         General: No swelling or tenderness.      Cervical back: No rigidity.      Right lower leg: No edema.      Left lower leg: No edema.   Lymphadenopathy:      Cervical: No cervical adenopathy.   Skin:     General: Skin is warm and dry.      Coloration: Skin is not jaundiced or pale.      Findings: No bruising or erythema.   Neurological:      General: No focal deficit present.      Mental Status: He is alert and oriented to person, place, and time.   Psychiatric:         Mood and Affect: Mood normal.         Behavior: Behavior normal.              I&O 24HR    Intake/Output Summary (Last 24 hours) at 1/8/2024 1151  Last data filed at 1/8/2024 0930  Gross per 24 hour   Intake 4292.35 ml   Output 900 ml   Net 3392.35 ml       Vitals 24HR  Heart Rate:  [57-93]   Temp:  [36.1 °C (97 °F)-37 °C (98.6 °F)]   Resp:  [11-34]   BP: ()/()   SpO2:  [86 %-97 %]     Relevant Results        Results for orders placed or performed during the hospital encounter of 01/06/24 (from the past 96 hour(s))   Streptococcus pneumoniae Antigen, Urine    Specimen: Urine   Result Value Ref Range    Streptococcus pneumoniae Ag, Urine Negative Negative   Legionella Antigen, Urine    Specimen: Urine   Result Value Ref Range    L. pneumophila Urine Ag Negative Negative   MRSA Surveillance for Vancomycin De-escalation,  PCR    Specimen: Anterior Nares; Swab   Result Value Ref Range    MRSA PCR Not Detected Not Detected   CBC   Result Value Ref Range    WBC 15.0 (H) 4.4 - 11.3 x10*3/uL    nRBC 0.0 0.0 - 0.0 /100 WBCs    RBC 3.60 (L) 4.50 - 5.90 x10*6/uL    Hemoglobin 10.9 (L) 13.5 - 17.5 g/dL    Hematocrit 31.3 (L) 41.0 - 52.0 %    MCV 87 80 - 100 fL    MCH 30.3 26.0 - 34.0 pg    MCHC 34.8 32.0 - 36.0 g/dL    RDW 15.5 (H) 11.5 - 14.5 %    Platelets 132 (L) 150 - 450 x10*3/uL   Comprehensive metabolic panel   Result Value Ref Range    Glucose 108 (H) 65 - 99 mg/dL    Sodium 136 133 - 145 mmol/L    Potassium 3.1 (L) 3.4 - 5.1 mmol/L    Chloride 101 97 - 107 mmol/L    Bicarbonate 21 (L) 24 - 31 mmol/L    Urea Nitrogen 28 (H) 8 - 25 mg/dL    Creatinine 1.40 0.40 - 1.60 mg/dL    eGFR 52 (L) >60 mL/min/1.73m*2    Calcium 7.7 (L) 8.5 - 10.4 mg/dL    Albumin 2.5 (L) 3.5 - 5.0 g/dL    Alkaline Phosphatase 100 35 - 125 U/L    Total Protein 5.9 5.9 - 7.9 g/dL     (H) 5 - 40 U/L    Bilirubin, Total 1.6 (H) 0.1 - 1.2 mg/dL    ALT 83 (H) 5 - 40 U/L    Anion Gap 14 <=19 mmol/L   Vancomycin   Result Value Ref Range    Vancomycin 12.0 10.0 - 20.0 ug/mL   C. difficile, PCR    Specimen: Stool   Result Value Ref Range    C. difficile, PCR Not Detected Not Detected   CBC and Auto Differential   Result Value Ref Range    WBC 12.9 (H) 4.4 - 11.3 x10*3/uL    nRBC 0.0 0.0 - 0.0 /100 WBCs    RBC 3.33 (L) 4.50 - 5.90 x10*6/uL    Hemoglobin 9.9 (L) 13.5 - 17.5 g/dL    Hematocrit 27.9 (L) 41.0 - 52.0 %    MCV 84 80 - 100 fL    MCH 29.7 26.0 - 34.0 pg    MCHC 35.5 32.0 - 36.0 g/dL    RDW 15.6 (H) 11.5 - 14.5 %    Platelets 143 (L) 150 - 450 x10*3/uL    Neutrophils % 88.4 40.0 - 80.0 %    Immature Granulocytes %, Automated 1.3 (H) 0.0 - 0.9 %    Lymphocytes % 4.0 13.0 - 44.0 %    Monocytes % 6.1 2.0 - 10.0 %    Eosinophils % 0.0 0.0 - 6.0 %    Basophils % 0.2 0.0 - 2.0 %    Neutrophils Absolute 11.38 (H) 1.60 - 5.50 x10*3/uL    Immature Granulocytes  Absolute, Automated 0.17 0.00 - 0.50 x10*3/uL    Lymphocytes Absolute 0.51 (L) 0.80 - 3.00 x10*3/uL    Monocytes Absolute 0.78 0.05 - 0.80 x10*3/uL    Eosinophils Absolute 0.00 0.00 - 0.40 x10*3/uL    Basophils Absolute 0.02 0.00 - 0.10 x10*3/uL   Comprehensive metabolic panel   Result Value Ref Range    Glucose 125 (H) 65 - 99 mg/dL    Sodium 132 (L) 133 - 145 mmol/L    Potassium 3.8 3.4 - 5.1 mmol/L    Chloride 102 97 - 107 mmol/L    Bicarbonate 19 (L) 24 - 31 mmol/L    Urea Nitrogen 42 (H) 8 - 25 mg/dL    Creatinine 2.20 (H) 0.40 - 1.60 mg/dL    eGFR 30 (L) >60 mL/min/1.73m*2    Calcium 7.6 (L) 8.5 - 10.4 mg/dL    Albumin 2.2 (L) 3.5 - 5.0 g/dL    Alkaline Phosphatase 99 35 - 125 U/L    Total Protein 5.6 (L) 5.9 - 7.9 g/dL     (H) 5 - 40 U/L    Bilirubin, Total 1.4 (H) 0.1 - 1.2 mg/dL    ALT 68 (H) 5 - 40 U/L    Anion Gap 11 <=19 mmol/L   Vancomycin   Result Value Ref Range    Vancomycin 7.0 (L) 10.0 - 20.0 ug/mL   POCT GLUCOSE   Result Value Ref Range    POCT Glucose 114 (H) 74 - 99 mg/dL   Respiratory Culture/Smear    Specimen: SPUTUM; Fluid   Result Value Ref Range    Respiratory Culture/Smear (A)      Culture not performed. See Gram stain findings. Recollect if clinically indicated.    Gram Stain (A)      Gram stain indicates specimen contains significant salivary contamination.   CBC and Auto Differential   Result Value Ref Range    WBC 17.8 (H) 4.4 - 11.3 x10*3/uL    nRBC 0.0 0.0 - 0.0 /100 WBCs    RBC 3.27 (L) 4.50 - 5.90 x10*6/uL    Hemoglobin 9.9 (L) 13.5 - 17.5 g/dL    Hematocrit 28.8 (L) 41.0 - 52.0 %    MCV 88 80 - 100 fL    MCH 30.3 26.0 - 34.0 pg    MCHC 34.4 32.0 - 36.0 g/dL    RDW 16.2 (H) 11.5 - 14.5 %    Platelets 198 150 - 450 x10*3/uL    Neutrophils % 85.8 40.0 - 80.0 %    Immature Granulocytes %, Automated 1.8 (H) 0.0 - 0.9 %    Lymphocytes % 5.0 13.0 - 44.0 %    Monocytes % 7.1 2.0 - 10.0 %    Eosinophils % 0.2 0.0 - 6.0 %    Basophils % 0.1 0.0 - 2.0 %    Neutrophils Absolute 15.29  (H) 1.60 - 5.50 x10*3/uL    Immature Granulocytes Absolute, Automated 0.32 0.00 - 0.50 x10*3/uL    Lymphocytes Absolute 0.90 0.80 - 3.00 x10*3/uL    Monocytes Absolute 1.27 (H) 0.05 - 0.80 x10*3/uL    Eosinophils Absolute 0.03 0.00 - 0.40 x10*3/uL    Basophils Absolute 0.02 0.00 - 0.10 x10*3/uL   Comprehensive metabolic panel   Result Value Ref Range    Glucose 115 (H) 65 - 99 mg/dL    Sodium 128 (L) 133 - 145 mmol/L    Potassium 3.5 3.4 - 5.1 mmol/L    Chloride 98 97 - 107 mmol/L    Bicarbonate 17 (L) 24 - 31 mmol/L    Urea Nitrogen 51 (H) 8 - 25 mg/dL    Creatinine 2.60 (H) 0.40 - 1.60 mg/dL    eGFR 25 (L) >60 mL/min/1.73m*2    Calcium 7.7 (L) 8.5 - 10.4 mg/dL    Albumin 2.1 (L) 3.5 - 5.0 g/dL    Alkaline Phosphatase 99 35 - 125 U/L    Total Protein 5.5 (L) 5.9 - 7.9 g/dL     (H) 5 - 40 U/L    Bilirubin, Total 1.3 (H) 0.1 - 1.2 mg/dL    ALT 59 (H) 5 - 40 U/L    Anion Gap 13 <=19 mmol/L   Vancomycin   Result Value Ref Range    Vancomycin 12.0 10.0 - 20.0 ug/mL   Transthoracic Echo (TTE) Complete   Result Value Ref Range    BSA 2.37 m2          Assessment/Plan     XR chest 1 view    Result Date: 1/7/2024  Interpreted By:  Cathy Hernandez, STUDY: XR CHEST 1 VIEW 1/7/2024 7:36 am   INDICATION: Follow-up pneumonia   COMPARISON: 05/29/2014 as well as CT examination of the chest done on 01/05/2024.   ACCESSION NUMBER(S): [id number]   ORDERING CLINICIAN: EMILY MAKI   TECHNIQUE: AP erect view of the chest   FINDINGS: The heart is at the upper limits of normal in size. There is infiltrate and airspace consolidation observed centrally within the right lung with upper lobar predominance. There is also some patchy infiltrate at the left lung base. No obvious pleural abnormality is seen.       Infiltrate and airspace consolidation identified within the right lung most pronounced centrally and within the right upper lobe with mild left basilar infiltrate.   Signed by: Cathy Hernandze 1/7/2024 8:11 AM Dictation workstation:    [id number]    Impression:   acute kidney injury secondary to sepsis and pneumonia however I think the main culprit here is IV contrast which he received with a CAT scan  Pneumonia  Acute respiratory failure  Metabolic acidosis secondary to acute kidney injury  Chronic atrial fibrillation    Recommendations:  Continue IV hydration  Start sodium bicarbonate drip  Agree with IV antibiotics therapy  Tried to wean down pressors  Continue oxygen therapy  No indication for dialysis at this point  Will monitor renal function very closely and avoid nephrotoxic medications      Thank you very much for your consultation      Domitila Valverde

## 2024-01-08 NOTE — PROGRESS NOTES
Critical Care Daily Progress        Subjective   Patient is a 78 y.o. male admitted on 1/6/2024 12:08 AM with the following indication(s) for ICU care in septic shock and acute respiratory failure secondary to bacterial pneumonia.     Interval History: Remains on low-dose norepinephrine.    Complaints: Tells me he is not doing so well this morning but does not give me reason for this.    Scheduled Medications:   apixaban, 2.5 mg, oral, q12h DARIA  aspirin, 81 mg, oral, Daily  azithromycin, 500 mg, intravenous, q24h DARIA  ferrous sulfate (325 mg ferrous sulfate), 1 tablet, oral, Daily with breakfast  finasteride, 5 mg, oral, Daily  ipratropium-albuteroL, 3 mL, nebulization, 4x daily  levothyroxine, 50 mcg, oral, Daily  pantoprazole, 40 mg, intravenous, Daily  piperacillin-tazobactam, 3.375 g, intravenous, q6h  vancomycin, 1,500 mg, intravenous, Once         Continuous Medications:   norepinephrine, 0.01-1 mcg/kg/min, Last Rate: 0.05 mcg/kg/min (01/08/24 0544)  sodium bicarbonate, 100 mL/hr  potassium chloride in 0.9%NaCl, 100 mL/hr, Last Rate: 100 mL/hr (01/08/24 0516)         PRN Medications:   PRN medications: acetaminophen, benzocaine-menthol, calcium carbonate, dextromethorphan-guaifenesin, dextrose 10 % in water (D10W), dextrose, glucagon, guaiFENesin, ondansetron ODT **OR** ondansetron, oxygen    Objective   Vitals:  Most Recent:  Vitals:    01/08/24 0830   BP: 97/60   Pulse: 64   Resp: 23   Temp:    SpO2: 94%       24hr Min/Max:  Temp  Min: 36.1 °C (97 °F)  Max: 37 °C (98.6 °F)  Pulse  Min: 57  Max: 93  BP  Min: 75/60  Max: 143/121  Resp  Min: 11  Max: 34  SpO2  Min: 86 %  Max: 97 %    LDA:  Urethral Catheter Coude 18 Fr. (Active)   Placement Date/Time: 01/06/24 0300   Hand Hygiene Completed: Yes  Catheter Type: Coude  Tube Size (Fr.): 18 Fr.   Number of days: 1         Vent settings:  FiO2 (%):  [36 %-40 %] 36 %    Hemodynamic parameters for last 24 hours:       I/O:  I/O last 2 completed shifts:  In: 4292.4  (41.4 mL/kg) [I.V.:3592.4 (34.6 mL/kg); IV Piggyback:700]  Out: 650 (6.3 mL/kg) [Urine:650 (0.3 mL/kg/hr)]  Weight: 103.7 kg     Physical Exam:   Vital signs reviewed  Alert, elderly  Nasal cannula  Lungs are slightly rhonchorous bilaterally  Abdomen soft, nontender  No CCE  Normal mood and affect        Lab/Radiology/Diagnostic Review:  Results for orders placed or performed during the hospital encounter of 01/06/24 (from the past 24 hour(s))   CBC and Auto Differential   Result Value Ref Range    WBC 17.8 (H) 4.4 - 11.3 x10*3/uL    nRBC 0.0 0.0 - 0.0 /100 WBCs    RBC 3.27 (L) 4.50 - 5.90 x10*6/uL    Hemoglobin 9.9 (L) 13.5 - 17.5 g/dL    Hematocrit 28.8 (L) 41.0 - 52.0 %    MCV 88 80 - 100 fL    MCH 30.3 26.0 - 34.0 pg    MCHC 34.4 32.0 - 36.0 g/dL    RDW 16.2 (H) 11.5 - 14.5 %    Platelets 198 150 - 450 x10*3/uL    Neutrophils % 85.8 40.0 - 80.0 %    Immature Granulocytes %, Automated 1.8 (H) 0.0 - 0.9 %    Lymphocytes % 5.0 13.0 - 44.0 %    Monocytes % 7.1 2.0 - 10.0 %    Eosinophils % 0.2 0.0 - 6.0 %    Basophils % 0.1 0.0 - 2.0 %    Neutrophils Absolute 15.29 (H) 1.60 - 5.50 x10*3/uL    Immature Granulocytes Absolute, Automated 0.32 0.00 - 0.50 x10*3/uL    Lymphocytes Absolute 0.90 0.80 - 3.00 x10*3/uL    Monocytes Absolute 1.27 (H) 0.05 - 0.80 x10*3/uL    Eosinophils Absolute 0.03 0.00 - 0.40 x10*3/uL    Basophils Absolute 0.02 0.00 - 0.10 x10*3/uL   Comprehensive metabolic panel   Result Value Ref Range    Glucose 115 (H) 65 - 99 mg/dL    Sodium 128 (L) 133 - 145 mmol/L    Potassium 3.5 3.4 - 5.1 mmol/L    Chloride 98 97 - 107 mmol/L    Bicarbonate 17 (L) 24 - 31 mmol/L    Urea Nitrogen 51 (H) 8 - 25 mg/dL    Creatinine 2.60 (H) 0.40 - 1.60 mg/dL    eGFR 25 (L) >60 mL/min/1.73m*2    Calcium 7.7 (L) 8.5 - 10.4 mg/dL    Albumin 2.1 (L) 3.5 - 5.0 g/dL    Alkaline Phosphatase 99 35 - 125 U/L    Total Protein 5.5 (L) 5.9 - 7.9 g/dL     (H) 5 - 40 U/L    Bilirubin, Total 1.3 (H) 0.1 - 1.2 mg/dL    ALT 59  (H) 5 - 40 U/L    Anion Gap 13 <=19 mmol/L   Vancomycin   Result Value Ref Range    Vancomycin 12.0 10.0 - 20.0 ug/mL   Transthoracic Echo (TTE) Complete   Result Value Ref Range    BSA 2.37 m2     XR chest 1 view    Result Date: 1/7/2024  Interpreted By:  Cathy Hernandez, STUDY: XR CHEST 1 VIEW 1/7/2024 7:36 am   INDICATION: Follow-up pneumonia   COMPARISON: 05/29/2014 as well as CT examination of the chest done on 01/05/2024.   ACCESSION NUMBER(S): [id number]   ORDERING CLINICIAN: EMILY MAKI   TECHNIQUE: AP erect view of the chest   FINDINGS: The heart is at the upper limits of normal in size. There is infiltrate and airspace consolidation observed centrally within the right lung with upper lobar predominance. There is also some patchy infiltrate at the left lung base. No obvious pleural abnormality is seen.       Infiltrate and airspace consolidation identified within the right lung most pronounced centrally and within the right upper lobe with mild left basilar infiltrate.   Signed by: Cathy Hernandez 1/7/2024 8:11 AM Dictation workstation:   [id number]    CT chest abdomen pelvis w IV contrast    Result Date: 1/5/2024  Interpreted By:  Yordy Urena, STUDY: CT CHEST ABDOMEN PELVIS W IV CONTRAST;  1/5/2024 6:33 pm   INDICATION: Signs/Symptoms:abd pain, sob.   COMPARISON: None.   ACCESSION NUMBER(S): [id number]   ORDERING CLINICIAN: NELLA MELGOZA   TECHNIQUE: Contiguous axial images of the chest, abdomen and pelvis were obtained after the intravenous administration of  contrast. Coronal and sagittal reformatted images were obtained from the axial images.   FINDINGS: CT CHEST:   No axillary lymphadenopathy. 1.6 cm right paratracheal lymph node and 1.5 cm subcarinal lymph node. There is limited evaluation for hilar lymphadenopathy secondary stripping motion.   The heart is normal in size. Coronary artery vascular calcifications. No significant pericardial effusion.   There is pulmonary emphysematous change. There is  consolidative opacity in the right upper lobe and right lower lobe compatible with pneumonia and also minimal opacity in the left lower lobe. Trace pleural effusions. No pneumothorax.   Multilevel degenerative change of the thoracic spine.     CT ABDOMEN PELVIS:   Evaluation of the abdomen and pelvis is limited secondary to patient motion. No evidence of liver mass. The gallbladder is present, not well evaluated secondary to motion. No dilatation common bile duct.   Atrophy of the pancreas.   No splenomegaly.   Question mild nodularity of the left adrenal gland. The right adrenal gland appears unremarkable.   Symmetric enhancement of the kidneys. Right renal cysts with the largest measuring 3.1 cm and several bowel subcentimeter hypodensity too small to characterize. No hydronephrosis.   Atherosclerotic calcification of the aorta and abdominal and pelvic vasculature.   No evidence of bowel obstruction. Evaluation the bowel is otherwise limited secondary patient motion.   Urinary bladder is underdistended and not well evaluated. 18 mm right posterior bladder diverticulum.   Postsurgical change of left hip arthroplasty resulting in beam hardening artifact and limiting evaluation the pelvis.   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       Consolidative opacity in the right upper lobe and lower lobe compatible with pneumonia and also mild left basilar opacity and trace pleural effusions.   No definite evidence of acute abnormality of the abdominal viscera within the limitations of the motion degraded examination.   MACRO: None   Signed by: Yordy Urena 1/5/2024 6:49 PM Dictation workstation:   [id number]             Assessment/Plan   Principal Problem:    Sepsis (CMS/HCC)  Active Problems:    Lactic acid acidosis    Acute renal failure (ARF) (CMS/HCC)    Leukocytosis    Bilateral pneumonia    Chronic respiratory failure (CMS/HCC)    Hypokalemia    Dehydration    Transaminitis    Acute encephalopathy     Hypotension    Now with pressor requirement but low but now with new acute kidney injury.    Wean off pressors as tolerated  Start midodrine 5mg tid  Follow on TTE  May benefit from central venous catheter and a PICC line if okay with nephrology.  Blood cultures have been no growth x 48 hours.  Continue with empiric antibiotics  Eliquis  Follow-up on cultures  Speech evaluation  Guarded prognosis    Discussed with ICU RN.  Discussed with respiratory therapy.      This critically ill patient continues to be at-risk for deterioration / failure due to the above mentioned dysfunctional unstable organ systems.   Critical care time is spent at bedside includes review of diagnostic tests, labs, and radiographs, serial assessments and management of hemodynamics, respiratory status, and coordination of care with different members of interdisciplinary team  Assessment, impression and plans are reflected in the note above as well as the orders.    Critical concerns addressed:  - respiratory failure  - shock  - encephalopathy  Time Spent in critical Care, exclusive of procedures : 18 mins.    Disclaimer: Parts of this chart were dictated with voice recognition software. Please excuse any errors of grammar, spelling, or transcription which are not corrected. Please contact me with any questions regarding documentation.        Jaren Zapien MD  Pulmonary & Critical care Medicine  [facility]

## 2024-01-08 NOTE — PROGRESS NOTES
Speech-Language Pathology                 Therapy Communication Note    Patient Name: Navarro Rabago  MRN: [id number]  Today's Date: 1/8/2024     Discipline: Speech Language Pathology    Missed Visit Reason:  Pt participating in AM care. Scheduled for MBSS today at 1:00.     Missed Time: Attempt    Comment:

## 2024-01-08 NOTE — PROGRESS NOTES
"Navarro Rabago is a 78 y.o. male on day 2 of admission presenting with Sepsis (CMS/HCC).    Subjective   Patient states he feels washed out and tired.       Objective     Physical Exam  Eyes:      Conjunctiva/sclera: Conjunctivae normal.   Cardiovascular:      Rate and Rhythm: Normal rate and regular rhythm.      Heart sounds:      No gallop.   Pulmonary:      Breath sounds: Normal breath sounds. No wheezing, rhonchi or rales.   Abdominal:      Palpations: Abdomen is soft.   Neurological:      General: No focal deficit present.      Mental Status: He is alert.       Constitutional:       Appearance: Not in distress.   Eyes:      Conjunctiva/sclera: Conjunctivae normal.   Neck:      Vascular: JVD normal.   Pulmonary:      Breath sounds: Normal breath sounds. No wheezing. No rhonchi. No rales.   Cardiovascular:      Normal rate. Regular rhythm.      Murmurs: There is no murmur.      No gallop.  No click. No rub.   Abdominal:      Palpations: Abdomen is soft.   Neurological:      General: No focal deficit present.      Mental Status: Alert.        Last Recorded Vitals  Blood pressure 95/59, pulse 72, temperature 36.6 °C (97.9 °F), temperature source Temporal, resp. rate 25, height 1.956 m (6' 5\"), weight 104 kg (228 lb 9.9 oz), SpO2 94 %.  Intake/Output last 3 Shifts:  I/O last 3 completed shifts:  In: 4292.4 (41.4 mL/kg) [I.V.:3592.4 (34.6 mL/kg); IV Piggyback:700]  Out: 1200 (11.6 mL/kg) [Urine:1200 (0.3 mL/kg/hr)]  Weight: 103.7 kg     Relevant Results           This patient currently has cardiac telemetry ordered; if you would like to modify or discontinue the telemetry order, click here to go to the orders activity to modify/discontinue the order.                 Assessment/Plan   Principal Problem:    Sepsis (CMS/HCC)  Active Problems:    Lactic acid acidosis    Acute renal failure (ARF) (CMS/HCC)    Leukocytosis    Bilateral pneumonia    Chronic respiratory failure (CMS/HCC)    Hypokalemia    Dehydration    " Transaminitis    Acute encephalopathy    Hypotension    1/6: The patient is a 78-year-old white male with a history of longstanding coronary artery disease with multiple PCI procedures.  He does have ischemic congestive cardiomyopathy with an echocardiogram performed on 9/26/2023 estimating his LV ejection fraction of 35-40% with 2+ mitral valve regurgitation moderate left atrial enlargement mild RV chamber dilatation and a normal estimated PA systolic pressure.  He also has a history of longstanding persistent now permanent atrial fibrillation for which she underwent multiple electrical DC cardioversions and a radiofrequency ablation procedure.  He is currently on a rate control strategy.  He has a history of former smoking COPD with O2 dependence.  He is admitted with increasing weakness shortness of breath cough with evidence of right-sided pneumonia by the initial chest CT for which she has been started on empiric IV antibiotics.  He does remain in atrial fibrillation borderline increase in ventricular rate and will begin low-dose metoprolol 25 mg twice daily.  He has been continued on Eliquis anticoagulation 5 mg twice daily along with a low-dose aspirin for CAD as well as statin therapy.  Will continue the modest IV fluid hydration given the transient hypotension in the emergency room with the potassium supplement.  Will check a repeat echocardiogram.  Follow-up chest x-ray tomorrow.      1/7: Patient's seen and events reviewed in detail.  He is lying supine somewhat fatigued no overt respiratory distress on nasal cannula.  He did develop hypotension with systolic blood pressure values in the lower 80 mmHg range.  He was given 500 cc of IV normal saline fluid bolus remains on the maintenance infusion at 100 cc/h.  He was placed on low-dose Levophed for pressure support.  Comprehensive metabolic panel noted of both for acute on chronic renal insuf transaminases remain elevated  ALT 68 bilirubin 1.4.   Transaminitis presumably related to sepsis hypotension.  Gastroenterology is following.  Patient remains in the atrial fibrillation with controlled ventricular rate and low-dose metoprolol.  Will reduce dose of Eliquis to 2.5 mg twice daily.  Acute on chronic renal insufficiency presumably due to sepsis and hypotension creatinine is risen from 1.4-2.2 today and urine output has diminished.  The repeat chest x-ray again demonstrates infiltration consolidation within the right lung predominantly centrally and in the right upper lobe.  Patient currently on IV Zosyn azithromycin and vancomycin.    1/8: Patient still receiving low-dose IV norepinephrine to support blood pressure.  IV antibiotic therapy continues as well.  Remains in atrial fibrillation with controlled heart rate response.  Eliquis dose has been decreased down to 2.5 mg twice daily given the renal insufficiency.  Will continue to follow renal function.  Patient slowly improving.          I spent 20 minutes in the professional and overall care of this patient.      Sanford Neves,

## 2024-01-08 NOTE — CARE PLAN
The patient's goals for the shift include maintain safety     The clinical goals for the shift include wean pressors    Over the shift, the patient did not make progress toward the following goals. Barriers to progression include none. Recommendations to address these barriers include n/a.

## 2024-01-08 NOTE — PROGRESS NOTES
"Vancomycin Dosing by Pharmacy- FOLLOW UP    Navarro Rabago is a 78 y.o. year old male who Pharmacy has been consulted for vancomycin dosing for pneumonia. Based on the patient's indication and renal status this patient is being dosed based on a goal trough/random level of 15-20.     Renal function is currently declining, switching to GRISELDA dosing.    Current vancomycin dose: 1000 mg given every 24 hours    Estimated vancomycin AUC on current dose: 384 mg/L.hr     Visit Vitals  BP 97/60   Pulse 64   Temp 36.6 °C (97.9 °F) (Temporal)   Resp 23        Lab Results   Component Value Date    CREATININE 2.60 (H) 01/08/2024    CREATININE 2.20 (H) 01/07/2024    CREATININE 1.40 01/06/2024    CREATININE 1.40 (H) 01/05/2024        Patient weight is No results found for: \"PTWEIGHT\"    No results found for: \"CULTURE\"     I/O last 3 completed shifts:  In: 4292.4 (41.4 mL/kg) [I.V.:3592.4 (34.6 mL/kg); IV Piggyback:700]  Out: 1200 (11.6 mL/kg) [Urine:1200 (0.3 mL/kg/hr)]  Weight: 103.7 kg   [unfilled]    No results found for: \"PATIENTTEMP\"     Assessment/Plan    Below random trough. Redosing 1500mg x 1 dose.    This dosing regimen is predicted by [employer] to result in the following pharmacokinetic parameters:  The next level will be obtained on 1/9 at 0500. May be obtained sooner if clinically indicated.   Will continue to monitor renal function daily while on vancomycin and order serum creatinine at least every 48 hours if not already ordered.  Follow for continued vancomycin needs, clinical response, and signs/symptoms of toxicity.       MYRANDA MALIK, PharmD           "

## 2024-01-08 NOTE — PROGRESS NOTES
Navarro Rabago is a 78 y.o. male on day 2 of admission presenting with Sepsis (CMS/[facility]).     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  Noted to have increased copious amounts of diarrhea.  His EKG was noted for sinus tachycardia 107 bpm. The patient just returned from a trip to [place] with his wife.    Patient and wife seen at bedside, introduced myself and reason for visit. Patient is retired and used to teach political science in the past.  Patient lives with wife in a one story home with basement. Laundry is in the basement. Wife stated she had not been able to do the laundry lately, difficulty getting to the basement and  not feeling well. Patient had been independent with ADLs and also helping his wife prior to becoming sick. May have some difficulty caring her himself and wife. Patient wears supplemental oxygen at home at 5 L at baseline.   Has had episodes of blood pressure dropping down to 71/48 and heart rate patient's up to 111.  He was not started on IV pressors as he was responding to IV fluids. It appears the patient is a [facility] patient.  Looking at his past medical records he follows with a cardiologist by the name of Dr. Lonny Stanton.  He recently saw his cardiologist on November 8, 2023.   Patient did not pass his swallow eval and speech recommended dysphagia diet with thickened liquids.    Spoke with wife and patient regarding skille rehab. Wife was reluctant as she has not heard good things about nursing homes. Explained to wife that he would be going for rehab for 1-2 weeks. Was given a list of skilled rehab.     PLAN: Skilled rehab, choices given. Will need a precert     1615: PT saw patient today and Discharge Recommendations: Moderate intensity level of continued care. PT Recommended Transfer Status: Total assist.  Wife was present when evaluation was done and wife told this nurse that she agrees with having patient go to skilled rehab and said she would like [facility]. Referral was placed.     Eloina Cuadra RN

## 2024-01-09 LAB
ANION GAP SERPL CALC-SCNC: 14 MMOL/L
BUN SERPL-MCNC: 49 MG/DL (ref 8–25)
CALCIUM SERPL-MCNC: 7 MG/DL (ref 8.5–10.4)
CHLORIDE SERPL-SCNC: 106 MMOL/L (ref 97–107)
CO2 SERPL-SCNC: 20 MMOL/L (ref 24–31)
CREAT SERPL-MCNC: 2.6 MG/DL (ref 0.4–1.6)
EGFRCR SERPLBLD CKD-EPI 2021: 25 ML/MIN/1.73M*2
ERYTHROCYTE [DISTWIDTH] IN BLOOD BY AUTOMATED COUNT: 16.2 % (ref 11.5–14.5)
FERRITIN SERPL-MCNC: 2435 NG/ML (ref 30–400)
GLUCOSE BLD MANUAL STRIP-MCNC: 108 MG/DL (ref 74–99)
GLUCOSE BLD MANUAL STRIP-MCNC: 141 MG/DL (ref 74–99)
GLUCOSE BLD MANUAL STRIP-MCNC: 147 MG/DL (ref 74–99)
GLUCOSE SERPL-MCNC: 140 MG/DL (ref 65–99)
HCT VFR BLD AUTO: 27.6 % (ref 41–52)
HGB BLD-MCNC: 9.6 G/DL (ref 13.5–17.5)
IRON SATN MFR SERPL: 21 % (ref 12–50)
IRON SERPL-MCNC: 24 UG/DL (ref 45–160)
M PNEUMO IGM SER IA-ACNC: 0.27 U/L
MCH RBC QN AUTO: 30.2 PG (ref 26–34)
MCHC RBC AUTO-ENTMCNC: 34.8 G/DL (ref 32–36)
MCV RBC AUTO: 87 FL (ref 80–100)
NRBC BLD-RTO: 0 /100 WBCS (ref 0–0)
NT-PROBNP SERPL-MCNC: 6201 PG/ML (ref 0–852)
PLATELET # BLD AUTO: 207 X10*3/UL (ref 150–450)
POTASSIUM SERPL-SCNC: 3 MMOL/L (ref 3.4–5.1)
RBC # BLD AUTO: 3.18 X10*6/UL (ref 4.5–5.9)
SODIUM SERPL-SCNC: 140 MMOL/L (ref 133–145)
TIBC SERPL-MCNC: 114 UG/DL (ref 228–428)
UIBC SERPL-MCNC: 90 UG/DL (ref 110–370)
VANCOMYCIN SERPL-MCNC: 17 UG/ML (ref 10–20)
WBC # BLD AUTO: 15.3 X10*3/UL (ref 4.4–11.3)

## 2024-01-09 PROCEDURE — 94640 AIRWAY INHALATION TREATMENT: CPT

## 2024-01-09 PROCEDURE — 2500000005 HC RX 250 GENERAL PHARMACY W/O HCPCS: Performed by: INTERNAL MEDICINE

## 2024-01-09 PROCEDURE — 2500000004 HC RX 250 GENERAL PHARMACY W/ HCPCS (ALT 636 FOR OP/ED): Performed by: INTERNAL MEDICINE

## 2024-01-09 PROCEDURE — 2500000001 HC RX 250 WO HCPCS SELF ADMINISTERED DRUGS (ALT 637 FOR MEDICARE OP): Performed by: HOSPITALIST

## 2024-01-09 PROCEDURE — 83540 ASSAY OF IRON: CPT | Performed by: NURSE PRACTITIONER

## 2024-01-09 PROCEDURE — 2500000002 HC RX 250 W HCPCS SELF ADMINISTERED DRUGS (ALT 637 FOR MEDICARE OP, ALT 636 FOR OP/ED): Performed by: INTERNAL MEDICINE

## 2024-01-09 PROCEDURE — 2500000001 HC RX 250 WO HCPCS SELF ADMINISTERED DRUGS (ALT 637 FOR MEDICARE OP): Performed by: INTERNAL MEDICINE

## 2024-01-09 PROCEDURE — 92526 ORAL FUNCTION THERAPY: CPT | Mod: GN | Performed by: SPEECH-LANGUAGE PATHOLOGIST

## 2024-01-09 PROCEDURE — C9113 INJ PANTOPRAZOLE SODIUM, VIA: HCPCS | Performed by: INTERNAL MEDICINE

## 2024-01-09 PROCEDURE — 97110 THERAPEUTIC EXERCISES: CPT | Mod: GP

## 2024-01-09 PROCEDURE — 83880 ASSAY OF NATRIURETIC PEPTIDE: CPT | Performed by: INTERNAL MEDICINE

## 2024-01-09 PROCEDURE — 82947 ASSAY GLUCOSE BLOOD QUANT: CPT

## 2024-01-09 PROCEDURE — 2060000001 HC INTERMEDIATE ICU ROOM DAILY

## 2024-01-09 PROCEDURE — 9420000001 HC RT PATIENT EDUCATION 5 MIN

## 2024-01-09 PROCEDURE — 99232 SBSQ HOSP IP/OBS MODERATE 35: CPT | Performed by: INTERNAL MEDICINE

## 2024-01-09 PROCEDURE — 2500000001 HC RX 250 WO HCPCS SELF ADMINISTERED DRUGS (ALT 637 FOR MEDICARE OP): Performed by: NURSE PRACTITIONER

## 2024-01-09 PROCEDURE — 80048 BASIC METABOLIC PNL TOTAL CA: CPT | Performed by: INTERNAL MEDICINE

## 2024-01-09 PROCEDURE — 37799 UNLISTED PX VASCULAR SURGERY: CPT | Performed by: INTERNAL MEDICINE

## 2024-01-09 PROCEDURE — 82728 ASSAY OF FERRITIN: CPT | Performed by: NURSE PRACTITIONER

## 2024-01-09 PROCEDURE — 85027 COMPLETE CBC AUTOMATED: CPT | Performed by: INTERNAL MEDICINE

## 2024-01-09 PROCEDURE — 97110 THERAPEUTIC EXERCISES: CPT | Mod: GO

## 2024-01-09 PROCEDURE — 97530 THERAPEUTIC ACTIVITIES: CPT | Mod: GP

## 2024-01-09 PROCEDURE — 97530 THERAPEUTIC ACTIVITIES: CPT | Mod: GO

## 2024-01-09 PROCEDURE — 80202 ASSAY OF VANCOMYCIN: CPT | Performed by: INTERNAL MEDICINE

## 2024-01-09 RX ORDER — DEXTROSE MONOHYDRATE AND SODIUM CHLORIDE 5; .45 G/100ML; G/100ML
75 INJECTION, SOLUTION INTRAVENOUS CONTINUOUS
Status: DISCONTINUED | OUTPATIENT
Start: 2024-01-09 | End: 2024-01-11

## 2024-01-09 RX ORDER — PANTOPRAZOLE SODIUM 40 MG/1
40 TABLET, DELAYED RELEASE ORAL
Status: DISCONTINUED | OUTPATIENT
Start: 2024-01-10 | End: 2024-01-19 | Stop reason: HOSPADM

## 2024-01-09 RX ORDER — VANCOMYCIN 1.5 G/300ML
1500 INJECTION, SOLUTION INTRAVENOUS ONCE
Status: COMPLETED | OUTPATIENT
Start: 2024-01-09 | End: 2024-01-09

## 2024-01-09 RX ORDER — POTASSIUM CHLORIDE 20 MEQ/1
20 TABLET, EXTENDED RELEASE ORAL ONCE
Status: COMPLETED | OUTPATIENT
Start: 2024-01-09 | End: 2024-01-09

## 2024-01-09 RX ADMIN — PIPERACILLIN SODIUM AND TAZOBACTAM SODIUM 3.38 G: 3; .375 INJECTION, SOLUTION INTRAVENOUS at 09:05

## 2024-01-09 RX ADMIN — APIXABAN 2.5 MG: 2.5 TABLET, FILM COATED ORAL at 21:57

## 2024-01-09 RX ADMIN — PIPERACILLIN SODIUM AND TAZOBACTAM SODIUM 3.38 G: 3; .375 INJECTION, SOLUTION INTRAVENOUS at 01:26

## 2024-01-09 RX ADMIN — POTASSIUM CHLORIDE 20 MEQ: 1500 TABLET, EXTENDED RELEASE ORAL at 06:34

## 2024-01-09 RX ADMIN — SODIUM BICARBONATE 100 ML/HR: 84 INJECTION, SOLUTION INTRAVENOUS at 00:35

## 2024-01-09 RX ADMIN — ACETAMINOPHEN 650 MG: 325 TABLET ORAL at 21:56

## 2024-01-09 RX ADMIN — PIPERACILLIN SODIUM AND TAZOBACTAM SODIUM 3.38 G: 3; .375 INJECTION, SOLUTION INTRAVENOUS at 18:15

## 2024-01-09 RX ADMIN — IPRATROPIUM BROMIDE AND ALBUTEROL SULFATE 3 ML: 2.5; .5 SOLUTION RESPIRATORY (INHALATION) at 07:28

## 2024-01-09 RX ADMIN — MIDODRINE HYDROCHLORIDE 5 MG: 5 TABLET ORAL at 11:59

## 2024-01-09 RX ADMIN — IPRATROPIUM BROMIDE AND ALBUTEROL SULFATE 3 ML: 2.5; .5 SOLUTION RESPIRATORY (INHALATION) at 16:00

## 2024-01-09 RX ADMIN — FERROUS SULFATE TAB 325 MG (65 MG ELEMENTAL FE) 1 TABLET: 325 (65 FE) TAB at 09:05

## 2024-01-09 RX ADMIN — VANCOMYCIN 1.5 G: 1.5 INJECTION, SOLUTION INTRAVENOUS at 14:55

## 2024-01-09 RX ADMIN — Medication 4 L/MIN: at 07:31

## 2024-01-09 RX ADMIN — ASPIRIN 81 MG: 81 TABLET, COATED ORAL at 09:05

## 2024-01-09 RX ADMIN — PIPERACILLIN SODIUM AND TAZOBACTAM SODIUM 3.38 G: 3; .375 INJECTION, SOLUTION INTRAVENOUS at 13:58

## 2024-01-09 RX ADMIN — LEVOTHYROXINE SODIUM 50 MCG: 0.05 TABLET ORAL at 06:29

## 2024-01-09 RX ADMIN — IPRATROPIUM BROMIDE AND ALBUTEROL SULFATE 3 ML: 2.5; .5 SOLUTION RESPIRATORY (INHALATION) at 19:55

## 2024-01-09 RX ADMIN — FINASTERIDE 5 MG: 5 TABLET, FILM COATED ORAL at 09:05

## 2024-01-09 RX ADMIN — IPRATROPIUM BROMIDE AND ALBUTEROL SULFATE 3 ML: 2.5; .5 SOLUTION RESPIRATORY (INHALATION) at 11:41

## 2024-01-09 RX ADMIN — NYSTATIN 500000 UNITS: 100000 SUSPENSION ORAL at 09:05

## 2024-01-09 RX ADMIN — NYSTATIN 500000 UNITS: 100000 SUSPENSION ORAL at 18:15

## 2024-01-09 RX ADMIN — AZITHROMYCIN MONOHYDRATE 500 MG: 500 INJECTION, POWDER, LYOPHILIZED, FOR SOLUTION INTRAVENOUS at 11:34

## 2024-01-09 RX ADMIN — PANTOPRAZOLE SODIUM 40 MG: 40 INJECTION, POWDER, FOR SOLUTION INTRAVENOUS at 09:05

## 2024-01-09 RX ADMIN — MIDODRINE HYDROCHLORIDE 5 MG: 5 TABLET ORAL at 03:49

## 2024-01-09 RX ADMIN — Medication 4 L/MIN: at 15:00

## 2024-01-09 RX ADMIN — NYSTATIN 500000 UNITS: 100000 SUSPENSION ORAL at 22:01

## 2024-01-09 RX ADMIN — APIXABAN 2.5 MG: 2.5 TABLET, FILM COATED ORAL at 09:05

## 2024-01-09 RX ADMIN — DEXTROSE MONOHYDRATE AND SODIUM CHLORIDE 75 ML/HR: 5; .45 INJECTION, SOLUTION INTRAVENOUS at 13:57

## 2024-01-09 RX ADMIN — MIDODRINE HYDROCHLORIDE 5 MG: 5 TABLET ORAL at 18:15

## 2024-01-09 RX ADMIN — Medication: at 23:00

## 2024-01-09 RX ADMIN — NYSTATIN 500000 UNITS: 100000 SUSPENSION ORAL at 14:41

## 2024-01-09 RX ADMIN — SODIUM BICARBONATE 100 ML/HR: 84 INJECTION, SOLUTION INTRAVENOUS at 11:32

## 2024-01-09 ASSESSMENT — COGNITIVE AND FUNCTIONAL STATUS - GENERAL
WALKING IN HOSPITAL ROOM: TOTAL
MOVING TO AND FROM BED TO CHAIR: TOTAL
STANDING UP FROM CHAIR USING ARMS: TOTAL
MOVING FROM LYING ON BACK TO SITTING ON SIDE OF FLAT BED WITH BEDRAILS: TOTAL
EATING MEALS: A LOT
HELP NEEDED FOR BATHING: A LOT
MOBILITY SCORE: 6
MOBILITY SCORE: 6
CLIMB 3 TO 5 STEPS WITH RAILING: TOTAL
WALKING IN HOSPITAL ROOM: TOTAL
CLIMB 3 TO 5 STEPS WITH RAILING: TOTAL
TURNING FROM BACK TO SIDE WHILE IN FLAT BAD: TOTAL
HELP NEEDED FOR BATHING: TOTAL
DRESSING REGULAR UPPER BODY CLOTHING: A LOT
PERSONAL GROOMING: A LOT
TOILETING: TOTAL
MOVING TO AND FROM BED TO CHAIR: TOTAL
TOILETING: TOTAL
DRESSING REGULAR UPPER BODY CLOTHING: A LOT
DRESSING REGULAR LOWER BODY CLOTHING: TOTAL
MOVING FROM LYING ON BACK TO SITTING ON SIDE OF FLAT BED WITH BEDRAILS: TOTAL
PERSONAL GROOMING: A LOT
DRESSING REGULAR LOWER BODY CLOTHING: TOTAL
DAILY ACTIVITIY SCORE: 9
STANDING UP FROM CHAIR USING ARMS: TOTAL
DAILY ACTIVITIY SCORE: 10
EATING MEALS: A LOT
TURNING FROM BACK TO SIDE WHILE IN FLAT BAD: TOTAL

## 2024-01-09 ASSESSMENT — PAIN - FUNCTIONAL ASSESSMENT
PAIN_FUNCTIONAL_ASSESSMENT: 0-10
PAIN_FUNCTIONAL_ASSESSMENT: FLACC (FACE, LEGS, ACTIVITY, CRY, CONSOLABILITY)
PAIN_FUNCTIONAL_ASSESSMENT: FLACC (FACE, LEGS, ACTIVITY, CRY, CONSOLABILITY)
PAIN_FUNCTIONAL_ASSESSMENT: 0-10
PAIN_FUNCTIONAL_ASSESSMENT: 0-10

## 2024-01-09 ASSESSMENT — PAIN DESCRIPTION - DESCRIPTORS: DESCRIPTORS: ACHING

## 2024-01-09 ASSESSMENT — PAIN SCALES - GENERAL
PAINLEVEL_OUTOF10: 4
PAINLEVEL_OUTOF10: 5 - MODERATE PAIN
PAINLEVEL_OUTOF10: 0 - NO PAIN
PAINLEVEL_OUTOF10: 5 - MODERATE PAIN
PAINLEVEL_OUTOF10: 5 - MODERATE PAIN

## 2024-01-09 ASSESSMENT — PAIN DESCRIPTION - LOCATION: LOCATION: BACK

## 2024-01-09 NOTE — CARE PLAN
The patient's goals for the shift include  unable to determine    The clinical goals for the shift include work w/ PT/OT , independence in eating     Over the shift, the patient did make progress toward the following goals.     Barriers to progression include patient very stiff and difficult to move, need for more independence in care.     Recommendations to address these barriers include work with PT/OT, assist with tray set up but have patient feed self , encourage independence in care as able - .

## 2024-01-09 NOTE — PROGRESS NOTES
Critical Care Daily Progress        Subjective   Patient is a 78 y.o. male admitted on 1/6/2024 12:08 AM with the following indication(s) for ICU care in septic shock and acute respiratory failure secondary to bacterial pneumonia.     Interval History: Overnight events.  Remains off of norepinephrine.  This was stopped yesterday.    Complaints: Breathing is fair.  Remains on oxygen.  Denies any chest pain.  Endorses a cough.    Scheduled Medications:   apixaban, 2.5 mg, oral, q12h DARIA  aspirin, 81 mg, oral, Daily  azithromycin, 500 mg, intravenous, q24h DARIA  ferrous sulfate (325 mg ferrous sulfate), 1 tablet, oral, Daily with breakfast  finasteride, 5 mg, oral, Daily  ipratropium-albuteroL, 3 mL, nebulization, 4x daily  levothyroxine, 50 mcg, oral, Daily  midodrine, 5 mg, oral, q8h  nystatin, 5 mL, Swish & Swallow, 4x daily  oxygen, , inhalation, q8h  [START ON 1/10/2024] pantoprazole, 40 mg, oral, Daily before breakfast  piperacillin-tazobactam, 3.375 g, intravenous, q6h         Continuous Medications:   norepinephrine, 0.01-1 mcg/kg/min, Last Rate: Stopped (01/08/24 1115)  sodium bicarbonate, 100 mL/hr, Last Rate: 100 mL/hr (01/09/24 1132)         PRN Medications:   PRN medications: acetaminophen, benzocaine-menthol, calcium carbonate, dextromethorphan-guaifenesin, dextrose 10 % in water (D10W), dextrose, glucagon, guaiFENesin, ondansetron ODT **OR** ondansetron    Objective   Vitals:  Most Recent:  Vitals:    01/09/24 1041   BP:    Pulse:    Resp:    Temp:    SpO2: 94%       24hr Min/Max:  Temp  Min: 36.7 °C (98.1 °F)  Max: 36.9 °C (98.4 °F)  Pulse  Min: 71  Max: 101  BP  Min: 70/57  Max: 152/63  Resp  Min: 16  Max: 29  SpO2  Min: 86 %  Max: 98 %    LDA:  Urethral Catheter Coude 18 Fr. (Active)   Placement Date/Time: 01/06/24 0300   Hand Hygiene Completed: Yes  Catheter Type: Coude  Tube Size (Fr.): 18 Fr.   Number of days: 1         Vent settings:  FiO2 (%):  [36 %] 36 %    Hemodynamic parameters for last 24  hours:       I/O:  I/O last 2 completed shifts:  In: 2481.8 (21.8 mL/kg) [P.O.:240; I.V.:1791.8 (15.8 mL/kg); IV Piggyback:450]  Out: 1200 (10.6 mL/kg) [Urine:1200 (0.4 mL/kg/hr)]  Weight: 113.7 kg     Physical Exam:   Vital signs reviewed  Alert, elderly  Nasal cannula  Lungs are slightly rhonchorous bilaterally  Abdomen soft, nontender  No CCE  Normal mood and affect        Lab/Radiology/Diagnostic Review:  Results for orders placed or performed during the hospital encounter of 01/06/24 (from the past 24 hour(s))   Basic Metabolic Panel   Result Value Ref Range    Glucose 140 (H) 65 - 99 mg/dL    Sodium 140 133 - 145 mmol/L    Potassium 3.0 (L) 3.4 - 5.1 mmol/L    Chloride 106 97 - 107 mmol/L    Bicarbonate 20 (L) 24 - 31 mmol/L    Urea Nitrogen 49 (H) 8 - 25 mg/dL    Creatinine 2.60 (H) 0.40 - 1.60 mg/dL    eGFR 25 (L) >60 mL/min/1.73m*2    Calcium 7.0 (L) 8.5 - 10.4 mg/dL    Anion Gap 14 <=19 mmol/L   NT Pro-BNP   Result Value Ref Range    PROBNP 6,201 (H) 0 - 852 pg/mL   CBC   Result Value Ref Range    WBC 15.3 (H) 4.4 - 11.3 x10*3/uL    nRBC 0.0 0.0 - 0.0 /100 WBCs    RBC 3.18 (L) 4.50 - 5.90 x10*6/uL    Hemoglobin 9.6 (L) 13.5 - 17.5 g/dL    Hematocrit 27.6 (L) 41.0 - 52.0 %    MCV 87 80 - 100 fL    MCH 30.2 26.0 - 34.0 pg    MCHC 34.8 32.0 - 36.0 g/dL    RDW 16.2 (H) 11.5 - 14.5 %    Platelets 207 150 - 450 x10*3/uL   POCT GLUCOSE   Result Value Ref Range    POCT Glucose 147 (H) 74 - 99 mg/dL   Vancomycin   Result Value Ref Range    Vancomycin 17.0 10.0 - 20.0 ug/mL   POCT GLUCOSE   Result Value Ref Range    POCT Glucose 141 (H) 74 - 99 mg/dL     XR chest 1 view    Result Date: 1/7/2024  Interpreted By:  Cathy Hernandez, STUDY: XR CHEST 1 VIEW 1/7/2024 7:36 am   INDICATION: Follow-up pneumonia   COMPARISON: 05/29/2014 as well as CT examination of the chest done on 01/05/2024.   ACCESSION NUMBER(S): [id number]   ORDERING CLINICIAN: EMILY MAKI   TECHNIQUE: AP erect view of the chest   FINDINGS: The heart is  at the upper limits of normal in size. There is infiltrate and airspace consolidation observed centrally within the right lung with upper lobar predominance. There is also some patchy infiltrate at the left lung base. No obvious pleural abnormality is seen.       Infiltrate and airspace consolidation identified within the right lung most pronounced centrally and within the right upper lobe with mild left basilar infiltrate.   Signed by: Cathy Hernandez 1/7/2024 8:11 AM Dictation workstation:   [id number]    CT chest abdomen pelvis w IV contrast    Result Date: 1/5/2024  Interpreted By:  Yordy Urena, STUDY: CT CHEST ABDOMEN PELVIS W IV CONTRAST;  1/5/2024 6:33 pm   INDICATION: Signs/Symptoms:abd pain, sob.   COMPARISON: None.   ACCESSION NUMBER(S): [id number]   ORDERING CLINICIAN: NELLA MELGOZA   TECHNIQUE: Contiguous axial images of the chest, abdomen and pelvis were obtained after the intravenous administration of  contrast. Coronal and sagittal reformatted images were obtained from the axial images.   FINDINGS: CT CHEST:   No axillary lymphadenopathy. 1.6 cm right paratracheal lymph node and 1.5 cm subcarinal lymph node. There is limited evaluation for hilar lymphadenopathy secondary stripping motion.   The heart is normal in size. Coronary artery vascular calcifications. No significant pericardial effusion.   There is pulmonary emphysematous change. There is consolidative opacity in the right upper lobe and right lower lobe compatible with pneumonia and also minimal opacity in the left lower lobe. Trace pleural effusions. No pneumothorax.   Multilevel degenerative change of the thoracic spine.     CT ABDOMEN PELVIS:   Evaluation of the abdomen and pelvis is limited secondary to patient motion. No evidence of liver mass. The gallbladder is present, not well evaluated secondary to motion. No dilatation common bile duct.   Atrophy of the pancreas.   No splenomegaly.   Question mild nodularity of the left adrenal  gland. The right adrenal gland appears unremarkable.   Symmetric enhancement of the kidneys. Right renal cysts with the largest measuring 3.1 cm and several bowel subcentimeter hypodensity too small to characterize. No hydronephrosis.   Atherosclerotic calcification of the aorta and abdominal and pelvic vasculature.   No evidence of bowel obstruction. Evaluation the bowel is otherwise limited secondary patient motion.   Urinary bladder is underdistended and not well evaluated. 18 mm right posterior bladder diverticulum.   Postsurgical change of left hip arthroplasty resulting in beam hardening artifact and limiting evaluation the pelvis.   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       Consolidative opacity in the right upper lobe and lower lobe compatible with pneumonia and also mild left basilar opacity and trace pleural effusions.   No definite evidence of acute abnormality of the abdominal viscera within the limitations of the motion degraded examination.   MACRO: None   Signed by: Yordy Urena 1/5/2024 6:49 PM Dictation workstation:   [id number]             Assessment/Plan   Principal Problem:    Sepsis (CMS/HCC)  Active Problems:    Lactic acid acidosis    Acute renal failure (ARF) (CMS/HCC)    Leukocytosis    Bilateral pneumonia    Chronic respiratory failure (CMS/HCC)    Hypokalemia    Dehydration    Transaminitis    Acute encephalopathy    Hypotension        Continue with supplemental oxygen and bronchodilators  Transthoracic echo shows cardiomyopathy   Continue with empiric antibiotics  Eliquis  Midodrine to continue but if remains with adequate blood pressure, given systolic heart failure and hopefully, of this.  Speech follow-up  Guarded prognosis  Discontinue arterial line  Okay to transfer out of the ICU  PT OT    Discussed with ICU RN.  Discussed with respiratory therapy.          Jaren Zapien MD  Pulmonary & Critical care Medicine  [facility]

## 2024-01-09 NOTE — PROGRESS NOTES
Navarro Rabago is a 78 y.o. male on day 3 of admission presenting with Sepsis (CMS/HCC).    Subjective   Interval History:   Afebrile, no chills  On baseline oxygen  Confused, intermittently  Reports mild to moderate productive cough      Objective   Range of Vitals (last 24 hours)  Heart Rate:  []   Temp:  [36.5 °C (97.7 °F)-36.9 °C (98.4 °F)]   Resp:  [16-29]   BP: ()/(49-63)   Weight:  [114 kg (250 lb 10.6 oz)]   SpO2:  [86 %-98 %]   Daily Weight  01/09/24 : 114 kg (250 lb 10.6 oz)    Body mass index is 29.72 kg/m².    Physical Exam  Constitutional:       Comments:  Intermittent confusion   HENT:      Head: Normocephalic and atraumatic.      Nose: Nose normal.      Mouth/Throat:      Mouth: Mucous membranes are moist.      Pharynx: Oropharynx is clear.   Eyes:      Extraocular Movements: Extraocular movements intact.      Conjunctiva/sclera: Conjunctivae normal.   Cardiovascular:      Rate and Rhythm: Normal rate and regular rhythm.   Pulmonary:      Effort: Pulmonary effort is normal.      Breath sounds: Rhonchi present.   Abdominal:      General: Bowel sounds are normal.      Palpations: Abdomen is soft.   Musculoskeletal:         General: Normal range of motion.      Cervical back: Normal range of motion and neck supple.   Skin:     General: Skin is warm and dry.   Neurological:      General: No focal deficit present.      Mental Status: He is alert.   Psychiatric:         Mood and Affect: Mood normal.         Behavior: Behavior normal.       C  Antibiotics  sodium chloride 0.9% infusion  heparin (porcine) injection 5,000 Units  sodium chloride 0.9 % with KCl 20 mEq/L infusion  calcium carbonate (Tums) chewable tablet 500 mg  ondansetron ODT (Zofran-ODT) disintegrating tablet 4 mg  ondansetron (Zofran) injection 4 mg  benzocaine-menthol (Cepastat Sore Throat) 15-3.6 mg lozenge 1 lozenge  guaiFENesin (Mucinex) 12 hr tablet 600 mg  dextromethorphan-guaifenesin (Robitussin DM)  mg/5 mL oral  liquid 5 mL  dextrose 50 % injection 25 g  glucagon (Glucagen) injection 1 mg  dextrose 10 % in water (D10W) infusion  ipratropium-albuteroL (Duo-Neb) 0.5-2.5 mg/3 mL nebulizer solution 3 mL  oxygen (O2) therapy  piperacillin-tazobactam-dextrose (Zosyn) IV 3.375 g  pantoprazole (ProtoNix) injection 40 mg  ipratropium-albuteroL (Duo-Neb) 0.5-2.5 mg/3 mL nebulizer solution 3 mL  allopurinol (Zyloprim) tablet  apixaban (Eliquis) tablet  aspirin EC tablet  atorvastatin (Lipitor) tablet  bumetanide (Bumex) tablet  colchicine tablet  empagliflozin (Jardiance) tablet  fexofenadine (Allegra) tablet  finasteride (Propecia, Proscar) tablet  guaiFENesin (Mucinex) 12 hr tablet  levothyroxine (Synthroid, Levoxyl) tablet  nitroglycerin (Nitrostat) SL tablet  pantoprazole (ProtoNix) EC tablet  potassium chloride SA (Klor-Con M20) ER tablet  tamsulosin (Flomax) 24 hr capsule  vancomycin 1.5 mg in 300 mL (Xellia) IVPB 1.5 g      levothyroxine (Synthroid, Levoxyl) tablet 50 mcg  finasteride (Proscar) tablet 5 mg  ferrous sulfate (325 mg ferrous sulfate) tablet 1 tablet  aspirin EC tablet 81 mg  apixaban (Eliquis) tablet 5 mg  metoprolol tartrate (Lopressor) tablet 25 mg  potassium chloride 20 mEq in 100 mL IV premix  midodrine (Proamatine) tablet 5 mg  sodium chloride 0.9 % bolus 500 mL  norepinephrine (Levophed) 8 mg in dextrose 5% 250 mL (0.032 mg/mL) infusion (premix)  sodium chloride 0.9 % bolus 250 mL  sodium chloride 0.9 % bolus 250 mL  azithromycin (Zithromax) in dextrose 5 % in water (D5W) 250 mL  mg  vancomycin 1.5 mg in 300 mL (Xellia) IVPB 1.5 g  apixaban (Eliquis) tablet 2.5 mg  vancomycin (Xellia) 1 g in 200 mL (Xellia) IVPB 1,000 mg      Relevant Results  Labs  Results from last 72 hours   Lab Units 01/09/24  0437 01/08/24  0307 01/07/24  0530   WBC AUTO x10*3/uL 15.3* 17.8* 12.9*   HEMOGLOBIN g/dL 9.6* 9.9* 9.9*   HEMATOCRIT % 27.6* 28.8* 27.9*   PLATELETS AUTO x10*3/uL 207 198 143*   NEUTROS PCT AUTO %  --  85.8  "88.4   LYMPHS PCT AUTO %  --  5.0 4.0   MONOS PCT AUTO %  --  7.1 6.1   EOS PCT AUTO %  --  0.2 0.0       Results from last 72 hours   Lab Units 01/09/24  0437 01/08/24  0307 01/07/24  0530   SODIUM mmol/L 140 128* 132*   POTASSIUM mmol/L 3.0* 3.5 3.8   CHLORIDE mmol/L 106 98 102   CO2 mmol/L 20* 17* 19*   BUN mg/dL 49* 51* 42*   CREATININE mg/dL 2.60* 2.60* 2.20*   GLUCOSE mg/dL 140* 115* 125*   CALCIUM mg/dL 7.0* 7.7* 7.6*   ANION GAP mmol/L 14 13 11   EGFR mL/min/1.73m*2 25* 25* 30*       Results from last 72 hours   Lab Units 01/08/24  0307 01/07/24  0530   ALK PHOS U/L 99 99   BILIRUBIN TOTAL mg/dL 1.3* 1.4*   PROTEIN TOTAL g/dL 5.5* 5.6*   ALT U/L 59* 68*   AST U/L 151* 221*   ALBUMIN g/dL 2.1* 2.2*       Estimated Creatinine Clearance: 33.9 mL/min (A) (by C-G formula based on SCr of 2.6 mg/dL (H)).  No results found for: \"CRP\"  Microbiology  Reviewed-cultures pending  Imaging  XR chest 1 view    Result Date: 1/7/2024  Interpreted By:  Cathy Hernandez, STUDY: XR CHEST 1 VIEW 1/7/2024 7:36 am   INDICATION: Follow-up pneumonia   COMPARISON: 05/29/2014 as well as CT examination of the chest done on 01/05/2024.   ACCESSION NUMBER(S): [id number]   ORDERING CLINICIAN: EMILY MAKI   TECHNIQUE: AP erect view of the chest   FINDINGS: The heart is at the upper limits of normal in size. There is infiltrate and airspace consolidation observed centrally within the right lung with upper lobar predominance. There is also some patchy infiltrate at the left lung base. No obvious pleural abnormality is seen.       Infiltrate and airspace consolidation identified within the right lung most pronounced centrally and within the right upper lobe with mild left basilar infiltrate.   Signed by: Cathy Hernandez 1/7/2024 8:11 AM Dictation workstation:   [id number]    CT chest abdomen pelvis w IV contrast    Result Date: 1/5/2024  Interpreted By:  Yordy Urena, STUDY: CT CHEST ABDOMEN PELVIS W IV CONTRAST;  1/5/2024 6:33 pm   INDICATION: " Signs/Symptoms:abd pain, sob.   COMPARISON: None.   ACCESSION NUMBER(S): [id number]   ORDERING CLINICIAN: NELLA MELGOZA   TECHNIQUE: Contiguous axial images of the chest, abdomen and pelvis were obtained after the intravenous administration of  contrast. Coronal and sagittal reformatted images were obtained from the axial images.   FINDINGS: CT CHEST:   No axillary lymphadenopathy. 1.6 cm right paratracheal lymph node and 1.5 cm subcarinal lymph node. There is limited evaluation for hilar lymphadenopathy secondary stripping motion.   The heart is normal in size. Coronary artery vascular calcifications. No significant pericardial effusion.   There is pulmonary emphysematous change. There is consolidative opacity in the right upper lobe and right lower lobe compatible with pneumonia and also minimal opacity in the left lower lobe. Trace pleural effusions. No pneumothorax.   Multilevel degenerative change of the thoracic spine.     CT ABDOMEN PELVIS:   Evaluation of the abdomen and pelvis is limited secondary to patient motion. No evidence of liver mass. The gallbladder is present, not well evaluated secondary to motion. No dilatation common bile duct.   Atrophy of the pancreas.   No splenomegaly.   Question mild nodularity of the left adrenal gland. The right adrenal gland appears unremarkable.   Symmetric enhancement of the kidneys. Right renal cysts with the largest measuring 3.1 cm and several bowel subcentimeter hypodensity too small to characterize. No hydronephrosis.   Atherosclerotic calcification of the aorta and abdominal and pelvic vasculature.   No evidence of bowel obstruction. Evaluation the bowel is otherwise limited secondary patient motion.   Urinary bladder is underdistended and not well evaluated. 18 mm right posterior bladder diverticulum.   Postsurgical change of left hip arthroplasty resulting in beam hardening artifact and limiting evaluation the pelvis.   Multilevel degenerative change of the  lumbar spine.       Consolidative opacity in the right upper lobe and lower lobe compatible with pneumonia and also mild left basilar opacity and trace pleural effusions.   No definite evidence of acute abnormality of the abdominal viscera within the limitations of the motion degraded examination.   MACRO: None   Signed by: Yordy Urena 1/5/2024 6:49 PM Dictation workstation:   [id number]     Assessment/Plan   Sepsis, with shock   Acute renal failure-interval worsening  Pneumonia-MRSA PCR negative   Diarrhea-c.diff negative  Transaminitis-resolving  Leukocytosis-resolving  Oral candida   Chronic respiratory failure-at baseline 5LNC     Continue IV zosyn  IV azithromycin for now  Nystatin suspension  Follow-up blood cultures-negative x 3 days-pending  Monitor WBC and temperature  Monitor renal function  Oxygen as needed  Supportive care  Further recommendations based on pending work-up    ANTONIO Magaña-CNP

## 2024-01-09 NOTE — PROGRESS NOTES
Physical Therapy    Physical Therapy Treatment    Patient Name: Navarro Rabago  MRN: [id number]  Today's Date: 1/9/2024  Time Calculation  Start Time: 1041  Stop Time: 1106  Time Calculation (min): 25 min       Assessment/Plan   PT Assessment  PT Assessment Results: Decreased strength, Decreased range of motion, Decreased endurance, Impaired balance, Decreased mobility, Decreased coordination, Decreased cognition, Impaired judgement, Decreased safety awareness  Rehab Prognosis: Fair  Medical Staff Made Aware: Yes  End of Session Communication: Bedside nurse  Assessment Comment: Pt tolerated sitting on EOB wtih improved posture. Attempted standing trial; pt resistive to assist and appears fearful of mobility. High falls risk. Assist x 2 for safety.  End of Session Patient Position: Bed, 4 rail up, Alarm on     PT Plan  Treatment/Interventions: Bed mobility, Transfer training, Balance training, Neuromuscular re-education, Strengthening, Endurance training, Therapeutic exercise, Therapeutic activity  PT Plan: Skilled PT  PT Frequency: 4 times per week  PT Discharge Recommendations: Moderate intensity level of continued care  PT Recommended Transfer Status: Assist x2  PT - OK to Discharge: Yes      General Visit Information:   PT  Visit  PT Received On: 01/09/24  General  Reason for Referral: impaired mobility  Co-Treatment: OT  Co-Treatment Reason: safety wtih mobility  Prior to Session Communication: Bedside nurse  Patient Position Received: Bed, 4 rail up  Preferred Learning Style: verbal  General Comment: Cleared for mobility per nursing. Pt supine in bed upon arrival. A-line, O2, tele, rock, PIV.    Subjective   Precautions:  Precautions  Medical Precautions: Fall precautions, Oxygen therapy device and L/min  Vital Signs:  Vital Signs  SpO2: 94 %  MAP (mmHg): 76  Patient Position: Sitting    Objective   Pain:  Pain Assessment  Pain Assessment: FLACC (Face, Legs, Activity, Cry, Consolability)  Pain Score: 5 -  "Moderate pain  Pain Type: Chronic pain  Pain Location: Generalized  Cognition:  Cognition  Overall Cognitive Status:  (Reported fearful of mobility. Able to follow commands, with encouragement. Labile at times.  Would occasionally yell out for questionable reasons.)  Orientation Level:  (oriented to self. Not oriented to situation.)  Insight: Moderate  Impulsive: Mildly  Processing Speed: Delayed  Postural Control:  Postural Control  Posture Comment: flexed posture  Extremity/Trunk Assessments:    Activity Tolerance:  Activity Tolerance  Endurance: Decreased tolerance for upright activites  Activity Tolerance Comments: instructed pt on pursed-lip breathing throughout session  Treatments:  Therapeutic Exercise  Therapeutic Exercise Performed: Yes  Therapeutic Exercise Activity 1: x10 B LAQs sitting on EOB with max encouragement to complete    Therapeutic Activity  Therapeutic Activity Performed: Yes  Therapeutic Activity 1: Pt sat on EOB for about 10-12 minutes with fluctuating assist from CGA to mod assist. Repeated cueing to promote upright posture and shift wt anteriorly. Encouraged reaching wtih UEs. Pt teafrul at times; would yell out. Promoted B knee flex to promote WBing through B feet and DF stretch.    Bed Mobility 1  Bed Mobility 1: Supine to sitting  Level of Assistance 1: Maximum assistance  Bed Mobility Comments 1: x2 person assist with HOB elevated and use of draw sheet  Bed Mobility 2  Bed Mobility  2: Sitting to supine  Level of Assistance 2: Maximum assistance  Bed Mobility Comments 2: x2 person assist       Transfers  Transfer: Yes  Transfer 1  Technique 1: Sit to stand, Stand to sit  Transfer Level of Assistance 1: Maximum assistance, x 3  Trials/Comments 1: Cleared buttocks about 40% of full stand. Pt resistant against therapist and pushing posteriorly. \"Ahhhh\" pt moaned. Stood for about 10 seconds with max assist x 2, blocking of knees, B arm in arm assist and 3rd person assist from buttocks to " encourage anterior wt shifting.         Outcome Measures:  [facility] Basic Mobility  Turning from your back to your side while in a flat bed without using bedrails: Total  Moving from lying on your back to sitting on the side of a flat bed without using bedrails: Total  Moving to and from bed to chair (including a wheelchair): Total  Standing up from a chair using your arms (e.g. wheelchair or bedside chair): Total  To walk in hospital room: Total  Climbing 3-5 steps with railing: Total  Basic Mobility - Total Score: 6    Education Documentation  Mobility Training, taught by Elizabeth Murphy, PT at 1/9/2024 11:23 AM.  Learner: Patient  Readiness: Acceptance  Method: Explanation  Response: Needs Reinforcement    Education Comments  No comments found.        OP EDUCATION:       Encounter Problems       Encounter Problems (Active)       Balance       Sitting Balance (Progressing)       Start:  01/08/24    Expected End:  01/16/24       Pt will demonstrate good sitting balance to promote safe engagement with out of bed activities           Standing Balance (Progressing)       Start:  01/08/24    Expected End:  01/16/24       Pt will demonstrate good static standing balance to promote safe participation with out of bed activity, transfers, and mobility              Mobility       Ambulation (Progressing)       Start:  01/08/24    Expected End:  01/16/24       Pt will ambulate 50' modified independent assist with walker to promote safe home mobility              Pain - Adult          Safety       Safe Mobility Techniques (Progressing)       Start:  01/08/24    Expected End:  01/16/24       Pt will correctly identify and demonstrate safe mobility techniques to reduce their risks for falls during their acute care stay               Transfers       Supine to sit (Progressing)       Start:  01/08/24    Expected End:  01/16/24       Pt will transfer supine to sitting at edge of bed with modified independent assist to promote acute care  out of bed activity           Sit to stand (Progressing)       Start:  01/08/24    Expected End:  01/16/24       Pt will transfer sit to standing position with modified independent assist and walker to promote safe out of bed activity           Bed to chair (Progressing)       Start:  01/08/24    Expected End:  01/16/24       Pt will transfer from sitting edge of bed to the chair with modified independent assist and walker to promote out of bed activity and reduce the risks of prolonged acute care bedrest

## 2024-01-09 NOTE — PROGRESS NOTES
"Navarro Rabago is a 78 y.o. male on day 3 of admission presenting with Sepsis (CMS/[facility]).    Subjective    patient was seen yesterday for acute renal failure secondary to septic shock he was admitted with acute pneumonia IV antibiotic therapy he was on pressors as well as IV sodium bicarbonate drip patient looks better today he is less short of breath he still coughing wife by the bedside.       Objective     Physical Exam  Neck:      Vascular: No carotid bruit.   Cardiovascular:      Rate and Rhythm: Normal rate and regular rhythm.      Heart sounds: No murmur heard.     No friction rub. No gallop.   Pulmonary:      Breath sounds: Wheezing and rhonchi present. No rales.   Chest:      Chest wall: No tenderness.   Abdominal:      General: There is no distension.      Tenderness: There is no abdominal tenderness. There is no guarding or rebound.   Musculoskeletal:         General: No swelling or tenderness.      Cervical back: Neck supple.      Right lower leg: No edema.      Left lower leg: No edema.   Lymphadenopathy:      Cervical: No cervical adenopathy.         Last Recorded Vitals  Blood pressure 152/63, pulse 71, temperature 36.5 °C (97.7 °F), temperature source Temporal, resp. rate 22, height 1.956 m (6' 5\"), weight 114 kg (250 lb 10.6 oz), SpO2 94 %.    Intake/Output last 3 Shifts:  I/O last 3 completed shifts:  In: 6774.1 (59.6 mL/kg) [P.O.:240; I.V.:5384.1 (47.4 mL/kg); IV Piggyback:1150]  Out: 1850 (16.3 mL/kg) [Urine:1850 (0.5 mL/kg/hr)]  Weight: 113.7 kg     Current Facility-Administered Medications:     acetaminophen (Tylenol) tablet 650 mg, 650 mg, oral, q6h PRN, Josephine Castro MD, 650 mg at 01/07/24 2245    apixaban (Eliquis) tablet 2.5 mg, 2.5 mg, oral, q12h DARIA, Lb Nance MD, 2.5 mg at 01/09/24 0905    aspirin EC tablet 81 mg, 81 mg, oral, Daily, Serjio Matthews MD, 81 mg at 01/09/24 0905    azithromycin (Zithromax) in dextrose 5 % in water (D5W) 250 mL  mg, 500 mg, intravenous, " q24h DARIA, Ulises Tariq MD, Last Rate: 250 mL/hr at 01/09/24 1134, 500 mg at 01/09/24 1134    benzocaine-menthol (Cepastat Sore Throat) 15-3.6 mg lozenge 1 lozenge, 1 lozenge, Mouth/Throat, q2h PRN, Serjio Matthews MD    calcium carbonate (Tums) chewable tablet 500 mg, 500 mg, oral, 4x daily PRN, Serjio Matthews MD    dextromethorphan-guaifenesin (Robitussin DM)  mg/5 mL oral liquid 5 mL, 5 mL, oral, q4h PRN, Serjio Matthews MD    dextrose 10 % in water (D10W) infusion, 0.3 g/kg/hr, intravenous, Once PRN, Serjio Matthews MD    dextrose 5%-0.45 % sodium chloride infusion, 75 mL/hr, intravenous, Continuous, Gal James MD    dextrose 50 % injection 25 g, 25 g, intravenous, q15 min PRN, Serjio Matthews MD    ferrous sulfate (325 mg ferrous sulfate) tablet 1 tablet, 1 tablet, oral, Daily with breakfast, Serjio Matthews MD, 1 tablet at 01/09/24 0905    finasteride (Proscar) tablet 5 mg, 5 mg, oral, Daily, Serjio Matthews MD, 5 mg at 01/09/24 0905    glucagon (Glucagen) injection 1 mg, 1 mg, intramuscular, q15 min PRN, Serjio aMtthews MD    guaiFENesin (Mucinex) 12 hr tablet 600 mg, 600 mg, oral, q12h PRN, Serjio Matthews MD    ipratropium-albuteroL (Duo-Neb) 0.5-2.5 mg/3 mL nebulizer solution 3 mL, 3 mL, nebulization, 4x daily, Serjio Matthews MD, 3 mL at 01/09/24 1141    levothyroxine (Synthroid, Levoxyl) tablet 50 mcg, 50 mcg, oral, Daily, Serjio Matthews MD, 50 mcg at 01/09/24 0629    midodrine (Proamatine) tablet 5 mg, 5 mg, oral, q8h, Jaren Zapien MD, 5 mg at 01/09/24 1159    norepinephrine (Levophed) 8 mg in dextrose 5% 250 mL (0.032 mg/mL) infusion (premix), 0.01-1 mcg/kg/min, intravenous, Continuous, Ulises Tariq MD, Stopped at 01/08/24 1115    nystatin (Mycostatin) 100,000 unit/mL suspension 500,000 Units, 5 mL, Swish & Swallow, 4x daily, Tess Gonzalez, APRN-CNP, 500,000 Units at 01/09/24 0905    ondansetron ODT (Zofran-ODT) disintegrating tablet 4 mg, 4 mg, oral, q8h PRN **OR**  ondansetron (Zofran) injection 4 mg, 4 mg, intravenous, q8h PRN, Serjio Matthews MD, 4 mg at 01/07/24 0321    oxygen (O2) therapy, , inhalation, q8h, Evan Bates MD    [START ON 1/10/2024] pantoprazole (ProtoNix) EC tablet 40 mg, 40 mg, oral, Daily before breakfast, Serjio Matthews MD    piperacillin-tazobactam-dextrose (Zosyn) IV 3.375 g, 3.375 g, intravenous, q6h, Serjio Matthews MD, Stopped at 01/09/24 0935   Relevant Results    Results for orders placed or performed during the hospital encounter of 01/06/24 (from the past 96 hour(s))   Streptococcus pneumoniae Antigen, Urine    Specimen: Urine   Result Value Ref Range    Streptococcus pneumoniae Ag, Urine Negative Negative   Legionella Antigen, Urine    Specimen: Urine   Result Value Ref Range    L. pneumophila Urine Ag Negative Negative   MRSA Surveillance for Vancomycin De-escalation, PCR    Specimen: Anterior Nares; Swab   Result Value Ref Range    MRSA PCR Not Detected Not Detected   CBC   Result Value Ref Range    WBC 15.0 (H) 4.4 - 11.3 x10*3/uL    nRBC 0.0 0.0 - 0.0 /100 WBCs    RBC 3.60 (L) 4.50 - 5.90 x10*6/uL    Hemoglobin 10.9 (L) 13.5 - 17.5 g/dL    Hematocrit 31.3 (L) 41.0 - 52.0 %    MCV 87 80 - 100 fL    MCH 30.3 26.0 - 34.0 pg    MCHC 34.8 32.0 - 36.0 g/dL    RDW 15.5 (H) 11.5 - 14.5 %    Platelets 132 (L) 150 - 450 x10*3/uL   Comprehensive metabolic panel   Result Value Ref Range    Glucose 108 (H) 65 - 99 mg/dL    Sodium 136 133 - 145 mmol/L    Potassium 3.1 (L) 3.4 - 5.1 mmol/L    Chloride 101 97 - 107 mmol/L    Bicarbonate 21 (L) 24 - 31 mmol/L    Urea Nitrogen 28 (H) 8 - 25 mg/dL    Creatinine 1.40 0.40 - 1.60 mg/dL    eGFR 52 (L) >60 mL/min/1.73m*2    Calcium 7.7 (L) 8.5 - 10.4 mg/dL    Albumin 2.5 (L) 3.5 - 5.0 g/dL    Alkaline Phosphatase 100 35 - 125 U/L    Total Protein 5.9 5.9 - 7.9 g/dL     (H) 5 - 40 U/L    Bilirubin, Total 1.6 (H) 0.1 - 1.2 mg/dL    ALT 83 (H) 5 - 40 U/L    Anion Gap 14 <=19 mmol/L   Vancomycin   Result  Value Ref Range    Vancomycin 12.0 10.0 - 20.0 ug/mL   C. difficile, PCR    Specimen: Stool   Result Value Ref Range    C. difficile, PCR Not Detected Not Detected   CBC and Auto Differential   Result Value Ref Range    WBC 12.9 (H) 4.4 - 11.3 x10*3/uL    nRBC 0.0 0.0 - 0.0 /100 WBCs    RBC 3.33 (L) 4.50 - 5.90 x10*6/uL    Hemoglobin 9.9 (L) 13.5 - 17.5 g/dL    Hematocrit 27.9 (L) 41.0 - 52.0 %    MCV 84 80 - 100 fL    MCH 29.7 26.0 - 34.0 pg    MCHC 35.5 32.0 - 36.0 g/dL    RDW 15.6 (H) 11.5 - 14.5 %    Platelets 143 (L) 150 - 450 x10*3/uL    Neutrophils % 88.4 40.0 - 80.0 %    Immature Granulocytes %, Automated 1.3 (H) 0.0 - 0.9 %    Lymphocytes % 4.0 13.0 - 44.0 %    Monocytes % 6.1 2.0 - 10.0 %    Eosinophils % 0.0 0.0 - 6.0 %    Basophils % 0.2 0.0 - 2.0 %    Neutrophils Absolute 11.38 (H) 1.60 - 5.50 x10*3/uL    Immature Granulocytes Absolute, Automated 0.17 0.00 - 0.50 x10*3/uL    Lymphocytes Absolute 0.51 (L) 0.80 - 3.00 x10*3/uL    Monocytes Absolute 0.78 0.05 - 0.80 x10*3/uL    Eosinophils Absolute 0.00 0.00 - 0.40 x10*3/uL    Basophils Absolute 0.02 0.00 - 0.10 x10*3/uL   Comprehensive metabolic panel   Result Value Ref Range    Glucose 125 (H) 65 - 99 mg/dL    Sodium 132 (L) 133 - 145 mmol/L    Potassium 3.8 3.4 - 5.1 mmol/L    Chloride 102 97 - 107 mmol/L    Bicarbonate 19 (L) 24 - 31 mmol/L    Urea Nitrogen 42 (H) 8 - 25 mg/dL    Creatinine 2.20 (H) 0.40 - 1.60 mg/dL    eGFR 30 (L) >60 mL/min/1.73m*2    Calcium 7.6 (L) 8.5 - 10.4 mg/dL    Albumin 2.2 (L) 3.5 - 5.0 g/dL    Alkaline Phosphatase 99 35 - 125 U/L    Total Protein 5.6 (L) 5.9 - 7.9 g/dL     (H) 5 - 40 U/L    Bilirubin, Total 1.4 (H) 0.1 - 1.2 mg/dL    ALT 68 (H) 5 - 40 U/L    Anion Gap 11 <=19 mmol/L   Vancomycin   Result Value Ref Range    Vancomycin 7.0 (L) 10.0 - 20.0 ug/mL   POCT GLUCOSE   Result Value Ref Range    POCT Glucose 114 (H) 74 - 99 mg/dL   Respiratory Culture/Smear    Specimen: SPUTUM; Fluid   Result Value Ref Range     Respiratory Culture/Smear (A)      Culture not performed. See Gram stain findings. Recollect if clinically indicated.    Gram Stain (A)      Gram stain indicates specimen contains significant salivary contamination.   CBC and Auto Differential   Result Value Ref Range    WBC 17.8 (H) 4.4 - 11.3 x10*3/uL    nRBC 0.0 0.0 - 0.0 /100 WBCs    RBC 3.27 (L) 4.50 - 5.90 x10*6/uL    Hemoglobin 9.9 (L) 13.5 - 17.5 g/dL    Hematocrit 28.8 (L) 41.0 - 52.0 %    MCV 88 80 - 100 fL    MCH 30.3 26.0 - 34.0 pg    MCHC 34.4 32.0 - 36.0 g/dL    RDW 16.2 (H) 11.5 - 14.5 %    Platelets 198 150 - 450 x10*3/uL    Neutrophils % 85.8 40.0 - 80.0 %    Immature Granulocytes %, Automated 1.8 (H) 0.0 - 0.9 %    Lymphocytes % 5.0 13.0 - 44.0 %    Monocytes % 7.1 2.0 - 10.0 %    Eosinophils % 0.2 0.0 - 6.0 %    Basophils % 0.1 0.0 - 2.0 %    Neutrophils Absolute 15.29 (H) 1.60 - 5.50 x10*3/uL    Immature Granulocytes Absolute, Automated 0.32 0.00 - 0.50 x10*3/uL    Lymphocytes Absolute 0.90 0.80 - 3.00 x10*3/uL    Monocytes Absolute 1.27 (H) 0.05 - 0.80 x10*3/uL    Eosinophils Absolute 0.03 0.00 - 0.40 x10*3/uL    Basophils Absolute 0.02 0.00 - 0.10 x10*3/uL   Comprehensive metabolic panel   Result Value Ref Range    Glucose 115 (H) 65 - 99 mg/dL    Sodium 128 (L) 133 - 145 mmol/L    Potassium 3.5 3.4 - 5.1 mmol/L    Chloride 98 97 - 107 mmol/L    Bicarbonate 17 (L) 24 - 31 mmol/L    Urea Nitrogen 51 (H) 8 - 25 mg/dL    Creatinine 2.60 (H) 0.40 - 1.60 mg/dL    eGFR 25 (L) >60 mL/min/1.73m*2    Calcium 7.7 (L) 8.5 - 10.4 mg/dL    Albumin 2.1 (L) 3.5 - 5.0 g/dL    Alkaline Phosphatase 99 35 - 125 U/L    Total Protein 5.5 (L) 5.9 - 7.9 g/dL     (H) 5 - 40 U/L    Bilirubin, Total 1.3 (H) 0.1 - 1.2 mg/dL    ALT 59 (H) 5 - 40 U/L    Anion Gap 13 <=19 mmol/L   Vancomycin   Result Value Ref Range    Vancomycin 12.0 10.0 - 20.0 ug/mL   Transthoracic Echo (TTE) Complete   Result Value Ref Range    AV pk gloria 1.64     LVOT diam 2.00     AV mn grad 6.0      LV biplane EF 54     RVSP 43.0     LVIDd 5.65     AV pk grad 10.8     Aortic Valve Area by Continuity of VTI 1.95     Aortic Valve Area by Continuity of Peak Velocity 1.89     LV A4C EF 54.1    Basic Metabolic Panel   Result Value Ref Range    Glucose 140 (H) 65 - 99 mg/dL    Sodium 140 133 - 145 mmol/L    Potassium 3.0 (L) 3.4 - 5.1 mmol/L    Chloride 106 97 - 107 mmol/L    Bicarbonate 20 (L) 24 - 31 mmol/L    Urea Nitrogen 49 (H) 8 - 25 mg/dL    Creatinine 2.60 (H) 0.40 - 1.60 mg/dL    eGFR 25 (L) >60 mL/min/1.73m*2    Calcium 7.0 (L) 8.5 - 10.4 mg/dL    Anion Gap 14 <=19 mmol/L   NT Pro-BNP   Result Value Ref Range    PROBNP 6,201 (H) 0 - 852 pg/mL   CBC   Result Value Ref Range    WBC 15.3 (H) 4.4 - 11.3 x10*3/uL    nRBC 0.0 0.0 - 0.0 /100 WBCs    RBC 3.18 (L) 4.50 - 5.90 x10*6/uL    Hemoglobin 9.6 (L) 13.5 - 17.5 g/dL    Hematocrit 27.6 (L) 41.0 - 52.0 %    MCV 87 80 - 100 fL    MCH 30.2 26.0 - 34.0 pg    MCHC 34.8 32.0 - 36.0 g/dL    RDW 16.2 (H) 11.5 - 14.5 %    Platelets 207 150 - 450 x10*3/uL   POCT GLUCOSE   Result Value Ref Range    POCT Glucose 147 (H) 74 - 99 mg/dL   Vancomycin   Result Value Ref Range    Vancomycin 17.0 10.0 - 20.0 ug/mL   POCT GLUCOSE   Result Value Ref Range    POCT Glucose 141 (H) 74 - 99 mg/dL       acute kidney injury secondary to sepsis and pneumonia however I think the main culprit here is IV contrast which he received with a CAT scan  His creatinine level is stable  comparing to yesterday's numbers his urine output is improving my plan is to continue with IV hydration change IV fluids to D5 half-normal saline and we will discontinue sodium bicarbonate drip indication for dialysis therapy discussed with the wife  Pneumonia continue with IV antibiotic therapy  Acute respiratory failure continue with oxygen therapy  Metabolic acidosis secondary to acute kidney injury discontinue sodium bicarb drip  Chronic atrial fibrillation             Gal James MD

## 2024-01-09 NOTE — PROGRESS NOTES
Navarro Rabago is a 78 y.o. male on day 3 of admission presenting with Sepsis (CMS/HCC).      Subjective   Pt awake/alert, looks comfortable        Objective     Last Recorded Vitals  /63 (BP Location: Left arm, Patient Position: Lying)   Pulse 71   Temp 36.7 °C (98.1 °F) (Temporal)   Resp 22   Wt 114 kg (250 lb 10.6 oz)   SpO2 95%   Intake/Output last 3 Shifts:    Intake/Output Summary (Last 24 hours) at 1/9/2024 1039  Last data filed at 1/9/2024 0900  Gross per 24 hour   Intake 2821.76 ml   Output 1475 ml   Net 1346.76 ml       Admission Weight  Weight: 104 kg (228 lb 9.9 oz) (01/06/24 0026)    Daily Weight  01/09/24 : 114 kg (250 lb 10.6 oz)    Image Results      Physical Exam  Constitutional:       Appearance: Normal appearance.   HENT:      Head: Normocephalic and atraumatic.      Mouth/Throat:      Mouth: Mucous membranes are moist.   Eyes:      Extraocular Movements: Extraocular movements intact.      Pupils: Pupils are equal, round, and reactive to light.   Cardiovascular:      Rate and Rhythm: Normal rate.      Pulses: Normal pulses.   Pulmonary:      Effort: Pulmonary effort is normal.      Breath sounds: Normal breath sounds.   Abdominal:      Palpations: Abdomen is soft.   Musculoskeletal:      Cervical back: Neck supple.   Skin:     General: Skin is warm.   Neurological:      Mental Status: He is alert and oriented to person, place, and time.   Psychiatric:         Behavior: Behavior normal.         Relevant Results             Scheduled medications  apixaban, 2.5 mg, oral, q12h DARIA  aspirin, 81 mg, oral, Daily  azithromycin, 500 mg, intravenous, q24h DARIA  ferrous sulfate (325 mg ferrous sulfate), 1 tablet, oral, Daily with breakfast  finasteride, 5 mg, oral, Daily  ipratropium-albuteroL, 3 mL, nebulization, 4x daily  levothyroxine, 50 mcg, oral, Daily  midodrine, 5 mg, oral, q8h  nystatin, 5 mL, Swish & Swallow, 4x daily  oxygen, , inhalation, q8h  [START ON 1/10/2024] pantoprazole, 40 mg,  oral, Daily before breakfast  piperacillin-tazobactam, 3.375 g, intravenous, q6h      Continuous medications  norepinephrine, 0.01-1 mcg/kg/min, Last Rate: Stopped (01/08/24 1115)  sodium bicarbonate, 100 mL/hr, Last Rate: 100 mL/hr (01/09/24 6289)      PRN medications  PRN medications: acetaminophen, benzocaine-menthol, calcium carbonate, dextromethorphan-guaifenesin, dextrose 10 % in water (D10W), dextrose, glucagon, guaiFENesin, ondansetron ODT **OR** ondansetron  Results for orders placed or performed during the hospital encounter of 01/06/24 (from the past 24 hour(s))   Basic Metabolic Panel   Result Value Ref Range    Glucose 140 (H) 65 - 99 mg/dL    Sodium 140 133 - 145 mmol/L    Potassium 3.0 (L) 3.4 - 5.1 mmol/L    Chloride 106 97 - 107 mmol/L    Bicarbonate 20 (L) 24 - 31 mmol/L    Urea Nitrogen 49 (H) 8 - 25 mg/dL    Creatinine 2.60 (H) 0.40 - 1.60 mg/dL    eGFR 25 (L) >60 mL/min/1.73m*2    Calcium 7.0 (L) 8.5 - 10.4 mg/dL    Anion Gap 14 <=19 mmol/L   NT Pro-BNP   Result Value Ref Range    PROBNP 6,201 (H) 0 - 852 pg/mL   CBC   Result Value Ref Range    WBC 15.3 (H) 4.4 - 11.3 x10*3/uL    nRBC 0.0 0.0 - 0.0 /100 WBCs    RBC 3.18 (L) 4.50 - 5.90 x10*6/uL    Hemoglobin 9.6 (L) 13.5 - 17.5 g/dL    Hematocrit 27.6 (L) 41.0 - 52.0 %    MCV 87 80 - 100 fL    MCH 30.2 26.0 - 34.0 pg    MCHC 34.8 32.0 - 36.0 g/dL    RDW 16.2 (H) 11.5 - 14.5 %    Platelets 207 150 - 450 x10*3/uL   POCT GLUCOSE   Result Value Ref Range    POCT Glucose 147 (H) 74 - 99 mg/dL      Assessment/Plan   This patient currently has cardiac telemetry ordered; if you would like to modify or discontinue the telemetry order, click here to go to the orders activity to modify/discontinue the order.      This patient has a urinary catheter   Reason for the urinary catheter remaining today?           Principal Problem:    Sepsis (CMS/HCC)  Active Problems:    Lactic acid acidosis    Acute renal failure (ARF) (CMS/HCC)    Leukocytosis    Bilateral  pneumonia    Chronic respiratory failure (CMS/HCC)    Hypokalemia    Dehydration    Transaminitis    Acute encephalopathy    Hypotension    Aspiration/pneumonia- atbs as ordered  Dysphagia- MBS per speech therapy done, diet as ordered   Sepsis/hypotension- improving, continue management in ICU  Encephalopathy/confusion-supportive care  Hypokalemia- replace, follow up with BMP AM    A fib- rate controlled, on apixaban, appreciate cardiology recommendations   Worsening in renal function- IV hydration, follow up with BMP, nephrology on case, renal US  done yesterday   Medically stable, will follow along with consultants               Evan Bates MD

## 2024-01-09 NOTE — PROGRESS NOTES
"Occupational Therapy    Occupational Therapy Treatment    Name: Navarro Rabago  MRN: [id number]  : 1945  Date: 24  Time Calculation  Start Time: 1039  Stop Time: 1103  Time Calculation (min): 24 min    Assessment:  OT Assessment: 79 y/o male here with severe sepsis.  On eval, he presents significantly below baseline, requiring assist x2-3 for xfers, mobility and self care d/t profound deficits in strength, balance, activity tolerance and a high fear of falling.  Skilled OT services are required to maximize safety/IND prior to DC  Prognosis: Good  Barriers to Discharge: None  Evaluation/Treatment Tolerance: Patient limited by fatigue  Medical Staff Made Aware: Yes  End of Session Communication: Bedside nurse  End of Session Patient Position: Bed, 4 rail up, Alarm on  Plan:  Treatment Interventions: ADL retraining, Functional transfer training, UE strengthening/ROM, Endurance training, Cognitive reorientation, Patient/family training, Equipment evaluation/education, Compensatory technique education  OT Frequency: 4 times per week  OT Discharge Recommendations: Moderate intensity level of continued care  Equipment Recommended upon Discharge: Lift  OT Recommended Transfer Status: Dependent, Assist of 2  OT - OK to Discharge: Yes    Subjective   Previous Visit Info:  OT Last Visit  OT Received On: 24  General:  General  Reason for Referral: Decreased ADLs  Referred By: Dr. Bates  Past Medical History Relevant to Rehab: CHF, afib, CAD, COPD, HTN, hernia  Family/Caregiver Present: Yes  Caregiver Feedback: spouse in room  Co-Treatment: PT  Co-Treatment Reason: d/t increased need for skilled intervention  Prior to Session Communication: Bedside nurse  Patient Position Received: Bed, 4 rail up  Preferred Learning Style: verbal  General Comment: Cleared by nsg, pt met in supine, agreeable to OT.  Reports feeling \"better in some ways and worse in others.\"  Precautions:  Hearing/Visual Limitations: " [facility]  Medical Precautions: Fall precautions, Oxygen therapy device and L/min  Precautions Comment: + rock cath, IV, Rt wrist arterial line, 6L O2 via NC, telemetry  Vitals:  Vital Signs  Heart Rate: 80  Heart Rate Source: Monitor  SpO2: 95 %  BP: (!) 107/49  MAP (mmHg): 76  BP Location: Right arm  BP Method: Arterial line  Patient Position: Sitting  Pain Assessment:  Pain Assessment  Pain Assessment: FLACC (Face, Legs, Activity, Cry, Consolability)  Pain Score: 5 - Moderate pain  Pain Type: Acute pain  Pain Location: Generalized     Objective   Activities of Daily Living:    LE Dressing  LE Dressing: Yes  Sock Level of Assistance: Dependent  LE Dressing Where Assessed: Bed level  LE Dressing Comments: sock mgmt in supine    Toileting  Toileting Level of Assistance: Dependent  Where Assessed: Other (Comment)  Toileting Comments: + rock cath    Functional Standing Tolerance:  Functional Standing Tolerance  Time: 15s  Activity: partial stand at EOB  Functional Standing Tolerance Comments: max A x3 with knees blocked. highly effortful completion    Bed Mobility/Transfers:   Bed Mobility  Bed Mobility: Yes  Bed Mobility 1  Bed Mobility 1: Supine to sitting  Level of Assistance 1: Maximum assistance  Bed Mobility Comments 1: max A x2 for trunk/BLE mgmt  Bed Mobility 2  Bed Mobility  2: Sitting to supine  Level of Assistance 2: Maximum assistance  Bed Mobility Comments 2: max A x2 for safe/controlled descent    Transfers  Transfer: Yes  Transfer 1  Technique 1: Sit to stand, Stand to sit  Transfer Level of Assistance 1: Maximum assistance, x 3  Trials/Comments 1: max A x3 for partial stand from EOB. pt is highly fearful, yells out and is difficult to redirect. fatigues quickly    Sitting Balance:  Static Sitting Balance  Static Sitting-Balance Support: Bilateral upper extremity supported, Feet supported  Static Sitting-Level of Assistance: Close supervision, Minimum assistance  Static Sitting-Comment/Number of Minutes:  EOB sitting initially required max A posteriorly, progresses to min-supervision assist with LE repositioning, cues for hand placement and weight shift    Therapy/Activity: Therapeutic Exercise  Therapeutic Exercise Performed: Yes  Therapeutic Exercise Activity 1: UE ther ex performed c/ pt seated supported at EOB, 1 set x 10 reps shoulder flexion with cues to count aloud. fair- form and tolerance    Outcome Measures:  [facility] Daily Activity  Putting on and taking off regular lower body clothing: Total  Bathing (including washing, rinsing, drying): A lot  Putting on and taking off regular upper body clothing: A lot  Toileting, which includes using toilet, bedpan or urinal: Total  Taking care of personal grooming such as brushing teeth: A lot  Eating Meals: A lot  Daily Activity - Total Score: 10        Education Documentation  Precautions, taught by Kelechi Bravo, OT at 1/8/2024  3:41 PM.  Learner: Patient  Readiness: Acceptance  Method: Explanation  Response: Needs Reinforcement    Education Comments  No comments found.      Goals:  Encounter Problems       Encounter Problems (Active)       OT Goals       ADLs (Progressing)       Start:  01/08/24    Expected End:  01/16/24       Patient will perform ADLs with MOD A using AE/compensatory techniques as needed.          Sitting Tolerance (Progressing)       Start:  01/08/24    Expected End:  01/16/24       Patient will demonstrate improved sitting tolerance AEB completing EOB activities for >/= 15 minutes with supervision assist for stability.         UE Strengthening (Progressing)       Start:  01/08/24    Expected End:  01/16/24       Patient will improve UE strength to 3-/5 in preparation for functional transfers.

## 2024-01-09 NOTE — PROGRESS NOTES
Patient is from home with spouse.  Therapy recommends MODERATE intensity at discharge.  Additional clinicals sent to [facility] via [facility] per facility request.  Referral in review.  Patient will need precert prior to discharge.  Awaiting updates.  RN TCC following.    1155  Per [facility] Song requesting updated notes when patient is out of ICU.  Awaiting downgrade/transfer.  [facility] following.    Eloina Crespo RN

## 2024-01-09 NOTE — PROGRESS NOTES
Speech-Language Pathology    Speech-Language Pathology    Inpatient Speech Language Pathology Dysphagia Treatment note     Patient Name: Navarro Rabago  MRN: [id number]  : 1945  Today's Date: 24  Time Calculation  Start Time: 1000  Stop Time: 1020  Time Calculation (min): 20 min       Total Number of Visits: 2/3 (sw, MON )    PLAN:  Skilled speech therapy for dysphagia treatment continues to be warranted to provide training and instruction regarding the use of compensatory swallow strategies, for pt/caregiver education in order to reduce risk of aspiration, dehydration and malnutrition. , to assess tolerance of diet , to determine ability to upgrade diet after PO trials with SLP     SLP Frequency: 3x per week  Discussed POC: Patient, Nursing, Physician  Discussed Risks/Benefits: Patient, Yes, Nursing, Physician  Patient/Caregiver Agreeable: Yes  SLP - OK to Discharge: Yes    Recommended Diet:   Solid Diet Recommendations: Minced & moist/ground (IDDSI Level 5)  Liquid Diet Recommendations: Thin (IDDSI Level 0), Other (Comments) (NO STRAWS)  Compensatory strategies: small bites/sips, upright 90 degrees for intake   Medication administration: crushed or whole in puree    Subjective:  Pt. Seen at bedside for skilled dysphagia treatment.   Pain:  Pain Assessment  Pain Assessment: 0-10  Pain Score: 4  Pain Type: Acute pain  Pain Location: Generalized  Pain Interventions: Repositioned Denies pain        Oxygen Status:   nasal cannula      Goals:   1) Patient will tolerate recommended diet without observed clinical signs of aspiration,    Current Session: RN reports adequate diet toleration, pt refuses participation in PO trials outside of ice chips.  2) Patient will demonstrate appropriate strategies for swallowing safety,    Current Session: Reviewed no straws, upright seating, and effortful swallow. Pt able to re-state all strategies.   3) Patient will progress to advanced diet    Current Session: Not  targeted  4) Pt to participate in assessment of oropharyngeal swallow function via MBSS for assessment of possible aspiration and to determine least restrictive diet for meeting pt's nutritional and hydration needs.  - Goal met 1/8  5) ADD GOAL: Pt to participate in oropharyngeal therapeutic exercises to help improve oropharyngeal swallow function.   Current Session: Pt participates in labial retraction/protrusion x10, anterior lingual elevation/depression x10, and effortful swallow x10 with mod cues for placement and techniques.     SLP Assessment:  See details above      Treatment Outcome:       Treatment Tolerance: Patient tolerated treatment well  Prognosis: Good   Barriers: Cognition   Medical Staff Made Aware: Yes       Education:  Pt. Given skilled instruction on plan of care and compensatory swallowing strategies  Pt. gave verbal understanding

## 2024-01-09 NOTE — PROGRESS NOTES
"Navarro Rabago is a 78 y.o. male on day 3 of admission presenting with Sepsis (CMS/HCC).    Subjective   Patient states that he feels better today.  Denies palpitations or chest pains.       Objective     Physical Exam  Eyes:      Conjunctiva/sclera: Conjunctivae normal.   Cardiovascular:      Rate and Rhythm: Normal rate and regular rhythm.      Heart sounds:      No gallop.   Pulmonary:      Breath sounds: Normal breath sounds. No wheezing, rhonchi or rales.   Abdominal:      Palpations: Abdomen is soft.   Neurological:      General: No focal deficit present.      Mental Status: He is alert.       Constitutional:       Appearance: Not in distress.   Eyes:      Conjunctiva/sclera: Conjunctivae normal.   Neck:      Vascular: JVD normal.   Pulmonary:      Breath sounds: Normal breath sounds. No wheezing. No rhonchi. No rales.   Cardiovascular:      Normal rate. Regular rhythm.      Murmurs: There is no murmur.      No gallop.  No click. No rub.   Abdominal:      Palpations: Abdomen is soft.   Neurological:      General: No focal deficit present.      Mental Status: Alert.        Last Recorded Vitals  Blood pressure (!) 110/49, pulse 71, temperature 36.8 °C (98.2 °F), temperature source Temporal, resp. rate 22, height 1.956 m (6' 5\"), weight 114 kg (250 lb 10.6 oz), SpO2 94 %.  Intake/Output last 3 Shifts:  I/O last 3 completed shifts:  In: 5912.4 (57 mL/kg) [P.O.:240; I.V.:4522.4 (43.6 mL/kg); IV Piggyback:1150]  Out: 1350 (13 mL/kg) [Urine:1350 (0.4 mL/kg/hr)]  Weight: 103.7 kg     Relevant Results           This patient currently has cardiac telemetry ordered; if you would like to modify or discontinue the telemetry order, click here to go to the orders activity to modify/discontinue the order.    This patient has a urinary catheter   Reason for the urinary catheter remaining today?                Assessment/Plan   Principal Problem:    Sepsis (CMS/HCC)  Active Problems:    Lactic acid acidosis    Acute renal " failure (ARF) (CMS/HCC)    Leukocytosis    Bilateral pneumonia    Chronic respiratory failure (CMS/HCC)    Hypokalemia    Dehydration    Transaminitis    Acute encephalopathy    Hypotension      1/6: The patient is a 78-year-old white male with a history of longstanding coronary artery disease with multiple PCI procedures.  He does have ischemic congestive cardiomyopathy with an echocardiogram performed on 9/26/2023 estimating his LV ejection fraction of 35-40% with 2+ mitral valve regurgitation moderate left atrial enlargement mild RV chamber dilatation and a normal estimated PA systolic pressure.  He also has a history of longstanding persistent now permanent atrial fibrillation for which she underwent multiple electrical DC cardioversions and a radiofrequency ablation procedure.  He is currently on a rate control strategy.  He has a history of former smoking COPD with O2 dependence.  He is admitted with increasing weakness shortness of breath cough with evidence of right-sided pneumonia by the initial chest CT for which she has been started on empiric IV antibiotics.  He does remain in atrial fibrillation borderline increase in ventricular rate and will begin low-dose metoprolol 25 mg twice daily.  He has been continued on Eliquis anticoagulation 5 mg twice daily along with a low-dose aspirin for CAD as well as statin therapy.  Will continue the modest IV fluid hydration given the transient hypotension in the emergency room with the potassium supplement.  Will check a repeat echocardiogram.  Follow-up chest x-ray tomorrow.      1/7: Patient's seen and events reviewed in detail.  He is lying supine somewhat fatigued no overt respiratory distress on nasal cannula.  He did develop hypotension with systolic blood pressure values in the lower 80 mmHg range.  He was given 500 cc of IV normal saline fluid bolus remains on the maintenance infusion at 100 cc/h.  He was placed on low-dose Levophed for pressure support.   Comprehensive metabolic panel noted of both for acute on chronic renal insuf transaminases remain elevated  ALT 68 bilirubin 1.4.  Transaminitis presumably related to sepsis hypotension.  Gastroenterology is following.  Patient remains in the atrial fibrillation with controlled ventricular rate and low-dose metoprolol.  Will reduce dose of Eliquis to 2.5 mg twice daily.  Acute on chronic renal insufficiency presumably due to sepsis and hypotension creatinine is risen from 1.4-2.2 today and urine output has diminished.  The repeat chest x-ray again demonstrates infiltration consolidation within the right lung predominantly centrally and in the right upper lobe.  Patient currently on IV Zosyn azithromycin and vancomycin.     1/8: Patient still receiving low-dose IV norepinephrine to support blood pressure.  IV antibiotic therapy continues as well.  Remains in atrial fibrillation with controlled heart rate response.  Eliquis dose has been decreased down to 2.5 mg twice daily given the renal insufficiency.  Will continue to follow renal function.  Patient slowly improving.     1/9: Clinically overall improved over the last 24 hours.  IV norepinephrine being titrated down gradually.  Patient remains in atrial fibrillation with a well-controlled heart rate.  Serum creatinine unchanged at 2.6.  Potassium at 3.0 thus supplement will be needed.  Continue antibiotic therapy, patient continues to gradually improve.       I spent 20 minutes in the professional and overall care of this patient.      Sanford Neves, DO

## 2024-01-09 NOTE — PROGRESS NOTES
"Vancomycin Dosing by Pharmacy- FOLLOW UP    Navarro Rabago is a 78 y.o. year old male who Pharmacy has been consulted for vancomycin dosing for pneumonia. Based on the patient's indication and renal status this patient is being dosed based on a goal trough/random level of 15-20.     Renal function is currently stable.    Current vancomycin dose: 1500 mg dosed by troughs    Most recent random level: 17.0 mcg/mL    Visit Vitals  /63 (BP Location: Left arm, Patient Position: Lying)   Pulse 71   Temp 36.5 °C (97.7 °F) (Temporal)   Resp 22        Lab Results   Component Value Date    CREATININE 2.60 (H) 01/09/2024    CREATININE 2.60 (H) 01/08/2024    CREATININE 2.20 (H) 01/07/2024    CREATININE 1.40 01/06/2024        Patient weight is No results found for: \"PTWEIGHT\"    No results found for: \"CULTURE\"     I/O last 3 completed shifts:  In: 6774.1 (59.6 mL/kg) [P.O.:240; I.V.:5384.1 (47.4 mL/kg); IV Piggyback:1150]  Out: 1850 (16.3 mL/kg) [Urine:1850 (0.5 mL/kg/hr)]  Weight: 113.7 kg   [unfilled]    No results found for: \"PATIENTTEMP\"     Assessment/Plan    Within goal random/trough level. 1500mg one time only  The next level will be obtained on 1/10 at 0500. May be obtained sooner if clinically indicated.   Will continue to monitor renal function daily while on vancomycin and order serum creatinine at least every 48 hours if not already ordered.  Follow for continued vancomycin needs, clinical response, and signs/symptoms of toxicity.       MYRANDA MALIK, PharmD           "

## 2024-01-10 ENCOUNTER — APPOINTMENT (OUTPATIENT)
Dept: RADIOLOGY | Facility: HOSPITAL | Age: 79
DRG: 871 | End: 2024-01-10
Payer: MEDICARE

## 2024-01-10 LAB
ABO GROUP (TYPE) IN BLOOD: NORMAL
ABO GROUP (TYPE) IN BLOOD: NORMAL
ALBUMIN SERPL-MCNC: 2.1 G/DL (ref 3.5–5)
ANION GAP SERPL CALC-SCNC: 12 MMOL/L
ANTIBODY SCREEN: NORMAL
ATRIAL RATE: 107 BPM
BACTERIA BLD CULT: NORMAL
BACTERIA BLD CULT: NORMAL
BUN SERPL-MCNC: 43 MG/DL (ref 8–25)
CALCIUM SERPL-MCNC: 7.7 MG/DL (ref 8.5–10.4)
CHLORIDE SERPL-SCNC: 101 MMOL/L (ref 97–107)
CO2 SERPL-SCNC: 23 MMOL/L (ref 24–31)
CREAT SERPL-MCNC: 2.1 MG/DL (ref 0.4–1.6)
EGFRCR SERPLBLD CKD-EPI 2021: 32 ML/MIN/1.73M*2
ERYTHROCYTE [DISTWIDTH] IN BLOOD BY AUTOMATED COUNT: 16 % (ref 11.5–14.5)
GLUCOSE SERPL-MCNC: 140 MG/DL (ref 65–99)
HCT VFR BLD AUTO: 29 % (ref 41–52)
HGB BLD-MCNC: 10.2 G/DL (ref 13.5–17.5)
MCH RBC QN AUTO: 30.1 PG (ref 26–34)
MCHC RBC AUTO-ENTMCNC: 35.2 G/DL (ref 32–36)
MCV RBC AUTO: 86 FL (ref 80–100)
NRBC BLD-RTO: 0 /100 WBCS (ref 0–0)
PHOSPHATE SERPL-MCNC: 1.9 MG/DL (ref 2.5–4.5)
PLATELET # BLD AUTO: 276 X10*3/UL (ref 150–450)
POTASSIUM SERPL-SCNC: 3.1 MMOL/L (ref 3.4–5.1)
PR INTERVAL: 352 MS
Q ONSET: 219 MS
QRS COUNT: 18 BEATS
QRS DURATION: 108 MS
QT INTERVAL: 282 MS
QTC CALCULATION(BAZETT): 376 MS
QTC FREDERICIA: 342 MS
R AXIS: 142 DEGREES
RBC # BLD AUTO: 3.39 X10*6/UL (ref 4.5–5.9)
RH FACTOR (ANTIGEN D): NORMAL
RH FACTOR (ANTIGEN D): NORMAL
SODIUM SERPL-SCNC: 136 MMOL/L (ref 133–145)
T AXIS: -37 DEGREES
T OFFSET: 360 MS
VANCOMYCIN SERPL-MCNC: 22 UG/ML (ref 10–20)
VENTRICULAR RATE: 107 BPM
WBC # BLD AUTO: 17.5 X10*3/UL (ref 4.4–11.3)

## 2024-01-10 PROCEDURE — 36415 COLL VENOUS BLD VENIPUNCTURE: CPT | Performed by: INTERNAL MEDICINE

## 2024-01-10 PROCEDURE — 2500000001 HC RX 250 WO HCPCS SELF ADMINISTERED DRUGS (ALT 637 FOR MEDICARE OP): Performed by: INTERNAL MEDICINE

## 2024-01-10 PROCEDURE — 97110 THERAPEUTIC EXERCISES: CPT | Mod: GP,CQ

## 2024-01-10 PROCEDURE — 2500000004 HC RX 250 GENERAL PHARMACY W/ HCPCS (ALT 636 FOR OP/ED): Performed by: INTERNAL MEDICINE

## 2024-01-10 PROCEDURE — 80069 RENAL FUNCTION PANEL: CPT | Performed by: INTERNAL MEDICINE

## 2024-01-10 PROCEDURE — 74176 CT ABD & PELVIS W/O CONTRAST: CPT

## 2024-01-10 PROCEDURE — 70450 CT HEAD/BRAIN W/O DYE: CPT | Mod: FOREIGN READ | Performed by: RADIOLOGY

## 2024-01-10 PROCEDURE — 2500000004 HC RX 250 GENERAL PHARMACY W/ HCPCS (ALT 636 FOR OP/ED): Performed by: NURSE PRACTITIONER

## 2024-01-10 PROCEDURE — 94640 AIRWAY INHALATION TREATMENT: CPT

## 2024-01-10 PROCEDURE — 2060000001 HC INTERMEDIATE ICU ROOM DAILY

## 2024-01-10 PROCEDURE — 99222 1ST HOSP IP/OBS MODERATE 55: CPT

## 2024-01-10 PROCEDURE — 92526 ORAL FUNCTION THERAPY: CPT | Mod: GN | Performed by: SPEECH-LANGUAGE PATHOLOGIST

## 2024-01-10 PROCEDURE — 97110 THERAPEUTIC EXERCISES: CPT | Mod: GO,CO

## 2024-01-10 PROCEDURE — 2500000005 HC RX 250 GENERAL PHARMACY W/O HCPCS: Performed by: INTERNAL MEDICINE

## 2024-01-10 PROCEDURE — 99232 SBSQ HOSP IP/OBS MODERATE 35: CPT | Performed by: NURSE PRACTITIONER

## 2024-01-10 PROCEDURE — 70450 CT HEAD/BRAIN W/O DYE: CPT

## 2024-01-10 PROCEDURE — 2500000002 HC RX 250 W HCPCS SELF ADMINISTERED DRUGS (ALT 637 FOR MEDICARE OP, ALT 636 FOR OP/ED): Performed by: INTERNAL MEDICINE

## 2024-01-10 PROCEDURE — 85027 COMPLETE CBC AUTOMATED: CPT | Performed by: INTERNAL MEDICINE

## 2024-01-10 PROCEDURE — 9420000001 HC RT PATIENT EDUCATION 5 MIN

## 2024-01-10 PROCEDURE — 74176 CT ABD & PELVIS W/O CONTRAST: CPT | Mod: FOREIGN READ | Performed by: RADIOLOGY

## 2024-01-10 PROCEDURE — 86901 BLOOD TYPING SEROLOGIC RH(D): CPT | Performed by: INTERNAL MEDICINE

## 2024-01-10 PROCEDURE — 80202 ASSAY OF VANCOMYCIN: CPT | Performed by: INTERNAL MEDICINE

## 2024-01-10 PROCEDURE — 97535 SELF CARE MNGMENT TRAINING: CPT | Mod: GO,CO

## 2024-01-10 RX ORDER — QUETIAPINE FUMARATE 25 MG/1
12.5 TABLET, FILM COATED ORAL 2 TIMES DAILY
Status: DISCONTINUED | OUTPATIENT
Start: 2024-01-10 | End: 2024-01-19 | Stop reason: HOSPADM

## 2024-01-10 RX ORDER — LORAZEPAM 2 MG/ML
2 INJECTION INTRAMUSCULAR ONCE
Status: COMPLETED | OUTPATIENT
Start: 2024-01-10 | End: 2024-01-10

## 2024-01-10 RX ORDER — POTASSIUM CHLORIDE 20 MEQ/1
40 TABLET, EXTENDED RELEASE ORAL ONCE
Status: COMPLETED | OUTPATIENT
Start: 2024-01-10 | End: 2024-01-10

## 2024-01-10 RX ORDER — LORAZEPAM 2 MG/ML
1 INJECTION INTRAMUSCULAR EVERY 4 HOURS PRN
Status: DISCONTINUED | OUTPATIENT
Start: 2024-01-10 | End: 2024-01-19 | Stop reason: HOSPADM

## 2024-01-10 RX ADMIN — DEXTROSE MONOHYDRATE AND SODIUM CHLORIDE 75 ML/HR: 5; .45 INJECTION, SOLUTION INTRAVENOUS at 17:11

## 2024-01-10 RX ADMIN — Medication 4 L/MIN: at 23:00

## 2024-01-10 RX ADMIN — PIPERACILLIN SODIUM AND TAZOBACTAM SODIUM 3.38 G: 3; .375 INJECTION, SOLUTION INTRAVENOUS at 18:02

## 2024-01-10 RX ADMIN — MIDODRINE HYDROCHLORIDE 5 MG: 5 TABLET ORAL at 11:56

## 2024-01-10 RX ADMIN — PANTOPRAZOLE SODIUM 40 MG: 40 TABLET, DELAYED RELEASE ORAL at 06:17

## 2024-01-10 RX ADMIN — IPRATROPIUM BROMIDE AND ALBUTEROL SULFATE 3 ML: 2.5; .5 SOLUTION RESPIRATORY (INHALATION) at 07:58

## 2024-01-10 RX ADMIN — LORAZEPAM 2 MG: 2 INJECTION, SOLUTION INTRAMUSCULAR; INTRAVENOUS at 15:01

## 2024-01-10 RX ADMIN — PIPERACILLIN SODIUM AND TAZOBACTAM SODIUM 3.38 G: 3; .375 INJECTION, SOLUTION INTRAVENOUS at 13:04

## 2024-01-10 RX ADMIN — Medication 4 L/MIN: at 07:00

## 2024-01-10 RX ADMIN — QUETIAPINE FUMARATE 12.5 MG: 25 TABLET ORAL at 20:08

## 2024-01-10 RX ADMIN — LEVOTHYROXINE SODIUM 50 MCG: 0.05 TABLET ORAL at 06:17

## 2024-01-10 RX ADMIN — POTASSIUM CHLORIDE 40 MEQ: 1500 TABLET, EXTENDED RELEASE ORAL at 09:19

## 2024-01-10 RX ADMIN — QUETIAPINE FUMARATE 12.5 MG: 25 TABLET ORAL at 12:32

## 2024-01-10 RX ADMIN — LORAZEPAM 1 MG: 2 INJECTION, SOLUTION INTRAMUSCULAR; INTRAVENOUS at 21:56

## 2024-01-10 RX ADMIN — GUAIFENESIN AND DEXTROMETHORPHAN 5 ML: 100; 10 SYRUP ORAL at 21:03

## 2024-01-10 RX ADMIN — DEXTROSE MONOHYDRATE AND SODIUM CHLORIDE 75 ML/HR: 5; .45 INJECTION, SOLUTION INTRAVENOUS at 01:29

## 2024-01-10 RX ADMIN — AZITHROMYCIN MONOHYDRATE 500 MG: 500 INJECTION, POWDER, LYOPHILIZED, FOR SOLUTION INTRAVENOUS at 09:22

## 2024-01-10 RX ADMIN — PIPERACILLIN SODIUM AND TAZOBACTAM SODIUM 3.38 G: 3; .375 INJECTION, SOLUTION INTRAVENOUS at 06:17

## 2024-01-10 RX ADMIN — PIPERACILLIN SODIUM AND TAZOBACTAM SODIUM 3.38 G: 3; .375 INJECTION, SOLUTION INTRAVENOUS at 01:25

## 2024-01-10 RX ADMIN — FINASTERIDE 5 MG: 5 TABLET, FILM COATED ORAL at 09:20

## 2024-01-10 RX ADMIN — FERROUS SULFATE TAB 325 MG (65 MG ELEMENTAL FE) 1 TABLET: 325 (65 FE) TAB at 09:19

## 2024-01-10 RX ADMIN — NYSTATIN 500000 UNITS: 100000 SUSPENSION ORAL at 20:08

## 2024-01-10 RX ADMIN — NYSTATIN 500000 UNITS: 100000 SUSPENSION ORAL at 13:00

## 2024-01-10 RX ADMIN — LORAZEPAM 1 MG: 2 INJECTION, SOLUTION INTRAMUSCULAR; INTRAVENOUS at 12:32

## 2024-01-10 RX ADMIN — MIDODRINE HYDROCHLORIDE 5 MG: 5 TABLET ORAL at 03:58

## 2024-01-10 RX ADMIN — NYSTATIN 500000 UNITS: 100000 SUSPENSION ORAL at 06:17

## 2024-01-10 RX ADMIN — IPRATROPIUM BROMIDE AND ALBUTEROL SULFATE 3 ML: 2.5; .5 SOLUTION RESPIRATORY (INHALATION) at 12:05

## 2024-01-10 ASSESSMENT — COGNITIVE AND FUNCTIONAL STATUS - GENERAL
STANDING UP FROM CHAIR USING ARMS: TOTAL
EATING MEALS: A LOT
DAILY ACTIVITIY SCORE: 7
HELP NEEDED FOR BATHING: TOTAL
DAILY ACTIVITIY SCORE: 6
MOVING FROM LYING ON BACK TO SITTING ON SIDE OF FLAT BED WITH BEDRAILS: TOTAL
PERSONAL GROOMING: A LOT
HELP NEEDED FOR BATHING: TOTAL
MOVING TO AND FROM BED TO CHAIR: TOTAL
DRESSING REGULAR UPPER BODY CLOTHING: A LOT
TOILETING: TOTAL
TOILETING: TOTAL
WALKING IN HOSPITAL ROOM: TOTAL
EATING MEALS: TOTAL
PERSONAL GROOMING: TOTAL
CLIMB 3 TO 5 STEPS WITH RAILING: TOTAL
CLIMB 3 TO 5 STEPS WITH RAILING: TOTAL
MOBILITY SCORE: 6
DRESSING REGULAR UPPER BODY CLOTHING: TOTAL
EATING MEALS: A LOT
MOVING TO AND FROM BED TO CHAIR: TOTAL
MOVING FROM LYING ON BACK TO SITTING ON SIDE OF FLAT BED WITH BEDRAILS: TOTAL
DAILY ACTIVITIY SCORE: 9
PERSONAL GROOMING: TOTAL
STANDING UP FROM CHAIR USING ARMS: TOTAL
TOILETING: TOTAL
MOVING FROM LYING ON BACK TO SITTING ON SIDE OF FLAT BED WITH BEDRAILS: TOTAL
TURNING FROM BACK TO SIDE WHILE IN FLAT BAD: TOTAL
DRESSING REGULAR UPPER BODY CLOTHING: TOTAL
TURNING FROM BACK TO SIDE WHILE IN FLAT BAD: TOTAL
MOBILITY SCORE: 6
STANDING UP FROM CHAIR USING ARMS: TOTAL
DRESSING REGULAR LOWER BODY CLOTHING: TOTAL
TURNING FROM BACK TO SIDE WHILE IN FLAT BAD: TOTAL
CLIMB 3 TO 5 STEPS WITH RAILING: TOTAL
MOVING TO AND FROM BED TO CHAIR: TOTAL
WALKING IN HOSPITAL ROOM: TOTAL
MOBILITY SCORE: 6
DRESSING REGULAR LOWER BODY CLOTHING: TOTAL
DRESSING REGULAR LOWER BODY CLOTHING: TOTAL
WALKING IN HOSPITAL ROOM: TOTAL
HELP NEEDED FOR BATHING: TOTAL

## 2024-01-10 ASSESSMENT — PAIN SCALES - GENERAL
PAINLEVEL_OUTOF10: 0 - NO PAIN
PAINLEVEL_OUTOF10: 5 - MODERATE PAIN
PAINLEVEL_OUTOF10: 0 - NO PAIN
PAINLEVEL_OUTOF10: 0 - NO PAIN
PAINLEVEL_OUTOF10: 5 - MODERATE PAIN
PAINLEVEL_OUTOF10: 5 - MODERATE PAIN

## 2024-01-10 ASSESSMENT — PAIN - FUNCTIONAL ASSESSMENT
PAIN_FUNCTIONAL_ASSESSMENT: FLACC (FACE, LEGS, ACTIVITY, CRY, CONSOLABILITY)
PAIN_FUNCTIONAL_ASSESSMENT: WONG-BAKER FACES
PAIN_FUNCTIONAL_ASSESSMENT: 0-10
PAIN_FUNCTIONAL_ASSESSMENT: 0-10
PAIN_FUNCTIONAL_ASSESSMENT: FLACC (FACE, LEGS, ACTIVITY, CRY, CONSOLABILITY)

## 2024-01-10 ASSESSMENT — PAIN DESCRIPTION - DESCRIPTORS: DESCRIPTORS: ACHING

## 2024-01-10 NOTE — PROGRESS NOTES
"Physical Therapy    Physical Therapy Treatment    Patient Name: Navarro Rabago  MRN: [id number]  Today's Date: 1/10/2024  Time Calculation  Start Time: 1339  Stop Time: 1354  Time Calculation (min): 15 min       Assessment/Plan   PT Assessment  End of Session Communication: Bedside nurse  Assessment Comment: Unable to stand  EOB x 15 minutes  End of Session Patient Position: Alarm on, Bed, 4 rail up     PT Plan  Treatment/Interventions: Bed mobility, Transfer training, Balance training, Neuromuscular re-education, Strengthening, Endurance training, Therapeutic exercise, Therapeutic activity  PT Plan: Skilled PT  PT Frequency: 4 times per week  PT Discharge Recommendations: Moderate intensity level of continued care  Equipment Recommended upon Discharge: Lift  PT Recommended Transfer Status: Total assist, Assist x2  PT - OK to Discharge: Yes      General Visit Information:   PT  Visit  PT Received On: 01/10/24  Response to Previous Treatment: Patient with no complaints from previous session.  General  Reason for Referral: impaired mobility  Past Medical History Relevant to Rehab: CHF, afib, CAD, COPD, HTN, hernia  Co-Treatment: OT  Co-Treatment Reason: safety wtih mobility  Prior to Session Communication: Bedside nurse  Patient Position Received: Bed, 4 rail up  General Comment: nurse ok see; however, pt did recieve Adavan and Seroquel prior to session due to pt pulling on medical lines. Spouse in room, concerns of pt not getting out of bed.    Subjective   Precautions:     Vital Signs:       Objective   Pain:  Pain Assessment  Pain Assessment: Robbins-Baker FACES  Pain Score: 5 - Moderate pain (\"Hurt all over \")  Cognition:  Cognition  Orientation Level: Unable to assess  Postural Control:     Extremity/Trunk Assessments:    Activity Tolerance:     Treatments:  Therapeutic Exercise  Therapeutic Exercise Performed: Yes  Therapeutic Exercise Activity 1: B LE seated ther ex: AAROM hip flexion and LAQs x10    Bed " Mobility  Bed Mobility: Yes  Bed Mobility 1  Bed Mobility 1: Supine to sitting  Level of Assistance 1: Dependent (x 3 for trunk up and B LEs over the EOB with draw sheet)  Bed Mobility Comments 1: HOB elevated (EOB with initially Max A fluctuating to Min A with B UE on back of chair EOB x 15 minutes)  Bed Mobility 2  Bed Mobility  2: Sitting to supine  Level of Assistance 2: Dependent (x 2 for trunk down and B LEs into bed.)  Bed Mobility Comments 2: Reposition to HOB dependent x 2    Outcome Measures:  [facility] Basic Mobility  Turning from your back to your side while in a flat bed without using bedrails: Total  Moving from lying on your back to sitting on the side of a flat bed without using bedrails: Total  Moving to and from bed to chair (including a wheelchair): Total  Standing up from a chair using your arms (e.g. wheelchair or bedside chair): Total  To walk in hospital room: Total  Climbing 3-5 steps with railing: Total  Basic Mobility - Total Score: 6    Education Documentation  Mobility Training, taught by Ashley Lyn PTA at 1/10/2024  2:48 PM.  Learner: Patient  Readiness: Acceptance  Method: Explanation  Response: Verbalizes Understanding    Education Comments  No comments found.        OP EDUCATION:       Encounter Problems       Encounter Problems (Active)       Balance       Sitting Balance (Progressing)       Start:  01/08/24    Expected End:  01/16/24       Pt will demonstrate good sitting balance to promote safe engagement with out of bed activities           Standing Balance (Progressing)       Start:  01/08/24    Expected End:  01/16/24       Pt will demonstrate good static standing balance to promote safe participation with out of bed activity, transfers, and mobility              Mobility       Ambulation (Progressing)       Start:  01/08/24    Expected End:  01/16/24       Pt will ambulate 50' modified independent assist with walker to promote safe home mobility              Pain - Adult           Safety       Safe Mobility Techniques (Progressing)       Start:  01/08/24    Expected End:  01/16/24       Pt will correctly identify and demonstrate safe mobility techniques to reduce their risks for falls during their acute care stay               Transfers       Supine to sit (Progressing)       Start:  01/08/24    Expected End:  01/16/24       Pt will transfer supine to sitting at edge of bed with modified independent assist to promote acute care out of bed activity           Sit to stand (Progressing)       Start:  01/08/24    Expected End:  01/16/24       Pt will transfer sit to standing position with modified independent assist and walker to promote safe out of bed activity           Bed to chair (Progressing)       Start:  01/08/24    Expected End:  01/16/24       Pt will transfer from sitting edge of bed to the chair with modified independent assist and walker to promote out of bed activity and reduce the risks of prolonged acute care bedrest

## 2024-01-10 NOTE — PROGRESS NOTES
Speech-Language Pathology    Inpatient Speech Language Pathology Dysphagia Treatment note     Patient Name: Navarro Rabago  MRN: [id number]  : 1945  Today's Date: 01/10/24  Time Calculation  Start Time: 1140  Stop Time: 1205  Time Calculation (min): 25 min     Total Number of Visits: 3/3 (, MON )    PLAN:  Skilled speech therapy for dysphagia treatment continues to be warranted to provide training and instruction regarding the use of compensatory swallow strategies, for pt/caregiver education in order to reduce risk of aspiration, dehydration and malnutrition. , to assess tolerance of diet , to determine ability to upgrade diet after PO trials with SLP     SLP Frequency: 3x per week  Discussed POC: Patient, Nursing, Physician  Discussed Risks/Benefits: Patient, Yes, Nursing, Physician  Patient/Caregiver Agreeable: Yes  SLP - OK to Discharge: Yes    Recommended Diet:   Solid Diet Recommendations: Minced & moist/ground (IDDSI Level 5)  Liquid Diet Recommendations: Thin (IDDSI Level 0), Other (Comments) (NO STRAWS)  Compensatory strategies: small bites/sips, upright 90 degrees for intake   Medication administration: crushed or whole in puree    Subjective:  Pt. Seen at bedside for skilled dysphagia treatment.   Pain:  Pain Assessment  Pain Assessment: 0-10  Pain Score: 4  Pain Type: Acute pain  Pain Location: Generalized  Pain Interventions: Repositioned Denies pain        Oxygen Status:   nasal cannula      Goals:   1) Patient will tolerate recommended diet without observed clinical signs of aspiration,    Current Session: RN reports adequate diet toleration, but limited intake  2) Patient will demonstrate appropriate strategies for swallowing safety,    Current Session: Reviewed strategies with pt's spouse with verbalized understanding noted.l    3) Patient will progress to advanced diet    Current Session: completed trials of regular textured food items with poor mastication, and coughing following  swallow. Tolerates thin liquids without overt s/s of aspiration.   4) Pt to participate in assessment of oropharyngeal swallow function via MBSS for assessment of possible aspiration and to determine least restrictive diet for meeting pt's nutritional and hydration needs.  - Goal met 1/8  5) ADD GOAL: Pt to participate in oropharyngeal therapeutic exercises to help improve oropharyngeal swallow function.   Current Session: not targeted    SLP Assessment:  See details above      Treatment Outcome:       Treatment Tolerance: Patient tolerated treatment well  Prognosis: Good   Barriers: Cognition   Medical Staff Made Aware: Yes       Education:  Pt. Given skilled instruction on plan of care and compensatory swallowing strategies  Pt. gave verbal understanding

## 2024-01-10 NOTE — PROGRESS NOTES
Navarro Rabago is a 78 y.o. male on day 4 of admission presenting with Sepsis (CMS/HCC).    Subjective   Interval History:   Afebrile, no chills  On baseline oxygen  Confused, intermittently  Reports mild to moderate productive cough  Reported right side back pain, underwent CT head, abdomen, pelvis-showed retroperitoneal hematoma      Objective   Range of Vitals (last 24 hours)  Heart Rate:  [61-90]   Temp:  [36.1 °C (97 °F)-36.6 °C (97.9 °F)]   Resp:  [17-20]   BP: ()/(56-78)   Weight:  [113 kg (248 lb 0.3 oz)]   SpO2:  [94 %-97 %]   Daily Weight  01/10/24 : 113 kg (248 lb 0.3 oz)    Body mass index is 29.41 kg/m².    Physical Exam  Constitutional:       Comments:  Intermittent confusion   HENT:      Head: Normocephalic and atraumatic.      Nose: Nose normal.      Mouth/Throat:      Mouth: Mucous membranes are moist.      Pharynx: Oropharynx is clear.   Eyes:      Extraocular Movements: Extraocular movements intact.      Conjunctiva/sclera: Conjunctivae normal.   Cardiovascular:      Rate and Rhythm: Normal rate and regular rhythm.   Pulmonary:      Effort: Pulmonary effort is normal.      Breath sounds: Rhonchi present.   Abdominal:      General: Bowel sounds are normal.      Palpations: Abdomen is soft.   Musculoskeletal:         General: Normal range of motion.      Cervical back: Normal range of motion and neck supple.   Skin:     General: Skin is warm and dry.   Neurological:      General: No focal deficit present.      Mental Status: He is alert.   Psychiatric:         Mood and Affect: Mood normal.         Behavior: Behavior normal.       C  Antibiotics  sodium chloride 0.9% infusion  heparin (porcine) injection 5,000 Units  sodium chloride 0.9 % with KCl 20 mEq/L infusion  calcium carbonate (Tums) chewable tablet 500 mg  ondansetron ODT (Zofran-ODT) disintegrating tablet 4 mg  ondansetron (Zofran) injection 4 mg  benzocaine-menthol (Cepastat Sore Throat) 15-3.6 mg lozenge 1 lozenge  guaiFENesin  (Mucinex) 12 hr tablet 600 mg  dextromethorphan-guaifenesin (Robitussin DM)  mg/5 mL oral liquid 5 mL  dextrose 50 % injection 25 g  glucagon (Glucagen) injection 1 mg  dextrose 10 % in water (D10W) infusion  ipratropium-albuteroL (Duo-Neb) 0.5-2.5 mg/3 mL nebulizer solution 3 mL  oxygen (O2) therapy  piperacillin-tazobactam-dextrose (Zosyn) IV 3.375 g  pantoprazole (ProtoNix) injection 40 mg  ipratropium-albuteroL (Duo-Neb) 0.5-2.5 mg/3 mL nebulizer solution 3 mL  allopurinol (Zyloprim) tablet  apixaban (Eliquis) tablet  aspirin EC tablet  atorvastatin (Lipitor) tablet  bumetanide (Bumex) tablet  colchicine tablet  empagliflozin (Jardiance) tablet  fexofenadine (Allegra) tablet  finasteride (Propecia, Proscar) tablet  guaiFENesin (Mucinex) 12 hr tablet  levothyroxine (Synthroid, Levoxyl) tablet  nitroglycerin (Nitrostat) SL tablet  pantoprazole (ProtoNix) EC tablet  potassium chloride SA (Klor-Con M20) ER tablet  tamsulosin (Flomax) 24 hr capsule  vancomycin 1.5 mg in 300 mL (Xellia) IVPB 1.5 g      levothyroxine (Synthroid, Levoxyl) tablet 50 mcg  finasteride (Proscar) tablet 5 mg  ferrous sulfate (325 mg ferrous sulfate) tablet 1 tablet  aspirin EC tablet 81 mg  apixaban (Eliquis) tablet 5 mg  metoprolol tartrate (Lopressor) tablet 25 mg  potassium chloride 20 mEq in 100 mL IV premix  midodrine (Proamatine) tablet 5 mg  sodium chloride 0.9 % bolus 500 mL  norepinephrine (Levophed) 8 mg in dextrose 5% 250 mL (0.032 mg/mL) infusion (premix)  sodium chloride 0.9 % bolus 250 mL  sodium chloride 0.9 % bolus 250 mL  azithromycin (Zithromax) in dextrose 5 % in water (D5W) 250 mL  mg  vancomycin 1.5 mg in 300 mL (Xellia) IVPB 1.5 g  apixaban (Eliquis) tablet 2.5 mg  vancomycin (Xellia) 1 g in 200 mL (Xellia) IVPB 1,000 mg      Relevant Results  Labs  Results from last 72 hours   Lab Units 01/10/24  0443 01/09/24  0437 01/08/24  0307   WBC AUTO x10*3/uL 17.5* 15.3* 17.8*   HEMOGLOBIN g/dL 10.2* 9.6* 9.9*  "  HEMATOCRIT % 29.0* 27.6* 28.8*   PLATELETS AUTO x10*3/uL 276 207 198   NEUTROS PCT AUTO %  --   --  85.8   LYMPHS PCT AUTO %  --   --  5.0   MONOS PCT AUTO %  --   --  7.1   EOS PCT AUTO %  --   --  0.2       Results from last 72 hours   Lab Units 01/10/24  0443 01/09/24  0437 01/08/24  0307   SODIUM mmol/L 136 140 128*   POTASSIUM mmol/L 3.1* 3.0* 3.5   CHLORIDE mmol/L 101 106 98   CO2 mmol/L 23* 20* 17*   BUN mg/dL 43* 49* 51*   CREATININE mg/dL 2.10* 2.60* 2.60*   GLUCOSE mg/dL 140* 140* 115*   CALCIUM mg/dL 7.7* 7.0* 7.7*   ANION GAP mmol/L 12 14 13   EGFR mL/min/1.73m*2 32* 25* 25*   PHOSPHORUS mg/dL 1.9*  --   --        Results from last 72 hours   Lab Units 01/10/24  0443 01/08/24  0307   ALK PHOS U/L  --  99   BILIRUBIN TOTAL mg/dL  --  1.3*   PROTEIN TOTAL g/dL  --  5.5*   ALT U/L  --  59*   AST U/L  --  151*   ALBUMIN g/dL 2.1* 2.1*       Estimated Creatinine Clearance: 41.8 mL/min (A) (by C-G formula based on SCr of 2.1 mg/dL (H)).  No results found for: \"CRP\"  Microbiology  Reviewed-cultures pending  Imaging  XR chest 1 view    Result Date: 1/7/2024  Interpreted By:  Cathy Hernandez, STUDY: XR CHEST 1 VIEW 1/7/2024 7:36 am   INDICATION: Follow-up pneumonia   COMPARISON: 05/29/2014 as well as CT examination of the chest done on 01/05/2024.   ACCESSION NUMBER(S): [id number]   ORDERING CLINICIAN: EMILY MAKI   TECHNIQUE: AP erect view of the chest   FINDINGS: The heart is at the upper limits of normal in size. There is infiltrate and airspace consolidation observed centrally within the right lung with upper lobar predominance. There is also some patchy infiltrate at the left lung base. No obvious pleural abnormality is seen.       Infiltrate and airspace consolidation identified within the right lung most pronounced centrally and within the right upper lobe with mild left basilar infiltrate.   Signed by: Cathy Hernandez 1/7/2024 8:11 AM Dictation workstation:   [id number]    CT chest abdomen pelvis w IV " contrast    Result Date: 1/5/2024  Interpreted By:  Yordy Urena, STUDY: CT CHEST ABDOMEN PELVIS W IV CONTRAST;  1/5/2024 6:33 pm   INDICATION: Signs/Symptoms:abd pain, sob.   COMPARISON: None.   ACCESSION NUMBER(S): [id number]   ORDERING CLINICIAN: NELLA MELGOZA   TECHNIQUE: Contiguous axial images of the chest, abdomen and pelvis were obtained after the intravenous administration of  contrast. Coronal and sagittal reformatted images were obtained from the axial images.   FINDINGS: CT CHEST:   No axillary lymphadenopathy. 1.6 cm right paratracheal lymph node and 1.5 cm subcarinal lymph node. There is limited evaluation for hilar lymphadenopathy secondary stripping motion.   The heart is normal in size. Coronary artery vascular calcifications. No significant pericardial effusion.   There is pulmonary emphysematous change. There is consolidative opacity in the right upper lobe and right lower lobe compatible with pneumonia and also minimal opacity in the left lower lobe. Trace pleural effusions. No pneumothorax.   Multilevel degenerative change of the thoracic spine.     CT ABDOMEN PELVIS:   Evaluation of the abdomen and pelvis is limited secondary to patient motion. No evidence of liver mass. The gallbladder is present, not well evaluated secondary to motion. No dilatation common bile duct.   Atrophy of the pancreas.   No splenomegaly.   Question mild nodularity of the left adrenal gland. The right adrenal gland appears unremarkable.   Symmetric enhancement of the kidneys. Right renal cysts with the largest measuring 3.1 cm and several bowel subcentimeter hypodensity too small to characterize. No hydronephrosis.   Atherosclerotic calcification of the aorta and abdominal and pelvic vasculature.   No evidence of bowel obstruction. Evaluation the bowel is otherwise limited secondary patient motion.   Urinary bladder is underdistended and not well evaluated. 18 mm right posterior bladder diverticulum.   Postsurgical  change of left hip arthroplasty resulting in beam hardening artifact and limiting evaluation the pelvis.   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       Consolidative opacity in the right upper lobe and lower lobe compatible with pneumonia and also mild left basilar opacity and trace pleural effusions.   No definite evidence of acute abnormality of the abdominal viscera within the limitations of the motion degraded examination.   MACRO: None   Signed by: Yordy Urena 1/5/2024 6:49 PM Dictation workstation:   [id number]     Assessment/Plan   Sepsis, with shock   Acute renal failure-interval worsening  Pneumonia-MRSA PCR negative   Diarrhea-c.diff negative  Transaminitis-resolving  Leukocytosis -interval increase  Oral candida   Chronic respiratory failure-at baseline 5LNC  Retroperitoneal hematoma     Continue IV zosyn  IV azithromycin for now  Nystatin suspension  Follow-up blood cultures-negative x 3 days-pending  Monitor WBC and temperature  Monitor renal function  Oxygen as needed  Supportive care  Further recommendations based on pending work-up    Tess Gonzalez, APRN-CNP

## 2024-01-10 NOTE — SIGNIFICANT EVENT
Came to evaluate patient as nurse reported that his complaint of pain in his back and that he is screaming and that he has been confused.  Reviewed chart.  Vital signs reviewed  Patient is awake answers questions but seems confused cannot give straightforward answers complains mostly of back pain lower back upon movement.  Normocephalic atraumatic EOMI PERRLA  Chest clear  Heart regular  Abdomen soft not particularly tender good femoral pulses   Extremities no edema  Skin multiple ecchymosis areas over his gluteal area and lower back  Neurologic exam seems to be nonfocal but movement of his lower extremities painful in his back    Impression:   He never had CT of the head on this admission so we will check 1 for his confusion.  Also would like to check CT of the abdomen and pelvis as I am concerned with multiple ecchymosis for the possibility of trauma or retroperitoneal bleed?  Discussed with nurse    Retroperitoneal right iliopsoas muscle hematoma discussed with radiologist Eliquis aspirin put on hold type and cross check CBC discussed with nurse

## 2024-01-10 NOTE — PROGRESS NOTES
Pulmonary Progress Note 01/10/24     Patient seen in follow-up for acute respiratory failure in the setting of aspiration pneumonia.  Recovered septic shock.    Subjective   Interval History:   Reportedly confused overnight and underwent a CT of the head as well as abdomen pelvis   Suggestion of retroperitoneal hematoma.  Has no complaints for me this morning    Objective   Vital signs in last 24 hours:  Temp:  [36.3 °C (97.3 °F)-36.7 °C (98.1 °F)] 36.3 °C (97.3 °F)  Heart Rate:  [61-80] 77  Resp:  [17-22] 17  BP: ()/(56-71) 128/71  FiO2 (%):  [36 %] 36 %    Intake/Output last 3 shifts:  I/O last 3 completed shifts:  In: 2665.4 (23.7 mL/kg) [P.O.:580; I.V.:2085.4 (18.5 mL/kg)]  Out: 2750 (24.4 mL/kg) [Urine:2750 (0.7 mL/kg/hr)]  Weight: 112.5 kg   Intake/Output this shift:  No intake/output data recorded.    Physical Exam  Vitals reviewed.   Constitutional:       General: He is not in acute distress.     Appearance: He is not toxic-appearing.   HENT:      Nose:      Comments: Nasal cannula  Cardiovascular:      Rate and Rhythm: Normal rate and regular rhythm.   Pulmonary:      Effort: Pulmonary effort is normal. No respiratory distress.      Breath sounds: Rhonchi present. No wheezing.   Abdominal:      Palpations: Abdomen is soft.   Musculoskeletal:      Right lower leg: No edema.      Left lower leg: No edema.   Skin:     General: Skin is warm and dry.   Neurological:      Mental Status: Mental status is at baseline.   Psychiatric:         Mood and Affect: Mood normal.         Behavior: Behavior normal.           Scheduled medications  [Held by provider] apixaban, 2.5 mg, oral, q12h DARIA  [Held by provider] aspirin, 81 mg, oral, Daily  azithromycin, 500 mg, intravenous, q24h DARIA  ferrous sulfate (325 mg ferrous sulfate), 1 tablet, oral, Daily with breakfast  finasteride, 5 mg, oral, Daily  ipratropium-albuteroL, 3 mL, nebulization, 4x daily  levothyroxine, 50 mcg, oral, Daily  midodrine, 5 mg,  oral, q8h  nystatin, 5 mL, Swish & Swallow, 4x daily  oxygen, , inhalation, q8h  pantoprazole, 40 mg, oral, Daily before breakfast  piperacillin-tazobactam, 3.375 g, intravenous, q6h      Continuous medications  dextrose 5%-0.45 % sodium chloride, 75 mL/hr, Last Rate: 75 mL/hr (01/10/24 0616)  norepinephrine, 0.01-1 mcg/kg/min, Last Rate: Stopped (01/08/24 1115)      PRN medications  PRN medications: acetaminophen, benzocaine-menthol, calcium carbonate, dextromethorphan-guaifenesin, dextrose 10 % in water (D10W), dextrose, glucagon, guaiFENesin, ondansetron ODT **OR** ondansetron     Labs:  Lab Results   Component Value Date     01/10/2024    K 3.1 (L) 01/10/2024     01/10/2024    CO2 23 (L) 01/10/2024    BUN 43 (H) 01/10/2024    CREATININE 2.10 (H) 01/10/2024    GLUCOSE 140 (H) 01/10/2024    CALCIUM 7.7 (L) 01/10/2024     Lab Results   Component Value Date    WBC 17.5 (H) 01/10/2024    HGB 10.2 (L) 01/10/2024    HCT 29.0 (L) 01/10/2024    MCV 86 01/10/2024     01/10/2024       Imaging:  CT abdomen pelvis wo IV contrast    Result Date: 1/10/2024  STUDY: CT Abdomen and Pelvis without IV Contrast; 1/10/2024 at 3:30 AM INDICATION: Back pain, ecchymosis; trauma. COMPARISON: US renal 1/8/2024, CT chest, abdomen and pelvis 1/5/2024. ACCESSION NUMBER(S): [id number] ORDERING CLINICIAN: DANA AMATO TECHNIQUE: CT of the abdomen and pelvis was performed.  Contiguous axial images were obtained at 3 mm slice thickness through the abdomen and pelvis. Coronal and sagittal reconstructions at 3 mm slice thickness were performed. No intravenous contrast was administered.  Automated mA/kV exposure control was utilized and patient examination was performed in strict accordance with principles of ALARA. FINDINGS: Please note that the evaluation of vessels, lymph nodes and organs is limited without intravenous contrast.  LOWER CHEST: No cardiomegaly.  No pericardial effusion.  Lung bases are clear.   ABDOMEN: Partially visualized portions of the lower chest showing small bilateral pleural effusions and partial atelectasis of the bilateral lower lobes increased from prior. Heart size normal. No pericardial effusion. Unenhanced liver without acute process. No discernible intrahepatic ductal dilatation. Pericholecystic flu atrophic changes of the pancreas. No acute abnormality of the spleen is identified. No acute abnormality of the adrenal glands or kidneys no retroperitoneal adenopathy. Atherosclerosis of the abdominal aorta without aneurysm. Asymmetric enlargement of the RIGHT psoas musculature and retroperitoneal fluid increased from 1/5/24. Small amount of free fluid within the pelvis. No pelvic adenopathy identified. Rodriguez catheter within the urinary bladder. LEFT hip prostheses. No findings of acute fracture of the pelvis. Spondylotic changes and facet arthrosis of the lumbar spine.     Impression: RIGHT psoas and retroperitoneal hematoma. Without the use of intravenous contrast difficult to determine the exact size of the hemorrhage. Increased small bilateral pleural effusions and partial atelectasis of the lower lobes. Findings discussed with Dr. DANA AMATO with verbal understanding at approximately 1/10/2024 4:28 AM. Signed by Keith Watson MD    CT head wo IV contrast    Result Date: 1/10/2024  STUDY: CT Head without IV Contrast; 1/10/2024 at 3:30 AM INDICATION: Confusion. COMPARISON: None available. ACCESSION NUMBER(S): [id number] ORDERING CLINICIAN: DANA AMATO TECHNIQUE: Noncontrast axial CT scan of head was performed.  Angled reformats in brain and bone windows were generated.  The images were reviewed in bone, brain, blood and soft tissue windows. Automated mA/kV exposure control was utilized and patient examination was performed in strict accordance with principles of ALARA. FINDINGS: CSF Spaces:  The ventricles, sulci and basal cisterns are prominent from atrophy.  There  is no extraaxial fluid collection.  Mild patchy areas of low-attenuation are seen in the subcortical and deep periventricular white matter suggesting areas of chronic microvascular ischemia.  Parenchyma:  The grey-white differentiation is intact.  There is no mass effect or midline shift.  There is no intracranial hemorrhage.  Calvarium:  The calvarium is unremarkable.  Paranasal sinuses and mastoids:  Visualized paranasal sinuses and mastoids are clear.    Diffuse cerebral atrophy.  Suspected mild chronic small vessel white matter ischemia.  No definite acute intracranial abnormality. Signed by Shawn Echols DO    Transthoracic Echo (TTE) Complete    Result Date: 1/8/2024            [facility] [address], [place] [id number]             Phone [phone] TRANSTHORACIC ECHOCARDIOGRAM REPORT  Patient Name:      LILIAN CRESPO    Reading Physician:   [id number] Nance MD Study Date:        1/8/2024           Ordering Provider:   [id number] NANCE MRN/PID:           [id number]           Fellow: Accession#:        [id number]       Nurse: Date of Birth/Age: 1945 / 78      Sonographer:         Lilli Mata RDCS                    years Gender:            M                  Additional Staff: Height:            195.58 cm          Admit Date:          1/8/2024 Weight:            103.42 kg          Admission Status:    Inpatient - Routine BSA:               2.36 m2            Department Location: [facility] Blood Pressure: 96 /74 mmHg Study Type:    TRANSTHORACIC ECHO (TTE) COMPLETE Diagnosis/ICD: Other hypotension-I95.89 Indication:    hypotension CPT Codes:     Echo Complete w Full Doppler-[id number]  Study Detail: The following Echo studies were performed: 2D, M-Mode, Doppler and               color flow.  PHYSICIAN INTERPRETATION: Left Ventricle: Left ventricular systolic function is moderately decreased, with an estimated  ejection fraction of 35-40%. Wall motion is abnormal. The left ventricular cavity size is mild to moderately dilated. Abnormal (paradoxical) septal motion, consistent with an intraventricular conduction delay. Spectral Doppler shows a normal pattern of left ventricular diastolic filling. There is moderate to severe hypokinesis of the inferoposterior wall. There is mild hypokinesis and dyssynchrony of the anteroseptal wall. Left Atrium: The left atrium is mild to moderately dilated. Right Ventricle: The right ventricle is upper limits of normal in size. There is normal right ventriclar wall thickness. There is normal right ventricular global systolic function. Right Atrium: The right atrium is mildly dilated. Aortic Valve: The aortic valve is trileaflet. The aortic valve appears tricuspid and non-restricted. There is mild aortic valve cusp calcification. There is no evidence of reduced aortic valve leaflet excursion excursion. There is evidence of mildly elevated transaortic gradients consistent with sclerosis of the aortic valve. There is mild aortic valve regurgitation. The peak instantaneous gradient of the aortic valve is 10.8 mmHg. The mean gradient of the aortic valve is 6.0 mmHg. There is moderate sclerotic calcification of the noncoronary aortic valve cusp. Mitral Valve: The mitral valve is normal in structure. There is normal mitral valve leaflet mobility. There is mild to moderate mitral valve regurgitation. Tricuspid Valve: The tricuspid valve is structurally normal. There is normal tricuspid valve leaflet mobility. There is mild to moderate tricuspid regurgitation. The Doppler estimated RVSP is mildly elevated at 43.0 mmHg. Pulmonic Valve: The pulmonic valve is structurally normal. There is trace pulmonic valve regurgitation. Pericardium: There is no pericardial effusion noted. Aorta: The aortic root is normal. There is no dilatation of the aortic arch. There is no dilatation of the ascending aorta. There  is no dilatation of the aortic root. Pulmonary Artery: The pulmonary artery is normal in size. The tricuspid regurgitant velocity is 2.96 m/s, and with an estimated right atrial pressure of 8 mmHg, the estimated pulmonary artery pressure is mildly elevated with the RVSP at 43.0 mmHg. The estimated PASP is 43 mmHg. Systemic Veins: The inferior vena cava appears moderately dilated.  CONCLUSIONS:  1. Left ventricular systolic function is moderately decreased with a 35-40% estimated ejection fraction.  2. There is moderate to severe hypokinesis of the inferoposterior wall.     There is mild hypokinesis and dyssynchrony of the anteroseptal wall.  3. The left atrium is mild to moderately dilated.  4. Mild to moderate mitral valve regurgitation.  5. Mild to moderate tricuspid regurgitation.  6. Mildly elevated RVSP.  7. Aortic valve sclerosis.  8. There is moderate sclerotic calcification of the noncoronary aortic valve cusp.  9. Mild aortic valve regurgitation. 10. The estimated PASP is 43 mmHg. 11. The inferior vena cava appears moderately dilated. QUANTITATIVE DATA SUMMARY: 2D MEASUREMENTS:                          Normal Ranges: IVSd:          1.08 cm   (0.6-1.1cm) LVPWd:         0.98 cm   (0.6-1.1cm) LVIDd:         5.65 cm   (3.9-5.9cm) LVIDs:         4.00 cm LV Mass Index: 97.7 g/m2 LV % FS        29.2 % LA VOLUME:                               Normal Ranges: LA Vol A4C:        59.2 ml    (22+/-6mL/m2) LA Vol Index A4C:  25.0ml/m2 LA Area A4C:       20.3 cm2 LA Major Axis A4C: 5.9 cm LA Volume Index:   25.3 ml/m2 LA Vol A4C:        54.4 ml RA VOLUME BY A/L METHOD:                               Normal Ranges: RA Vol A4C:        38.2 ml    (8.3-19.5ml) RA Vol Index A4C:  16.2 ml/m2 RA Area A4C:       15.2 cm2 RA Major Axis A4C: 5.1 cm M-MODE MEASUREMENTS:                  Normal Ranges: Ao Root: 3.70 cm (2.0-3.7cm) AORTA MEASUREMENTS:                      Normal Ranges: Ao Sinus, d: 3.21 cm (2.1-3.5cm) LV SYSTOLIC  FUNCTION BY 2D PLANIMETRY (MOD):                     Normal Ranges: EF-A4C View: 54.1 % (>=55%) EF-A2C View: 51.4 % EF-Biplane:  54.1 % AORTIC VALVE:                                    Normal Ranges: AoV Vmax:                1.64 m/s  (<=1.7m/s) AoV Peak PG:             10.8 mmHg (<20mmHg) AoV Mean P.0 mmHg  (1.7-11.5mmHg) LVOT Max Angel Luis:            0.99 m/s  (<=1.1m/s) AoV VTI:                 31.80 cm  (18-25cm) LVOT VTI:                19.70 cm LVOT Diameter:           2.00 cm   (1.8-2.4cm) AoV Area, VTI:           1.95 cm2  (2.5-5.5cm2) AoV Area,Vmax:           1.89 cm2  (2.5-4.5cm2) AoV Dimensionless Index: 0.62 AORTIC INSUFFICIENCY: AI Vmax:       3.44 m/s AI Half-time:  677 msec AI Decel Rate: 150.00 cm/s2  RIGHT VENTRICLE: RV Basal 3.83 cm RV Mid   4.43 cm RV Major 7.2 cm TRICUSPID VALVE/RVSP:                             Normal Ranges: Peak TR Velocity: 2.96 m/s RV Syst Pressure: 43.0 mmHg (< 30mmHg) IVC Diam:         2.68 cm  [id number] Lb Nance MD Electronically signed on 2024 at 9:32:37 PM  ** Final **     FL modified barium swallow study    Result Date: 2024  Interpreted By:  Antoniou, Elias, and Casey Corinne STUDY: FL MODIFIED BARIUM SWALLOW STUDY;; 2024 1:27 pm   INDICATION: Signs/Symptoms:suspected pharyngeal dysphagia.   COMPARISON: None.   ACCESSION NUMBER(S): [id number]   ORDERING CLINICIAN: JOSE DAVID SOLER   TECHNIQUE: MBSS completed. Informed verbal consent obtained prior to completion of exam. Trials of thin, nectar thick, honey thick, puree, and regular solids given. Fluoroscopy time :  3 minutes and 54 seconds. Total dose air kerma 21.15 mGy..   SLP: Corinne E. Casey, M.A. CCC-SLP Phone/Pager: [phone] opt 2, via [employer], or via EP[employer] secure chat   SPEECH FINDINGS: Reason for referral: Dysphagia, aspiration pneumonia Patient hx: Sepsis, HTN, HLD, GERD, CHF Respiratory status: Nasal cannula Previous diet: Regular and thin   FINAL SPEECH RECOMMENDATIONS   Diet  recommendations/feeding strategies: Minced/moist solids with thin liquids, NO STRAWS.   Upright seating, no straw. Meds crushed in puree. Please downgrade to nectar thick and notify SLP if s/s of aspiration present with PO intake.   Follow-up speech therapy recommended: Yes.   Education provided: Yes. Results and recs discussed with pt, RN, and physician with verbalized understanding noted.   Treatment Provided today: Yes, see progress notes   Repeat study/ dc plan: Continue skilled speech therapy services after discharge   Mechanics of the swallow summary: *Oral phase: Moderate impairment as characterized by disorganized/prolonged mastication, poor tongue control during bolus hold with premature posterior phrayngeal spillage, and delayed swallow onset (at level of pyriform sinuese for thin and nectar liquids, and at valleculae for honey liquids, puree, and cracker consistencies *Pharyngeal phase: Mild impairment as characterized by decreased tongue base retraction and trace vallecular retention *Esophageal phase: WNL, limited assessment   SLP impressions with severity rating: Pt. Presenting with singular episode of aspiration during initial trial of thin liquid.  Penetration with thin liquid via straw (successive swallows) and with nectar liquids via tsp. No further penetration nor aspiration presents with remaining trials of thin liquids via cup, nectar liquids via cup, honey liquids, puree, nor cracker trials.   Pt noted with mild oral and pharyngeal dysphagia as characterized by prolonged mastication, premature posterior pharyngeal spillage, delayed swallow onset, and diminished tongue base retraction.   Thin Liquids (MBSS) Rosenbek's Penetration Aspiration Scale, Thin Liquids (MBSS): 7. OVERT ASPIRATION, material is not cleared - contrast passes glottis, visible residue, W/pt response Singluar episode of aspiration on initial trial of thin via tsp - appears r/t premature posterior pharyngeal spillage and occurs  prior to swallow onset. Penetration without aspiration (PAS 2) during trials of thin liquid via straw (successive swallows); No penetration nor aspiration with thin liquids via cup (PAS 1) Response to Aspiration, Thin Liquid (MBSS): unproductive reflexive involuntary cough,       Nectar Thick Liquids (MBSS) Rosenbek's Penetration Aspiration Scale, Nectar thick liquids (MBSS): 2. PENETRATION that CLEARS - contrast enter airway, above vocal cords, no residue       Honey Thick Liquids (MBSS) Rosenbek's Penetration Aspiration Scale, Honey thick liquids (MBSS): 1. NO ASPIRATION & NO PENETRATION - no aspiration, contrast does not enter airway     Response to Pharyngeal Residue, Honey thick liquids (MBSS): no spontaneous response,   Purees (MBSS) Rosenbek's Penetration Aspiration Scale, Purees (MBSS): 1. NO ASPIRATION & NO PENETRATION - no aspiration, contrast does not enter airway     Response to Pharyngeal Residue, Purees (MBSS): no spontaneous response, Clears with thin liquid wash,     Solids (MBSS) Rosenbek's Penetration Aspiration Scale, Solids (MBSS): 1. NO ASPIRATION & NO PENETRATION - no aspiration, contrast does not enter airway   Response to Pharyngeal Residue, Solids (MBSS): no spontaneous response, Clears with thin liquid wash. ,     Speech Therapy section of this report signed by Corinne E. Casey, M.A. CCC-SLP on 1/8/2024 at 1:41 pm.   RADIOLOGY FINDINGS: Prolonged oral phase with uncoordinated mastication/lingual motion. Single episode of aspiration with thin barium. A couple of additional episodes of laryngeal penetration without aspiration. No laryngeal penetration or aspiration seen with thicker consistencies of barium. Retention of barium is noted in the vallecula and piriform sinuses.   Radiology section of this report signed by .       Dysphagia as above.   MACRO: None   Signed by: Roby Villarreal 1/8/2024 4:31 PM Dictation workstation:   [id number]    ECG 12 lead    Result Date: 1/8/2024  Sinus  tachycardia with 1st degree AV block with Premature supraventricular complexes Low voltage QRS Left posterior fascicular block Abnormal QRS-T angle, consider primary T wave abnormality Abnormal ECG When compared with ECG of 13-MAY-2021 13:46, Sinus rhythm has replaced Atrial fibrillation Vent. rate has increased BY  35 BPM Left posterior fascicular block is now Present QT has shortened    US renal complete    Result Date: 1/8/2024  Interpreted By:  Cathy Hernandez, STUDY: US RENAL COMPLETE; 1/8/2024 11:45 am   INDICATION: Acute renal insufficiency.   COMPARISON: None.   ACCESSION NUMBER(S): [id number]   ORDERING CLINICIAN: PERCY CABRERA   TECHNIQUE: Grayscale and color Doppler imaging of the kidneys is performed portably..   FINDINGS: The right kidney measures 12.1 cm x 7.2 cm x 5.1 cm. A simple right renal cyst measuring 1.9 cm in size is seen. The left kidney measures 12.6 cm x 5.6 cm x 6.4 cm. There is no shadowing calculus, hydronephrosis, or solid mass identified. The renal cortical echogenicity is normal bilaterally with renal cortical thickness within normal limits.   Cursory evaluation of urinary bladder shows presence of a Rodriguez catheter with bladder decompressed.       1.9 cm simple right renal cyst with unremarkable appearance of the left kidney.   No renal atrophy or hydronephrosis.   MACRO: None.   Signed by: Cathy Hernandez 1/8/2024 12:04 PM Dictation workstation:   [id number]    XR chest 1 view    Result Date: 1/7/2024  Interpreted By:  Cathy Hernandez, STUDY: XR CHEST 1 VIEW 1/7/2024 7:36 am   INDICATION: Follow-up pneumonia   COMPARISON: 05/29/2014 as well as CT examination of the chest done on 01/05/2024.   ACCESSION NUMBER(S): [id number]   ORDERING CLINICIAN: EMILY MAKI   TECHNIQUE: AP erect view of the chest   FINDINGS: The heart is at the upper limits of normal in size. There is infiltrate and airspace consolidation observed centrally within the right lung with upper lobar predominance. There is also  some patchy infiltrate at the left lung base. No obvious pleural abnormality is seen.       Infiltrate and airspace consolidation identified within the right lung most pronounced centrally and within the right upper lobe with mild left basilar infiltrate.   Signed by: Cathy Hernandez 1/7/2024 8:11 AM Dictation workstation:   [id number]    CT chest abdomen pelvis w IV contrast    Result Date: 1/5/2024  Interpreted By:  Yordy Urena, STUDY: CT CHEST ABDOMEN PELVIS W IV CONTRAST;  1/5/2024 6:33 pm   INDICATION: Signs/Symptoms:abd pain, sob.   COMPARISON: None.   ACCESSION NUMBER(S): [id number]   ORDERING CLINICIAN: NELLA MELGOZA   TECHNIQUE: Contiguous axial images of the chest, abdomen and pelvis were obtained after the intravenous administration of  contrast. Coronal and sagittal reformatted images were obtained from the axial images.   FINDINGS: CT CHEST:   No axillary lymphadenopathy. 1.6 cm right paratracheal lymph node and 1.5 cm subcarinal lymph node. There is limited evaluation for hilar lymphadenopathy secondary stripping motion.   The heart is normal in size. Coronary artery vascular calcifications. No significant pericardial effusion.   There is pulmonary emphysematous change. There is consolidative opacity in the right upper lobe and right lower lobe compatible with pneumonia and also minimal opacity in the left lower lobe. Trace pleural effusions. No pneumothorax.   Multilevel degenerative change of the thoracic spine.     CT ABDOMEN PELVIS:   Evaluation of the abdomen and pelvis is limited secondary to patient motion. No evidence of liver mass. The gallbladder is present, not well evaluated secondary to motion. No dilatation common bile duct.   Atrophy of the pancreas.   No splenomegaly.   Question mild nodularity of the left adrenal gland. The right adrenal gland appears unremarkable.   Symmetric enhancement of the kidneys. Right renal cysts with the largest measuring 3.1 cm and several bowel  subcentimeter hypodensity too small to characterize. No hydronephrosis.   Atherosclerotic calcification of the aorta and abdominal and pelvic vasculature.   No evidence of bowel obstruction. Evaluation the bowel is otherwise limited secondary patient motion.   Urinary bladder is underdistended and not well evaluated. 18 mm right posterior bladder diverticulum.   Postsurgical change of left hip arthroplasty resulting in beam hardening artifact and limiting evaluation the pelvis.   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       Consolidative opacity in the right upper lobe and lower lobe compatible with pneumonia and also mild left basilar opacity and trace pleural effusions.   No definite evidence of acute abnormality of the abdominal viscera within the limitations of the motion degraded examination.   MACRO: None   Signed by: Yordy Urena 1/5/2024 6:49 PM Dictation workstation:   [id number]              Assessment/Plan   Principal Problem:    Sepsis (CMS/HCC)  Active Problems:    Lactic acid acidosis    Acute renal failure (ARF) (CMS/HCC)    Leukocytosis    Bilateral pneumonia    Chronic respiratory failure (CMS/HCC)    Hypokalemia    Dehydration    Transaminitis    Acute encephalopathy    Hypotension    Recovered septic shock.  Intermittent confusion.  Appears at recent baseline for me but wife not present.    Continue with pulmonary hygiene  Supplemental oxygen  Eliquis on hold secondary to retroperitoneal hematoma, trend H&H  Supplemental oxygen  Antibiotics per ID  PT OT    Jaren Zapien MD  Pulmonary/[facility]       LOS: 4 days

## 2024-01-10 NOTE — PROGRESS NOTES
"Navarro Rabago is a 78 y.o. male on day 4 of admission presenting with Sepsis (CMS/HCC).    Subjective   Alert, oriented to self only, confused.  No distress.        Objective     Physical Exam  Vitals and nursing note reviewed.   Constitutional:       General: He is not in acute distress.     Appearance: He is ill-appearing.   HENT:      Head: Normocephalic and atraumatic.      Nose: Nose normal.      Mouth/Throat:      Mouth: Mucous membranes are moist.   Cardiovascular:      Rate and Rhythm: Normal rate. Rhythm irregular.      Pulses: Normal pulses.      Heart sounds: Normal heart sounds. No murmur heard.     No friction rub. No gallop.   Pulmonary:      Breath sounds: No wheezing, rhonchi or rales.      Comments: Diminished throughout oxygen nasal cannula.  Mild conversational dyspnea appreciated  Abdominal:      General: Abdomen is flat.      Palpations: Abdomen is soft.   Genitourinary:     Comments: Rodriguez to CD with 400 mL rodri-yellow urine  Musculoskeletal:         General: Swelling present.      Cervical back: Normal range of motion.      Comments: Swelling noted to right foot   Skin:     General: Skin is warm and dry.      Capillary Refill: Capillary refill takes less than 2 seconds.   Neurological:      Mental Status: He is alert. He is disoriented.         Last Recorded Vitals  Blood pressure 128/71, pulse 77, temperature 36.3 °C (97.3 °F), temperature source Temporal, resp. rate 17, height 1.956 m (6' 5\"), weight 113 kg (248 lb 0.3 oz), SpO2 95 %.  Intake/Output last 3 Shifts:  I/O last 3 completed shifts:  In: 2665.4 (23.7 mL/kg) [P.O.:580; I.V.:2085.4 (18.5 mL/kg)]  Out: 2750 (24.4 mL/kg) [Urine:2750 (0.7 mL/kg/hr)]  Weight: 112.5 kg     Relevant Results  Results for orders placed or performed during the hospital encounter of 01/06/24 (from the past 24 hour(s))   Vancomycin   Result Value Ref Range    Vancomycin 17.0 10.0 - 20.0 ug/mL   POCT GLUCOSE   Result Value Ref Range    POCT Glucose 141 (H) " 74 - 99 mg/dL   POCT GLUCOSE   Result Value Ref Range    POCT Glucose 108 (H) 74 - 99 mg/dL   CBC   Result Value Ref Range    WBC 17.5 (H) 4.4 - 11.3 x10*3/uL    nRBC 0.0 0.0 - 0.0 /100 WBCs    RBC 3.39 (L) 4.50 - 5.90 x10*6/uL    Hemoglobin 10.2 (L) 13.5 - 17.5 g/dL    Hematocrit 29.0 (L) 41.0 - 52.0 %    MCV 86 80 - 100 fL    MCH 30.1 26.0 - 34.0 pg    MCHC 35.2 32.0 - 36.0 g/dL    RDW 16.0 (H) 11.5 - 14.5 %    Platelets 276 150 - 450 x10*3/uL   Renal Function Panel   Result Value Ref Range    Glucose 140 (H) 65 - 99 mg/dL    Sodium 136 133 - 145 mmol/L    Potassium 3.1 (L) 3.4 - 5.1 mmol/L    Chloride 101 97 - 107 mmol/L    Bicarbonate 23 (L) 24 - 31 mmol/L    Urea Nitrogen 43 (H) 8 - 25 mg/dL    Creatinine 2.10 (H) 0.40 - 1.60 mg/dL    eGFR 32 (L) >60 mL/min/1.73m*2    Calcium 7.7 (L) 8.5 - 10.4 mg/dL    Phosphorus 1.9 (L) 2.5 - 4.5 mg/dL    Albumin 2.1 (L) 3.5 - 5.0 g/dL    Anion Gap 12 <=19 mmol/L   Vancomycin   Result Value Ref Range    Vancomycin 22.0 (H) 10.0 - 20.0 ug/mL   Type And Screen   Result Value Ref Range    ABO TYPE O     Rh TYPE POS     ANTIBODY SCREEN NEG        Assessment/Plan   Principal Problem:    Sepsis (CMS/HCC)  Active Problems:    Lactic acid acidosis    Acute renal failure (ARF) (CMS/HCC)    Leukocytosis    Bilateral pneumonia    Chronic respiratory failure (CMS/HCC)    Hypokalemia    Dehydration    Transaminitis    Acute encephalopathy    Hypotension      1/6: The patient is a 78-year-old white male with a history of longstanding coronary artery disease with multiple PCI procedures.  He does have ischemic congestive cardiomyopathy with an echocardiogram performed on 9/26/2023 estimating his LV ejection fraction of 35-40% with 2+ mitral valve regurgitation moderate left atrial enlargement mild RV chamber dilatation and a normal estimated PA systolic pressure.  He also has a history of longstanding persistent now permanent atrial fibrillation for which she underwent multiple electrical  DC cardioversions and a radiofrequency ablation procedure.  He is currently on a rate control strategy.  He has a history of former smoking COPD with O2 dependence.  He is admitted with increasing weakness shortness of breath cough with evidence of right-sided pneumonia by the initial chest CT for which she has been started on empiric IV antibiotics.  He does remain in atrial fibrillation borderline increase in ventricular rate and will begin low-dose metoprolol 25 mg twice daily.  He has been continued on Eliquis anticoagulation 5 mg twice daily along with a low-dose aspirin for CAD as well as statin therapy.  Will continue the modest IV fluid hydration given the transient hypotension in the emergency room with the potassium supplement.  Will check a repeat echocardiogram.  Follow-up chest x-ray tomorrow.      1/7: Patient's seen and events reviewed in detail.  He is lying supine somewhat fatigued no overt respiratory distress on nasal cannula.  He did develop hypotension with systolic blood pressure values in the lower 80 mmHg range.  He was given 500 cc of IV normal saline fluid bolus remains on the maintenance infusion at 100 cc/h.  He was placed on low-dose Levophed for pressure support.  Comprehensive metabolic panel noted of both for acute on chronic renal insuf transaminases remain elevated  ALT 68 bilirubin 1.4.  Transaminitis presumably related to sepsis hypotension.  Gastroenterology is following.  Patient remains in the atrial fibrillation with controlled ventricular rate and low-dose metoprolol.  Will reduce dose of Eliquis to 2.5 mg twice daily.  Acute on chronic renal insufficiency presumably due to sepsis and hypotension creatinine is risen from 1.4-2.2 today and urine output has diminished.  The repeat chest x-ray again demonstrates infiltration consolidation within the right lung predominantly centrally and in the right upper lobe.  Patient currently on IV Zosyn azithromycin and  vancomycin.     1/8: Patient still receiving low-dose IV norepinephrine to support blood pressure.  IV antibiotic therapy continues as well.  Remains in atrial fibrillation with controlled heart rate response.  Eliquis dose has been decreased down to 2.5 mg twice daily given the renal insufficiency.  Will continue to follow renal function.  Patient slowly improving.     1/9: Clinically overall improved over the last 24 hours.  IV norepinephrine being titrated down gradually.  Patient remains in atrial fibrillation with a well-controlled heart rate.  Serum creatinine unchanged at 2.6.  Potassium at 3.0 thus supplement will be needed.  Continue antibiotic therapy, patient continues to gradually improve.    1/10: Stable over the past 24 hours.  Now off pressor support.  Continues in a rate controlled atrial fibrillation.  Creatinine has improved to current of 2.1.  White blood cell count has significantly increased to current of 17.5.  Hemoglobin stable at 10.2.  Calcium does remain low at 3.1.  I have ordered for further oral repletion.  He is chest pain-free breathing comfortably nasal cannula oxygen.  Breath sounds are significant diminished throughout.  Does continue on antibiotics as directed by admitting.  Overall slowly improving.  Will follow with you.        I spent 35 minutes in the professional and overall care of this patient.      Sonu Melara, APRN-CNP

## 2024-01-10 NOTE — CONSULTS
"Inpatient consult to Psychiatry  Consult performed by: Deborah Villegas, ANTONIO-CNP  Consult ordered by: Evan Bates MD  Reason for consult: Competency evaluation      Capacity Assessment Tool    \"Capacity\" is the \"ability\" to make a decision.  The decision in question must be specific (one decision), relevant to a patient's current condition (appropriate), and timely (neither prospective nor retrospective).    Capacity varies based on knowledge base (explanation/understanding of clinical information), cognitive processing, acute psychiatric illness, and other clinical conditions.    In order to be deemed \"capacitated\" to make a single decision at one point in time, a patient must demonstrate all 4 of the following elements:    *Ability to consistently communicate a choice (consistent over time with adequate information)  *Ability to understand the relevant information (accurate knowledge of condition)  *Ability to appreciate the situation and its consequences (risks/benefits, pros/cons)  *Ability to reason about treatment options (without undue influence of a person or condition, eg. suicidality or acute psychosis)      Current Decision    Clinical issue:   77 y/o male with history of COPD, CHF, atrial fibrillation who was admitted to [facility] on 1/6/23 with sepsis, pneumonia, acute kidney injury, acute encephalopathy.      Pt become increasingly confused and agitated today, likely secondary to delirium.  Psychiatry was consulted to perform a competency evaluation.      On assessment wife is in the room.  She explains that prior to admission patient handled most of the duties around the house, from shopping to cleaning and paying the bills.  She states that prior to admission he was fully oriented and able to care for himself and all ADLs and IADLs.  She explains that he had a sudden onset of confusion, disorientation a few days before hospitalization.  She reports fluctuating loc,  intermittent confusion, and " "inattention since hospitalization.  He occasionally seen grabbing at things in the air; this morning he was looking for his coat and telling his wife, \"come on, get your coat it's time to go home.\"  Wife states he was pulling on his clothes and his medical equipment. Pt was medicated with Ativan due to agitation.     Capacity Evaluation    Patient demonstrates ability to consistently communicate choice:  No, due to encephalopathy, confusion, altered loc.     Patient demonstrates ability to understand the relevant information:  No     Patient demonstrates ability to appreciate the situation and its consequences:  No     Patient demonstrates ability to reason about treatment options:  No     If ANY of the above items are answered \"NO,\" the patient LACKS CAPACITY for that specific decision at hand, at that specific time.  Further capacity evaluations can be done as needed.    Assessment and Recommendations:  Pt currently  lacks capacity by default secondary to acute delirium.  As delirium resolves, his capacity and ability to make a decision will need to be re-evaluated.     There is concern about performing a global competency assessment with indefinite outcomes, in the setting of acute illness and encephalopathy.  Discussed with wife the order for competency evaluation and that the patient, by nature of his delirium, lacks capacity for medical decision making at this time.  Wife states that she is current surrogate decision maker. She understands that competency is determined by a  in court and that a guardian is assigned, and she declined to have a formal competency evaluation done at this time.  She consulted with her daughter who is in agreement.  Please re-consult if it is determined that a formal competency evaluation is needed and we will contact Dr. Lili Johnston, psychologist to execute a Statement of Expert Evaluation for submission to probate court.     Please defer to patient's surrogate decision maker " at this time for medical decisions.

## 2024-01-10 NOTE — PROGRESS NOTES
"Navarro Rabago is a 78 y.o. male on day 4 of admission presenting with Sepsis (CMS/HCC).    Subjective    patient was seen yesterday for acute renal failure secondary to septic shock secondary to pneumonia the patient clinically feels better no  overnight events noted.       Objective     Physical Exam  Neck:      Vascular: No carotid bruit.   Cardiovascular:      Rate and Rhythm: Normal rate and regular rhythm.      Heart sounds: No murmur heard.     No friction rub. No gallop.   Pulmonary:      Breath sounds: Wheezing and rhonchi present. No rales.   Chest:      Chest wall: No tenderness.   Abdominal:      General: There is no distension.      Tenderness: There is no abdominal tenderness. There is no guarding or rebound.   Musculoskeletal:         General: No swelling or tenderness.      Cervical back: Neck supple.      Right lower leg: No edema.      Left lower leg: No edema.   Lymphadenopathy:      Cervical: No cervical adenopathy.         Last Recorded Vitals  Blood pressure 120/78, pulse 90, temperature 36.1 °C (97 °F), temperature source Temporal, resp. rate 18, height 1.956 m (6' 5\"), weight 113 kg (248 lb 0.3 oz), SpO2 95 %.    Intake/Output last 3 Shifts:  I/O last 3 completed shifts:  In: 2665.4 (23.7 mL/kg) [P.O.:580; I.V.:2085.4 (18.5 mL/kg)]  Out: 2750 (24.4 mL/kg) [Urine:2750 (0.7 mL/kg/hr)]  Weight: 112.5 kg     Current Facility-Administered Medications:     acetaminophen (Tylenol) tablet 650 mg, 650 mg, oral, q6h PRN, Ricardo Kaplan MD, 650 mg at 01/09/24 2156    [Held by provider] apixaban (Eliquis) tablet 2.5 mg, 2.5 mg, oral, q12h DARIA, Ricardo Kaplan MD, 2.5 mg at 01/09/24 2157    [Held by provider] aspirin EC tablet 81 mg, 81 mg, oral, Daily, Ricardo Kaplan MD, 81 mg at 01/09/24 0905    azithromycin (Zithromax) in dextrose 5 % in water (D5W) 250 mL  mg, 500 mg, intravenous, q24h DARIA, Ricardo Kaplan MD, Stopped at 01/10/24 1022    benzocaine-menthol " (Cepastat Sore Throat) 15-3.6 mg lozenge 1 lozenge, 1 lozenge, Mouth/Throat, q2h PRN, Ricardo Kaplan MD    calcium carbonate (Tums) chewable tablet 500 mg, 500 mg, oral, 4x daily PRN, Ricardo Kaplan MD    dextromethorphan-guaifenesin (Robitussin DM)  mg/5 mL oral liquid 5 mL, 5 mL, oral, q4h PRN, Ricardo Kaplan MD    dextrose 10 % in water (D10W) infusion, 0.3 g/kg/hr, intravenous, Once PRN, Ricardo Kaplan MD    dextrose 5%-0.45 % sodium chloride infusion, 75 mL/hr, intravenous, Continuous, Ricardo Kaplan MD, Last Rate: 75 mL/hr at 01/10/24 0616, 75 mL/hr at 01/10/24 0616    dextrose 50 % injection 25 g, 25 g, intravenous, q15 min PRN, Ricardo Kaplan MD    ferrous sulfate (325 mg ferrous sulfate) tablet 1 tablet, 1 tablet, oral, Daily with breakfast, Ricardo Kaplan MD, 1 tablet at 01/10/24 0919    finasteride (Proscar) tablet 5 mg, 5 mg, oral, Daily, Ricardo Kaplan MD, 5 mg at 01/10/24 0920    glucagon (Glucagen) injection 1 mg, 1 mg, intramuscular, q15 min PRN, Ricardo Kaplan MD    guaiFENesin (Mucinex) 12 hr tablet 600 mg, 600 mg, oral, q12h PRN, Ricardo Kaplan MD    ipratropium-albuteroL (Duo-Neb) 0.5-2.5 mg/3 mL nebulizer solution 3 mL, 3 mL, nebulization, 4x daily, Ricardo Kaplan MD, 3 mL at 01/10/24 1205    levothyroxine (Synthroid, Levoxyl) tablet 50 mcg, 50 mcg, oral, Daily, Ricardo Kaplan MD, 50 mcg at 01/10/24 0617    LORazepam (Ativan) injection 1 mg, 1 mg, intravenous, q4h PRN, Evan Bates MD, 1 mg at 01/10/24 1232    midodrine (Proamatine) tablet 5 mg, 5 mg, oral, q8h, Ricardo Kaplan MD, 5 mg at 01/10/24 1156    norepinephrine (Levophed) 8 mg in dextrose 5% 250 mL (0.032 mg/mL) infusion (premix), 0.01-1 mcg/kg/min, intravenous, Continuous, Ricardo Kaplan MD, Stopped at 01/08/24 1115    nystatin (Mycostatin) 100,000 unit/mL suspension 500,000 Units, 5 mL, Swish & Swallow, 4x daily,  Ricardo Kaplan MD, 500,000 Units at 01/10/24 1300    ondansetron ODT (Zofran-ODT) disintegrating tablet 4 mg, 4 mg, oral, q8h PRN **OR** ondansetron (Zofran) injection 4 mg, 4 mg, intravenous, q8h PRN, Ricardo Kaplan MD, 4 mg at 01/07/24 0321    oxygen (O2) therapy, , inhalation, q8h, Ricardo Kaplan MD, 4 L/min at 01/10/24 0700    pantoprazole (ProtoNix) EC tablet 40 mg, 40 mg, oral, Daily before breakfast, Ricardo Kaplan MD, 40 mg at 01/10/24 0617    piperacillin-tazobactam-dextrose (Zosyn) IV 3.375 g, 3.375 g, intravenous, q6h, Ricardo Kaplan MD, Stopped at 01/10/24 1334    QUEtiapine (SEROquel) tablet 12.5 mg, 12.5 mg, oral, BID, Evan Bates MD, 12.5 mg at 01/10/24 1232   Relevant Results    Results for orders placed or performed during the hospital encounter of 01/06/24 (from the past 96 hour(s))   CBC and Auto Differential   Result Value Ref Range    WBC 12.9 (H) 4.4 - 11.3 x10*3/uL    nRBC 0.0 0.0 - 0.0 /100 WBCs    RBC 3.33 (L) 4.50 - 5.90 x10*6/uL    Hemoglobin 9.9 (L) 13.5 - 17.5 g/dL    Hematocrit 27.9 (L) 41.0 - 52.0 %    MCV 84 80 - 100 fL    MCH 29.7 26.0 - 34.0 pg    MCHC 35.5 32.0 - 36.0 g/dL    RDW 15.6 (H) 11.5 - 14.5 %    Platelets 143 (L) 150 - 450 x10*3/uL    Neutrophils % 88.4 40.0 - 80.0 %    Immature Granulocytes %, Automated 1.3 (H) 0.0 - 0.9 %    Lymphocytes % 4.0 13.0 - 44.0 %    Monocytes % 6.1 2.0 - 10.0 %    Eosinophils % 0.0 0.0 - 6.0 %    Basophils % 0.2 0.0 - 2.0 %    Neutrophils Absolute 11.38 (H) 1.60 - 5.50 x10*3/uL    Immature Granulocytes Absolute, Automated 0.17 0.00 - 0.50 x10*3/uL    Lymphocytes Absolute 0.51 (L) 0.80 - 3.00 x10*3/uL    Monocytes Absolute 0.78 0.05 - 0.80 x10*3/uL    Eosinophils Absolute 0.00 0.00 - 0.40 x10*3/uL    Basophils Absolute 0.02 0.00 - 0.10 x10*3/uL   Comprehensive metabolic panel   Result Value Ref Range    Glucose 125 (H) 65 - 99 mg/dL    Sodium 132 (L) 133 - 145 mmol/L    Potassium 3.8 3.4 - 5.1 mmol/L     Chloride 102 97 - 107 mmol/L    Bicarbonate 19 (L) 24 - 31 mmol/L    Urea Nitrogen 42 (H) 8 - 25 mg/dL    Creatinine 2.20 (H) 0.40 - 1.60 mg/dL    eGFR 30 (L) >60 mL/min/1.73m*2    Calcium 7.6 (L) 8.5 - 10.4 mg/dL    Albumin 2.2 (L) 3.5 - 5.0 g/dL    Alkaline Phosphatase 99 35 - 125 U/L    Total Protein 5.6 (L) 5.9 - 7.9 g/dL     (H) 5 - 40 U/L    Bilirubin, Total 1.4 (H) 0.1 - 1.2 mg/dL    ALT 68 (H) 5 - 40 U/L    Anion Gap 11 <=19 mmol/L   Vancomycin   Result Value Ref Range    Vancomycin 7.0 (L) 10.0 - 20.0 ug/mL   POCT GLUCOSE   Result Value Ref Range    POCT Glucose 114 (H) 74 - 99 mg/dL   Respiratory Culture/Smear    Specimen: SPUTUM; Fluid   Result Value Ref Range    Respiratory Culture/Smear (A)      Culture not performed. See Gram stain findings. Recollect if clinically indicated.    Gram Stain (A)      Gram stain indicates specimen contains significant salivary contamination.   CBC and Auto Differential   Result Value Ref Range    WBC 17.8 (H) 4.4 - 11.3 x10*3/uL    nRBC 0.0 0.0 - 0.0 /100 WBCs    RBC 3.27 (L) 4.50 - 5.90 x10*6/uL    Hemoglobin 9.9 (L) 13.5 - 17.5 g/dL    Hematocrit 28.8 (L) 41.0 - 52.0 %    MCV 88 80 - 100 fL    MCH 30.3 26.0 - 34.0 pg    MCHC 34.4 32.0 - 36.0 g/dL    RDW 16.2 (H) 11.5 - 14.5 %    Platelets 198 150 - 450 x10*3/uL    Neutrophils % 85.8 40.0 - 80.0 %    Immature Granulocytes %, Automated 1.8 (H) 0.0 - 0.9 %    Lymphocytes % 5.0 13.0 - 44.0 %    Monocytes % 7.1 2.0 - 10.0 %    Eosinophils % 0.2 0.0 - 6.0 %    Basophils % 0.1 0.0 - 2.0 %    Neutrophils Absolute 15.29 (H) 1.60 - 5.50 x10*3/uL    Immature Granulocytes Absolute, Automated 0.32 0.00 - 0.50 x10*3/uL    Lymphocytes Absolute 0.90 0.80 - 3.00 x10*3/uL    Monocytes Absolute 1.27 (H) 0.05 - 0.80 x10*3/uL    Eosinophils Absolute 0.03 0.00 - 0.40 x10*3/uL    Basophils Absolute 0.02 0.00 - 0.10 x10*3/uL   Comprehensive metabolic panel   Result Value Ref Range    Glucose 115 (H) 65 - 99 mg/dL    Sodium 128 (L)  133 - 145 mmol/L    Potassium 3.5 3.4 - 5.1 mmol/L    Chloride 98 97 - 107 mmol/L    Bicarbonate 17 (L) 24 - 31 mmol/L    Urea Nitrogen 51 (H) 8 - 25 mg/dL    Creatinine 2.60 (H) 0.40 - 1.60 mg/dL    eGFR 25 (L) >60 mL/min/1.73m*2    Calcium 7.7 (L) 8.5 - 10.4 mg/dL    Albumin 2.1 (L) 3.5 - 5.0 g/dL    Alkaline Phosphatase 99 35 - 125 U/L    Total Protein 5.5 (L) 5.9 - 7.9 g/dL     (H) 5 - 40 U/L    Bilirubin, Total 1.3 (H) 0.1 - 1.2 mg/dL    ALT 59 (H) 5 - 40 U/L    Anion Gap 13 <=19 mmol/L   Vancomycin   Result Value Ref Range    Vancomycin 12.0 10.0 - 20.0 ug/mL   Transthoracic Echo (TTE) Complete   Result Value Ref Range    AV pk gloria 1.64     LVOT diam 2.00     AV mn grad 6.0     LV biplane EF 54     RVSP 43.0     LVIDd 5.65     AV pk grad 10.8     Aortic Valve Area by Continuity of VTI 1.95     Aortic Valve Area by Continuity of Peak Velocity 1.89     LV A4C EF 54.1    Basic Metabolic Panel   Result Value Ref Range    Glucose 140 (H) 65 - 99 mg/dL    Sodium 140 133 - 145 mmol/L    Potassium 3.0 (L) 3.4 - 5.1 mmol/L    Chloride 106 97 - 107 mmol/L    Bicarbonate 20 (L) 24 - 31 mmol/L    Urea Nitrogen 49 (H) 8 - 25 mg/dL    Creatinine 2.60 (H) 0.40 - 1.60 mg/dL    eGFR 25 (L) >60 mL/min/1.73m*2    Calcium 7.0 (L) 8.5 - 10.4 mg/dL    Anion Gap 14 <=19 mmol/L   NT Pro-BNP   Result Value Ref Range    PROBNP 6,201 (H) 0 - 852 pg/mL   CBC   Result Value Ref Range    WBC 15.3 (H) 4.4 - 11.3 x10*3/uL    nRBC 0.0 0.0 - 0.0 /100 WBCs    RBC 3.18 (L) 4.50 - 5.90 x10*6/uL    Hemoglobin 9.6 (L) 13.5 - 17.5 g/dL    Hematocrit 27.6 (L) 41.0 - 52.0 %    MCV 87 80 - 100 fL    MCH 30.2 26.0 - 34.0 pg    MCHC 34.8 32.0 - 36.0 g/dL    RDW 16.2 (H) 11.5 - 14.5 %    Platelets 207 150 - 450 x10*3/uL   Iron and TIBC   Result Value Ref Range    Iron 24 (L) 45 - 160 ug/dL    UIBC 90 (L) 110 - 370 ug/dL    TIBC 114 (L) 228 - 428 ug/dL    % Saturation 21 12 - 50 %   Ferritin   Result Value Ref Range    Ferritin 2,435 (H) 30 - 400  ng/mL   POCT GLUCOSE   Result Value Ref Range    POCT Glucose 147 (H) 74 - 99 mg/dL   Vancomycin   Result Value Ref Range    Vancomycin 17.0 10.0 - 20.0 ug/mL   POCT GLUCOSE   Result Value Ref Range    POCT Glucose 141 (H) 74 - 99 mg/dL   POCT GLUCOSE   Result Value Ref Range    POCT Glucose 108 (H) 74 - 99 mg/dL   CBC   Result Value Ref Range    WBC 17.5 (H) 4.4 - 11.3 x10*3/uL    nRBC 0.0 0.0 - 0.0 /100 WBCs    RBC 3.39 (L) 4.50 - 5.90 x10*6/uL    Hemoglobin 10.2 (L) 13.5 - 17.5 g/dL    Hematocrit 29.0 (L) 41.0 - 52.0 %    MCV 86 80 - 100 fL    MCH 30.1 26.0 - 34.0 pg    MCHC 35.2 32.0 - 36.0 g/dL    RDW 16.0 (H) 11.5 - 14.5 %    Platelets 276 150 - 450 x10*3/uL   Renal Function Panel   Result Value Ref Range    Glucose 140 (H) 65 - 99 mg/dL    Sodium 136 133 - 145 mmol/L    Potassium 3.1 (L) 3.4 - 5.1 mmol/L    Chloride 101 97 - 107 mmol/L    Bicarbonate 23 (L) 24 - 31 mmol/L    Urea Nitrogen 43 (H) 8 - 25 mg/dL    Creatinine 2.10 (H) 0.40 - 1.60 mg/dL    eGFR 32 (L) >60 mL/min/1.73m*2    Calcium 7.7 (L) 8.5 - 10.4 mg/dL    Phosphorus 1.9 (L) 2.5 - 4.5 mg/dL    Albumin 2.1 (L) 3.5 - 5.0 g/dL    Anion Gap 12 <=19 mmol/L   Vancomycin   Result Value Ref Range    Vancomycin 22.0 (H) 10.0 - 20.0 ug/mL   Type And Screen   Result Value Ref Range    ABO TYPE O     Rh TYPE POS     ANTIBODY SCREEN NEG    VERIFY ABO/Rh Group Test   Result Value Ref Range    ABO TYPE O     Rh TYPE POS        acute kidney injury secondary to sepsis and pneumonia however I think the main culprit here is IV contrast which he received with a CAT scan his creatinine is stable at 2.1 mg/dL also he has good urine output  Pneumonia continue with IV antibiotic therapy  Acute respiratory failure continue with oxygen therapy  Metabolic acidosis secondary to acute kidney injury discontinue sodium bicarb drip  Chronic atrial fibrillation  Hypokalemia replace potassium             Gal James MD

## 2024-01-10 NOTE — PROGRESS NOTES
Vancomycin Dosing by Pharmacy- Cessation of Therapy    Consult to pharmacy for vancomycin dosing has been discontinued by the prescriber, pharmacy will sign off at this time.    Please call pharmacy if there are further questions or re-enter a consult if vancomycin is resumed.     ISREAL SNOW, PharmD

## 2024-01-10 NOTE — PROGRESS NOTES
Navarro Rabago is a 78 y.o. male on day 4 of admission presenting with Sepsis (CMS/HCC).      Subjective   Patient awake, confused, agitated        Objective     Last Recorded Vitals  /71 (BP Location: Right arm, Patient Position: Lying)   Pulse 77   Temp 36.3 °C (97.3 °F) (Temporal)   Resp 17   Wt 113 kg (248 lb 0.3 oz)   SpO2 95%   Intake/Output last 3 Shifts:    Intake/Output Summary (Last 24 hours) at 1/10/2024 1136  Last data filed at 1/10/2024 1000  Gross per 24 hour   Intake 1463.75 ml   Output 2200 ml   Net -736.25 ml       Admission Weight  Weight: 104 kg (228 lb 9.9 oz) (01/06/24 0026)    Daily Weight  01/10/24 : 113 kg (248 lb 0.3 oz)    Image Results  CT abdomen pelvis wo IV contrast  Narrative: STUDY:  CT Abdomen and Pelvis without IV Contrast; 1/10/2024 at 3:30 AM  INDICATION:  Back pain, ecchymosis; trauma.  COMPARISON:  US renal 1/8/2024, CT chest, abdomen and pelvis 1/5/2024.  ACCESSION NUMBER(S):  [id number]  ORDERING CLINICIAN:  DANA AMATO  TECHNIQUE:  CT of the abdomen and pelvis was performed.  Contiguous axial images  were obtained at 3 mm slice thickness through the abdomen and pelvis.   Coronal and sagittal reconstructions at 3 mm slice thickness were  performed. No intravenous contrast was administered.    Automated mA/kV exposure control was utilized and patient examination  was performed in strict accordance with principles of ALARA.  FINDINGS:  Please note that the evaluation of vessels, lymph nodes and organs is  limited without intravenous contrast.      LOWER CHEST:  No cardiomegaly.  No pericardial effusion.  Lung bases are clear.     ABDOMEN:  Partially visualized portions of the lower chest showing small  bilateral pleural effusions and partial atelectasis of the bilateral  lower lobes increased from prior. Heart size normal. No pericardial  effusion.  Unenhanced liver without acute process. No discernible intrahepatic  ductal dilatation. Pericholecystic flu  atrophic changes of the  pancreas. No acute abnormality of the spleen is identified. No acute  abnormality of the adrenal glands or kidneys no retroperitoneal  adenopathy. Atherosclerosis of the abdominal aorta without aneurysm.  Asymmetric enlargement of the RIGHT psoas musculature and  retroperitoneal fluid increased from 1/5/24.   Small amount of free fluid within the pelvis. No pelvic adenopathy  identified. Rodriguez catheter within the urinary bladder.  LEFT hip prostheses. No findings of acute fracture of the pelvis.  Spondylotic changes and facet arthrosis of the lumbar spine.   Impression: Impression:  RIGHT psoas and retroperitoneal hematoma. Without the use of  intravenous contrast difficult to determine the exact size of the  hemorrhage.  Increased small bilateral pleural effusions and partial atelectasis of  the lower lobes.  Findings discussed with Dr. DANA AMATO with verbal  understanding at approximately 1/10/2024 4:28 AM.  Signed by Keith Watson MD  CT head wo IV contrast  Narrative: STUDY:  CT Head without IV Contrast; 1/10/2024 at 3:30 AM  INDICATION:  Confusion.  COMPARISON:  None available.  ACCESSION NUMBER(S):  [id number]  ORDERING CLINICIAN:  DANA AMATO  TECHNIQUE:  Noncontrast axial CT scan of head was performed.  Angled reformats in  brain and bone windows were generated.  The images were reviewed in  bone, brain, blood and soft tissue windows.  Automated mA/kV exposure control was utilized and patient examination  was performed in strict accordance with principles of ALARA.  FINDINGS:  CSF Spaces:  The ventricles, sulci and basal cisterns are prominent  from atrophy.  There is no extraaxial fluid collection.  Mild patchy  areas of low-attenuation are seen in the subcortical and deep  periventricular white matter suggesting areas of chronic microvascular  ischemia.     Parenchyma:  The grey-white differentiation is intact.  There is no  mass effect or midline shift.   There is no intracranial hemorrhage.     Calvarium:  The calvarium is unremarkable.     Paranasal sinuses and mastoids:  Visualized paranasal sinuses and  mastoids are clear.  Impression: Diffuse cerebral atrophy.  Suspected mild chronic small vessel white  matter ischemia.  No definite acute intracranial abnormality.  Signed by Shawn Echols DO      Physical Exam  Constitutional:       Appearance: Normal appearance.   HENT:      Head: Normocephalic.      Mouth/Throat:      Mouth: Mucous membranes are moist.   Eyes:      Extraocular Movements: Extraocular movements intact.      Pupils: Pupils are equal, round, and reactive to light.   Cardiovascular:      Rate and Rhythm: Normal rate and regular rhythm.   Pulmonary:      Effort: Pulmonary effort is normal.      Breath sounds: Normal breath sounds.   Abdominal:      General: Abdomen is flat.      Palpations: Abdomen is soft.   Skin:     General: Skin is warm.   Neurological:      Mental Status: He is alert. He is disoriented.         Relevant Results             Scheduled medications  [Held by provider] apixaban, 2.5 mg, oral, q12h DARIA  [Held by provider] aspirin, 81 mg, oral, Daily  azithromycin, 500 mg, intravenous, q24h DARIA  ferrous sulfate (325 mg ferrous sulfate), 1 tablet, oral, Daily with breakfast  finasteride, 5 mg, oral, Daily  ipratropium-albuteroL, 3 mL, nebulization, 4x daily  levothyroxine, 50 mcg, oral, Daily  midodrine, 5 mg, oral, q8h  nystatin, 5 mL, Swish & Swallow, 4x daily  oxygen, , inhalation, q8h  pantoprazole, 40 mg, oral, Daily before breakfast  piperacillin-tazobactam, 3.375 g, intravenous, q6h      Continuous medications  dextrose 5%-0.45 % sodium chloride, 75 mL/hr, Last Rate: 75 mL/hr (01/10/24 0616)  norepinephrine, 0.01-1 mcg/kg/min, Last Rate: Stopped (01/08/24 1115)      PRN medications  PRN medications: acetaminophen, benzocaine-menthol, calcium carbonate, dextromethorphan-guaifenesin, dextrose 10 % in water (D10W), dextrose,  glucagon, guaiFENesin, ondansetron ODT **OR** ondansetron  Results for orders placed or performed during the hospital encounter of 01/06/24 (from the past 24 hour(s))   Vancomycin   Result Value Ref Range    Vancomycin 17.0 10.0 - 20.0 ug/mL   POCT GLUCOSE   Result Value Ref Range    POCT Glucose 141 (H) 74 - 99 mg/dL   POCT GLUCOSE   Result Value Ref Range    POCT Glucose 108 (H) 74 - 99 mg/dL   CBC   Result Value Ref Range    WBC 17.5 (H) 4.4 - 11.3 x10*3/uL    nRBC 0.0 0.0 - 0.0 /100 WBCs    RBC 3.39 (L) 4.50 - 5.90 x10*6/uL    Hemoglobin 10.2 (L) 13.5 - 17.5 g/dL    Hematocrit 29.0 (L) 41.0 - 52.0 %    MCV 86 80 - 100 fL    MCH 30.1 26.0 - 34.0 pg    MCHC 35.2 32.0 - 36.0 g/dL    RDW 16.0 (H) 11.5 - 14.5 %    Platelets 276 150 - 450 x10*3/uL   Renal Function Panel   Result Value Ref Range    Glucose 140 (H) 65 - 99 mg/dL    Sodium 136 133 - 145 mmol/L    Potassium 3.1 (L) 3.4 - 5.1 mmol/L    Chloride 101 97 - 107 mmol/L    Bicarbonate 23 (L) 24 - 31 mmol/L    Urea Nitrogen 43 (H) 8 - 25 mg/dL    Creatinine 2.10 (H) 0.40 - 1.60 mg/dL    eGFR 32 (L) >60 mL/min/1.73m*2    Calcium 7.7 (L) 8.5 - 10.4 mg/dL    Phosphorus 1.9 (L) 2.5 - 4.5 mg/dL    Albumin 2.1 (L) 3.5 - 5.0 g/dL    Anion Gap 12 <=19 mmol/L   Vancomycin   Result Value Ref Range    Vancomycin 22.0 (H) 10.0 - 20.0 ug/mL   Type And Screen   Result Value Ref Range    ABO TYPE O     Rh TYPE POS     ANTIBODY SCREEN NEG    VERIFY ABO/Rh Group Test   Result Value Ref Range    ABO TYPE O     Rh TYPE POS        Assessment/Plan          This patient has a urinary catheter   Reason for the urinary catheter remaining today?           Principal Problem:    Sepsis (CMS/HCC)  Active Problems:    Lactic acid acidosis    Acute renal failure (ARF) (CMS/HCC)    Leukocytosis    Bilateral pneumonia    Chronic respiratory failure (CMS/HCC)    Hypokalemia    Dehydration    Transaminitis    Acute encephalopathy    Hypotension    Aspiration/pneumonia- atbs as ordered  Dysphagia-  MBS per speech therapy done, diet as ordered   Sepsis/hypotension- improving, continue management in ICU, off pressors   Encephalopathy/confusion-supportive care, psych service to see pt for competency eval   Hypokalemia- replace, follow up with BMP AM    A fib- rate controlled, was on apixaban, appreciate cardiology recommendations   New R retroperitoneal hematoma was discovered over night- apixaban/ASA on hold, follow up with H/H  Worsening in renal function- IV hydration, follow up with BMP, nephrology on case, renal US  done    Medically stable, will follow along with consultants                  Evan Bates MD

## 2024-01-10 NOTE — CARE PLAN
The patient's goals for the shift include      The clinical goals for the shift include pain management

## 2024-01-11 LAB
ANION GAP SERPL CALC-SCNC: 7 MMOL/L
BUN SERPL-MCNC: 35 MG/DL (ref 8–25)
CALCIUM SERPL-MCNC: 7.7 MG/DL (ref 8.5–10.4)
CHLORIDE SERPL-SCNC: 106 MMOL/L (ref 97–107)
CO2 SERPL-SCNC: 25 MMOL/L (ref 24–31)
CREAT SERPL-MCNC: 1.7 MG/DL (ref 0.4–1.6)
EGFRCR SERPLBLD CKD-EPI 2021: 41 ML/MIN/1.73M*2
ERYTHROCYTE [DISTWIDTH] IN BLOOD BY AUTOMATED COUNT: 16.5 % (ref 11.5–14.5)
GLUCOSE SERPL-MCNC: 132 MG/DL (ref 65–99)
HCT VFR BLD AUTO: 28.2 % (ref 41–52)
HGB BLD-MCNC: 9.3 G/DL (ref 13.5–17.5)
MCH RBC QN AUTO: 29.3 PG (ref 26–34)
MCHC RBC AUTO-ENTMCNC: 33 G/DL (ref 32–36)
MCV RBC AUTO: 89 FL (ref 80–100)
NRBC BLD-RTO: 0 /100 WBCS (ref 0–0)
PLATELET # BLD AUTO: 277 X10*3/UL (ref 150–450)
POTASSIUM SERPL-SCNC: 3.1 MMOL/L (ref 3.4–5.1)
RBC # BLD AUTO: 3.17 X10*6/UL (ref 4.5–5.9)
SODIUM SERPL-SCNC: 138 MMOL/L (ref 133–145)
WBC # BLD AUTO: 13.6 X10*3/UL (ref 4.4–11.3)

## 2024-01-11 PROCEDURE — 2500000004 HC RX 250 GENERAL PHARMACY W/ HCPCS (ALT 636 FOR OP/ED): Performed by: INTERNAL MEDICINE

## 2024-01-11 PROCEDURE — 80048 BASIC METABOLIC PNL TOTAL CA: CPT | Performed by: INTERNAL MEDICINE

## 2024-01-11 PROCEDURE — 2500000002 HC RX 250 W HCPCS SELF ADMINISTERED DRUGS (ALT 637 FOR MEDICARE OP, ALT 636 FOR OP/ED): Performed by: INTERNAL MEDICINE

## 2024-01-11 PROCEDURE — 94640 AIRWAY INHALATION TREATMENT: CPT

## 2024-01-11 PROCEDURE — 2500000005 HC RX 250 GENERAL PHARMACY W/O HCPCS: Performed by: INTERNAL MEDICINE

## 2024-01-11 PROCEDURE — 97535 SELF CARE MNGMENT TRAINING: CPT | Mod: GO,CO

## 2024-01-11 PROCEDURE — 2500000001 HC RX 250 WO HCPCS SELF ADMINISTERED DRUGS (ALT 637 FOR MEDICARE OP): Performed by: INTERNAL MEDICINE

## 2024-01-11 PROCEDURE — 94760 N-INVAS EAR/PLS OXIMETRY 1: CPT

## 2024-01-11 PROCEDURE — 97110 THERAPEUTIC EXERCISES: CPT | Mod: GO,CO

## 2024-01-11 PROCEDURE — 85027 COMPLETE CBC AUTOMATED: CPT | Performed by: INTERNAL MEDICINE

## 2024-01-11 PROCEDURE — 36415 COLL VENOUS BLD VENIPUNCTURE: CPT | Performed by: INTERNAL MEDICINE

## 2024-01-11 PROCEDURE — 99232 SBSQ HOSP IP/OBS MODERATE 35: CPT | Performed by: INTERNAL MEDICINE

## 2024-01-11 PROCEDURE — 9420000001 HC RT PATIENT EDUCATION 5 MIN

## 2024-01-11 PROCEDURE — 2060000001 HC INTERMEDIATE ICU ROOM DAILY

## 2024-01-11 PROCEDURE — 97530 THERAPEUTIC ACTIVITIES: CPT | Mod: GP,CQ

## 2024-01-11 PROCEDURE — 2500000001 HC RX 250 WO HCPCS SELF ADMINISTERED DRUGS (ALT 637 FOR MEDICARE OP)

## 2024-01-11 RX ORDER — SODIUM CHLORIDE 9 MG/ML
75 INJECTION, SOLUTION INTRAVENOUS CONTINUOUS
Status: ACTIVE | OUTPATIENT
Start: 2024-01-11 | End: 2024-01-11

## 2024-01-11 RX ORDER — POTASSIUM CHLORIDE 20 MEQ/1
20 TABLET, EXTENDED RELEASE ORAL 2 TIMES DAILY
Status: DISCONTINUED | OUTPATIENT
Start: 2024-01-11 | End: 2024-01-11

## 2024-01-11 RX ORDER — POTASSIUM CHLORIDE 1.5 G/1.58G
20 POWDER, FOR SOLUTION ORAL ONCE
Status: COMPLETED | OUTPATIENT
Start: 2024-01-11 | End: 2024-01-11

## 2024-01-11 RX ORDER — POTASSIUM CHLORIDE 14.9 MG/ML
20 INJECTION INTRAVENOUS ONCE
Status: COMPLETED | OUTPATIENT
Start: 2024-01-11 | End: 2024-01-11

## 2024-01-11 RX ADMIN — POTASSIUM CHLORIDE 20 MEQ: 14.9 INJECTION, SOLUTION INTRAVENOUS at 13:01

## 2024-01-11 RX ADMIN — FINASTERIDE 5 MG: 5 TABLET, FILM COATED ORAL at 08:05

## 2024-01-11 RX ADMIN — AZITHROMYCIN MONOHYDRATE 500 MG: 500 INJECTION, POWDER, LYOPHILIZED, FOR SOLUTION INTRAVENOUS at 08:05

## 2024-01-11 RX ADMIN — IPRATROPIUM BROMIDE AND ALBUTEROL SULFATE 3 ML: 2.5; .5 SOLUTION RESPIRATORY (INHALATION) at 19:12

## 2024-01-11 RX ADMIN — GUAIFENESIN AND DEXTROMETHORPHAN 5 ML: 100; 10 SYRUP ORAL at 03:28

## 2024-01-11 RX ADMIN — POTASSIUM CHLORIDE 20 MEQ: 1.5 POWDER, FOR SOLUTION ORAL at 21:16

## 2024-01-11 RX ADMIN — PIPERACILLIN SODIUM AND TAZOBACTAM SODIUM 3.38 G: 3; .375 INJECTION, SOLUTION INTRAVENOUS at 06:44

## 2024-01-11 RX ADMIN — DEXTROSE MONOHYDRATE AND SODIUM CHLORIDE 75 ML/HR: 5; .45 INJECTION, SOLUTION INTRAVENOUS at 06:46

## 2024-01-11 RX ADMIN — Medication: at 07:00

## 2024-01-11 RX ADMIN — PANTOPRAZOLE SODIUM 40 MG: 40 TABLET, DELAYED RELEASE ORAL at 07:00

## 2024-01-11 RX ADMIN — MIDODRINE HYDROCHLORIDE 5 MG: 5 TABLET ORAL at 21:05

## 2024-01-11 RX ADMIN — IPRATROPIUM BROMIDE AND ALBUTEROL SULFATE 3 ML: 2.5; .5 SOLUTION RESPIRATORY (INHALATION) at 10:25

## 2024-01-11 RX ADMIN — GUAIFENESIN 600 MG: 600 TABLET ORAL at 21:05

## 2024-01-11 RX ADMIN — QUETIAPINE FUMARATE 12.5 MG: 25 TABLET ORAL at 21:06

## 2024-01-11 RX ADMIN — NYSTATIN 500000 UNITS: 100000 SUSPENSION ORAL at 21:06

## 2024-01-11 RX ADMIN — NYSTATIN 500000 UNITS: 100000 SUSPENSION ORAL at 06:44

## 2024-01-11 RX ADMIN — Medication 4 L/MIN: at 23:00

## 2024-01-11 RX ADMIN — FERROUS SULFATE TAB 325 MG (65 MG ELEMENTAL FE) 1 TABLET: 325 (65 FE) TAB at 08:05

## 2024-01-11 RX ADMIN — Medication: at 15:00

## 2024-01-11 RX ADMIN — SODIUM CHLORIDE 75 ML/HR: 900 INJECTION, SOLUTION INTRAVENOUS at 12:54

## 2024-01-11 RX ADMIN — MIDODRINE HYDROCHLORIDE 5 MG: 5 TABLET ORAL at 02:55

## 2024-01-11 RX ADMIN — PIPERACILLIN SODIUM AND TAZOBACTAM SODIUM 3.38 G: 3; .375 INJECTION, SOLUTION INTRAVENOUS at 12:26

## 2024-01-11 RX ADMIN — PIPERACILLIN SODIUM AND TAZOBACTAM SODIUM 3.38 G: 3; .375 INJECTION, SOLUTION INTRAVENOUS at 18:39

## 2024-01-11 RX ADMIN — IPRATROPIUM BROMIDE AND ALBUTEROL SULFATE 3 ML: 2.5; .5 SOLUTION RESPIRATORY (INHALATION) at 07:38

## 2024-01-11 RX ADMIN — IPRATROPIUM BROMIDE AND ALBUTEROL SULFATE 3 ML: 2.5; .5 SOLUTION RESPIRATORY (INHALATION) at 14:48

## 2024-01-11 RX ADMIN — QUETIAPINE FUMARATE 12.5 MG: 25 TABLET ORAL at 08:05

## 2024-01-11 RX ADMIN — LEVOTHYROXINE SODIUM 50 MCG: 0.05 TABLET ORAL at 06:44

## 2024-01-11 RX ADMIN — PIPERACILLIN SODIUM AND TAZOBACTAM SODIUM 3.38 G: 3; .375 INJECTION, SOLUTION INTRAVENOUS at 00:10

## 2024-01-11 ASSESSMENT — COGNITIVE AND FUNCTIONAL STATUS - GENERAL
HELP NEEDED FOR BATHING: TOTAL
CLIMB 3 TO 5 STEPS WITH RAILING: TOTAL
PERSONAL GROOMING: TOTAL
DAILY ACTIVITIY SCORE: 6
WALKING IN HOSPITAL ROOM: TOTAL
DRESSING REGULAR UPPER BODY CLOTHING: TOTAL
TURNING FROM BACK TO SIDE WHILE IN FLAT BAD: TOTAL
MOBILITY SCORE: 6
DRESSING REGULAR LOWER BODY CLOTHING: TOTAL
MOVING TO AND FROM BED TO CHAIR: TOTAL
MOBILITY SCORE: 6
PERSONAL GROOMING: TOTAL
DAILY ACTIVITIY SCORE: 6
TURNING FROM BACK TO SIDE WHILE IN FLAT BAD: TOTAL
STANDING UP FROM CHAIR USING ARMS: TOTAL
EATING MEALS: TOTAL
DRESSING REGULAR LOWER BODY CLOTHING: TOTAL
MOVING TO AND FROM BED TO CHAIR: TOTAL
DRESSING REGULAR UPPER BODY CLOTHING: TOTAL
MOVING FROM LYING ON BACK TO SITTING ON SIDE OF FLAT BED WITH BEDRAILS: TOTAL
TOILETING: TOTAL
STANDING UP FROM CHAIR USING ARMS: TOTAL
CLIMB 3 TO 5 STEPS WITH RAILING: TOTAL
TOILETING: TOTAL
WALKING IN HOSPITAL ROOM: TOTAL
MOVING FROM LYING ON BACK TO SITTING ON SIDE OF FLAT BED WITH BEDRAILS: TOTAL
HELP NEEDED FOR BATHING: TOTAL
EATING MEALS: TOTAL

## 2024-01-11 ASSESSMENT — PAIN - FUNCTIONAL ASSESSMENT
PAIN_FUNCTIONAL_ASSESSMENT: FLACC (FACE, LEGS, ACTIVITY, CRY, CONSOLABILITY)

## 2024-01-11 ASSESSMENT — PAIN SCALES - GENERAL
PAINLEVEL_OUTOF10: 0 - NO PAIN
PAINLEVEL_OUTOF10: 0 - NO PAIN
PAINLEVEL_OUTOF10: 5 - MODERATE PAIN
PAINLEVEL_OUTOF10: 0 - NO PAIN
PAINLEVEL_OUTOF10: 5 - MODERATE PAIN
PAINLEVEL_OUTOF10: 0 - NO PAIN

## 2024-01-11 NOTE — PROGRESS NOTES
Physical Therapy    Physical Therapy Treatment    Patient Name: Navarro Rabago  MRN: [id number]  Today's Date: 1/11/2024  Time Calculation  Start Time: 0805  Stop Time: 0830  Time Calculation (min): 25 min       Assessment/Plan   PT Assessment  End of Session Communication: Bedside nurse  Assessment Comment: Dep x 2 sup to sit / sit to sup Mod/ A x1- 2 while seated on EOB x 15 min  End of Session Patient Position: Bed, 4 rail up, Alarm on     PT Plan  Treatment/Interventions: Bed mobility, Transfer training, Balance training, Neuromuscular re-education, Strengthening, Endurance training, Therapeutic exercise, Therapeutic activity  PT Plan: Skilled PT  PT Frequency: 4 times per week  PT Discharge Recommendations: Moderate intensity level of continued care, 24 hr supervision due to cognition  Equipment Recommended upon Discharge: Lift  PT Recommended Transfer Status: Assist x2, Total assist  PT - OK to Discharge: Yes      General Visit Information:   PT  Visit  PT Received On: 01/11/24  Response to Previous Treatment: Patient with no complaints from previous session.  General  Reason for Referral: impaired mobility  Past Medical History Relevant to Rehab: CHF, afib, CAD, COPD, HTN, hernia  Co-Treatment: OT  Co-Treatment Reason: safety wtih mobility  Prior to Session Communication: Bedside nurse  Patient Position Received: Bed, 4 rail up, Alarm on  Preferred Learning Style: verbal  General Comment: Cleared by nurse to see. Patient agreeable.    Subjective   Precautions:  Precautions  Precautions Comment: + rock cath, IV, Rt wrist arterial line, 6L O2 via NC, telemetry  Vital Signs:       Objective   Pain:  Pain Assessment  Pain Assessment: FLACC (Face, Legs, Activity, Cry, Consolability)  Pain Score: 5 - Moderate pain (Heavy lean to the left)  Pain Type:  (Intermittent)  Pain Location:  (All over)  Cognition:  Cognition  Orientation Level: Unable to assess  Postural Control:     Extremity/Trunk Assessments:    Activity  Tolerance:     Treatments:  Bed Mobility  Bed Mobility: Yes  Bed Mobility 1  Bed Mobility 1: Supine to sitting  Level of Assistance 1: Dependent (x 2 Assist for trunk up and B LEs over the EOB with assist with draw sheet)  Bed Mobility Comments 1: HOB slightly elevated On EOB x 15 minutes requiring Max /Mod A initially Holding chair with R hand only with Mod A rest of sitting time. Third person to assist with sitting balance (Total bed change completed)  Bed Mobility 2  Bed Mobility  2: Sitting to supine  Level of Assistance 2: Dependent (X 2)  Bed Mobility Comments 2: Dep x 2 sit to supine for trunk down and B LEs into bed. Dependent boost up to HOB         Outcome Measures:  [facility] Basic Mobility  Turning from your back to your side while in a flat bed without using bedrails: Total  Moving from lying on your back to sitting on the side of a flat bed without using bedrails: Total  Moving to and from bed to chair (including a wheelchair): Total  Standing up from a chair using your arms (e.g. wheelchair or bedside chair): Total  To walk in hospital room: Total  Climbing 3-5 steps with railing: Total  Basic Mobility - Total Score: 6    Education Documentation  Mobility Training, taught by Ashley Lyn PTA at 1/11/2024  8:49 AM.  Learner: Patient  Readiness: Acceptance  Method: Explanation  Response: Verbalizes Understanding    Education Comments  No comments found.        OP EDUCATION:       Encounter Problems       Encounter Problems (Active)       Balance       Sitting Balance (Progressing)       Start:  01/08/24    Expected End:  01/16/24       Pt will demonstrate good sitting balance to promote safe engagement with out of bed activities           Standing Balance (Progressing)       Start:  01/08/24    Expected End:  01/16/24       Pt will demonstrate good static standing balance to promote safe participation with out of bed activity, transfers, and mobility              Mobility       Ambulation  (Progressing)       Start:  01/08/24    Expected End:  01/16/24       Pt will ambulate 50' modified independent assist with walker to promote safe home mobility              Pain - Adult          Safety       Safe Mobility Techniques (Progressing)       Start:  01/08/24    Expected End:  01/16/24       Pt will correctly identify and demonstrate safe mobility techniques to reduce their risks for falls during their acute care stay               Transfers       Supine to sit (Progressing)       Start:  01/08/24    Expected End:  01/16/24       Pt will transfer supine to sitting at edge of bed with modified independent assist to promote acute care out of bed activity           Sit to stand (Progressing)       Start:  01/08/24    Expected End:  01/16/24       Pt will transfer sit to standing position with modified independent assist and walker to promote safe out of bed activity           Bed to chair (Progressing)       Start:  01/08/24    Expected End:  01/16/24       Pt will transfer from sitting edge of bed to the chair with modified independent assist and walker to promote out of bed activity and reduce the risks of prolonged acute care bedrest

## 2024-01-11 NOTE — PROGRESS NOTES
Patient is from home with spouse.  Therapy recommends MODERATE intensity at discharge.  Met with spouse aqt bedside to discuss discharge plan.  Referral in review for [facility].  Spoke to patient's daughter Elizabeth at the request of spouse.  Patient will need precert prior to discharge.  Awaiting updates.  RN TCC following.        Eloina Crespo RN

## 2024-01-11 NOTE — PROGRESS NOTES
Speech-Language Pathology                 Therapy Communication Note    Patient Name: Navarro Rabago  MRN: [id number]  Today's Date: 1/11/2024     Discipline: Speech Language Pathology    Missed Visit Reason:  Pt sleeping soundly. He does not respond to verbal/tactile stimulation nor sternal rub. RN made aware.     Missed Time: Attempt    Comment:

## 2024-01-11 NOTE — CARE PLAN
The patient's goals for the shift include  .  The clinical goals for the shift include maintain patient safety    Over the shift, the patient did not make progress toward the following goals. Barriers to progression include .. Recommendations to address these barriers include ..

## 2024-01-11 NOTE — PROGRESS NOTES
Navarro Rabago is a 78 y.o. male on day 5 of admission presenting with Sepsis (CMS/HCC).      Subjective   Patient looks sleepy, confused        Objective     Last Recorded Vitals  /60 (BP Location: Right arm, Patient Position: Lying)   Pulse 72   Temp 35.9 °C (96.6 °F) (Temporal)   Resp 19   Wt 114 kg (251 lb 5.2 oz)   SpO2 96%   Intake/Output last 3 Shifts:    Intake/Output Summary (Last 24 hours) at 1/11/2024 1217  Last data filed at 1/11/2024 0714  Gross per 24 hour   Intake 1031.25 ml   Output 1125 ml   Net -93.75 ml       Admission Weight  Weight: 104 kg (228 lb 9.9 oz) (01/06/24 0026)    Daily Weight  01/11/24 : 114 kg (251 lb 5.2 oz)    Image Results  ECG 12 lead  Sinus tachycardia with 1st degree AV block with Premature supraventricular complexes  Low voltage QRS  Left posterior fascicular block  Abnormal QRS-T angle, consider primary T wave abnormality  Abnormal ECG  When compared with ECG of 13-MAY-2021 13:46,  Sinus rhythm has replaced Atrial fibrillation  Vent. rate has increased BY  35 BPM  Left posterior fascicular block is now Present  QT has shortened  See ED provider note for full interpretation and clinical correlation  Confirmed by Alis Tejeda ([id number]) on 1/10/2024 5:08:28 PM  CT abdomen pelvis wo IV contrast  Narrative: STUDY:  CT Abdomen and Pelvis without IV Contrast; 1/10/2024 at 3:30 AM  INDICATION:  Back pain, ecchymosis; trauma.  COMPARISON:  US renal 1/8/2024, CT chest, abdomen and pelvis 1/5/2024.  ACCESSION NUMBER(S):  [id number]  ORDERING CLINICIAN:  DANA AMATO  TECHNIQUE:  CT of the abdomen and pelvis was performed.  Contiguous axial images  were obtained at 3 mm slice thickness through the abdomen and pelvis.   Coronal and sagittal reconstructions at 3 mm slice thickness were  performed. No intravenous contrast was administered.    Automated mA/kV exposure control was utilized and patient examination  was performed in strict accordance with principles of  ANA.  FINDINGS:  Please note that the evaluation of vessels, lymph nodes and organs is  limited without intravenous contrast.      LOWER CHEST:  No cardiomegaly.  No pericardial effusion.  Lung bases are clear.     ABDOMEN:  Partially visualized portions of the lower chest showing small  bilateral pleural effusions and partial atelectasis of the bilateral  lower lobes increased from prior. Heart size normal. No pericardial  effusion.  Unenhanced liver without acute process. No discernible intrahepatic  ductal dilatation. Pericholecystic flu atrophic changes of the  pancreas. No acute abnormality of the spleen is identified. No acute  abnormality of the adrenal glands or kidneys no retroperitoneal  adenopathy. Atherosclerosis of the abdominal aorta without aneurysm.  Asymmetric enlargement of the RIGHT psoas musculature and  retroperitoneal fluid increased from 1/5/24.   Small amount of free fluid within the pelvis. No pelvic adenopathy  identified. Rodriguez catheter within the urinary bladder.  LEFT hip prostheses. No findings of acute fracture of the pelvis.  Spondylotic changes and facet arthrosis of the lumbar spine.   Impression: Impression:  RIGHT psoas and retroperitoneal hematoma. Without the use of  intravenous contrast difficult to determine the exact size of the  hemorrhage.  Increased small bilateral pleural effusions and partial atelectasis of  the lower lobes.  Findings discussed with Dr. DANA AMATO with verbal  understanding at approximately 1/10/2024 4:28 AM.  Signed by Keith Watson MD  CT head wo IV contrast  Narrative: STUDY:  CT Head without IV Contrast; 1/10/2024 at 3:30 AM  INDICATION:  Confusion.  COMPARISON:  None available.  ACCESSION NUMBER(S):  [id number]  ORDERING CLINICIAN:  DANA AMATO  TECHNIQUE:  Noncontrast axial CT scan of head was performed.  Angled reformats in  brain and bone windows were generated.  The images were reviewed in  bone, brain, blood and soft  tissue windows.  Automated mA/kV exposure control was utilized and patient examination  was performed in strict accordance with principles of ALARA.  FINDINGS:  CSF Spaces:  The ventricles, sulci and basal cisterns are prominent  from atrophy.  There is no extraaxial fluid collection.  Mild patchy  areas of low-attenuation are seen in the subcortical and deep  periventricular white matter suggesting areas of chronic microvascular  ischemia.     Parenchyma:  The grey-white differentiation is intact.  There is no  mass effect or midline shift.  There is no intracranial hemorrhage.     Calvarium:  The calvarium is unremarkable.     Paranasal sinuses and mastoids:  Visualized paranasal sinuses and  mastoids are clear.  Impression: Diffuse cerebral atrophy.  Suspected mild chronic small vessel white  matter ischemia.  No definite acute intracranial abnormality.  Signed by Shawn Echols,       Physical Exam  Constitutional:       Appearance: He is ill-appearing.   HENT:      Head: Normocephalic.      Mouth/Throat:      Mouth: Mucous membranes are moist.   Eyes:      Extraocular Movements: Extraocular movements intact.      Pupils: Pupils are equal, round, and reactive to light.   Cardiovascular:      Rate and Rhythm: Regular rhythm.   Pulmonary:      Breath sounds: Normal breath sounds.   Abdominal:      Palpations: Abdomen is soft.         Relevant Results             Scheduled medications  [Held by provider] apixaban, 2.5 mg, oral, q12h DARIA  [Held by provider] aspirin, 81 mg, oral, Daily  ferrous sulfate (325 mg ferrous sulfate), 1 tablet, oral, Daily with breakfast  finasteride, 5 mg, oral, Daily  ipratropium-albuteroL, 3 mL, nebulization, 4x daily  levothyroxine, 50 mcg, oral, Daily  midodrine, 5 mg, oral, q8h  nystatin, 5 mL, Swish & Swallow, 4x daily  oxygen, , inhalation, q8h  pantoprazole, 40 mg, oral, Daily before breakfast  piperacillin-tazobactam, 3.375 g, intravenous, q6h  potassium chloride, 20 mEq,  intravenous, Once  potassium chloride CR, 20 mEq, oral, BID  QUEtiapine, 12.5 mg, oral, BID      Continuous medications  norepinephrine, 0.01-1 mcg/kg/min, Last Rate: Stopped (01/08/24 1115)      PRN medications  PRN medications: acetaminophen, benzocaine-menthol, calcium carbonate, dextromethorphan-guaifenesin, dextrose 10 % in water (D10W), dextrose, glucagon, guaiFENesin, LORazepam, ondansetron ODT **OR** ondansetron  Results for orders placed or performed during the hospital encounter of 01/06/24 (from the past 24 hour(s))   Basic Metabolic Panel   Result Value Ref Range    Glucose 132 (H) 65 - 99 mg/dL    Sodium 138 133 - 145 mmol/L    Potassium 3.1 (L) 3.4 - 5.1 mmol/L    Chloride 106 97 - 107 mmol/L    Bicarbonate 25 24 - 31 mmol/L    Urea Nitrogen 35 (H) 8 - 25 mg/dL    Creatinine 1.70 (H) 0.40 - 1.60 mg/dL    eGFR 41 (L) >60 mL/min/1.73m*2    Calcium 7.7 (L) 8.5 - 10.4 mg/dL    Anion Gap 7 <=19 mmol/L   CBC   Result Value Ref Range    WBC 13.6 (H) 4.4 - 11.3 x10*3/uL    nRBC 0.0 0.0 - 0.0 /100 WBCs    RBC 3.17 (L) 4.50 - 5.90 x10*6/uL    Hemoglobin 9.3 (L) 13.5 - 17.5 g/dL    Hematocrit 28.2 (L) 41.0 - 52.0 %    MCV 89 80 - 100 fL    MCH 29.3 26.0 - 34.0 pg    MCHC 33.0 32.0 - 36.0 g/dL    RDW 16.5 (H) 11.5 - 14.5 %    Platelets 277 150 - 450 x10*3/uL       Assessment/Plan          This patient has a urinary catheter   Reason for the urinary catheter remaining today?           Principal Problem:    Sepsis (CMS/HCC)  Active Problems:    Lactic acid acidosis    Acute renal failure (ARF) (CMS/HCC)    Leukocytosis    Bilateral pneumonia    Chronic respiratory failure (CMS/HCC)    Hypokalemia    Dehydration    Transaminitis    Acute encephalopathy    Hypotension    Aspiration/pneumonia- atbs as ordered  Dysphagia- MBS per speech therapy done, diet as ordered   Sepsis/hypotension- improving, continue management in ICU, off pressors, leucocytosis improving    Encephalopathy/confusion-supportive care, psych service  on case    Hypokalemia- replace, follow up with BMP AM    A fib- rate controlled, was on apixaban-holding now, appreciate cardiology recommendations   New R retroperitoneal hematoma was discovered over night- apixaban/ASA on hold, follow up with H/H  Erik-BETTER- IV hydration, follow up with BMP, nephrology on case, renal US  done    Medically stable, will follow along with consultants               Evan Bates MD

## 2024-01-11 NOTE — PROGRESS NOTES
Navarro Rabago is a 78 y.o. male on day 5 of admission presenting with Sepsis (CMS/HCC).    Subjective   Interval History:   Resting comfortable  Discussed with nursing  Increased confusion overnight  Afebrile, no chills  On baseline oxygen      Objective   Range of Vitals (last 24 hours)  Heart Rate:  [71-90]   Temp:  [35.5 °C (95.9 °F)-36.2 °C (97.2 °F)]   Resp:  [18-22]   BP: ()/(55-82)   Weight:  [114 kg (251 lb 5.2 oz)]   SpO2:  [93 %-97 %]   Daily Weight  01/11/24 : 114 kg (251 lb 5.2 oz)    Body mass index is 29.8 kg/m².    Physical Exam  Constitutional:       Comments:  Intermittent confusion   HENT:      Head: Normocephalic and atraumatic.      Nose: Nose normal.      Mouth/Throat:      Mouth: Mucous membranes are moist.      Pharynx: Oropharynx is clear.   Eyes:      Extraocular Movements: Extraocular movements intact.      Conjunctiva/sclera: Conjunctivae normal.   Cardiovascular:      Rate and Rhythm: Normal rate and regular rhythm.   Pulmonary:      Effort: Pulmonary effort is normal.      Breath sounds: Rhonchi present.   Abdominal:      General: Bowel sounds are normal.      Palpations: Abdomen is soft.   Musculoskeletal:         General: Normal range of motion.      Cervical back: Normal range of motion and neck supple.   Skin:     General: Skin is warm and dry.   Neurological:      General: No focal deficit present.      Mental Status: He is alert.   Psychiatric:         Mood and Affect: Mood normal.         Behavior: Behavior normal.       C  Antibiotics  sodium chloride 0.9% infusion  heparin (porcine) injection 5,000 Units  sodium chloride 0.9 % with KCl 20 mEq/L infusion  calcium carbonate (Tums) chewable tablet 500 mg  ondansetron ODT (Zofran-ODT) disintegrating tablet 4 mg  ondansetron (Zofran) injection 4 mg  benzocaine-menthol (Cepastat Sore Throat) 15-3.6 mg lozenge 1 lozenge  guaiFENesin (Mucinex) 12 hr tablet 600 mg  dextromethorphan-guaifenesin (Robitussin DM)  mg/5 mL oral  liquid 5 mL  dextrose 50 % injection 25 g  glucagon (Glucagen) injection 1 mg  dextrose 10 % in water (D10W) infusion  ipratropium-albuteroL (Duo-Neb) 0.5-2.5 mg/3 mL nebulizer solution 3 mL  oxygen (O2) therapy  piperacillin-tazobactam-dextrose (Zosyn) IV 3.375 g  pantoprazole (ProtoNix) injection 40 mg  ipratropium-albuteroL (Duo-Neb) 0.5-2.5 mg/3 mL nebulizer solution 3 mL  allopurinol (Zyloprim) tablet  apixaban (Eliquis) tablet  aspirin EC tablet  atorvastatin (Lipitor) tablet  bumetanide (Bumex) tablet  colchicine tablet  empagliflozin (Jardiance) tablet  fexofenadine (Allegra) tablet  finasteride (Propecia, Proscar) tablet  guaiFENesin (Mucinex) 12 hr tablet  levothyroxine (Synthroid, Levoxyl) tablet  nitroglycerin (Nitrostat) SL tablet  pantoprazole (ProtoNix) EC tablet  potassium chloride SA (Klor-Con M20) ER tablet  tamsulosin (Flomax) 24 hr capsule  vancomycin 1.5 mg in 300 mL (Xellia) IVPB 1.5 g      levothyroxine (Synthroid, Levoxyl) tablet 50 mcg  finasteride (Proscar) tablet 5 mg  ferrous sulfate (325 mg ferrous sulfate) tablet 1 tablet  aspirin EC tablet 81 mg  apixaban (Eliquis) tablet 5 mg  metoprolol tartrate (Lopressor) tablet 25 mg  potassium chloride 20 mEq in 100 mL IV premix  midodrine (Proamatine) tablet 5 mg  sodium chloride 0.9 % bolus 500 mL  norepinephrine (Levophed) 8 mg in dextrose 5% 250 mL (0.032 mg/mL) infusion (premix)  sodium chloride 0.9 % bolus 250 mL  sodium chloride 0.9 % bolus 250 mL  azithromycin (Zithromax) in dextrose 5 % in water (D5W) 250 mL  mg  vancomycin 1.5 mg in 300 mL (Xellia) IVPB 1.5 g  apixaban (Eliquis) tablet 2.5 mg  vancomycin (Xellia) 1 g in 200 mL (Xellia) IVPB 1,000 mg      Relevant Results  Labs  Results from last 72 hours   Lab Units 01/11/24  0554 01/10/24  0443 01/09/24  0437   WBC AUTO x10*3/uL 13.6* 17.5* 15.3*   HEMOGLOBIN g/dL 9.3* 10.2* 9.6*   HEMATOCRIT % 28.2* 29.0* 27.6*   PLATELETS AUTO x10*3/uL 277 276 207       Results from last 72  "hours   Lab Units 01/11/24  0554 01/10/24  0443 01/09/24  0437   SODIUM mmol/L 138 136 140   POTASSIUM mmol/L 3.1* 3.1* 3.0*   CHLORIDE mmol/L 106 101 106   CO2 mmol/L 25 23* 20*   BUN mg/dL 35* 43* 49*   CREATININE mg/dL 1.70* 2.10* 2.60*   GLUCOSE mg/dL 132* 140* 140*   CALCIUM mg/dL 7.7* 7.7* 7.0*   ANION GAP mmol/L 7 12 14   EGFR mL/min/1.73m*2 41* 32* 25*   PHOSPHORUS mg/dL  --  1.9*  --        Results from last 72 hours   Lab Units 01/10/24  0443   ALBUMIN g/dL 2.1*       Estimated Creatinine Clearance: 51.8 mL/min (A) (by C-G formula based on SCr of 1.7 mg/dL (H)).  No results found for: \"CRP\"  Microbiology  Reviewed-cultures pending  Imaging  XR chest 1 view    Result Date: 1/7/2024  Interpreted By:  Cathy Hernandez, STUDY: XR CHEST 1 VIEW 1/7/2024 7:36 am   INDICATION: Follow-up pneumonia   COMPARISON: 05/29/2014 as well as CT examination of the chest done on 01/05/2024.   ACCESSION NUMBER(S): [id number]   ORDERING CLINICIAN: EMILY MAKI   TECHNIQUE: AP erect view of the chest   FINDINGS: The heart is at the upper limits of normal in size. There is infiltrate and airspace consolidation observed centrally within the right lung with upper lobar predominance. There is also some patchy infiltrate at the left lung base. No obvious pleural abnormality is seen.       Infiltrate and airspace consolidation identified within the right lung most pronounced centrally and within the right upper lobe with mild left basilar infiltrate.   Signed by: Cathy Hernandez 1/7/2024 8:11 AM Dictation workstation:   [id number]    CT chest abdomen pelvis w IV contrast    Result Date: 1/5/2024  Interpreted By:  Yordy Urena, STUDY: CT CHEST ABDOMEN PELVIS W IV CONTRAST;  1/5/2024 6:33 pm   INDICATION: Signs/Symptoms:abd pain, sob.   COMPARISON: None.   ACCESSION NUMBER(S): [id number]   ORDERING CLINICIAN: NELLA ANDRYC   TECHNIQUE: Contiguous axial images of the chest, abdomen and pelvis were obtained after the intravenous " administration of  contrast. Coronal and sagittal reformatted images were obtained from the axial images.   FINDINGS: CT CHEST:   No axillary lymphadenopathy. 1.6 cm right paratracheal lymph node and 1.5 cm subcarinal lymph node. There is limited evaluation for hilar lymphadenopathy secondary stripping motion.   The heart is normal in size. Coronary artery vascular calcifications. No significant pericardial effusion.   There is pulmonary emphysematous change. There is consolidative opacity in the right upper lobe and right lower lobe compatible with pneumonia and also minimal opacity in the left lower lobe. Trace pleural effusions. No pneumothorax.   Multilevel degenerative change of the thoracic spine.     CT ABDOMEN PELVIS:   Evaluation of the abdomen and pelvis is limited secondary to patient motion. No evidence of liver mass. The gallbladder is present, not well evaluated secondary to motion. No dilatation common bile duct.   Atrophy of the pancreas.   No splenomegaly.   Question mild nodularity of the left adrenal gland. The right adrenal gland appears unremarkable.   Symmetric enhancement of the kidneys. Right renal cysts with the largest measuring 3.1 cm and several bowel subcentimeter hypodensity too small to characterize. No hydronephrosis.   Atherosclerotic calcification of the aorta and abdominal and pelvic vasculature.   No evidence of bowel obstruction. Evaluation the bowel is otherwise limited secondary patient motion.   Urinary bladder is underdistended and not well evaluated. 18 mm right posterior bladder diverticulum.   Postsurgical change of left hip arthroplasty resulting in beam hardening artifact and limiting evaluation the pelvis.   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       Consolidative opacity in the right upper lobe and lower lobe compatible with pneumonia and also mild left basilar opacity and trace pleural effusions.   No definite evidence of acute abnormality of the  abdominal viscera within the limitations of the motion degraded examination.   MACRO: None   Signed by: Yordy Urena 1/5/2024 6:49 PM Dictation workstation:   [id number]     Assessment/Plan   Sepsis, with shock   Acute renal failure-improving  Pneumonia-MRSA PCR negative   Diarrhea-c.diff negative  Transaminitis-resolving  Leukocytosis, improving  Oral candida   Chronic respiratory failure-at baseline 5LNC  Retroperitoneal hematoma     Continue IV zosyn  Discontinue IV azithromycin -completed 5 days  Continue Nystatin suspension  Monitor WBC and temperature  Monitor renal function  Oxygen as needed  Supportive care      Tess Gonzalez, APRN-CNP

## 2024-01-11 NOTE — PROGRESS NOTES
"Navarro Rabago is a 78 y.o. male on day 5 of admission presenting with Sepsis (CMS/[facility]).    Subjective    patient was seen yesterday for acute renal failure secondary to septic shock secondary to pneumonia clinically he continues to feel better he is awake responsive no distress at all no overnight events noted.       Objective     Physical Exam  Neck:      Vascular: No carotid bruit.   Cardiovascular:      Rate and Rhythm: Normal rate and regular rhythm.      Heart sounds: No murmur heard.     No friction rub. No gallop.   Pulmonary:      Breath sounds: Wheezing and rhonchi present. No rales.   Chest:      Chest wall: No tenderness.   Abdominal:      General: There is no distension.      Tenderness: There is no abdominal tenderness. There is no guarding or rebound.   Musculoskeletal:         General: No swelling or tenderness.      Cervical back: Neck supple.      Right lower leg: No edema.      Left lower leg: No edema.   Lymphadenopathy:      Cervical: No cervical adenopathy.         Last Recorded Vitals  Blood pressure 105/60, pulse 72, temperature 35.9 °C (96.6 °F), temperature source Temporal, resp. rate 19, height 1.956 m (6' 5\"), weight 114 kg (251 lb 5.2 oz), SpO2 96 %.    Intake/Output last 3 Shifts:  I/O last 3 completed shifts:  In: 2205 (19.3 mL/kg) [I.V.:2205 (19.3 mL/kg)]  Out: 2650 (23.2 mL/kg) [Urine:2650 (0.6 mL/kg/hr)]  Weight: 114 kg     Current Facility-Administered Medications:     acetaminophen (Tylenol) tablet 650 mg, 650 mg, oral, q6h PRN, Ricardo Kaplan MD, 650 mg at 01/09/24 2156    [Held by provider] apixaban (Eliquis) tablet 2.5 mg, 2.5 mg, oral, q12h DARIA, Ricardo Kaplan MD, 2.5 mg at 01/09/24 2157    [Held by provider] aspirin EC tablet 81 mg, 81 mg, oral, Daily, Ricardo Kaplan MD, 81 mg at 01/09/24 0905    benzocaine-menthol (Cepastat Sore Throat) 15-3.6 mg lozenge 1 lozenge, 1 lozenge, Mouth/Throat, q2h PRN, Ricardo Kaplan MD    calcium carbonate " (Tums) chewable tablet 500 mg, 500 mg, oral, 4x daily PRN, Ricardo Kaplan MD    dextromethorphan-guaifenesin (Robitussin DM)  mg/5 mL oral liquid 5 mL, 5 mL, oral, q4h PRN, Ricardo Kaplan MD, 5 mL at 01/11/24 0328    dextrose 10 % in water (D10W) infusion, 0.3 g/kg/hr, intravenous, Once PRN, Ricardo Kpalan MD    dextrose 5%-0.45 % sodium chloride infusion, 75 mL/hr, intravenous, Continuous, Ricardo Kaplan MD, Last Rate: 75 mL/hr at 01/11/24 0646, 75 mL/hr at 01/11/24 0646    dextrose 50 % injection 25 g, 25 g, intravenous, q15 min PRN, Ricardo Kaplan MD    ferrous sulfate (325 mg ferrous sulfate) tablet 1 tablet, 1 tablet, oral, Daily with breakfast, Ricardo Kaplan MD, 1 tablet at 01/11/24 0805    finasteride (Proscar) tablet 5 mg, 5 mg, oral, Daily, Ricardo Kaplan MD, 5 mg at 01/11/24 0805    glucagon (Glucagen) injection 1 mg, 1 mg, intramuscular, q15 min PRN, Ricardo Kaplan MD    guaiFENesin (Mucinex) 12 hr tablet 600 mg, 600 mg, oral, q12h PRN, Ricardo Kaplan MD    ipratropium-albuteroL (Duo-Neb) 0.5-2.5 mg/3 mL nebulizer solution 3 mL, 3 mL, nebulization, 4x daily, Ricardo Kaplan MD, 3 mL at 01/11/24 1025    levothyroxine (Synthroid, Levoxyl) tablet 50 mcg, 50 mcg, oral, Daily, Ricardo Kaplan MD, 50 mcg at 01/11/24 0644    LORazepam (Ativan) injection 1 mg, 1 mg, intravenous, q4h PRN, Evan Bates MD, 1 mg at 01/10/24 2156    midodrine (Proamatine) tablet 5 mg, 5 mg, oral, q8h, Ricardo Kaplan MD, 5 mg at 01/11/24 0255    norepinephrine (Levophed) 8 mg in dextrose 5% 250 mL (0.032 mg/mL) infusion (premix), 0.01-1 mcg/kg/min, intravenous, Continuous, Ricardo Kaplan MD, Stopped at 01/08/24 1115    nystatin (Mycostatin) 100,000 unit/mL suspension 500,000 Units, 5 mL, Swish & Swallow, 4x daily, Ricardo Kaplan MD, 500,000 Units at 01/11/24 0644    ondansetron ODT (Zofran-ODT) disintegrating tablet 4  mg, 4 mg, oral, q8h PRN **OR** ondansetron (Zofran) injection 4 mg, 4 mg, intravenous, q8h PRN, Ricardo Kaplan MD, 4 mg at 01/07/24 0321    oxygen (O2) therapy, , inhalation, q8h, Ricardo Kaplan MD, Start at 01/11/24 0700    pantoprazole (ProtoNix) EC tablet 40 mg, 40 mg, oral, Daily before breakfast, Ricardo Kaplan MD, 40 mg at 01/11/24 0700    piperacillin-tazobactam-dextrose (Zosyn) IV 3.375 g, 3.375 g, intravenous, q6h, Ricardo Kaplan MD, Stopped at 01/11/24 0714    potassium chloride CR (Klor-Con M20) ER tablet 20 mEq, 20 mEq, oral, BID, Sanford Neves DO    QUEtiapine (SEROquel) tablet 12.5 mg, 12.5 mg, oral, BID, Evan Bates MD, 12.5 mg at 01/11/24 0805   Relevant Results    Results for orders placed or performed during the hospital encounter of 01/06/24 (from the past 96 hour(s))   Respiratory Culture/Smear    Specimen: SPUTUM; Fluid   Result Value Ref Range    Respiratory Culture/Smear (A)      Culture not performed. See Gram stain findings. Recollect if clinically indicated.    Gram Stain (A)      Gram stain indicates specimen contains significant salivary contamination.   CBC and Auto Differential   Result Value Ref Range    WBC 17.8 (H) 4.4 - 11.3 x10*3/uL    nRBC 0.0 0.0 - 0.0 /100 WBCs    RBC 3.27 (L) 4.50 - 5.90 x10*6/uL    Hemoglobin 9.9 (L) 13.5 - 17.5 g/dL    Hematocrit 28.8 (L) 41.0 - 52.0 %    MCV 88 80 - 100 fL    MCH 30.3 26.0 - 34.0 pg    MCHC 34.4 32.0 - 36.0 g/dL    RDW 16.2 (H) 11.5 - 14.5 %    Platelets 198 150 - 450 x10*3/uL    Neutrophils % 85.8 40.0 - 80.0 %    Immature Granulocytes %, Automated 1.8 (H) 0.0 - 0.9 %    Lymphocytes % 5.0 13.0 - 44.0 %    Monocytes % 7.1 2.0 - 10.0 %    Eosinophils % 0.2 0.0 - 6.0 %    Basophils % 0.1 0.0 - 2.0 %    Neutrophils Absolute 15.29 (H) 1.60 - 5.50 x10*3/uL    Immature Granulocytes Absolute, Automated 0.32 0.00 - 0.50 x10*3/uL    Lymphocytes Absolute 0.90 0.80 - 3.00 x10*3/uL    Monocytes Absolute 1.27 (H) 0.05 -  0.80 x10*3/uL    Eosinophils Absolute 0.03 0.00 - 0.40 x10*3/uL    Basophils Absolute 0.02 0.00 - 0.10 x10*3/uL   Comprehensive metabolic panel   Result Value Ref Range    Glucose 115 (H) 65 - 99 mg/dL    Sodium 128 (L) 133 - 145 mmol/L    Potassium 3.5 3.4 - 5.1 mmol/L    Chloride 98 97 - 107 mmol/L    Bicarbonate 17 (L) 24 - 31 mmol/L    Urea Nitrogen 51 (H) 8 - 25 mg/dL    Creatinine 2.60 (H) 0.40 - 1.60 mg/dL    eGFR 25 (L) >60 mL/min/1.73m*2    Calcium 7.7 (L) 8.5 - 10.4 mg/dL    Albumin 2.1 (L) 3.5 - 5.0 g/dL    Alkaline Phosphatase 99 35 - 125 U/L    Total Protein 5.5 (L) 5.9 - 7.9 g/dL     (H) 5 - 40 U/L    Bilirubin, Total 1.3 (H) 0.1 - 1.2 mg/dL    ALT 59 (H) 5 - 40 U/L    Anion Gap 13 <=19 mmol/L   Vancomycin   Result Value Ref Range    Vancomycin 12.0 10.0 - 20.0 ug/mL   Transthoracic Echo (TTE) Complete   Result Value Ref Range    AV pk gloria 1.64     LVOT diam 2.00     AV mn grad 6.0     LV biplane EF 54     RVSP 43.0     LVIDd 5.65     AV pk grad 10.8     Aortic Valve Area by Continuity of VTI 1.95     Aortic Valve Area by Continuity of Peak Velocity 1.89     LV A4C EF 54.1    Basic Metabolic Panel   Result Value Ref Range    Glucose 140 (H) 65 - 99 mg/dL    Sodium 140 133 - 145 mmol/L    Potassium 3.0 (L) 3.4 - 5.1 mmol/L    Chloride 106 97 - 107 mmol/L    Bicarbonate 20 (L) 24 - 31 mmol/L    Urea Nitrogen 49 (H) 8 - 25 mg/dL    Creatinine 2.60 (H) 0.40 - 1.60 mg/dL    eGFR 25 (L) >60 mL/min/1.73m*2    Calcium 7.0 (L) 8.5 - 10.4 mg/dL    Anion Gap 14 <=19 mmol/L   NT Pro-BNP   Result Value Ref Range    PROBNP 6,201 (H) 0 - 852 pg/mL   CBC   Result Value Ref Range    WBC 15.3 (H) 4.4 - 11.3 x10*3/uL    nRBC 0.0 0.0 - 0.0 /100 WBCs    RBC 3.18 (L) 4.50 - 5.90 x10*6/uL    Hemoglobin 9.6 (L) 13.5 - 17.5 g/dL    Hematocrit 27.6 (L) 41.0 - 52.0 %    MCV 87 80 - 100 fL    MCH 30.2 26.0 - 34.0 pg    MCHC 34.8 32.0 - 36.0 g/dL    RDW 16.2 (H) 11.5 - 14.5 %    Platelets 207 150 - 450 x10*3/uL   Iron and  TIBC   Result Value Ref Range    Iron 24 (L) 45 - 160 ug/dL    UIBC 90 (L) 110 - 370 ug/dL    TIBC 114 (L) 228 - 428 ug/dL    % Saturation 21 12 - 50 %   Ferritin   Result Value Ref Range    Ferritin 2,435 (H) 30 - 400 ng/mL   POCT GLUCOSE   Result Value Ref Range    POCT Glucose 147 (H) 74 - 99 mg/dL   Vancomycin   Result Value Ref Range    Vancomycin 17.0 10.0 - 20.0 ug/mL   POCT GLUCOSE   Result Value Ref Range    POCT Glucose 141 (H) 74 - 99 mg/dL   POCT GLUCOSE   Result Value Ref Range    POCT Glucose 108 (H) 74 - 99 mg/dL   CBC   Result Value Ref Range    WBC 17.5 (H) 4.4 - 11.3 x10*3/uL    nRBC 0.0 0.0 - 0.0 /100 WBCs    RBC 3.39 (L) 4.50 - 5.90 x10*6/uL    Hemoglobin 10.2 (L) 13.5 - 17.5 g/dL    Hematocrit 29.0 (L) 41.0 - 52.0 %    MCV 86 80 - 100 fL    MCH 30.1 26.0 - 34.0 pg    MCHC 35.2 32.0 - 36.0 g/dL    RDW 16.0 (H) 11.5 - 14.5 %    Platelets 276 150 - 450 x10*3/uL   Renal Function Panel   Result Value Ref Range    Glucose 140 (H) 65 - 99 mg/dL    Sodium 136 133 - 145 mmol/L    Potassium 3.1 (L) 3.4 - 5.1 mmol/L    Chloride 101 97 - 107 mmol/L    Bicarbonate 23 (L) 24 - 31 mmol/L    Urea Nitrogen 43 (H) 8 - 25 mg/dL    Creatinine 2.10 (H) 0.40 - 1.60 mg/dL    eGFR 32 (L) >60 mL/min/1.73m*2    Calcium 7.7 (L) 8.5 - 10.4 mg/dL    Phosphorus 1.9 (L) 2.5 - 4.5 mg/dL    Albumin 2.1 (L) 3.5 - 5.0 g/dL    Anion Gap 12 <=19 mmol/L   Vancomycin   Result Value Ref Range    Vancomycin 22.0 (H) 10.0 - 20.0 ug/mL   Type And Screen   Result Value Ref Range    ABO TYPE O     Rh TYPE POS     ANTIBODY SCREEN NEG    VERIFY ABO/Rh Group Test   Result Value Ref Range    ABO TYPE O     Rh TYPE POS    Basic Metabolic Panel   Result Value Ref Range    Glucose 132 (H) 65 - 99 mg/dL    Sodium 138 133 - 145 mmol/L    Potassium 3.1 (L) 3.4 - 5.1 mmol/L    Chloride 106 97 - 107 mmol/L    Bicarbonate 25 24 - 31 mmol/L    Urea Nitrogen 35 (H) 8 - 25 mg/dL    Creatinine 1.70 (H) 0.40 - 1.60 mg/dL    eGFR 41 (L) >60 mL/min/1.73m*2     Calcium 7.7 (L) 8.5 - 10.4 mg/dL    Anion Gap 7 <=19 mmol/L   CBC   Result Value Ref Range    WBC 13.6 (H) 4.4 - 11.3 x10*3/uL    nRBC 0.0 0.0 - 0.0 /100 WBCs    RBC 3.17 (L) 4.50 - 5.90 x10*6/uL    Hemoglobin 9.3 (L) 13.5 - 17.5 g/dL    Hematocrit 28.2 (L) 41.0 - 52.0 %    MCV 89 80 - 100 fL    MCH 29.3 26.0 - 34.0 pg    MCHC 33.0 32.0 - 36.0 g/dL    RDW 16.5 (H) 11.5 - 14.5 %    Platelets 277 150 - 450 x10*3/uL       acute kidney injury function continue to improve his creatinine is down and urine output is much improved I will discontinue IV fluids continue to monitor renal function  Pneumonia continue with IV antibiotic therapy  Acute respiratory failure continue with oxygen therapy  Metabolic acidosis secondary to acute kidney injury discontinue sodium bicarb drip  Chronic atrial fibrillation  Hypokalemia replace potassium             Gal James MD

## 2024-01-11 NOTE — PROGRESS NOTES
Patient is from home with spouse.  Therapy recommends MODERATE intensity at discharge. Requested additioanal updates be sent to facility by [facility].  Patient will need a precert prior to discharge once facility accepts patient.  Awating updates.  RN TCC following.     Eloina Crespo RN

## 2024-01-11 NOTE — PROGRESS NOTES
"Nutrition Follow up Note    Nutrition Assessment      Attempted to reach patient by phone, no answer. Potassium noted to be low, replacement therapy initiated. PO intake appears less than 50%, spouse stated patient does not each much in hospital at initial assessment, will continue to monitor intake.    Nutrition History:  Food and Nutrient History: Spoke with Pt's SO wo states that there haven't been appetite changes, but she says Pt doesn't usually eat much while in the hospital.  Energy Intake: Fair 50-75 %  Food Allergies/Intolerances:  None  GI Symptoms: None  Oral Problems: None    Anthropometrics:  Ht: 195.6 cm (6' 5\"), Wt: 114 kg (251 lb 5.2 oz), BMI: 29.8  IBW/kg (Dietitian Calculated): 94.54 kg     Weight Change:  Weight History / % Weight Change: Pt's SO denies any wt changes aside from fluid related.     Daily Weight  01/11/24 : 114 kg (251 lb 5.2 oz)  01/05/24 : 81.6 kg (180 lb)     Nutrition Focused Physical Exam Findings:      Nutrition Significant Labs:  Lab Results   Component Value Date    WBC 13.6 (H) 01/11/2024    HGB 9.3 (L) 01/11/2024    HCT 28.2 (L) 01/11/2024     01/11/2024    ALT 59 (H) 01/08/2024     (H) 01/08/2024     01/11/2024    K 3.1 (L) 01/11/2024     01/11/2024    CREATININE 1.70 (H) 01/11/2024    BUN 35 (H) 01/11/2024    CO2 25 01/11/2024    TSH 3.74 05/19/2021    PSA 1.36 05/19/2021    INR 1.2 (H) 05/13/2021    HGBA1C 5.5 06/19/2023       Current Facility-Administered Medications:     acetaminophen (Tylenol) tablet 650 mg, 650 mg, oral, q6h PRN, Ricardo Kaplan MD, 650 mg at 01/09/24 0835    [Held by provider] apixaban (Eliquis) tablet 2.5 mg, 2.5 mg, oral, q12h DARIARicardo MD, 2.5 mg at 01/09/24 2157    [Held by provider] aspirin EC tablet 81 mg, 81 mg, oral, Daily, Ricardo Kaplan MD, 81 mg at 01/09/24 0905    benzocaine-menthol (Cepastat Sore Throat) 15-3.6 mg lozenge 1 lozenge, 1 lozenge, Mouth/Throat, q2h PRN, Ricardo MCDERMOTT " MD Eusebio    calcium carbonate (Tums) chewable tablet 500 mg, 500 mg, oral, 4x daily PRN, Ricardo Kaplan MD    dextromethorphan-guaifenesin (Robitussin DM)  mg/5 mL oral liquid 5 mL, 5 mL, oral, q4h PRN, Ricardo Kaplan MD, 5 mL at 01/11/24 0328    dextrose 10 % in water (D10W) infusion, 0.3 g/kg/hr, intravenous, Once PRN, Ricardo Kaplan MD    dextrose 50 % injection 25 g, 25 g, intravenous, q15 min PRN, Ricardo Kaplan MD    ferrous sulfate (325 mg ferrous sulfate) tablet 1 tablet, 1 tablet, oral, Daily with breakfast, Ricardo Kaplan MD, 1 tablet at 01/11/24 0805    finasteride (Proscar) tablet 5 mg, 5 mg, oral, Daily, Ricardo Kaplan MD, 5 mg at 01/11/24 0805    glucagon (Glucagen) injection 1 mg, 1 mg, intramuscular, q15 min PRN, Ricardo Kaplan MD    guaiFENesin (Mucinex) 12 hr tablet 600 mg, 600 mg, oral, q12h PRN, Ricardo Kaplan MD    ipratropium-albuteroL (Duo-Neb) 0.5-2.5 mg/3 mL nebulizer solution 3 mL, 3 mL, nebulization, 4x daily, Ricardo Kaplan MD, 3 mL at 01/11/24 1025    levothyroxine (Synthroid, Levoxyl) tablet 50 mcg, 50 mcg, oral, Daily, Ricardo Kaplan MD, 50 mcg at 01/11/24 0644    LORazepam (Ativan) injection 1 mg, 1 mg, intravenous, q4h PRN, Evan Bates MD, 1 mg at 01/10/24 2156    midodrine (Proamatine) tablet 5 mg, 5 mg, oral, q8h, Ricardo Kaplan MD, 5 mg at 01/11/24 0255    norepinephrine (Levophed) 8 mg in dextrose 5% 250 mL (0.032 mg/mL) infusion (premix), 0.01-1 mcg/kg/min, intravenous, Continuous, Ricardo Kaplan MD, Stopped at 01/08/24 1115    nystatin (Mycostatin) 100,000 unit/mL suspension 500,000 Units, 5 mL, Swish & Swallow, 4x daily, Ricardo Kaplan MD, 500,000 Units at 01/11/24 0644    ondansetron ODT (Zofran-ODT) disintegrating tablet 4 mg, 4 mg, oral, q8h PRN **OR** ondansetron (Zofran) injection 4 mg, 4 mg, intravenous, q8h PRN, Ricardo Kaplan MD, 4 mg at  01/07/24 0321    oxygen (O2) therapy, , inhalation, q8h, Ricardo Kaplan MD, Start at 01/11/24 0700    pantoprazole (ProtoNix) EC tablet 40 mg, 40 mg, oral, Daily before breakfast, Ricardo Kaplan MD, 40 mg at 01/11/24 0700    piperacillin-tazobactam-dextrose (Zosyn) IV 3.375 g, 3.375 g, intravenous, q6h, Ricardo Kaplan MD, Stopped at 01/11/24 0714    potassium chloride 20 mEq in 100 mL IV premix, 20 mEq, intravenous, Once, Evan Bates MD    potassium chloride CR (Klor-Con M20) ER tablet 20 mEq, 20 mEq, oral, BID, Sanford Neves DO    QUEtiapine (SEROquel) tablet 12.5 mg, 12.5 mg, oral, BID, Evan Bates MD, 12.5 mg at 01/11/24 0805    Dietary Orders (From admission, onward)       Start     Ordered    01/08/24 1333  Adult diet Carb Controlled; 60 gram carb/meal, 30 gram Carb evening snack; Minced/moist food 5; Thin 0; No straw  Diet effective now        Comments: Upright seating, no straw. Meds crushed in puree. Please downgrade to nectar thick and notify SLP if s/s of aspiration present with PO intake.   Question Answer Comment   Diet type Carb Controlled    Carb diet selection: 60 gram carb/meal, 30 gram Carb evening snack    Texture Minced/moist food 5    Fluid consistency Thin 0    Select tray type: No straw        01/08/24 1332                   Estimated Needs:   Estimated Energy Needs  Total Energy Estimated Needs (kCal):  (2661-5087 kcals)  Total Estimated Energy Need per Day (kCal/kg):  (20-30 kcals/kg)  Method for Estimating Needs: actual wt    Estimated Protein Needs  Total Protein Estimated Needs (g):  (114-146)  Total Protein Estimated Needs (g/kg):  (1.1-1.4)  Method for Estimating Needs: actual wt    Estimated Fluid Needs  Total Fluid Estimated Needs (mL):  (2524-7990 mL)  Method for Estimating Needs: <2000 mL/d      Nutrition Diagnosis   Nutrition Diagnosis:     Nutrition Diagnosis  Patient has Nutrition Diagnosis: Yes  Diagnosis Status (1): New  Nutrition Diagnosis 1:  Increased nutrient needs  Related to (1): Increased demand for nutrient  As Evidenced by (1): CHF     Nutrition Interventions/Recommendations   Nutrition Interventions and Recommendations:    Nutrition Prescription:  Individualized Nutrition Prescription Provided for : 5665-4132 kcals, 114-146 g protein provided by diet    Nutrition Interventions:   Food and/or Nutrient Delivery Interventions  Interventions: Meals and snacks  Meals and Snacks: Carbohydrate-modified diet  Goal: Provide as ordered    Education Documentation  No documentation found.         Nutrition Monitoring and Evaluation   Monitoring/Evaluation:   Food/Nutrient Related History Monitoring  Monitoring and Evaluation Plan: Energy intake  Energy Intake: Estimated energy intake  Criteria: Patient will consume >/= 75% of estimated needs       Time Spent/Follow-up:   Follow Up  Time Spent (min): 20 minutes  Last Date of Nutrition Visit: 01/11/24  Nutrition Follow-Up Needed?: 5-7 days  Follow up Comment: 1/16/24

## 2024-01-11 NOTE — PROGRESS NOTES
"Navarro Rabago is a 78 y.o. male on day 5 of admission presenting with Sepsis (CMS/HCC).    Subjective   Patient resting comfortably early this morning in no distress.       Objective     Physical Exam  Neck: Normal jugular venous pressure  Heart: Irregular rate and rhythm  Lungs: Clear anteriorly  Abdomen: Bowel sounds positive  Last Recorded Vitals  Blood pressure 135/82, pulse 72, temperature 36.2 °C (97.2 °F), temperature source Temporal, resp. rate 18, height 1.956 m (6' 5\"), weight 114 kg (251 lb 5.2 oz), SpO2 94 %.  Intake/Output last 3 Shifts:  I/O last 3 completed shifts:  In: 2205 (19.3 mL/kg) [I.V.:2205 (19.3 mL/kg)]  Out: 2650 (23.2 mL/kg) [Urine:2650 (0.6 mL/kg/hr)]  Weight: 114 kg     Relevant Results                This patient has a urinary catheter   Reason for the urinary catheter remaining today?                Assessment/Plan   Principal Problem:    Sepsis (CMS/HCC)  Active Problems:    Lactic acid acidosis    Acute renal failure (ARF) (CMS/HCC)    Leukocytosis    Bilateral pneumonia    Chronic respiratory failure (CMS/HCC)    Hypokalemia    Dehydration    Transaminitis    Acute encephalopathy    Hypotension  Iliopsoas hematoma  ASHD  Chronic systolic heart failure  Longstanding persistent atrial fibrillation      1/6: The patient is a 78-year-old white male with a history of longstanding coronary artery disease with multiple PCI procedures.  He does have ischemic congestive cardiomyopathy with an echocardiogram performed on 9/26/2023 estimating his LV ejection fraction of 35-40% with 2+ mitral valve regurgitation moderate left atrial enlargement mild RV chamber dilatation and a normal estimated PA systolic pressure.  He also has a history of longstanding persistent now permanent atrial fibrillation for which she underwent multiple electrical DC cardioversions and a radiofrequency ablation procedure.  He is currently on a rate control strategy.  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  He is admitted with increasing weakness shortness of breath cough with evidence of right-sided pneumonia by the initial chest CT for which she has been started on empiric IV antibiotics.  He does remain in atrial fibrillation borderline increase in ventricular rate and will begin low-dose metoprolol 25 mg twice daily.  He has been continued on Eliquis anticoagulation 5 mg twice daily along with a low-dose aspirin for CAD as well as statin therapy.  Will continue the modest IV fluid hydration given the transient hypotension in the emergency room with the potassium supplement.  Will check a repeat echocardiogram.  Follow-up chest x-ray tomorrow.      1/7: Patient's seen and events reviewed in detail.  He is lying supine somewhat fatigued no overt respiratory distress on nasal cannula.  He did develop hypotension with systolic blood pressure values in the lower 80 mmHg range.  He was given 500 cc of IV normal saline fluid bolus remains on the maintenance infusion at 100 cc/h.  He was placed on low-dose Levophed for pressure support.  Comprehensive metabolic panel noted of both for acute on chronic renal insuf transaminases remain elevated  ALT 68 bilirubin 1.4.  Transaminitis presumably related to sepsis hypotension.  Gastroenterology is following.  Patient remains in the atrial fibrillation with controlled ventricular rate and low-dose metoprolol.  Will reduce dose of Eliquis to 2.5 mg twice daily.  Acute on chronic renal insufficiency presumably due to sepsis and hypotension creatinine is risen from 1.4-2.2 today and urine output has diminished.  The repeat chest x-ray again demonstrates infiltration consolidation within the right lung predominantly centrally and in the right upper lobe.  Patient currently on IV Zosyn azithromycin and vancomycin.     1/8: Patient still receiving low-dose IV norepinephrine to support blood pressure.  IV antibiotic therapy continues as well.  Remains in atrial  fibrillation with controlled heart rate response.  Eliquis dose has been decreased down to 2.5 mg twice daily given the renal insufficiency.  Will continue to follow renal function.  Patient slowly improving.     1/9: Clinically overall improved over the last 24 hours.  IV norepinephrine being titrated down gradually.  Patient remains in atrial fibrillation with a well-controlled heart rate.  Serum creatinine unchanged at 2.6.  Potassium at 3.0 thus supplement will be needed.  Continue antibiotic therapy, patient continues to gradually improve.     1/10: Stable over the past 24 hours.  Now off pressor support.  Continues in a rate controlled atrial fibrillation.  Creatinine has improved to current of 2.1.  White blood cell count has significantly increased to current of 17.5.  Hemoglobin stable at 10.2.  Calcium does remain low at 3.1.  I have ordered for further oral repletion.  He is chest pain-free breathing comfortably nasal cannula oxygen.  Breath sounds are significant diminished throughout.  Does continue on antibiotics as directed by admitting.  Overall slowly improving.  Will follow with you.    1/11: Blood pressure has improved considerably over the last 24 hours.  As noted yesterday patient found to have evidence of an iliopsoas hematoma and antiplatelet and anticoagulant therapy has been placed on hold.  Hemoglobin has remained stable at 9.3 on morning labs.  Serum creatinine continues to improve and has decreased down to 1.7.  Potassium level remains suppressed and will require supplementation.  Heart rate is adequately controlled on current regimen.  Patient continues to slowly improve.             I spent 20 minutes in the professional and overall care of this patient.      Sanford Neves, DO

## 2024-01-11 NOTE — PROGRESS NOTES
Occupational Therapy    OT Treatment    Patient Name: Navarro Rabago  MRN: [id number]  Today's Date: 1/11/2024  Time Calculation  Start Time: 0758  Stop Time: 0830  Time Calculation (min): 32 min         Assessment:  OT Assessment: Pt continues to have difficulty with lethargy, very little verbal communication, difficult to understand. Poor+ sitting balance  End of Session Communication: Bedside nurse  End of Session Patient Position: Bed, 4 rail up, Alarm on  OT Assessment Results: Decreased ADL status, Decreased upper extremity range of motion, Decreased upper extremity strength, Decreased cognition, Decreased endurance, Decreased fine motor control, Decreased functional mobility, Decreased gross motor control  Plan:  Treatment Interventions: ADL retraining, Functional transfer training, Endurance training  OT Frequency: 4 times per week  OT Discharge Recommendations: Moderate intensity level of continued care  Equipment Recommended upon Discharge: Lift  OT Recommended Transfer Status: Dependent  OT - OK to Discharge: Yes  Treatment Interventions: ADL retraining, Functional transfer training, Endurance training    Subjective   Previous Visit Info:  OT Last Visit  OT Received On: 01/11/24  General:  General  Reason for Referral: impaired mobility  Referred By: Dr. Bates  Past Medical History Relevant to Rehab: CHF, afib, CAD, COPD, HTN, hernia  Co-Treatment: PT (15 minutes)  Co-Treatment Reason:  (skilled 2 person assist to maximize safety and functional abilities during treatment.)  Prior to Session Communication: Bedside nurse  Patient Position Received: Bed, 4 rail up, Alarm on  General Comment: Pt with difficulty arousing, not keeping eyes opened.       Pain:  Pain Assessment  Pain Assessment: FLACC (Face, Legs, Activity, Cry, Consolability)  Pain Score: 5 - Moderate pain  Pain Type:  (Intermittent)  Pain Location:  (Pt unable to verbilize location)  Pain Frequency: Intermittent (Pt yells out with attempts to  weight shift to Left at edge of bed)    Objective    Cognition:  Cognition  Orientation Level: Unable to assess    Activities of Daily Living: Grooming  Grooming Level of Assistance: Dependent  Grooming Where Assessed: Edge of bed  Grooming Comments: washing face, hair    UE Bathing  UE Bathing Level of Assistance: Dependent  UE Bathing Where Assessed: Edge of bed  UE Bathing Comments: sponge bathing, pt not following commands to attempt         UE Dressing  UE Dressing Level of Assistance: Dependent  UE Dressing Where Assessed: Edge of bed  UE Dressing Comments: [facility] gown       Bed Mobility/Transfers: Bed Mobility 1  Bed Mobility 1: Supine to sitting  Level of Assistance 1: Dependent (x2)  Bed Mobility Comments 1: HOb elevated ~ 60 degrees  Bed Mobility 2  Bed Mobility  2: Sitting to supine  Level of Assistance 2: Dependent (x2)  Bed Mobility Comments 2: Also, pt dependent x2 to reposition to HOB    Sitting Balance:  Static Sitting Balance  Static Sitting-Balance Support: Feet supported, Bilateral upper extremity supported  Static Sitting-Level of Assistance: Moderate assistance (x2 for safety)  Static Sitting-Comment/Number of Minutes: 15, pt demonstrates retro lean dispite frequent cues for forward weight shifting     Therapy/Activity: Therapeutic Exercise  Therapeutic Exercise Performed: Yes  Therapeutic Exercise Activity 1: ball squeeze x 8-10 reps with cues to initiate each rep. Pt unable to follow further commands for therex dispite Max encourgement  Therapeutic Exercise Activity 2: bicep curls  Therapeutic Exercise Activity 3: tricep presses  1 set x 10 reps, AAROM, required Max tactile/verbal cues to initiate each rep with poor follow through         Outcome Measures:[facility] Daily Activity  Putting on and taking off regular lower body clothing: Total  Bathing (including washing, rinsing, drying): Total  Putting on and taking off regular upper body clothing: Total  Toileting, which includes using  toilet, bedpan or urinal: Total  Taking care of personal grooming such as brushing teeth: Total  Eating Meals: Total  Daily Activity - Total Score: 6        Education Documentation  Home Exercise Program, taught by VLAD Gilbert at 1/11/2024  8:54 AM.  Learner: Patient  Readiness: Nonacceptance  Method: Explanation, Demonstration  Response: Needs Reinforcement  Comment: Pt with difficulty command following due to decreased alertness.    ADL Training, taught by VLAD Gilbert at 1/11/2024  8:54 AM.  Learner: Patient  Readiness: Nonacceptance  Method: Explanation, Demonstration  Response: Needs Reinforcement  Comment: Pt with difficulty command following due to decreased alertness.    Home Exercise Program, taught by VLAD Gilbert at 1/10/2024  2:19 PM.  Learner: Patient  Readiness: Nonacceptance  Method: Explanation, Demonstration  Response: Needs Reinforcement    ADL Training, taught by VLAD Gilbert at 1/10/2024  2:19 PM.  Learner: Patient  Readiness: Nonacceptance  Method: Explanation, Demonstration  Response: Needs Reinforcement    Education Comments  No comments found.           Goals:  Encounter Problems            OT Goals       ADLs (Progressing)       Start:  01/08/24    Expected End:  01/16/24       Patient will perform ADLs with MOD A using AE/compensatory techniques as needed.          Sitting Tolerance (Progressing)       Start:  01/08/24    Expected End:  01/16/24       Patient will demonstrate improved sitting tolerance AEB completing EOB activities for >/= 15 minutes with supervision assist for stability.         UE Strengthening (Progressing)       Start:  01/08/24    Expected End:  01/16/24       Patient will improve UE strength to 3-/5 in preparation for functional transfers.             Safety       Safe Mobility Techniques (Progressing)       Start:  01/08/24    Expected End:  01/16/24       Pt will correctly identify and demonstrate safe mobility techniques to reduce their risks  for falls during their acute care stay               Transfers       Supine to sit (Progressing)       Start:  01/08/24    Expected End:  01/16/24       Pt will transfer supine to sitting at edge of bed with modified independent assist to promote acute care out of bed activity           Sit to stand (Progressing)       Start:  01/08/24    Expected End:  01/16/24       Pt will transfer sit to standing position with modified independent assist and walker to promote safe out of bed activity           Bed to chair (Progressing)       Start:  01/08/24    Expected End:  01/16/24       Pt will transfer from sitting edge of bed to the chair with modified independent assist and walker to promote out of bed activity and reduce the risks of prolonged acute care bedrest

## 2024-01-12 LAB
ALBUMIN SERPL-MCNC: 2 G/DL (ref 3.5–5)
ANION GAP SERPL CALC-SCNC: 7 MMOL/L
BUN SERPL-MCNC: 31 MG/DL (ref 8–25)
CALCIUM SERPL-MCNC: 7.3 MG/DL (ref 8.5–10.4)
CHLORIDE SERPL-SCNC: 106 MMOL/L (ref 97–107)
CO2 SERPL-SCNC: 24 MMOL/L (ref 24–31)
CREAT SERPL-MCNC: 1.5 MG/DL (ref 0.4–1.6)
EGFRCR SERPLBLD CKD-EPI 2021: 48 ML/MIN/1.73M*2
ERYTHROCYTE [DISTWIDTH] IN BLOOD BY AUTOMATED COUNT: 16.9 % (ref 11.5–14.5)
GLUCOSE SERPL-MCNC: 90 MG/DL (ref 65–99)
HCT VFR BLD AUTO: 28 % (ref 41–52)
HGB BLD-MCNC: 9.3 G/DL (ref 13.5–17.5)
MCH RBC QN AUTO: 29.9 PG (ref 26–34)
MCHC RBC AUTO-ENTMCNC: 33.2 G/DL (ref 32–36)
MCV RBC AUTO: 90 FL (ref 80–100)
NRBC BLD-RTO: 0 /100 WBCS (ref 0–0)
PHOSPHATE SERPL-MCNC: 2.6 MG/DL (ref 2.5–4.5)
PLATELET # BLD AUTO: 309 X10*3/UL (ref 150–450)
POTASSIUM SERPL-SCNC: 3.2 MMOL/L (ref 3.4–5.1)
RBC # BLD AUTO: 3.11 X10*6/UL (ref 4.5–5.9)
SODIUM SERPL-SCNC: 137 MMOL/L (ref 133–145)
WBC # BLD AUTO: 9.7 X10*3/UL (ref 4.4–11.3)

## 2024-01-12 PROCEDURE — 2500000004 HC RX 250 GENERAL PHARMACY W/ HCPCS (ALT 636 FOR OP/ED): Performed by: INTERNAL MEDICINE

## 2024-01-12 PROCEDURE — 97530 THERAPEUTIC ACTIVITIES: CPT | Mod: GP,CQ

## 2024-01-12 PROCEDURE — 84520 ASSAY OF UREA NITROGEN: CPT | Performed by: INTERNAL MEDICINE

## 2024-01-12 PROCEDURE — 94640 AIRWAY INHALATION TREATMENT: CPT

## 2024-01-12 PROCEDURE — 9420000001 HC RT PATIENT EDUCATION 5 MIN

## 2024-01-12 PROCEDURE — 97530 THERAPEUTIC ACTIVITIES: CPT | Mod: GO,CO

## 2024-01-12 PROCEDURE — 2500000001 HC RX 250 WO HCPCS SELF ADMINISTERED DRUGS (ALT 637 FOR MEDICARE OP): Performed by: INTERNAL MEDICINE

## 2024-01-12 PROCEDURE — 92526 ORAL FUNCTION THERAPY: CPT | Mod: GN | Performed by: SPEECH-LANGUAGE PATHOLOGIST

## 2024-01-12 PROCEDURE — 94760 N-INVAS EAR/PLS OXIMETRY 1: CPT

## 2024-01-12 PROCEDURE — 99232 SBSQ HOSP IP/OBS MODERATE 35: CPT | Performed by: INTERNAL MEDICINE

## 2024-01-12 PROCEDURE — 85027 COMPLETE CBC AUTOMATED: CPT | Performed by: INTERNAL MEDICINE

## 2024-01-12 PROCEDURE — 97535 SELF CARE MNGMENT TRAINING: CPT | Mod: GO,CO

## 2024-01-12 PROCEDURE — 2500000005 HC RX 250 GENERAL PHARMACY W/O HCPCS: Performed by: INTERNAL MEDICINE

## 2024-01-12 PROCEDURE — 2060000001 HC INTERMEDIATE ICU ROOM DAILY

## 2024-01-12 PROCEDURE — 36415 COLL VENOUS BLD VENIPUNCTURE: CPT | Performed by: INTERNAL MEDICINE

## 2024-01-12 PROCEDURE — 2500000002 HC RX 250 W HCPCS SELF ADMINISTERED DRUGS (ALT 637 FOR MEDICARE OP, ALT 636 FOR OP/ED): Performed by: INTERNAL MEDICINE

## 2024-01-12 RX ORDER — POTASSIUM CHLORIDE 20 MEQ/1
20 TABLET, EXTENDED RELEASE ORAL ONCE
Status: COMPLETED | OUTPATIENT
Start: 2024-01-12 | End: 2024-01-12

## 2024-01-12 RX ADMIN — POTASSIUM CHLORIDE 20 MEQ: 1500 TABLET, EXTENDED RELEASE ORAL at 11:14

## 2024-01-12 RX ADMIN — NYSTATIN 500000 UNITS: 100000 SUSPENSION ORAL at 20:08

## 2024-01-12 RX ADMIN — IPRATROPIUM BROMIDE AND ALBUTEROL SULFATE 3 ML: 2.5; .5 SOLUTION RESPIRATORY (INHALATION) at 14:36

## 2024-01-12 RX ADMIN — IPRATROPIUM BROMIDE AND ALBUTEROL SULFATE 3 ML: 2.5; .5 SOLUTION RESPIRATORY (INHALATION) at 07:39

## 2024-01-12 RX ADMIN — Medication: at 07:00

## 2024-01-12 RX ADMIN — MIDODRINE HYDROCHLORIDE 5 MG: 5 TABLET ORAL at 03:23

## 2024-01-12 RX ADMIN — Medication: at 15:00

## 2024-01-12 RX ADMIN — PIPERACILLIN SODIUM AND TAZOBACTAM SODIUM 3.38 G: 3; .375 INJECTION, SOLUTION INTRAVENOUS at 06:56

## 2024-01-12 RX ADMIN — PIPERACILLIN SODIUM AND TAZOBACTAM SODIUM 3.38 G: 3; .375 INJECTION, SOLUTION INTRAVENOUS at 15:07

## 2024-01-12 RX ADMIN — MIDODRINE HYDROCHLORIDE 5 MG: 5 TABLET ORAL at 11:14

## 2024-01-12 RX ADMIN — PIPERACILLIN SODIUM AND TAZOBACTAM SODIUM 3.38 G: 3; .375 INJECTION, SOLUTION INTRAVENOUS at 20:01

## 2024-01-12 RX ADMIN — IPRATROPIUM BROMIDE AND ALBUTEROL SULFATE 3 ML: 2.5; .5 SOLUTION RESPIRATORY (INHALATION) at 10:23

## 2024-01-12 RX ADMIN — FINASTERIDE 5 MG: 5 TABLET, FILM COATED ORAL at 11:14

## 2024-01-12 RX ADMIN — MIDODRINE HYDROCHLORIDE 5 MG: 5 TABLET ORAL at 20:02

## 2024-01-12 RX ADMIN — QUETIAPINE FUMARATE 12.5 MG: 25 TABLET ORAL at 20:09

## 2024-01-12 RX ADMIN — LEVOTHYROXINE SODIUM 50 MCG: 0.05 TABLET ORAL at 06:00

## 2024-01-12 RX ADMIN — QUETIAPINE FUMARATE 12.5 MG: 25 TABLET ORAL at 11:14

## 2024-01-12 RX ADMIN — PANTOPRAZOLE SODIUM 40 MG: 40 TABLET, DELAYED RELEASE ORAL at 06:00

## 2024-01-12 RX ADMIN — NYSTATIN 500000 UNITS: 100000 SUSPENSION ORAL at 18:38

## 2024-01-12 RX ADMIN — IPRATROPIUM BROMIDE AND ALBUTEROL SULFATE 3 ML: 2.5; .5 SOLUTION RESPIRATORY (INHALATION) at 19:47

## 2024-01-12 RX ADMIN — PIPERACILLIN SODIUM AND TAZOBACTAM SODIUM 3.38 G: 3; .375 INJECTION, SOLUTION INTRAVENOUS at 01:58

## 2024-01-12 RX ADMIN — POTASSIUM CHLORIDE 20 MEQ: 1500 TABLET, EXTENDED RELEASE ORAL at 15:06

## 2024-01-12 RX ADMIN — Medication: at 23:00

## 2024-01-12 RX ADMIN — NYSTATIN 500000 UNITS: 100000 SUSPENSION ORAL at 06:00

## 2024-01-12 RX ADMIN — NYSTATIN 500000 UNITS: 100000 SUSPENSION ORAL at 15:06

## 2024-01-12 RX ADMIN — GUAIFENESIN 600 MG: 600 TABLET ORAL at 11:14

## 2024-01-12 RX ADMIN — FERROUS SULFATE TAB 325 MG (65 MG ELEMENTAL FE) 1 TABLET: 325 (65 FE) TAB at 11:14

## 2024-01-12 ASSESSMENT — COGNITIVE AND FUNCTIONAL STATUS - GENERAL
DRESSING REGULAR UPPER BODY CLOTHING: A LOT
MOVING TO AND FROM BED TO CHAIR: TOTAL
CLIMB 3 TO 5 STEPS WITH RAILING: TOTAL
TURNING FROM BACK TO SIDE WHILE IN FLAT BAD: TOTAL
DRESSING REGULAR LOWER BODY CLOTHING: TOTAL
MOVING TO AND FROM BED TO CHAIR: TOTAL
WALKING IN HOSPITAL ROOM: TOTAL
HELP NEEDED FOR BATHING: A LOT
DRESSING REGULAR UPPER BODY CLOTHING: A LOT
MOBILITY SCORE: 6
MOVING FROM LYING ON BACK TO SITTING ON SIDE OF FLAT BED WITH BEDRAILS: TOTAL
STANDING UP FROM CHAIR USING ARMS: TOTAL
EATING MEALS: A LOT
WALKING IN HOSPITAL ROOM: TOTAL
CLIMB 3 TO 5 STEPS WITH RAILING: TOTAL
TOILETING: TOTAL
PERSONAL GROOMING: A LOT
DRESSING REGULAR LOWER BODY CLOTHING: TOTAL
MOVING FROM LYING ON BACK TO SITTING ON SIDE OF FLAT BED WITH BEDRAILS: TOTAL
PERSONAL GROOMING: A LOT
TURNING FROM BACK TO SIDE WHILE IN FLAT BAD: TOTAL
MOBILITY SCORE: 6
STANDING UP FROM CHAIR USING ARMS: TOTAL
HELP NEEDED FOR BATHING: A LOT
TOILETING: TOTAL
DAILY ACTIVITIY SCORE: 10
EATING MEALS: A LOT
DAILY ACTIVITIY SCORE: 10

## 2024-01-12 ASSESSMENT — ACTIVITIES OF DAILY LIVING (ADL)
BATHING_WHERE_ASSESSED: EDGE OF BED;BED LEVEL
HOME_MANAGEMENT_TIME_ENTRY: 26
BATHING_LEVEL_OF_ASSISTANCE: MAXIMUM ASSISTANCE

## 2024-01-12 ASSESSMENT — PAIN SCALES - GENERAL
PAINLEVEL_OUTOF10: 0 - NO PAIN

## 2024-01-12 ASSESSMENT — PAIN - FUNCTIONAL ASSESSMENT
PAIN_FUNCTIONAL_ASSESSMENT: 0-10
PAIN_FUNCTIONAL_ASSESSMENT: 0-10

## 2024-01-12 NOTE — PROGRESS NOTES
Patient is from home with spouse.  Therapy recommends MODERATE intensity at discharge.  Per [facility], patient denied by [facility].  Met with spouse at bedside to discuss additional SNF choices.  Spouse provided [facility] and [facility] as new choices.  Referrals to [facility] for processing.  Awaiting updates.  Patient will need precert prior to discharge.  RN [facility] following.    1325  Per [facility] declined patient.    Eloina Crespo RN

## 2024-01-12 NOTE — PROGRESS NOTES
Navarro Rabago is a 78 y.o. male on day 6 of admission presenting with Sepsis (CMS/HCC).    Subjective   Interval History:   Awake, responsive  Discussed with nursing  Afebrile, no chills  On baseline oxygen      Objective   Range of Vitals (last 24 hours)  Heart Rate:  [66-90]   Temp:  [36.1 °C (97 °F)-37.1 °C (98.8 °F)]   Resp:  [16-18]   BP: ()/(55-74)   SpO2:  [90 %-97 %]   Daily Weight  01/11/24 : 114 kg (251 lb 5.2 oz)    Body mass index is 29.8 kg/m².    Physical Exam  Constitutional:       Comments:  Intermittent confusion   HENT:      Head: Normocephalic and atraumatic.      Nose: Nose normal.      Mouth/Throat:      Mouth: Mucous membranes are moist.      Pharynx: Oropharynx is clear.   Eyes:      Extraocular Movements: Extraocular movements intact.      Conjunctiva/sclera: Conjunctivae normal.   Cardiovascular:      Rate and Rhythm: Normal rate and regular rhythm.   Pulmonary:      Effort: Pulmonary effort is normal.      Breath sounds: Rhonchi present.   Abdominal:      General: Bowel sounds are normal.      Palpations: Abdomen is soft.   Musculoskeletal:         General: Normal range of motion.      Cervical back: Normal range of motion and neck supple.   Skin:     General: Skin is warm and dry.   Neurological:      General: No focal deficit present.      Mental Status: He is alert.   Psychiatric:         Mood and Affect: Mood normal.         Behavior: Behavior normal.       C  Antibiotics  sodium chloride 0.9% infusion  heparin (porcine) injection 5,000 Units  sodium chloride 0.9 % with KCl 20 mEq/L infusion  calcium carbonate (Tums) chewable tablet 500 mg  ondansetron ODT (Zofran-ODT) disintegrating tablet 4 mg  ondansetron (Zofran) injection 4 mg  benzocaine-menthol (Cepastat Sore Throat) 15-3.6 mg lozenge 1 lozenge  guaiFENesin (Mucinex) 12 hr tablet 600 mg  dextromethorphan-guaifenesin (Robitussin DM)  mg/5 mL oral liquid 5 mL  dextrose 50 % injection 25 g  glucagon (Glucagen)  injection 1 mg  dextrose 10 % in water (D10W) infusion  ipratropium-albuteroL (Duo-Neb) 0.5-2.5 mg/3 mL nebulizer solution 3 mL  oxygen (O2) therapy  piperacillin-tazobactam-dextrose (Zosyn) IV 3.375 g  pantoprazole (ProtoNix) injection 40 mg  ipratropium-albuteroL (Duo-Neb) 0.5-2.5 mg/3 mL nebulizer solution 3 mL  allopurinol (Zyloprim) tablet  apixaban (Eliquis) tablet  aspirin EC tablet  atorvastatin (Lipitor) tablet  bumetanide (Bumex) tablet  colchicine tablet  empagliflozin (Jardiance) tablet  fexofenadine (Allegra) tablet  finasteride (Propecia, Proscar) tablet  guaiFENesin (Mucinex) 12 hr tablet  levothyroxine (Synthroid, Levoxyl) tablet  nitroglycerin (Nitrostat) SL tablet  pantoprazole (ProtoNix) EC tablet  potassium chloride SA (Klor-Con M20) ER tablet  tamsulosin (Flomax) 24 hr capsule  vancomycin 1.5 mg in 300 mL (Xellia) IVPB 1.5 g      levothyroxine (Synthroid, Levoxyl) tablet 50 mcg  finasteride (Proscar) tablet 5 mg  ferrous sulfate (325 mg ferrous sulfate) tablet 1 tablet  aspirin EC tablet 81 mg  apixaban (Eliquis) tablet 5 mg  metoprolol tartrate (Lopressor) tablet 25 mg  potassium chloride 20 mEq in 100 mL IV premix  midodrine (Proamatine) tablet 5 mg  sodium chloride 0.9 % bolus 500 mL  norepinephrine (Levophed) 8 mg in dextrose 5% 250 mL (0.032 mg/mL) infusion (premix)  sodium chloride 0.9 % bolus 250 mL  sodium chloride 0.9 % bolus 250 mL  azithromycin (Zithromax) in dextrose 5 % in water (D5W) 250 mL  mg  vancomycin 1.5 mg in 300 mL (Xellia) IVPB 1.5 g  apixaban (Eliquis) tablet 2.5 mg  vancomycin (Xellia) 1 g in 200 mL (Xellia) IVPB 1,000 mg      Relevant Results  Labs  Results from last 72 hours   Lab Units 01/12/24  0611 01/11/24  0554 01/10/24  0443   WBC AUTO x10*3/uL 9.7 13.6* 17.5*   HEMOGLOBIN g/dL 9.3* 9.3* 10.2*   HEMATOCRIT % 28.0* 28.2* 29.0*   PLATELETS AUTO x10*3/uL 309 277 276       Results from last 72 hours   Lab Units 01/12/24  0611 01/11/24  0554 01/10/24  0443  "  SODIUM mmol/L 137 138 136   POTASSIUM mmol/L 3.2* 3.1* 3.1*   CHLORIDE mmol/L 106 106 101   CO2 mmol/L 24 25 23*   BUN mg/dL 31* 35* 43*   CREATININE mg/dL 1.50 1.70* 2.10*   GLUCOSE mg/dL 90 132* 140*   CALCIUM mg/dL 7.3* 7.7* 7.7*   ANION GAP mmol/L 7 7 12   EGFR mL/min/1.73m*2 48* 41* 32*   PHOSPHORUS mg/dL 2.6  --  1.9*       Results from last 72 hours   Lab Units 01/12/24  0611 01/10/24  0443   ALBUMIN g/dL 2.0* 2.1*       Estimated Creatinine Clearance: 58.7 mL/min (by C-G formula based on SCr of 1.5 mg/dL).  No results found for: \"CRP\"  Microbiology  Reviewed-cultures pending  Imaging  XR chest 1 view    Result Date: 1/7/2024  Interpreted By:  Cathy Hernandez, STUDY: XR CHEST 1 VIEW 1/7/2024 7:36 am   INDICATION: Follow-up pneumonia   COMPARISON: 05/29/2014 as well as CT examination of the chest done on 01/05/2024.   ACCESSION NUMBER(S): [id number]   ORDERING CLINICIAN: EMILY MAKI   TECHNIQUE: AP erect view of the chest   FINDINGS: The heart is at the upper limits of normal in size. There is infiltrate and airspace consolidation observed centrally within the right lung with upper lobar predominance. There is also some patchy infiltrate at the left lung base. No obvious pleural abnormality is seen.       Infiltrate and airspace consolidation identified within the right lung most pronounced centrally and within the right upper lobe with mild left basilar infiltrate.   Signed by: Cathy Hernandez 1/7/2024 8:11 AM Dictation workstation:   [id number]    CT chest abdomen pelvis w IV contrast    Result Date: 1/5/2024  Interpreted By:  Yordy Urena, STUDY: CT CHEST ABDOMEN PELVIS W IV CONTRAST;  1/5/2024 6:33 pm   INDICATION: Signs/Symptoms:abd pain, sob.   COMPARISON: None.   ACCESSION NUMBER(S): [id number]   ORDERING CLINICIAN: NELLA MELGOZA   TECHNIQUE: Contiguous axial images of the chest, abdomen and pelvis were obtained after the intravenous administration of  contrast. Coronal and sagittal reformatted images " were obtained from the axial images.   FINDINGS: CT CHEST:   No axillary lymphadenopathy. 1.6 cm right paratracheal lymph node and 1.5 cm subcarinal lymph node. There is limited evaluation for hilar lymphadenopathy secondary stripping motion.   The heart is normal in size. Coronary artery vascular calcifications. No significant pericardial effusion.   There is pulmonary emphysematous change. There is consolidative opacity in the right upper lobe and right lower lobe compatible with pneumonia and also minimal opacity in the left lower lobe. Trace pleural effusions. No pneumothorax.   Multilevel degenerative change of the thoracic spine.     CT ABDOMEN PELVIS:   Evaluation of the abdomen and pelvis is limited secondary to patient motion. No evidence of liver mass. The gallbladder is present, not well evaluated secondary to motion. No dilatation common bile duct.   Atrophy of the pancreas.   No splenomegaly.   Question mild nodularity of the left adrenal gland. The right adrenal gland appears unremarkable.   Symmetric enhancement of the kidneys. Right renal cysts with the largest measuring 3.1 cm and several bowel subcentimeter hypodensity too small to characterize. No hydronephrosis.   Atherosclerotic calcification of the aorta and abdominal and pelvic vasculature.   No evidence of bowel obstruction. Evaluation the bowel is otherwise limited secondary patient motion.   Urinary bladder is underdistended and not well evaluated. 18 mm right posterior bladder diverticulum.   Postsurgical change of left hip arthroplasty resulting in beam hardening artifact and limiting evaluation the pelvis.   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       Consolidative opacity in the right upper lobe and lower lobe compatible with pneumonia and also mild left basilar opacity and trace pleural effusions.   No definite evidence of acute abnormality of the abdominal viscera within the limitations of the motion degraded  examination.   MACRO: None   Signed by: Yordy Urena 1/5/2024 6:49 PM Dictation workstation:   [id number]     Assessment/Plan   Sepsis, with shock   Acute renal failure-improving  Pneumonia-MRSA PCR negative   Diarrhea-c.diff negative  Transaminitis-resolving  Leukocytosis, improving  Oral candida   Chronic respiratory failure-at baseline 5LNC  Retroperitoneal hematoma     Continue IV zosyn-started on 1/6  Continue Nystatin suspension  Monitor WBC and temperature  Monitor renal function  Oxygen as needed  Supportive care      Tess Gonzalez, APRN-CNP

## 2024-01-12 NOTE — PROGRESS NOTES
Occupational Therapy    OT Treatment    Patient Name: Navarro Rabago  MRN: [id number]  Today's Date: 1/12/2024  Time Calculation  Start Time: 1306  Stop Time: 1347  Time Calculation (min): 41 min         Assessment:  OT Assessment: Pt requiring max encouragement with simple direct verbal cues to participate established POC with slow progression.  Pt will benefit from continued OT intervention to address current limitations  Prognosis: Fair  Evaluation/Treatment Tolerance: Patient limited by fatigue  Medical Staff Made Aware: Yes  End of Session Communication: Bedside nurse  End of Session Patient Position: Bed, 4 rail up, Alarm on (call light in reach all needs met spouse present)  OT Assessment Results: Decreased ADL status, Decreased upper extremity range of motion, Decreased upper extremity strength, Decreased cognition, Decreased endurance, Decreased fine motor control, Decreased functional mobility, Decreased gross motor control  Prognosis: Fair  Evaluation/Treatment Tolerance: Patient limited by fatigue  Medical Staff Made Aware: Yes  Strengths: Ability to acquire knowledge, Support of extended family/friends, Support of Caregivers  Plan:  Treatment Interventions: ADL retraining, Functional transfer training, Endurance training, Patient/family training, Compensatory technique education  OT Frequency: 4 times per week  OT Discharge Recommendations: Moderate intensity level of continued care  Equipment Recommended upon Discharge: Lift  OT Recommended Transfer Status: Assist of 2, Dependent  OT - OK to Discharge: Yes  Treatment Interventions: ADL retraining, Functional transfer training, Endurance training, Patient/family training, Compensatory technique education    Subjective   Previous Visit Info:  OT Last Visit  OT Received On: 01/12/24  General:  General  Reason for Referral: impaired mobility  Referred By: Dr. Bates  Past Medical History Relevant to Rehab: CHF, afib, CAD, COPD, HTN, hernia  Family/Caregiver  Present: Yes  Caregiver Feedback: spouse (spopuse aware Pts current needs)  Co-Treatment: PT  Co-Treatment Reason: safety wtih mobility  Prior to Session Communication: Bedside nurse  Patient Position Received: Bed, 4 rail up, Alarm on  Preferred Learning Style: verbal  General Comment: Confered  with [facility]. Pt agreeable to skilled therapeutic intervnention  Precautions:  Medical Precautions: Fall precautions, Oxygen therapy device and L/min (3LO2 NC continuous)  Vital Signs:     Pain:  Pain Assessment  Pain Assessment: 0-10  Pain Score: 0 - No pain    Objective    Cognition:  Cognition  Overall Cognitive Status: Impaired  Orientation Level: Disoriented to place, Disoriented to time  Coordination:     Activities of Daily Living:      Grooming  Grooming Level of Assistance: Moderate assistance  Grooming Where Assessed: Bed level  Grooming Comments: Pt brushing teeth, wash face, comb hair  max simple 1 step commands assist for thoroughness    UE Bathing  UE Bathing Level of Assistance: Moderate assistance  UE Bathing Where Assessed: Edge of bed  UE Bathing Comments: CHG wipes max vc attend to task/sequence    LE Bathing  LE Bathing Level of Assistance: Maximum assistance  LE Bathing Where Assessed: Edge of bed, Bed level  LE Bathing Comments: CHG wipes assist buttocks lower legs supine    UE Dressing  UE Dressing Level of Assistance: Maximum assistance  UE Dressing Where Assessed: Edge of bed  UE Dressing Comments: doff/don hospital gown simple cues direc for sequencing Pt assist with hand placement minimally    LE Dressing  LE Dressing: Yes  Sock Level of Assistance: Dependent  LE Dressing Where Assessed: Bed level    Toileting  Toileting Adaptive Equipment: Cathertization equipment (+Rodriguez)  Toileting Level of Assistance: Dependent  Functional Standing Tolerance:     Bed Mobility/Transfers: Bed Mobility  Bed Mobility: Yes  Bed Mobility 1  Bed Mobility 1: Supine to sitting  Level of Assistance 1: Dependent (x 2 Assist  for trunk up and B LEs over the EOB with assist with draw shee)  Bed Mobility Comments 1: x 2 Assist for trunk up and B LEs over the EOB with assist with draw shee  Bed Mobility 2  Bed Mobility  2: Sitting to supine  Level of Assistance 2: Dependent (x 2)  Bed Mobility Comments 2: asssit trunk down and BLE to bed  Bed Mobility 3  Bed Mobility 3: Scooting  Level of Assistance 3: Dependent  Bed Mobility Comments 3: 2 person draw sheet to advance to HOB     Sitting Balance:  Dynamic Sitting Balance  Dynamic Sitting-Balance Support: Right upper extremity supported, Left upper extremity supported, Feet supported (Pt alternating UE)  Dynamic Sitting-Balance: Lateral lean, Forward lean, Reaching for objects, Reaching across midline, Trunk control activities  Dynamic Sitting-Comments: edge of bed placement chair  to encourage forward balance with Pt seated Min A-Max A with functional task 20 minutes  Standing Balance:    Outcome Measures:[facility] Daily Activity  Putting on and taking off regular lower body clothing: Total  Bathing (including washing, rinsing, drying): A lot  Putting on and taking off regular upper body clothing: A lot  Toileting, which includes using toilet, bedpan or urinal: Total  Taking care of personal grooming such as brushing teeth: A lot  Eating Meals: A lot  Daily Activity - Total Score: 10        Education Documentation  Body Mechanics, taught by VLAD Christian at 1/12/2024  2:53 PM.  Learner: Patient  Readiness: Nonacceptance  Method: Explanation, Demonstration, Teach-back  Response: Needs Reinforcement  Comment: Instructed Pt with adaptive /compensatory techniques and safety with transfers    ADL Training, taught by VLAD Christian at 1/12/2024  2:53 PM.  Learner: Patient  Readiness: Nonacceptance  Method: Explanation, Demonstration, Teach-back  Response: Needs Reinforcement  Comment: Instructed Pt with adaptive /compensatory techniques and safety with transfers    Education Comments  No  comments found.        OP EDUCATION:       Goals:  Encounter Problems       Encounter Problems (Active)       Balance       Sitting Balance (Progressing)       Start:  01/08/24    Expected End:  01/16/24       Pt will demonstrate good sitting balance to promote safe engagement with out of bed activities           Standing Balance (Progressing)       Start:  01/08/24    Expected End:  01/16/24       Pt will demonstrate good static standing balance to promote safe participation with out of bed activity, transfers, and mobility              Mobility       Ambulation (Progressing)       Start:  01/08/24    Expected End:  01/16/24       Pt will ambulate 50' modified independent assist with walker to promote safe home mobility              OT Goals       ADLs (Progressing)       Start:  01/08/24    Expected End:  01/16/24       Patient will perform ADLs with MOD A using AE/compensatory techniques as needed.          Sitting Tolerance (Progressing)       Start:  01/08/24    Expected End:  01/16/24       Patient will demonstrate improved sitting tolerance AEB completing EOB activities for >/= 15 minutes with supervision assist for stability.         UE Strengthening (Progressing)       Start:  01/08/24    Expected End:  01/16/24       Patient will improve UE strength to 3-/5 in preparation for functional transfers.             Safety       Safe Mobility Techniques (Progressing)       Start:  01/08/24    Expected End:  01/16/24       Pt will correctly identify and demonstrate safe mobility techniques to reduce their risks for falls during their acute care stay               Transfers       Supine to sit (Progressing)       Start:  01/08/24    Expected End:  01/16/24       Pt will transfer supine to sitting at edge of bed with modified independent assist to promote acute care out of bed activity           Sit to stand (Progressing)       Start:  01/08/24    Expected End:  01/16/24       Pt will transfer sit to standing  position with modified independent assist and walker to promote safe out of bed activity           Bed to chair (Progressing)       Start:  01/08/24    Expected End:  01/16/24       Pt will transfer from sitting edge of bed to the chair with modified independent assist and walker to promote out of bed activity and reduce the risks of prolonged acute care bedrest

## 2024-01-12 NOTE — PROGRESS NOTES
"Navarro Rabago is a 78 y.o. male on day 6 of admission presenting with Sepsis (CMS/HCC).    Subjective    patient was seen yesterday for acute renal failure secondary to septic shock secondary to pneumonia patient continues to improve no shortness of breath he is awake and responsive    Objective     Physical Exam  Neck:      Vascular: No carotid bruit.   Cardiovascular:      Rate and Rhythm: Normal rate and regular rhythm.      Heart sounds: No murmur heard.     No friction rub. No gallop.   Pulmonary:      Breath sounds: No wheezing, rhonchi or rales.   Chest:      Chest wall: No tenderness.   Abdominal:      General: There is no distension.      Tenderness: There is no abdominal tenderness. There is no guarding or rebound.   Musculoskeletal:         General: No swelling or tenderness.      Cervical back: Neck supple.      Right lower leg: No edema.      Left lower leg: No edema.   Lymphadenopathy:      Cervical: No cervical adenopathy.         Last Recorded Vitals  Blood pressure 97/74, pulse 70, temperature 36.2 °C (97.2 °F), temperature source Temporal, resp. rate 16, height 1.956 m (6' 5\"), weight 114 kg (251 lb 5.2 oz), SpO2 90 %.    Intake/Output last 3 Shifts:  I/O last 3 completed shifts:  In: 1131.3 (9.9 mL/kg) [I.V.:981.3 (8.6 mL/kg); IV Piggyback:150]  Out: 1000 (8.8 mL/kg) [Urine:1000 (0.2 mL/kg/hr)]  Weight: 114 kg     Current Facility-Administered Medications:     acetaminophen (Tylenol) tablet 650 mg, 650 mg, oral, q6h PRN, Ricardo Kaplan MD, 650 mg at 01/09/24 2156    [Held by provider] apixaban (Eliquis) tablet 2.5 mg, 2.5 mg, oral, q12h DARIA, Ricardo Kaplan MD, 2.5 mg at 01/09/24 2157    [Held by provider] aspirin EC tablet 81 mg, 81 mg, oral, Daily, Ricardo Kaplan MD, 81 mg at 01/09/24 0905    benzocaine-menthol (Cepastat Sore Throat) 15-3.6 mg lozenge 1 lozenge, 1 lozenge, Mouth/Throat, q2h PRN, Ricardo Kaplan MD    calcium carbonate (Tums) chewable tablet 500 " mg, 500 mg, oral, 4x daily PRN, Ricardo Kaplan MD    dextromethorphan-guaifenesin (Robitussin DM)  mg/5 mL oral liquid 5 mL, 5 mL, oral, q4h PRN, Ricardo Kaplan MD, 5 mL at 01/11/24 0328    dextrose 10 % in water (D10W) infusion, 0.3 g/kg/hr, intravenous, Once PRN, Ricardo Kaplan MD    dextrose 50 % injection 25 g, 25 g, intravenous, q15 min PRN, Ricardo Kaplan MD    ferrous sulfate (325 mg ferrous sulfate) tablet 1 tablet, 1 tablet, oral, Daily with breakfast, Ricardo Kaplan MD, 1 tablet at 01/11/24 0805    finasteride (Proscar) tablet 5 mg, 5 mg, oral, Daily, Ricardo Kaplan MD, 5 mg at 01/11/24 0805    glucagon (Glucagen) injection 1 mg, 1 mg, intramuscular, q15 min PRN, Ricardo Kaplan MD    guaiFENesin (Mucinex) 12 hr tablet 600 mg, 600 mg, oral, q12h PRN, Ricardo Kaplan MD, 600 mg at 01/11/24 2105    ipratropium-albuteroL (Duo-Neb) 0.5-2.5 mg/3 mL nebulizer solution 3 mL, 3 mL, nebulization, 4x daily, Ricardo Kaplan MD, 3 mL at 01/12/24 0739    levothyroxine (Synthroid, Levoxyl) tablet 50 mcg, 50 mcg, oral, Daily, Ricardo Kaplan MD, 50 mcg at 01/12/24 0600    LORazepam (Ativan) injection 1 mg, 1 mg, intravenous, q4h PRN, Evan Bates MD, 1 mg at 01/10/24 2156    midodrine (Proamatine) tablet 5 mg, 5 mg, oral, q8h, Ricardo Kaplan MD, 5 mg at 01/12/24 0323    norepinephrine (Levophed) 8 mg in dextrose 5% 250 mL (0.032 mg/mL) infusion (premix), 0.01-1 mcg/kg/min, intravenous, Continuous, Ricardo Kaplan MD, Stopped at 01/08/24 1115    nystatin (Mycostatin) 100,000 unit/mL suspension 500,000 Units, 5 mL, Swish & Swallow, 4x daily, Ricardo Kaplan MD, 500,000 Units at 01/12/24 0600    ondansetron ODT (Zofran-ODT) disintegrating tablet 4 mg, 4 mg, oral, q8h PRN **OR** ondansetron (Zofran) injection 4 mg, 4 mg, intravenous, q8h PRN, Ricardo Kaplan MD, 4 mg at 01/07/24 0321    oxygen (O2) therapy, ,  inhalation, q8h, Ricardo Kaplan MD, Start at 01/12/24 0700    pantoprazole (ProtoNix) EC tablet 40 mg, 40 mg, oral, Daily before breakfast, Ricardo Kaplan MD, 40 mg at 01/12/24 0600    piperacillin-tazobactam-dextrose (Zosyn) IV 3.375 g, 3.375 g, intravenous, q6h, Ricardo Kaplan MD, Stopped at 01/12/24 0726    QUEtiapine (SEROquel) tablet 12.5 mg, 12.5 mg, oral, BID, Evan Bates MD, 12.5 mg at 01/11/24 2106   Relevant Results    Results for orders placed or performed during the hospital encounter of 01/06/24 (from the past 96 hour(s))   Basic Metabolic Panel   Result Value Ref Range    Glucose 140 (H) 65 - 99 mg/dL    Sodium 140 133 - 145 mmol/L    Potassium 3.0 (L) 3.4 - 5.1 mmol/L    Chloride 106 97 - 107 mmol/L    Bicarbonate 20 (L) 24 - 31 mmol/L    Urea Nitrogen 49 (H) 8 - 25 mg/dL    Creatinine 2.60 (H) 0.40 - 1.60 mg/dL    eGFR 25 (L) >60 mL/min/1.73m*2    Calcium 7.0 (L) 8.5 - 10.4 mg/dL    Anion Gap 14 <=19 mmol/L   NT Pro-BNP   Result Value Ref Range    PROBNP 6,201 (H) 0 - 852 pg/mL   CBC   Result Value Ref Range    WBC 15.3 (H) 4.4 - 11.3 x10*3/uL    nRBC 0.0 0.0 - 0.0 /100 WBCs    RBC 3.18 (L) 4.50 - 5.90 x10*6/uL    Hemoglobin 9.6 (L) 13.5 - 17.5 g/dL    Hematocrit 27.6 (L) 41.0 - 52.0 %    MCV 87 80 - 100 fL    MCH 30.2 26.0 - 34.0 pg    MCHC 34.8 32.0 - 36.0 g/dL    RDW 16.2 (H) 11.5 - 14.5 %    Platelets 207 150 - 450 x10*3/uL   Iron and TIBC   Result Value Ref Range    Iron 24 (L) 45 - 160 ug/dL    UIBC 90 (L) 110 - 370 ug/dL    TIBC 114 (L) 228 - 428 ug/dL    % Saturation 21 12 - 50 %   Ferritin   Result Value Ref Range    Ferritin 2,435 (H) 30 - 400 ng/mL   POCT GLUCOSE   Result Value Ref Range    POCT Glucose 147 (H) 74 - 99 mg/dL   Vancomycin   Result Value Ref Range    Vancomycin 17.0 10.0 - 20.0 ug/mL   POCT GLUCOSE   Result Value Ref Range    POCT Glucose 141 (H) 74 - 99 mg/dL   POCT GLUCOSE   Result Value Ref Range    POCT Glucose 108 (H) 74 - 99 mg/dL   CBC    Result Value Ref Range    WBC 17.5 (H) 4.4 - 11.3 x10*3/uL    nRBC 0.0 0.0 - 0.0 /100 WBCs    RBC 3.39 (L) 4.50 - 5.90 x10*6/uL    Hemoglobin 10.2 (L) 13.5 - 17.5 g/dL    Hematocrit 29.0 (L) 41.0 - 52.0 %    MCV 86 80 - 100 fL    MCH 30.1 26.0 - 34.0 pg    MCHC 35.2 32.0 - 36.0 g/dL    RDW 16.0 (H) 11.5 - 14.5 %    Platelets 276 150 - 450 x10*3/uL   Renal Function Panel   Result Value Ref Range    Glucose 140 (H) 65 - 99 mg/dL    Sodium 136 133 - 145 mmol/L    Potassium 3.1 (L) 3.4 - 5.1 mmol/L    Chloride 101 97 - 107 mmol/L    Bicarbonate 23 (L) 24 - 31 mmol/L    Urea Nitrogen 43 (H) 8 - 25 mg/dL    Creatinine 2.10 (H) 0.40 - 1.60 mg/dL    eGFR 32 (L) >60 mL/min/1.73m*2    Calcium 7.7 (L) 8.5 - 10.4 mg/dL    Phosphorus 1.9 (L) 2.5 - 4.5 mg/dL    Albumin 2.1 (L) 3.5 - 5.0 g/dL    Anion Gap 12 <=19 mmol/L   Vancomycin   Result Value Ref Range    Vancomycin 22.0 (H) 10.0 - 20.0 ug/mL   Type And Screen   Result Value Ref Range    ABO TYPE O     Rh TYPE POS     ANTIBODY SCREEN NEG    VERIFY ABO/Rh Group Test   Result Value Ref Range    ABO TYPE O     Rh TYPE POS    Basic Metabolic Panel   Result Value Ref Range    Glucose 132 (H) 65 - 99 mg/dL    Sodium 138 133 - 145 mmol/L    Potassium 3.1 (L) 3.4 - 5.1 mmol/L    Chloride 106 97 - 107 mmol/L    Bicarbonate 25 24 - 31 mmol/L    Urea Nitrogen 35 (H) 8 - 25 mg/dL    Creatinine 1.70 (H) 0.40 - 1.60 mg/dL    eGFR 41 (L) >60 mL/min/1.73m*2    Calcium 7.7 (L) 8.5 - 10.4 mg/dL    Anion Gap 7 <=19 mmol/L   CBC   Result Value Ref Range    WBC 13.6 (H) 4.4 - 11.3 x10*3/uL    nRBC 0.0 0.0 - 0.0 /100 WBCs    RBC 3.17 (L) 4.50 - 5.90 x10*6/uL    Hemoglobin 9.3 (L) 13.5 - 17.5 g/dL    Hematocrit 28.2 (L) 41.0 - 52.0 %    MCV 89 80 - 100 fL    MCH 29.3 26.0 - 34.0 pg    MCHC 33.0 32.0 - 36.0 g/dL    RDW 16.5 (H) 11.5 - 14.5 %    Platelets 277 150 - 450 x10*3/uL   Renal Function Panel   Result Value Ref Range    Glucose 90 65 - 99 mg/dL    Sodium 137 133 - 145 mmol/L    Potassium  3.2 (L) 3.4 - 5.1 mmol/L    Chloride 106 97 - 107 mmol/L    Bicarbonate 24 24 - 31 mmol/L    Urea Nitrogen 31 (H) 8 - 25 mg/dL    Creatinine 1.50 0.40 - 1.60 mg/dL    eGFR 48 (L) >60 mL/min/1.73m*2    Calcium 7.3 (L) 8.5 - 10.4 mg/dL    Phosphorus 2.6 2.5 - 4.5 mg/dL    Albumin 2.0 (L) 3.5 - 5.0 g/dL    Anion Gap 7 <=19 mmol/L   CBC   Result Value Ref Range    WBC 9.7 4.4 - 11.3 x10*3/uL    nRBC 0.0 0.0 - 0.0 /100 WBCs    RBC 3.11 (L) 4.50 - 5.90 x10*6/uL    Hemoglobin 9.3 (L) 13.5 - 17.5 g/dL    Hematocrit 28.0 (L) 41.0 - 52.0 %    MCV 90 80 - 100 fL    MCH 29.9 26.0 - 34.0 pg    MCHC 33.2 32.0 - 36.0 g/dL    RDW 16.9 (H) 11.5 - 14.5 %    Platelets 309 150 - 450 x10*3/uL       acute kidney injury function continue to improve his creatinine is down and urine output is much improved I will discontinue IV fluids continue to monitor renal function  Pneumonia getting better  Acute respiratory failure continue with oxygen therapy  Metabolic acidosis much improved  Hypokalemia replace potassium             Gal James MD

## 2024-01-12 NOTE — PROGRESS NOTES
Navarro Rabago is a 78 y.o. male on day 6 of admission presenting with Sepsis (CMS/HCC).      Subjective   Patient more awake/alert today, looks comfortable        Objective     Last Recorded Vitals  /55 (BP Location: Left arm, Patient Position: Lying)   Pulse 90   Temp 36.5 °C (97.7 °F) (Temporal)   Resp 16   Wt 114 kg (251 lb 5.2 oz)   SpO2 97%   Intake/Output last 3 Shifts:    Intake/Output Summary (Last 24 hours) at 1/12/2024 1241  Last data filed at 1/11/2024 1520  Gross per 24 hour   Intake 100 ml   Output 400 ml   Net -300 ml       Admission Weight  Weight: 104 kg (228 lb 9.9 oz) (01/06/24 0026)    Daily Weight  01/11/24 : 114 kg (251 lb 5.2 oz)    Image Results  ECG 12 lead  Sinus tachycardia with 1st degree AV block with Premature supraventricular complexes  Low voltage QRS  Left posterior fascicular block  Abnormal QRS-T angle, consider primary T wave abnormality  Abnormal ECG  When compared with ECG of 13-MAY-2021 13:46,  Sinus rhythm has replaced Atrial fibrillation  Vent. rate has increased BY  35 BPM  Left posterior fascicular block is now Present  QT has shortened  See ED provider note for full interpretation and clinical correlation  Confirmed by Alis Tejeda ([id number]) on 1/10/2024 5:08:28 PM  CT abdomen pelvis wo IV contrast  Narrative: STUDY:  CT Abdomen and Pelvis without IV Contrast; 1/10/2024 at 3:30 AM  INDICATION:  Back pain, ecchymosis; trauma.  COMPARISON:  US renal 1/8/2024, CT chest, abdomen and pelvis 1/5/2024.  ACCESSION NUMBER(S):  [id number]  ORDERING CLINICIAN:  DANA AMATO  TECHNIQUE:  CT of the abdomen and pelvis was performed.  Contiguous axial images  were obtained at 3 mm slice thickness through the abdomen and pelvis.   Coronal and sagittal reconstructions at 3 mm slice thickness were  performed. No intravenous contrast was administered.    Automated mA/kV exposure control was utilized and patient examination  was performed in strict accordance with  principles of ALARA.  FINDINGS:  Please note that the evaluation of vessels, lymph nodes and organs is  limited without intravenous contrast.      LOWER CHEST:  No cardiomegaly.  No pericardial effusion.  Lung bases are clear.     ABDOMEN:  Partially visualized portions of the lower chest showing small  bilateral pleural effusions and partial atelectasis of the bilateral  lower lobes increased from prior. Heart size normal. No pericardial  effusion.  Unenhanced liver without acute process. No discernible intrahepatic  ductal dilatation. Pericholecystic flu atrophic changes of the  pancreas. No acute abnormality of the spleen is identified. No acute  abnormality of the adrenal glands or kidneys no retroperitoneal  adenopathy. Atherosclerosis of the abdominal aorta without aneurysm.  Asymmetric enlargement of the RIGHT psoas musculature and  retroperitoneal fluid increased from 1/5/24.   Small amount of free fluid within the pelvis. No pelvic adenopathy  identified. Rodriguez catheter within the urinary bladder.  LEFT hip prostheses. No findings of acute fracture of the pelvis.  Spondylotic changes and facet arthrosis of the lumbar spine.   Impression: Impression:  RIGHT psoas and retroperitoneal hematoma. Without the use of  intravenous contrast difficult to determine the exact size of the  hemorrhage.  Increased small bilateral pleural effusions and partial atelectasis of  the lower lobes.  Findings discussed with Dr. DANA AMATO with verbal  understanding at approximately 1/10/2024 4:28 AM.  Signed by Keith Watson MD  CT head wo IV contrast  Narrative: STUDY:  CT Head without IV Contrast; 1/10/2024 at 3:30 AM  INDICATION:  Confusion.  COMPARISON:  None available.  ACCESSION NUMBER(S):  [id number]  ORDERING CLINICIAN:  DANA AMATO  TECHNIQUE:  Noncontrast axial CT scan of head was performed.  Angled reformats in  brain and bone windows were generated.  The images were reviewed in  bone, brain,  blood and soft tissue windows.  Automated mA/kV exposure control was utilized and patient examination  was performed in strict accordance with principles of ALARA.  FINDINGS:  CSF Spaces:  The ventricles, sulci and basal cisterns are prominent  from atrophy.  There is no extraaxial fluid collection.  Mild patchy  areas of low-attenuation are seen in the subcortical and deep  periventricular white matter suggesting areas of chronic microvascular  ischemia.     Parenchyma:  The grey-white differentiation is intact.  There is no  mass effect or midline shift.  There is no intracranial hemorrhage.     Calvarium:  The calvarium is unremarkable.     Paranasal sinuses and mastoids:  Visualized paranasal sinuses and  mastoids are clear.  Impression: Diffuse cerebral atrophy.  Suspected mild chronic small vessel white  matter ischemia.  No definite acute intracranial abnormality.  Signed by Shwan Echols, DO      Physical Exam    Relevant Results             Scheduled medications  [Held by provider] apixaban, 2.5 mg, oral, q12h DARIA  [Held by provider] aspirin, 81 mg, oral, Daily  ferrous sulfate (325 mg ferrous sulfate), 1 tablet, oral, Daily with breakfast  finasteride, 5 mg, oral, Daily  ipratropium-albuteroL, 3 mL, nebulization, 4x daily  levothyroxine, 50 mcg, oral, Daily  midodrine, 5 mg, oral, q8h  nystatin, 5 mL, Swish & Swallow, 4x daily  oxygen, , inhalation, q8h  pantoprazole, 40 mg, oral, Daily before breakfast  piperacillin-tazobactam, 3.375 g, intravenous, q6h  potassium chloride CR, 20 mEq, oral, Once  QUEtiapine, 12.5 mg, oral, BID      Continuous medications  norepinephrine, 0.01-1 mcg/kg/min, Last Rate: Stopped (01/08/24 1115)      PRN medications  PRN medications: acetaminophen, benzocaine-menthol, calcium carbonate, dextromethorphan-guaifenesin, dextrose 10 % in water (D10W), dextrose, glucagon, guaiFENesin, LORazepam, ondansetron ODT **OR** ondansetron  Results for orders placed or performed during the  hospital encounter of 01/06/24 (from the past 24 hour(s))   Renal Function Panel   Result Value Ref Range    Glucose 90 65 - 99 mg/dL    Sodium 137 133 - 145 mmol/L    Potassium 3.2 (L) 3.4 - 5.1 mmol/L    Chloride 106 97 - 107 mmol/L    Bicarbonate 24 24 - 31 mmol/L    Urea Nitrogen 31 (H) 8 - 25 mg/dL    Creatinine 1.50 0.40 - 1.60 mg/dL    eGFR 48 (L) >60 mL/min/1.73m*2    Calcium 7.3 (L) 8.5 - 10.4 mg/dL    Phosphorus 2.6 2.5 - 4.5 mg/dL    Albumin 2.0 (L) 3.5 - 5.0 g/dL    Anion Gap 7 <=19 mmol/L   CBC   Result Value Ref Range    WBC 9.7 4.4 - 11.3 x10*3/uL    nRBC 0.0 0.0 - 0.0 /100 WBCs    RBC 3.11 (L) 4.50 - 5.90 x10*6/uL    Hemoglobin 9.3 (L) 13.5 - 17.5 g/dL    Hematocrit 28.0 (L) 41.0 - 52.0 %    MCV 90 80 - 100 fL    MCH 29.9 26.0 - 34.0 pg    MCHC 33.2 32.0 - 36.0 g/dL    RDW 16.9 (H) 11.5 - 14.5 %    Platelets 309 150 - 450 x10*3/uL       Assessment/Plan          This patient has a urinary catheter   Reason for the urinary catheter remaining today?           Principal Problem:    Sepsis (CMS/[facility])  Active Problems:    Lactic acid acidosis    Acute renal failure (ARF) (CMS/[facility])    Leukocytosis    Bilateral pneumonia    Chronic respiratory failure (CMS/[facility])    Hypokalemia    Dehydration    Transaminitis    Acute encephalopathy    Hypotension    Aspiration/pneumonia- atbs as ordered, leucocytosis improving   Dysphagia- MBS per speech therapy done, diet as ordered   Sepsis/hypotension- improving,   off pressors, leucocytosis improving    Encephalopathy/confusion-supportive care, psych service on case    Hypokalemia- replace, follow up with BMP AM    A fib- rate controlled, was on apixaban-holding now, appreciate cardiology recommendations   New R retroperitoneal hematoma  - apixaban/ASA on hold, follow up with H/H  Erik-BETTER- IV hydration, follow up with BMP, nephrology on case, renal US  done    Medically stable, will follow along with consultants               Evan Bates MD

## 2024-01-12 NOTE — PROGRESS NOTES
Pulmonary Progress Note 01/12/24     Patient seen in follow-up for pneumonia and respiratory failure    Subjective   Interval History:   Tells me he is not doing well but not able to describe exactly why    Objective   Vital signs in last 24 hours:  Temp:  [36.1 °C (97 °F)-37.1 °C (98.8 °F)] 36.5 °C (97.7 °F)  Heart Rate:  [66-90] 90  Resp:  [16-18] 16  BP: ()/(55-74) 132/55  FiO2 (%):  [21 %-36 %] 32 %    Intake/Output last 3 shifts:  I/O last 3 completed shifts:  In: 1131.3 (9.9 mL/kg) [I.V.:981.3 (8.6 mL/kg); IV Piggyback:150]  Out: 1000 (8.8 mL/kg) [Urine:1000 (0.2 mL/kg/hr)]  Weight: 114 kg   Intake/Output this shift:  No intake/output data recorded.    Physical Exam  Vitals reviewed.   Constitutional:       General: He is not in acute distress.     Appearance: Normal appearance.   Cardiovascular:      Rate and Rhythm: Normal rate.      Pulses: Normal pulses.   Pulmonary:      Effort: Pulmonary effort is normal.      Breath sounds: Rales present. No wheezing.   Musculoskeletal:      Right lower leg: No edema.      Left lower leg: No edema.   Skin:     General: Skin is warm and dry.   Neurological:      Comments: Less confused than yesterday, weak   Psychiatric:         Mood and Affect: Mood normal.         Behavior: Behavior normal.         Scheduled medications  [Held by provider] apixaban, 2.5 mg, oral, q12h DARIA  [Held by provider] aspirin, 81 mg, oral, Daily  ferrous sulfate (325 mg ferrous sulfate), 1 tablet, oral, Daily with breakfast  finasteride, 5 mg, oral, Daily  ipratropium-albuteroL, 3 mL, nebulization, 4x daily  levothyroxine, 50 mcg, oral, Daily  midodrine, 5 mg, oral, q8h  nystatin, 5 mL, Swish & Swallow, 4x daily  oxygen, , inhalation, q8h  pantoprazole, 40 mg, oral, Daily before breakfast  piperacillin-tazobactam, 3.375 g, intravenous, q6h  potassium chloride CR, 20 mEq, oral, Once  QUEtiapine, 12.5 mg, oral, BID      Continuous medications  norepinephrine, 0.01-1 mcg/kg/min,  Last Rate: Stopped (01/08/24 1115)      PRN medications  PRN medications: acetaminophen, benzocaine-menthol, calcium carbonate, dextromethorphan-guaifenesin, dextrose 10 % in water (D10W), dextrose, glucagon, guaiFENesin, LORazepam, ondansetron ODT **OR** ondansetron     Labs:  Lab Results   Component Value Date     01/12/2024    K 3.2 (L) 01/12/2024     01/12/2024    CO2 24 01/12/2024    BUN 31 (H) 01/12/2024    CREATININE 1.50 01/12/2024    GLUCOSE 90 01/12/2024    CALCIUM 7.3 (L) 01/12/2024     Lab Results   Component Value Date    WBC 9.7 01/12/2024    HGB 9.3 (L) 01/12/2024    HCT 28.0 (L) 01/12/2024    MCV 90 01/12/2024     01/12/2024       Imaging:  ECG 12 lead    Result Date: 1/10/2024  Sinus tachycardia with 1st degree AV block with Premature supraventricular complexes Low voltage QRS Left posterior fascicular block Abnormal QRS-T angle, consider primary T wave abnormality Abnormal ECG When compared with ECG of 13-MAY-2021 13:46, Sinus rhythm has replaced Atrial fibrillation Vent. rate has increased BY  35 BPM Left posterior fascicular block is now Present QT has shortened See ED provider note for full interpretation and clinical correlation Confirmed by Alis Tejeda ([id number]) on 1/10/2024 5:08:28 PM    CT abdomen pelvis wo IV contrast    Result Date: 1/10/2024  STUDY: CT Abdomen and Pelvis without IV Contrast; 1/10/2024 at 3:30 AM INDICATION: Back pain, ecchymosis; trauma. COMPARISON: US renal 1/8/2024, CT chest, abdomen and pelvis 1/5/2024. ACCESSION NUMBER(S): [id number] ORDERING CLINICIAN: DANA AMATO TECHNIQUE: CT of the abdomen and pelvis was performed.  Contiguous axial images were obtained at 3 mm slice thickness through the abdomen and pelvis. Coronal and sagittal reconstructions at 3 mm slice thickness were performed. No intravenous contrast was administered.  Automated mA/kV exposure control was utilized and patient examination was performed in strict accordance  with principles of ALARA. FINDINGS: Please note that the evaluation of vessels, lymph nodes and organs is limited without intravenous contrast.  LOWER CHEST: No cardiomegaly.  No pericardial effusion.  Lung bases are clear.  ABDOMEN: Partially visualized portions of the lower chest showing small bilateral pleural effusions and partial atelectasis of the bilateral lower lobes increased from prior. Heart size normal. No pericardial effusion. Unenhanced liver without acute process. No discernible intrahepatic ductal dilatation. Pericholecystic flu atrophic changes of the pancreas. No acute abnormality of the spleen is identified. No acute abnormality of the adrenal glands or kidneys no retroperitoneal adenopathy. Atherosclerosis of the abdominal aorta without aneurysm. Asymmetric enlargement of the RIGHT psoas musculature and retroperitoneal fluid increased from 1/5/24. Small amount of free fluid within the pelvis. No pelvic adenopathy identified. Rodriguez catheter within the urinary bladder. LEFT hip prostheses. No findings of acute fracture of the pelvis. Spondylotic changes and facet arthrosis of the lumbar spine.     Impression: RIGHT psoas and retroperitoneal hematoma. Without the use of intravenous contrast difficult to determine the exact size of the hemorrhage. Increased small bilateral pleural effusions and partial atelectasis of the lower lobes. Findings discussed with Dr. DAAN AMATO with verbal understanding at approximately 1/10/2024 4:28 AM. Signed by Keith Watson MD    CT head wo IV contrast    Result Date: 1/10/2024  STUDY: CT Head without IV Contrast; 1/10/2024 at 3:30 AM INDICATION: Confusion. COMPARISON: None available. ACCESSION NUMBER(S): [id number] ORDERING CLINICIAN: DANA AMATO TECHNIQUE: Noncontrast axial CT scan of head was performed.  Angled reformats in brain and bone windows were generated.  The images were reviewed in bone, brain, blood and soft tissue windows.  Automated mA/kV exposure control was utilized and patient examination was performed in strict accordance with principles of ALARA. FINDINGS: CSF Spaces:  The ventricles, sulci and basal cisterns are prominent from atrophy.  There is no extraaxial fluid collection.  Mild patchy areas of low-attenuation are seen in the subcortical and deep periventricular white matter suggesting areas of chronic microvascular ischemia.  Parenchyma:  The grey-white differentiation is intact.  There is no mass effect or midline shift.  There is no intracranial hemorrhage.  Calvarium:  The calvarium is unremarkable.  Paranasal sinuses and mastoids:  Visualized paranasal sinuses and mastoids are clear.    Diffuse cerebral atrophy.  Suspected mild chronic small vessel white matter ischemia.  No definite acute intracranial abnormality. Signed by Shawn Echols DO    Transthoracic Echo (TTE) Complete    Result Date: 1/8/2024            [address], [place] [id number]             Phone [phone] TRANSTHORACIC ECHOCARDIOGRAM REPORT  Patient Name:      LILIAN MCDERMOTT CHERIE    Reading Physician:   [id number] Nance MD Study Date:        1/8/2024           Ordering Provider:   [id number] NANCE MRN/PID:           [id number]           Fellow: Accession#:        [id number]       Nurse: Date of Birth/Age: 1945 / 78      Sonographer:         Lilli Mata RDCS                    years Gender:            M                  Additional Staff: Height:            195.58 cm          Admit Date:          1/8/2024 Weight:            103.42 kg          Admission Status:    Inpatient - Routine BSA:               2.36 m2            Department Location: [facility] Blood Pressure: 96 /74 mmHg Study Type:    TRANSTHORACIC ECHO (TTE) COMPLETE Diagnosis/ICD: Other hypotension-I95.89 Indication:    hypotension CPT Codes:     Echo Complete w Full Doppler-[id number]   Study Detail: The following Echo studies were performed: 2D, M-Mode, Doppler and               color flow.  PHYSICIAN INTERPRETATION: Left Ventricle: Left ventricular systolic function is moderately decreased, with an estimated ejection fraction of 35-40%. Wall motion is abnormal. The left ventricular cavity size is mild to moderately dilated. Abnormal (paradoxical) septal motion, consistent with an intraventricular conduction delay. Spectral Doppler shows a normal pattern of left ventricular diastolic filling. There is moderate to severe hypokinesis of the inferoposterior wall. There is mild hypokinesis and dyssynchrony of the anteroseptal wall. Left Atrium: The left atrium is mild to moderately dilated. Right Ventricle: The right ventricle is upper limits of normal in size. There is normal right ventriclar wall thickness. There is normal right ventricular global systolic function. Right Atrium: The right atrium is mildly dilated. Aortic Valve: The aortic valve is trileaflet. The aortic valve appears tricuspid and non-restricted. There is mild aortic valve cusp calcification. There is no evidence of reduced aortic valve leaflet excursion excursion. There is evidence of mildly elevated transaortic gradients consistent with sclerosis of the aortic valve. There is mild aortic valve regurgitation. The peak instantaneous gradient of the aortic valve is 10.8 mmHg. The mean gradient of the aortic valve is 6.0 mmHg. There is moderate sclerotic calcification of the noncoronary aortic valve cusp. Mitral Valve: The mitral valve is normal in structure. There is normal mitral valve leaflet mobility. There is mild to moderate mitral valve regurgitation. Tricuspid Valve: The tricuspid valve is structurally normal. There is normal tricuspid valve leaflet mobility. There is mild to moderate tricuspid regurgitation. The Doppler estimated RVSP is mildly elevated at 43.0 mmHg. Pulmonic Valve: The pulmonic valve is structurally normal.  There is trace pulmonic valve regurgitation. Pericardium: There is no pericardial effusion noted. Aorta: The aortic root is normal. There is no dilatation of the aortic arch. There is no dilatation of the ascending aorta. There is no dilatation of the aortic root. Pulmonary Artery: The pulmonary artery is normal in size. The tricuspid regurgitant velocity is 2.96 m/s, and with an estimated right atrial pressure of 8 mmHg, the estimated pulmonary artery pressure is mildly elevated with the RVSP at 43.0 mmHg. The estimated PASP is 43 mmHg. Systemic Veins: The inferior vena cava appears moderately dilated.  CONCLUSIONS:  1. Left ventricular systolic function is moderately decreased with a 35-40% estimated ejection fraction.  2. There is moderate to severe hypokinesis of the inferoposterior wall.     There is mild hypokinesis and dyssynchrony of the anteroseptal wall.  3. The left atrium is mild to moderately dilated.  4. Mild to moderate mitral valve regurgitation.  5. Mild to moderate tricuspid regurgitation.  6. Mildly elevated RVSP.  7. Aortic valve sclerosis.  8. There is moderate sclerotic calcification of the noncoronary aortic valve cusp.  9. Mild aortic valve regurgitation. 10. The estimated PASP is 43 mmHg. 11. The inferior vena cava appears moderately dilated. QUANTITATIVE DATA SUMMARY: 2D MEASUREMENTS:                          Normal Ranges: IVSd:          1.08 cm   (0.6-1.1cm) LVPWd:         0.98 cm   (0.6-1.1cm) LVIDd:         5.65 cm   (3.9-5.9cm) LVIDs:         4.00 cm LV Mass Index: 97.7 g/m2 LV % FS        29.2 % LA VOLUME:                               Normal Ranges: LA Vol A4C:        59.2 ml    (22+/-6mL/m2) LA Vol Index A4C:  25.0ml/m2 LA Area A4C:       20.3 cm2 LA Major Axis A4C: 5.9 cm LA Volume Index:   25.3 ml/m2 LA Vol A4C:        54.4 ml RA VOLUME BY A/L METHOD:                               Normal Ranges: RA Vol A4C:        38.2 ml    (8.3-19.5ml) RA Vol Index A4C:  16.2 ml/m2 RA Area A4C:        15.2 cm2 RA Major Axis A4C: 5.1 cm M-MODE MEASUREMENTS:                  Normal Ranges: Ao Root: 3.70 cm (2.0-3.7cm) AORTA MEASUREMENTS:                      Normal Ranges: Ao Sinus, d: 3.21 cm (2.1-3.5cm) LV SYSTOLIC FUNCTION BY 2D PLANIMETRY (MOD):                     Normal Ranges: EF-A4C View: 54.1 % (>=55%) EF-A2C View: 51.4 % EF-Biplane:  54.1 % AORTIC VALVE:                                    Normal Ranges: AoV Vmax:                1.64 m/s  (<=1.7m/s) AoV Peak PG:             10.8 mmHg (<20mmHg) AoV Mean P.0 mmHg  (1.7-11.5mmHg) LVOT Max Angel Luis:            0.99 m/s  (<=1.1m/s) AoV VTI:                 31.80 cm  (18-25cm) LVOT VTI:                19.70 cm LVOT Diameter:           2.00 cm   (1.8-2.4cm) AoV Area, VTI:           1.95 cm2  (2.5-5.5cm2) AoV Area,Vmax:           1.89 cm2  (2.5-4.5cm2) AoV Dimensionless Index: 0.62 AORTIC INSUFFICIENCY: AI Vmax:       3.44 m/s AI Half-time:  677 msec AI Decel Rate: 150.00 cm/s2  RIGHT VENTRICLE: RV Basal 3.83 cm RV Mid   4.43 cm RV Major 7.2 cm TRICUSPID VALVE/RVSP:                             Normal Ranges: Peak TR Velocity: 2.96 m/s RV Syst Pressure: 43.0 mmHg (< 30mmHg) IVC Diam:         2.68 cm  [id number] Lb Nance MD Electronically signed on 2024 at 9:32:37 PM  ** Final **     FL modified barium swallow study    Result Date: 2024  Interpreted By:  Antoniou, Elias, and Casey Corinne STUDY: FL MODIFIED BARIUM SWALLOW STUDY;; 2024 1:27 pm   INDICATION: Signs/Symptoms:suspected pharyngeal dysphagia.   COMPARISON: None.   ACCESSION NUMBER(S): [id number]   ORDERING CLINICIAN: JOSE DAVID SOLER   TECHNIQUE: MBSS completed. Informed verbal consent obtained prior to completion of exam. Trials of thin, nectar thick, honey thick, puree, and regular solids given. Fluoroscopy time :  3 minutes and 54 seconds. Total dose air kerma 21.15 mGy..   SLP: Corinne E. Casey, M.A. CCC-SLP Phone/Pager: [phone] opt 2, via [employer], or via [employer]  secure chat   SPEECH FINDINGS: Reason for referral: Dysphagia, aspiration pneumonia Patient hx: Sepsis, HTN, HLD, GERD, CHF Respiratory status: Nasal cannula Previous diet: Regular and thin   FINAL SPEECH RECOMMENDATIONS   Diet recommendations/feeding strategies: Minced/moist solids with thin liquids, NO STRAWS.   Upright seating, no straw. Meds crushed in puree. Please downgrade to nectar thick and notify SLP if s/s of aspiration present with PO intake.   Follow-up speech therapy recommended: Yes.   Education provided: Yes. Results and recs discussed with pt, RN, and physician with verbalized understanding noted.   Treatment Provided today: Yes, see progress notes   Repeat study/ dc plan: Continue skilled speech therapy services after discharge   Mechanics of the swallow summary: *Oral phase: Moderate impairment as characterized by disorganized/prolonged mastication, poor tongue control during bolus hold with premature posterior phrayngeal spillage, and delayed swallow onset (at level of pyriform sinuese for thin and nectar liquids, and at valleculae for honey liquids, puree, and cracker consistencies *Pharyngeal phase: Mild impairment as characterized by decreased tongue base retraction and trace vallecular retention *Esophageal phase: WNL, limited assessment   SLP impressions with severity rating: Pt. Presenting with singular episode of aspiration during initial trial of thin liquid.  Penetration with thin liquid via straw (successive swallows) and with nectar liquids via tsp. No further penetration nor aspiration presents with remaining trials of thin liquids via cup, nectar liquids via cup, honey liquids, puree, nor cracker trials.   Pt noted with mild oral and pharyngeal dysphagia as characterized by prolonged mastication, premature posterior pharyngeal spillage, delayed swallow onset, and diminished tongue base retraction.   Thin Liquids (MBSS) Rosenbek's Penetration Aspiration Scale, Thin Liquids (MBSS): 7.  OVERT ASPIRATION, material is not cleared - contrast passes glottis, visible residue, W/pt response Singluar episode of aspiration on initial trial of thin via tsp - appears r/t premature posterior pharyngeal spillage and occurs prior to swallow onset. Penetration without aspiration (PAS 2) during trials of thin liquid via straw (successive swallows); No penetration nor aspiration with thin liquids via cup (PAS 1) Response to Aspiration, Thin Liquid (MBSS): unproductive reflexive involuntary cough,       Nectar Thick Liquids (MBSS) Rosenbek's Penetration Aspiration Scale, Nectar thick liquids (MBSS): 2. PENETRATION that CLEARS - contrast enter airway, above vocal cords, no residue       Honey Thick Liquids (MBSS) Rosenbek's Penetration Aspiration Scale, Honey thick liquids (MBSS): 1. NO ASPIRATION & NO PENETRATION - no aspiration, contrast does not enter airway     Response to Pharyngeal Residue, Honey thick liquids (MBSS): no spontaneous response,   Purees (MBSS) Rosenbek's Penetration Aspiration Scale, Purees (MBSS): 1. NO ASPIRATION & NO PENETRATION - no aspiration, contrast does not enter airway     Response to Pharyngeal Residue, Purees (MBSS): no spontaneous response, Clears with thin liquid wash,     Solids (MBSS) Rosenbek's Penetration Aspiration Scale, Solids (MBSS): 1. NO ASPIRATION & NO PENETRATION - no aspiration, contrast does not enter airway   Response to Pharyngeal Residue, Solids (MBSS): no spontaneous response, Clears with thin liquid wash. ,     Speech Therapy section of this report signed by Corinne E. Casey, M.A. CCC-SLP on 1/8/2024 at 1:41 pm.   RADIOLOGY FINDINGS: Prolonged oral phase with uncoordinated mastication/lingual motion. Single episode of aspiration with thin barium. A couple of additional episodes of laryngeal penetration without aspiration. No laryngeal penetration or aspiration seen with thicker consistencies of barium. Retention of barium is noted in the vallecula and piriform  sinuses.   Radiology section of this report signed by .       Dysphagia as above.   MACRO: None   Signed by: Roby Villarreal 1/8/2024 4:31 PM Dictation workstation:   [id number]    US renal complete    Result Date: 1/8/2024  Interpreted By:  Cathy Hernandez, STUDY: US RENAL COMPLETE; 1/8/2024 11:45 am   INDICATION: Acute renal insufficiency.   COMPARISON: None.   ACCESSION NUMBER(S): [id number]   ORDERING CLINICIAN: PERCY CABRERA   TECHNIQUE: Grayscale and color Doppler imaging of the kidneys is performed portably..   FINDINGS: The right kidney measures 12.1 cm x 7.2 cm x 5.1 cm. A simple right renal cyst measuring 1.9 cm in size is seen. The left kidney measures 12.6 cm x 5.6 cm x 6.4 cm. There is no shadowing calculus, hydronephrosis, or solid mass identified. The renal cortical echogenicity is normal bilaterally with renal cortical thickness within normal limits.   Cursory evaluation of urinary bladder shows presence of a Rodriguez catheter with bladder decompressed.       1.9 cm simple right renal cyst with unremarkable appearance of the left kidney.   No renal atrophy or hydronephrosis.   MACRO: None.   Signed by: Cathy Hernandez 1/8/2024 12:04 PM Dictation workstation:   [id number]    XR chest 1 view    Result Date: 1/7/2024  Interpreted By:  Cathy Hernandez, STUDY: XR CHEST 1 VIEW 1/7/2024 7:36 am   INDICATION: Follow-up pneumonia   COMPARISON: 05/29/2014 as well as CT examination of the chest done on 01/05/2024.   ACCESSION NUMBER(S): [id number]   ORDERING CLINICIAN: EMILY MAKI   TECHNIQUE: AP erect view of the chest   FINDINGS: The heart is at the upper limits of normal in size. There is infiltrate and airspace consolidation observed centrally within the right lung with upper lobar predominance. There is also some patchy infiltrate at the left lung base. No obvious pleural abnormality is seen.       Infiltrate and airspace consolidation identified within the right lung most pronounced centrally and within the right  upper lobe with mild left basilar infiltrate.   Signed by: Cathy Hernandez 1/7/2024 8:11 AM Dictation workstation:   [id number]    CT chest abdomen pelvis w IV contrast    Result Date: 1/5/2024  Interpreted By:  Yordy Urena, STUDY: CT CHEST ABDOMEN PELVIS W IV CONTRAST;  1/5/2024 6:33 pm   INDICATION: Signs/Symptoms:abd pain, sob.   COMPARISON: None.   ACCESSION NUMBER(S): [id number]   ORDERING CLINICIAN: NELLA MELGOZA   TECHNIQUE: Contiguous axial images of the chest, abdomen and pelvis were obtained after the intravenous administration of  contrast. Coronal and sagittal reformatted images were obtained from the axial images.   FINDINGS: CT CHEST:   No axillary lymphadenopathy. 1.6 cm right paratracheal lymph node and 1.5 cm subcarinal lymph node. There is limited evaluation for hilar lymphadenopathy secondary stripping motion.   The heart is normal in size. Coronary artery vascular calcifications. No significant pericardial effusion.   There is pulmonary emphysematous change. There is consolidative opacity in the right upper lobe and right lower lobe compatible with pneumonia and also minimal opacity in the left lower lobe. Trace pleural effusions. No pneumothorax.   Multilevel degenerative change of the thoracic spine.     CT ABDOMEN PELVIS:   Evaluation of the abdomen and pelvis is limited secondary to patient motion. No evidence of liver mass. The gallbladder is present, not well evaluated secondary to motion. No dilatation common bile duct.   Atrophy of the pancreas.   No splenomegaly.   Question mild nodularity of the left adrenal gland. The right adrenal gland appears unremarkable.   Symmetric enhancement of the kidneys. Right renal cysts with the largest measuring 3.1 cm and several bowel subcentimeter hypodensity too small to characterize. No hydronephrosis.   Atherosclerotic calcification of the aorta and abdominal and pelvic vasculature.   No evidence of bowel obstruction. Evaluation the bowel is  otherwise limited secondary patient motion.   Urinary bladder is underdistended and not well evaluated. 18 mm right posterior bladder diverticulum.   Postsurgical change of left hip arthroplasty resulting in beam hardening artifact and limiting evaluation the pelvis.   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       Consolidative opacity in the right upper lobe and lower lobe compatible with pneumonia and also mild left basilar opacity and trace pleural effusions.   No definite evidence of acute abnormality of the abdominal viscera within the limitations of the motion degraded examination.   MACRO: None   Signed by: Yordy Urena 1/5/2024 6:49 PM Dictation workstation:   [id number]              Assessment/Plan   Principal Problem:    Sepsis (CMS/HCC)  Active Problems:    Lactic acid acidosis    Acute renal failure (ARF) (CMS/HCC)    Leukocytosis    Bilateral pneumonia    Chronic respiratory failure (CMS/HCC)    Hypokalemia    Dehydration    Transaminitis    Acute encephalopathy    Hypotension    Looks better to me than yesterday.     Supplemental 02  Abx  Bronchodilators  PT/OT  Trend h/h    Available to see as needed per your request this weekend      Jaren Zapien MD  Pulmonary Medicine   [facility]       LOS: 6 days

## 2024-01-12 NOTE — PROGRESS NOTES
"Navarro Rabago is a 78 y.o. male on day 6 of admission presenting with Sepsis (CMS/HCC).    Subjective   Patient feels somewhat washed out but no chest pain palpitations or unusual shortness of breath       Objective     Physical Exam  Eyes:      Conjunctiva/sclera: Conjunctivae normal.   Cardiovascular:      Rate and Rhythm: Normal rate. Rhythm irregularly irregular.      Heart sounds:      No gallop.   Pulmonary:      Breath sounds: Normal breath sounds. No wheezing, rhonchi or rales.   Abdominal:      Palpations: Abdomen is soft.   Neurological:      General: No focal deficit present.      Mental Status: He is alert.       Constitutional:       Appearance: Not in distress.   Eyes:      Conjunctiva/sclera: Conjunctivae normal.   Neck:      Vascular: JVD normal.   Pulmonary:      Breath sounds: Normal breath sounds. No wheezing. No rhonchi. No rales.   Cardiovascular:      Normal rate. Irregularly irregular rhythm.      Murmurs: There is no murmur.      No gallop.  No click. No rub.   Abdominal:      Palpations: Abdomen is soft.   Neurological:      General: No focal deficit present.      Mental Status: Alert.        Last Recorded Vitals  Blood pressure 112/63, pulse 71, temperature 37.1 °C (98.8 °F), temperature source Temporal, resp. rate 16, height 1.956 m (6' 5\"), weight 114 kg (251 lb 5.2 oz), SpO2 97 %.  Intake/Output last 3 Shifts:  I/O last 3 completed shifts:  In: 1131.3 (9.9 mL/kg) [I.V.:981.3 (8.6 mL/kg); IV Piggyback:150]  Out: 1000 (8.8 mL/kg) [Urine:1000 (0.2 mL/kg/hr)]  Weight: 114 kg     Relevant Results                This patient has a urinary catheter   Reason for the urinary catheter remaining today?                Assessment/Plan   Principal Problem:    Sepsis (CMS/HCC)  Active Problems:    Lactic acid acidosis    Acute renal failure (ARF) (CMS/HCC)    Leukocytosis    Bilateral pneumonia    Chronic respiratory failure (CMS/HCC)    Hypokalemia    Dehydration    Transaminitis    Acute " encephalopathy    Hypotension    Principal Problem:    Sepsis (CMS/HCC)  Active Problems:    Lactic acid acidosis    Acute renal failure (ARF) (CMS/HCC)    Leukocytosis    Bilateral pneumonia    Chronic respiratory failure (CMS/HCC)    Hypokalemia    Dehydration    Transaminitis    Acute encephalopathy    Hypotension  Iliopsoas hematoma  ASHD  Chronic systolic heart failure  Longstanding persistent atrial fibrillation       1/6: The patient is a 78-year-old white male with a history of longstanding coronary artery disease with multiple PCI procedures.  He does have ischemic congestive cardiomyopathy with an echocardiogram performed on 9/26/2023 estimating his LV ejection fraction of 35-40% with 2+ mitral valve regurgitation moderate left atrial enlargement mild RV chamber dilatation and a normal estimated PA systolic pressure.  He also has a history of longstanding persistent now permanent atrial fibrillation for which she underwent multiple electrical DC cardioversions and a radiofrequency ablation procedure.  He is currently on a rate control strategy.  He has a history of former smoking COPD with O2 dependence.  He is admitted with increasing weakness shortness of breath cough with evidence of right-sided pneumonia by the initial chest CT for which she has been started on empiric IV antibiotics.  He does remain in atrial fibrillation borderline increase in ventricular rate and will begin low-dose metoprolol 25 mg twice daily.  He has been continued on Eliquis anticoagulation 5 mg twice daily along with a low-dose aspirin for CAD as well as statin therapy.  Will continue the modest IV fluid hydration given the transient hypotension in the emergency room with the potassium supplement.  Will check a repeat echocardiogram.  Follow-up chest x-ray tomorrow.      1/7: Patient's seen and events reviewed in detail.  He is lying supine somewhat fatigued no overt respiratory distress on nasal cannula.  He did develop  hypotension with systolic blood pressure values in the lower 80 mmHg range.  He was given 500 cc of IV normal saline fluid bolus remains on the maintenance infusion at 100 cc/h.  He was placed on low-dose Levophed for pressure support.  Comprehensive metabolic panel noted of both for acute on chronic renal insuf transaminases remain elevated  ALT 68 bilirubin 1.4.  Transaminitis presumably related to sepsis hypotension.  Gastroenterology is following.  Patient remains in the atrial fibrillation with controlled ventricular rate and low-dose metoprolol.  Will reduce dose of Eliquis to 2.5 mg twice daily.  Acute on chronic renal insufficiency presumably due to sepsis and hypotension creatinine is risen from 1.4-2.2 today and urine output has diminished.  The repeat chest x-ray again demonstrates infiltration consolidation within the right lung predominantly centrally and in the right upper lobe.  Patient currently on IV Zosyn azithromycin and vancomycin.     1/8: Patient still receiving low-dose IV norepinephrine to support blood pressure.  IV antibiotic therapy continues as well.  Remains in atrial fibrillation with controlled heart rate response.  Eliquis dose has been decreased down to 2.5 mg twice daily given the renal insufficiency.  Will continue to follow renal function.  Patient slowly improving.     1/9: Clinically overall improved over the last 24 hours.  IV norepinephrine being titrated down gradually.  Patient remains in atrial fibrillation with a well-controlled heart rate.  Serum creatinine unchanged at 2.6.  Potassium at 3.0 thus supplement will be needed.  Continue antibiotic therapy, patient continues to gradually improve.     1/10: Stable over the past 24 hours.  Now off pressor support.  Continues in a rate controlled atrial fibrillation.  Creatinine has improved to current of 2.1.  White blood cell count has significantly increased to current of 17.5.  Hemoglobin stable at 10.2.  Calcium does  remain low at 3.1.  I have ordered for further oral repletion.  He is chest pain-free breathing comfortably nasal cannula oxygen.  Breath sounds are significant diminished throughout.  Does continue on antibiotics as directed by admitting.  Overall slowly improving.  Will follow with you.     1/11: Blood pressure has improved considerably over the last 24 hours.  As noted yesterday patient found to have evidence of an iliopsoas hematoma and antiplatelet and anticoagulant therapy has been placed on hold.  Hemoglobin has remained stable at 9.3 on morning labs.  Serum creatinine continues to improve and has decreased down to 1.7.  Potassium level remains suppressed and will require supplementation.  Heart rate is adequately controlled on current regimen.  Patient continues to slowly improve.    1/12: Blood pressure continues to improve and is now stabilized significantly.  As noted previously anticoagulation has been held secondary to iliopsoas hematoma.  Kidney function also continues to improve with serum creatinine now decreased to 1.5.  Remains hypokalemic and is receiving potassium supplementation.  The patient continues to improve on a daily basis.             I spent 15 minutes in the professional and overall care of this patient.      Sanford Neves, DO

## 2024-01-12 NOTE — PROGRESS NOTES
Speech-Language Pathology    Inpatient Speech Language Pathology Dysphagia Treatment note     Patient Name: Navarro Rabago  MRN: [id number]  : 1945  Today's Date: 24  Time Calculation  Start Time: 1140  Stop Time: 1205  Time Calculation (min): 25 min     Total Number of Visits: 3/3 (, MON )    PLAN:  Skilled speech therapy for dysphagia treatment continues to be warranted to provide training and instruction regarding the use of compensatory swallow strategies, for pt/caregiver education in order to reduce risk of aspiration, dehydration and malnutrition. , to assess tolerance of diet , to determine ability to upgrade diet after PO trials with SLP     SLP Frequency: 3x per week  Discussed POC: Patient, Nursing, Physician  Discussed Risks/Benefits: Patient, Yes, Nursing, Physician  Patient/Caregiver Agreeable: Yes  SLP - OK to Discharge: Yes    Recommended Diet:   Solid Diet Recommendations: Minced & moist/ground (IDDSI Level 5)  Liquid Diet Recommendations: Thin (IDDSI Level 0), Other (Comments) (NO STRAWS)  Compensatory strategies: small bites/sips, upright 90 degrees for intake   Medication administration: crushed or whole in puree    Subjective:  Pt. Seen at bedside for skilled dysphagia treatment.   Pain:  Pain Assessment  Pain Assessment: 0-10  Pain Score: 4  Pain Type: Acute pain  Pain Location: Generalized  Pain Interventions: Repositioned Denies pain        Oxygen Status:   nasal cannula      Goals:   1) Patient will tolerate recommended diet without observed clinical signs of aspiration,    Current Session: tolerates PO trials without overt s/s of aspiration  2) Patient will demonstrate appropriate strategies for swallowing safety,    Current Session: Reviewed strategies with pt with fair follow through during PO trials.   3) Patient will progress to advanced diet    Current Session: not targeted  4) Pt to participate in assessment of oropharyngeal swallow function via MBSS for assessment of  possible aspiration and to determine least restrictive diet for meeting pt's nutritional and hydration needs.  - Goal met 1/8  5) ADD GOAL: Pt to participate in oropharyngeal therapeutic exercises to help improve oropharyngeal swallow function.   Current Session: not targeted    SLP Assessment:  See details above      Treatment Outcome:       Treatment Tolerance: Patient tolerated treatment well  Prognosis: Good   Barriers: Cognition   Medical Staff Made Aware: Yes       Education:  Pt. Given skilled instruction on plan of care and compensatory swallowing strategies  Pt. gave verbal understanding

## 2024-01-12 NOTE — PROGRESS NOTES
Physical Therapy    Physical Therapy Treatment    Patient Name: Navarro Rabago  MRN: [id number]  Today's Date: 1/12/2024  Time Calculation  Start Time: 1305  Stop Time: 1335  Time Calculation (min): 30 min       Assessment/Plan   PT Assessment  Rehab Prognosis: Fair  End of Session Communication: Bedside nurse  End of Session Patient Position: Bed, 4 rail up, Alarm on     PT Plan  Treatment/Interventions: Bed mobility, Transfer training, Balance training, Neuromuscular re-education, Strengthening, Endurance training, Therapeutic exercise, Therapeutic activity  PT Plan: Skilled PT  PT Frequency: 4 times per week  PT Discharge Recommendations: Moderate intensity level of continued care  Equipment Recommended upon Discharge: Lift  PT Recommended Transfer Status: Total assist, Assist x2  PT - OK to Discharge: Yes      General Visit Information:      General  Reason for Referral: impaired mobility  Past Medical History Relevant to Rehab: CHF, afib, CAD, COPD, HTN, hernia  Family/Caregiver Present: Yes  Caregiver Feedback: spouse  Co-Treatment Reason: safety wtih mobility  Prior to Session Communication: Bedside nurse  Patient Position Received: Bed, 4 rail up, Alarm on  Preferred Learning Style: verbal  General Comment: Cleared by nurse to see. Patient agreeable.    Subjective   Precautions:  Precautions  Medical Precautions: Fall precautions, Oxygen therapy device and L/min  Precautions Comment: + rock cath, IV, Rt wrist arterial line, 6L O2 via NC, telemetry  Vital Signs:       Objective   Pain:  Pain Assessment  Pain Assessment: 0-10  Pain Score: 0 - No pain  Cognition:  Cognition  Overall Cognitive Status: Impaired  Orientation Level: Disoriented to place, Disoriented to time  Postural Control:     Extremity/Trunk Assessments:    Activity Tolerance:     Treatments:  Bed Mobility  Bed Mobility: Yes  Bed Mobility 1  Bed Mobility 1: Supine to sitting  Level of Assistance 1: Dependent (x 2 Assist for trunk up and B LEs  over the EOB.Draw sheet assist)  Bed Mobility Comments 1: x 2 for trunk up and B LEs over the EOB Use of draw sheet (On EOB x 20 minutes initially Max A for upright posture then Min A with hand placement on back of chair supported)  Bed Mobility 2  Bed Mobility  2: Sitting to supine  Level of Assistance 2: Dependent (x 2)  Bed Mobility Comments 2: Assist for trunk down and B Les into bed  Bed Mobility 3  Bed Mobility 3: Scooting  Level of Assistance 3: Dependent  Bed Mobility Comments 3: 2 person assist to move patient with draw sheet to [facility]         Outcome Measures:  [facility] Basic Mobility  Turning from your back to your side while in a flat bed without using bedrails: Total  Moving from lying on your back to sitting on the side of a flat bed without using bedrails: Total  Moving to and from bed to chair (including a wheelchair): Total  Standing up from a chair using your arms (e.g. wheelchair or bedside chair): Total  To walk in hospital room: Total  Climbing 3-5 steps with railing: Total  Basic Mobility - Total Score: 6    Education Documentation  Mobility Training, taught by Ashley Lyn PTA at 1/12/2024  3:21 PM.  Learner: Patient  Readiness: Acceptance  Method: Explanation  Response: Verbalizes Understanding    Education Comments  No comments found.        OP EDUCATION:       Encounter Problems       Encounter Problems (Active)       Balance       Sitting Balance (Progressing)       Start:  01/08/24    Expected End:  01/16/24       Pt will demonstrate good sitting balance to promote safe engagement with out of bed activities           Standing Balance (Progressing)       Start:  01/08/24    Expected End:  01/16/24       Pt will demonstrate good static standing balance to promote safe participation with out of bed activity, transfers, and mobility              Mobility       Ambulation (Progressing)       Start:  01/08/24    Expected End:  01/16/24       Pt will ambulate 50' modified independent assist with  walker to promote safe home mobility              Pain - Adult          Safety       Safe Mobility Techniques (Progressing)       Start:  01/08/24    Expected End:  01/16/24       Pt will correctly identify and demonstrate safe mobility techniques to reduce their risks for falls during their acute care stay               Transfers       Supine to sit (Progressing)       Start:  01/08/24    Expected End:  01/16/24       Pt will transfer supine to sitting at edge of bed with modified independent assist to promote acute care out of bed activity           Sit to stand (Progressing)       Start:  01/08/24    Expected End:  01/16/24       Pt will transfer sit to standing position with modified independent assist and walker to promote safe out of bed activity           Bed to chair (Progressing)       Start:  01/08/24    Expected End:  01/16/24       Pt will transfer from sitting edge of bed to the chair with modified independent assist and walker to promote out of bed activity and reduce the risks of prolonged acute care bedrest

## 2024-01-13 LAB
ANION GAP SERPL CALC-SCNC: 8 MMOL/L
BUN SERPL-MCNC: 23 MG/DL (ref 8–25)
CALCIUM SERPL-MCNC: 7.5 MG/DL (ref 8.5–10.4)
CHLORIDE SERPL-SCNC: 107 MMOL/L (ref 97–107)
CO2 SERPL-SCNC: 24 MMOL/L (ref 24–31)
CREAT SERPL-MCNC: 1.2 MG/DL (ref 0.4–1.6)
EGFRCR SERPLBLD CKD-EPI 2021: 63 ML/MIN/1.73M*2
ERYTHROCYTE [DISTWIDTH] IN BLOOD BY AUTOMATED COUNT: 16.9 % (ref 11.5–14.5)
GLUCOSE SERPL-MCNC: 93 MG/DL (ref 65–99)
HCT VFR BLD AUTO: 27.9 % (ref 41–52)
HGB BLD-MCNC: 9.1 G/DL (ref 13.5–17.5)
MCH RBC QN AUTO: 29.4 PG (ref 26–34)
MCHC RBC AUTO-ENTMCNC: 32.6 G/DL (ref 32–36)
MCV RBC AUTO: 90 FL (ref 80–100)
NRBC BLD-RTO: 0 /100 WBCS (ref 0–0)
PLATELET # BLD AUTO: 363 X10*3/UL (ref 150–450)
POTASSIUM SERPL-SCNC: 3.4 MMOL/L (ref 3.4–5.1)
RBC # BLD AUTO: 3.09 X10*6/UL (ref 4.5–5.9)
SODIUM SERPL-SCNC: 139 MMOL/L (ref 133–145)
WBC # BLD AUTO: 8.7 X10*3/UL (ref 4.4–11.3)

## 2024-01-13 PROCEDURE — 2500000001 HC RX 250 WO HCPCS SELF ADMINISTERED DRUGS (ALT 637 FOR MEDICARE OP): Performed by: INTERNAL MEDICINE

## 2024-01-13 PROCEDURE — 2500000004 HC RX 250 GENERAL PHARMACY W/ HCPCS (ALT 636 FOR OP/ED): Performed by: INTERNAL MEDICINE

## 2024-01-13 PROCEDURE — 99232 SBSQ HOSP IP/OBS MODERATE 35: CPT | Performed by: INTERNAL MEDICINE

## 2024-01-13 PROCEDURE — 2060000001 HC INTERMEDIATE ICU ROOM DAILY

## 2024-01-13 PROCEDURE — 9420000001 HC RT PATIENT EDUCATION 5 MIN

## 2024-01-13 PROCEDURE — 94760 N-INVAS EAR/PLS OXIMETRY 1: CPT

## 2024-01-13 PROCEDURE — 36415 COLL VENOUS BLD VENIPUNCTURE: CPT | Performed by: INTERNAL MEDICINE

## 2024-01-13 PROCEDURE — 2500000002 HC RX 250 W HCPCS SELF ADMINISTERED DRUGS (ALT 637 FOR MEDICARE OP, ALT 636 FOR OP/ED): Performed by: INTERNAL MEDICINE

## 2024-01-13 PROCEDURE — 94640 AIRWAY INHALATION TREATMENT: CPT

## 2024-01-13 PROCEDURE — 2500000005 HC RX 250 GENERAL PHARMACY W/O HCPCS: Performed by: INTERNAL MEDICINE

## 2024-01-13 PROCEDURE — 80048 BASIC METABOLIC PNL TOTAL CA: CPT | Performed by: INTERNAL MEDICINE

## 2024-01-13 PROCEDURE — 85027 COMPLETE CBC AUTOMATED: CPT | Performed by: INTERNAL MEDICINE

## 2024-01-13 RX ORDER — POTASSIUM CHLORIDE 20 MEQ/1
20 TABLET, EXTENDED RELEASE ORAL ONCE
Status: COMPLETED | OUTPATIENT
Start: 2024-01-13 | End: 2024-01-13

## 2024-01-13 RX ADMIN — IPRATROPIUM BROMIDE AND ALBUTEROL SULFATE 3 ML: 2.5; .5 SOLUTION RESPIRATORY (INHALATION) at 08:39

## 2024-01-13 RX ADMIN — FERROUS SULFATE TAB 325 MG (65 MG ELEMENTAL FE) 1 TABLET: 325 (65 FE) TAB at 09:53

## 2024-01-13 RX ADMIN — IPRATROPIUM BROMIDE AND ALBUTEROL SULFATE 3 ML: 2.5; .5 SOLUTION RESPIRATORY (INHALATION) at 20:03

## 2024-01-13 RX ADMIN — QUETIAPINE FUMARATE 12.5 MG: 25 TABLET ORAL at 09:54

## 2024-01-13 RX ADMIN — PIPERACILLIN SODIUM AND TAZOBACTAM SODIUM 3.38 G: 3; .375 INJECTION, SOLUTION INTRAVENOUS at 13:34

## 2024-01-13 RX ADMIN — Medication: at 23:00

## 2024-01-13 RX ADMIN — PIPERACILLIN SODIUM AND TAZOBACTAM SODIUM 3.38 G: 3; .375 INJECTION, SOLUTION INTRAVENOUS at 02:47

## 2024-01-13 RX ADMIN — POTASSIUM CHLORIDE 20 MEQ: 1500 TABLET, EXTENDED RELEASE ORAL at 09:55

## 2024-01-13 RX ADMIN — MIDODRINE HYDROCHLORIDE 5 MG: 5 TABLET ORAL at 10:00

## 2024-01-13 RX ADMIN — NYSTATIN 500000 UNITS: 100000 SUSPENSION ORAL at 06:31

## 2024-01-13 RX ADMIN — MIDODRINE HYDROCHLORIDE 5 MG: 5 TABLET ORAL at 18:20

## 2024-01-13 RX ADMIN — NYSTATIN 500000 UNITS: 100000 SUSPENSION ORAL at 21:26

## 2024-01-13 RX ADMIN — PANTOPRAZOLE SODIUM 40 MG: 40 TABLET, DELAYED RELEASE ORAL at 06:31

## 2024-01-13 RX ADMIN — PIPERACILLIN SODIUM AND TAZOBACTAM SODIUM 3.38 G: 3; .375 INJECTION, SOLUTION INTRAVENOUS at 18:20

## 2024-01-13 RX ADMIN — FINASTERIDE 5 MG: 5 TABLET, FILM COATED ORAL at 09:54

## 2024-01-13 RX ADMIN — Medication: at 07:00

## 2024-01-13 RX ADMIN — QUETIAPINE FUMARATE 12.5 MG: 25 TABLET ORAL at 21:26

## 2024-01-13 RX ADMIN — MIDODRINE HYDROCHLORIDE 5 MG: 5 TABLET ORAL at 02:48

## 2024-01-13 RX ADMIN — ACETAMINOPHEN 650 MG: 325 TABLET ORAL at 21:25

## 2024-01-13 RX ADMIN — LEVOTHYROXINE SODIUM 50 MCG: 0.05 TABLET ORAL at 06:31

## 2024-01-13 RX ADMIN — IPRATROPIUM BROMIDE AND ALBUTEROL SULFATE 3 ML: 2.5; .5 SOLUTION RESPIRATORY (INHALATION) at 15:48

## 2024-01-13 RX ADMIN — LORAZEPAM 1 MG: 2 INJECTION, SOLUTION INTRAMUSCULAR; INTRAVENOUS at 21:25

## 2024-01-13 RX ADMIN — PIPERACILLIN SODIUM AND TAZOBACTAM SODIUM 3.38 G: 3; .375 INJECTION, SOLUTION INTRAVENOUS at 06:31

## 2024-01-13 RX ADMIN — ACETAMINOPHEN 650 MG: 325 TABLET ORAL at 06:31

## 2024-01-13 RX ADMIN — IPRATROPIUM BROMIDE AND ALBUTEROL SULFATE 3 ML: 2.5; .5 SOLUTION RESPIRATORY (INHALATION) at 10:56

## 2024-01-13 RX ADMIN — Medication: at 15:00

## 2024-01-13 ASSESSMENT — PAIN SCALES - GENERAL
PAINLEVEL_OUTOF10: 8
PAINLEVEL_OUTOF10: 0 - NO PAIN
PAINLEVEL_OUTOF10: 0 - NO PAIN

## 2024-01-13 ASSESSMENT — COGNITIVE AND FUNCTIONAL STATUS - GENERAL
TURNING FROM BACK TO SIDE WHILE IN FLAT BAD: TOTAL
CLIMB 3 TO 5 STEPS WITH RAILING: TOTAL
STANDING UP FROM CHAIR USING ARMS: TOTAL
PERSONAL GROOMING: A LOT
EATING MEALS: A LOT
HELP NEEDED FOR BATHING: A LOT
MOVING TO AND FROM BED TO CHAIR: TOTAL
WALKING IN HOSPITAL ROOM: TOTAL
DRESSING REGULAR LOWER BODY CLOTHING: TOTAL
MOVING FROM LYING ON BACK TO SITTING ON SIDE OF FLAT BED WITH BEDRAILS: TOTAL
DAILY ACTIVITIY SCORE: 10
MOBILITY SCORE: 6
TOILETING: TOTAL
DRESSING REGULAR UPPER BODY CLOTHING: A LOT

## 2024-01-13 ASSESSMENT — PAIN - FUNCTIONAL ASSESSMENT
PAIN_FUNCTIONAL_ASSESSMENT: FLACC (FACE, LEGS, ACTIVITY, CRY, CONSOLABILITY)
PAIN_FUNCTIONAL_ASSESSMENT: 0-10

## 2024-01-13 ASSESSMENT — PAIN DESCRIPTION - ORIENTATION: ORIENTATION: LOWER

## 2024-01-13 ASSESSMENT — PAIN SCALES - WONG BAKER
WONGBAKER_NUMERICALRESPONSE: NO HURT
WONGBAKER_NUMERICALRESPONSE: HURTS LITTLE MORE

## 2024-01-13 ASSESSMENT — PAIN DESCRIPTION - LOCATION: LOCATION: BACK

## 2024-01-13 NOTE — PROGRESS NOTES
Subjective Data:  Sting comfortably    Overnight Events:    None cardiac     Objective Data:  Last Recorded Vitals:  Vitals:    01/13/24 0418 01/13/24 0700 01/13/24 0753 01/13/24 0839   BP: 130/67  120/79    BP Location:   Left arm    Patient Position:   Lying    Pulse: 77  83    Resp: 16  19    Temp: 37.1 °C (98.8 °F)  36.5 °C (97.7 °F)    TempSrc:   Temporal    SpO2: 97% 96% 97% 96%   Weight:       Height:           Last Labs:  CBC - 1/13/2024:  6:01 AM  8.7 9.1 363    27.9      CMP - 1/13/2024:  6:02 AM  7.5 5.5 151 --- 1.3   2.6 2.0 59 99      PTT - No results in last year.  _   _ _     HGBA1C   Date/Time Value Ref Range Status   06/19/2023 02:07 PM 5.5 4.3 - 5.6 % Final     Comment:     American Diabetes Association guidelines indicate that patients with HgbA1c in the range 5.7-6.4% are at increased risk for development of diabetes, and intervention by lifestyle modification may be beneficial. HgbA1c greater or equal to 6.5% is considered diagnostic of diabetes.   03/09/2023 12:45 PM 5.5 4.3 - 5.6 % Final     Comment:     American Diabetes Association guidelines indicate that patients with HgbA1c in the range 5.7-6.4% are at increased risk for development of diabetes, and intervention by lifestyle modification may be beneficial. HgbA1c greater or equal to 6.5% is considered diagnostic of diabetes.      Last I/O:  I/O last 3 completed shifts:  In: - (0 mL/kg)   Out: 1450 (12.7 mL/kg) [Urine:1450 (0.4 mL/kg/hr)]  Weight: 114 kg     Past Cardiology Tests (Last 3 Years):  EKG:  ECG 12 lead 01/08/2024    Echo:  Transthoracic Echo (TTE) Complete 01/08/2024    Ejection Fractions:  EF   Date/Time Value Ref Range Status   01/08/2024 09:59 AM 54       Cath:  No results found for this or any previous visit from the past 1095 days.    Stress Test:  No results found for this or any previous visit from the past 1095 days.    Cardiac Imaging:  No results found for this or any previous visit from the past 1095  days.      Inpatient Medications:  Scheduled medications   Medication Dose Route Frequency    [Held by provider] apixaban  2.5 mg oral q12h DARIA    [Held by provider] aspirin  81 mg oral Daily    ferrous sulfate (325 mg ferrous sulfate)  1 tablet oral Daily with breakfast    finasteride  5 mg oral Daily    ipratropium-albuteroL  3 mL nebulization 4x daily    levothyroxine  50 mcg oral Daily    midodrine  5 mg oral q8h    nystatin  5 mL Swish & Swallow 4x daily    oxygen   inhalation q8h    pantoprazole  40 mg oral Daily before breakfast    piperacillin-tazobactam  3.375 g intravenous q6h    QUEtiapine  12.5 mg oral BID     PRN medications   Medication    acetaminophen    benzocaine-menthol    calcium carbonate    dextromethorphan-guaifenesin    dextrose 10 % in water (D10W)    dextrose    glucagon    guaiFENesin    LORazepam    ondansetron ODT    Or    ondansetron     Continuous Medications   Medication Dose Last Rate       Physical Exam:       Assessment/Plan   Principal Problem:    Sepsis (CMS/HCC)  Active Problems:    Lactic acid acidosis    Acute renal failure (ARF) (CMS/HCC)    Leukocytosis    Bilateral pneumonia    Chronic respiratory failure (CMS/HCC)    Hypokalemia    Dehydration    Transaminitis    Acute encephalopathy    Hypotension  Iliopsoas hematoma  ASHD  Chronic systolic heart failure  Longstanding persistent atrial fibrillation      1/6: The patient is a 78-year-old white male with a history of longstanding coronary artery disease with multiple PCI procedures.  He does have ischemic congestive cardiomyopathy with an echocardiogram performed on 9/26/2023 estimating his LV ejection fraction of 35-40% with 2+ mitral valve regurgitation moderate left atrial enlargement mild RV chamber dilatation and a normal estimated PA systolic pressure.  He also has a history of longstanding persistent now permanent atrial fibrillation for which she underwent multiple electrical DC cardioversions and a radiofrequency  ablation procedure.  He is currently on a rate control strategy.  He has a history of former smoking COPD with O2 dependence.  He is admitted with increasing weakness shortness of breath cough with evidence of right-sided pneumonia by the initial chest CT for which she has been started on empiric IV antibiotics.  He does remain in atrial fibrillation borderline increase in ventricular rate and will begin low-dose metoprolol 25 mg twice daily.  He has been continued on Eliquis anticoagulation 5 mg twice daily along with a low-dose aspirin for CAD as well as statin therapy.  Will continue the modest IV fluid hydration given the transient hypotension in the emergency room with the potassium supplement.  Will check a repeat echocardiogram.  Follow-up chest x-ray tomorrow.      1/7: Patient's seen and events reviewed in detail.  He is lying supine somewhat fatigued no overt respiratory distress on nasal cannula.  He did develop hypotension with systolic blood pressure values in the lower 80 mmHg range.  He was given 500 cc of IV normal saline fluid bolus remains on the maintenance infusion at 100 cc/h.  He was placed on low-dose Levophed for pressure support.  Comprehensive metabolic panel noted of both for acute on chronic renal insuf transaminases remain elevated  ALT 68 bilirubin 1.4.  Transaminitis presumably related to sepsis hypotension.  Gastroenterology is following.  Patient remains in the atrial fibrillation with controlled ventricular rate and low-dose metoprolol.  Will reduce dose of Eliquis to 2.5 mg twice daily.  Acute on chronic renal insufficiency presumably due to sepsis and hypotension creatinine is risen from 1.4-2.2 today and urine output has diminished.  The repeat chest x-ray again demonstrates infiltration consolidation within the right lung predominantly centrally and in the right upper lobe.  Patient currently on IV Zosyn azithromycin and vancomycin.     1/8: Patient still receiving  low-dose IV norepinephrine to support blood pressure.  IV antibiotic therapy continues as well.  Remains in atrial fibrillation with controlled heart rate response.  Eliquis dose has been decreased down to 2.5 mg twice daily given the renal insufficiency.  Will continue to follow renal function.  Patient slowly improving.     1/9: Clinically overall improved over the last 24 hours.  IV norepinephrine being titrated down gradually.  Patient remains in atrial fibrillation with a well-controlled heart rate.  Serum creatinine unchanged at 2.6.  Potassium at 3.0 thus supplement will be needed.  Continue antibiotic therapy, patient continues to gradually improve.     1/10: Stable over the past 24 hours.  Now off pressor support.  Continues in a rate controlled atrial fibrillation.  Creatinine has improved to current of 2.1.  White blood cell count has significantly increased to current of 17.5.  Hemoglobin stable at 10.2.  Calcium does remain low at 3.1.  I have ordered for further oral repletion.  He is chest pain-free breathing comfortably nasal cannula oxygen.  Breath sounds are significant diminished throughout.  Does continue on antibiotics as directed by admitting.  Overall slowly improving.  Will follow with you.     1/11: Blood pressure has improved considerably over the last 24 hours.  As noted yesterday patient found to have evidence of an iliopsoas hematoma and antiplatelet and anticoagulant therapy has been placed on hold.  Hemoglobin has remained stable at 9.3 on morning labs.  Serum creatinine continues to improve and has decreased down to 1.7.  Potassium level remains suppressed and will require supplementation.  Heart rate is adequately controlled on current regimen.  Patient continues to slowly improve.     1/12: Blood pressure continues to improve and is now stabilized significantly.  As noted previously anticoagulation has been held secondary to iliopsoas hematoma.  Kidney function also continues to  improve with serum creatinine now decreased to 1.5.  Remains hypokalemic and is receiving potassium supplementation.  The patient continues to improve on a daily basis.    1/13: Resting comfortably and breathing easily.  Blood pressure is stable.  Kidney function continues to improve with creatinine of 1.2 and BUN of 23 with a GFR of 63.  Cardiac status is stable and he remains off of Eliquis in the setting of rate controlled atrial fibrillation due to his previous iliopsoas hematoma        Code Status:  Full Code    I spent 25 minutes in the professional and overall care of this patient.         Johan Flores, DO

## 2024-01-13 NOTE — PROGRESS NOTES
Patient was seen for acute kidney injury secondary to septic shock and pneumonia was initially on pressors now he is off pressors he feels fine no complaints his renal function is back to normal his creatinine today is 1.2 mg/dL and he has normal electrolytes I will sign off please call me if needed

## 2024-01-13 NOTE — PROGRESS NOTES
Navarro Rabago is a 78 y.o. male on day 7 of admission presenting with Sepsis (CMS/HCC).      Subjective   Patient awake/confused/disoriented        Objective     Last Recorded Vitals  /79 (BP Location: Left arm, Patient Position: Lying)   Pulse 83   Temp 36.5 °C (97.7 °F) (Temporal)   Resp 19   Wt 114 kg (251 lb 5.2 oz)   SpO2 96%   Intake/Output last 3 Shifts:    Intake/Output Summary (Last 24 hours) at 1/13/2024 0848  Last data filed at 1/13/2024 0500  Gross per 24 hour   Intake --   Output 1450 ml   Net -1450 ml       Admission Weight  Weight: 104 kg (228 lb 9.9 oz) (01/06/24 0026)    Daily Weight  01/11/24 : 114 kg (251 lb 5.2 oz)    Image Results  ECG 12 lead  Sinus tachycardia with 1st degree AV block with Premature supraventricular complexes  Low voltage QRS  Left posterior fascicular block  Abnormal QRS-T angle, consider primary T wave abnormality  Abnormal ECG  When compared with ECG of 13-MAY-2021 13:46,  Sinus rhythm has replaced Atrial fibrillation  Vent. rate has increased BY  35 BPM  Left posterior fascicular block is now Present  QT has shortened  See ED provider note for full interpretation and clinical correlation  Confirmed by Alis Tejeda ([id number]) on 1/10/2024 5:08:28 PM  CT abdomen pelvis wo IV contrast  Narrative: STUDY:  CT Abdomen and Pelvis without IV Contrast; 1/10/2024 at 3:30 AM  INDICATION:  Back pain, ecchymosis; trauma.  COMPARISON:  US renal 1/8/2024, CT chest, abdomen and pelvis 1/5/2024.  ACCESSION NUMBER(S):  [id number]  ORDERING CLINICIAN:  DANA AMATO  TECHNIQUE:  CT of the abdomen and pelvis was performed.  Contiguous axial images  were obtained at 3 mm slice thickness through the abdomen and pelvis.   Coronal and sagittal reconstructions at 3 mm slice thickness were  performed. No intravenous contrast was administered.    Automated mA/kV exposure control was utilized and patient examination  was performed in strict accordance with principles of  ANA.  FINDINGS:  Please note that the evaluation of vessels, lymph nodes and organs is  limited without intravenous contrast.      LOWER CHEST:  No cardiomegaly.  No pericardial effusion.  Lung bases are clear.     ABDOMEN:  Partially visualized portions of the lower chest showing small  bilateral pleural effusions and partial atelectasis of the bilateral  lower lobes increased from prior. Heart size normal. No pericardial  effusion.  Unenhanced liver without acute process. No discernible intrahepatic  ductal dilatation. Pericholecystic flu atrophic changes of the  pancreas. No acute abnormality of the spleen is identified. No acute  abnormality of the adrenal glands or kidneys no retroperitoneal  adenopathy. Atherosclerosis of the abdominal aorta without aneurysm.  Asymmetric enlargement of the RIGHT psoas musculature and  retroperitoneal fluid increased from 1/5/24.   Small amount of free fluid within the pelvis. No pelvic adenopathy  identified. Rodriguez catheter within the urinary bladder.  LEFT hip prostheses. No findings of acute fracture of the pelvis.  Spondylotic changes and facet arthrosis of the lumbar spine.   Impression: Impression:  RIGHT psoas and retroperitoneal hematoma. Without the use of  intravenous contrast difficult to determine the exact size of the  hemorrhage.  Increased small bilateral pleural effusions and partial atelectasis of  the lower lobes.  Findings discussed with Dr. DANA AMATO with verbal  understanding at approximately 1/10/2024 4:28 AM.  Signed by Keith Watson MD  CT head wo IV contrast  Narrative: STUDY:  CT Head without IV Contrast; 1/10/2024 at 3:30 AM  INDICATION:  Confusion.  COMPARISON:  None available.  ACCESSION NUMBER(S):  [id number]  ORDERING CLINICIAN:  DANA AMATO  TECHNIQUE:  Noncontrast axial CT scan of head was performed.  Angled reformats in  brain and bone windows were generated.  The images were reviewed in  bone, brain, blood and soft  tissue windows.  Automated mA/kV exposure control was utilized and patient examination  was performed in strict accordance with principles of ALARA.  FINDINGS:  CSF Spaces:  The ventricles, sulci and basal cisterns are prominent  from atrophy.  There is no extraaxial fluid collection.  Mild patchy  areas of low-attenuation are seen in the subcortical and deep  periventricular white matter suggesting areas of chronic microvascular  ischemia.     Parenchyma:  The grey-white differentiation is intact.  There is no  mass effect or midline shift.  There is no intracranial hemorrhage.     Calvarium:  The calvarium is unremarkable.     Paranasal sinuses and mastoids:  Visualized paranasal sinuses and  mastoids are clear.  Impression: Diffuse cerebral atrophy.  Suspected mild chronic small vessel white  matter ischemia.  No definite acute intracranial abnormality.  Signed by Shawn Echols,       Physical Exam  Constitutional:       Appearance: He is ill-appearing.   HENT:      Head: Normocephalic.      Mouth/Throat:      Mouth: Mucous membranes are moist.   Eyes:      Pupils: Pupils are equal, round, and reactive to light.   Cardiovascular:      Rate and Rhythm: Normal rate.   Pulmonary:      Effort: Pulmonary effort is normal.      Breath sounds: Normal breath sounds.   Abdominal:      Palpations: Abdomen is soft.   Musculoskeletal:         General: Normal range of motion.      Cervical back: Normal range of motion and neck supple.   Neurological:      Mental Status: Mental status is at baseline. He is disoriented.      Motor: Weakness present.         Relevant Results             Scheduled medications  [Held by provider] apixaban, 2.5 mg, oral, q12h DARIA  [Held by provider] aspirin, 81 mg, oral, Daily  ferrous sulfate (325 mg ferrous sulfate), 1 tablet, oral, Daily with breakfast  finasteride, 5 mg, oral, Daily  ipratropium-albuteroL, 3 mL, nebulization, 4x daily  levothyroxine, 50 mcg, oral, Daily  midodrine, 5 mg,  oral, q8h  nystatin, 5 mL, Swish & Swallow, 4x daily  oxygen, , inhalation, q8h  pantoprazole, 40 mg, oral, Daily before breakfast  piperacillin-tazobactam, 3.375 g, intravenous, q6h  QUEtiapine, 12.5 mg, oral, BID      Continuous medications  norepinephrine, 0.01-1 mcg/kg/min, Last Rate: Stopped (01/08/24 1115)      PRN medications  PRN medications: acetaminophen, benzocaine-menthol, calcium carbonate, dextromethorphan-guaifenesin, dextrose 10 % in water (D10W), dextrose, glucagon, guaiFENesin, LORazepam, ondansetron ODT **OR** ondansetron  Results for orders placed or performed during the hospital encounter of 01/06/24 (from the past 24 hour(s))   CBC   Result Value Ref Range    WBC 8.7 4.4 - 11.3 x10*3/uL    nRBC 0.0 0.0 - 0.0 /100 WBCs    RBC 3.09 (L) 4.50 - 5.90 x10*6/uL    Hemoglobin 9.1 (L) 13.5 - 17.5 g/dL    Hematocrit 27.9 (L) 41.0 - 52.0 %    MCV 90 80 - 100 fL    MCH 29.4 26.0 - 34.0 pg    MCHC 32.6 32.0 - 36.0 g/dL    RDW 16.9 (H) 11.5 - 14.5 %    Platelets 363 150 - 450 x10*3/uL   Basic Metabolic Panel   Result Value Ref Range    Glucose 93 65 - 99 mg/dL    Sodium 139 133 - 145 mmol/L    Potassium 3.4 3.4 - 5.1 mmol/L    Chloride 107 97 - 107 mmol/L    Bicarbonate 24 24 - 31 mmol/L    Urea Nitrogen 23 8 - 25 mg/dL    Creatinine 1.20 0.40 - 1.60 mg/dL    eGFR 63 >60 mL/min/1.73m*2    Calcium 7.5 (L) 8.5 - 10.4 mg/dL    Anion Gap 8 <=19 mmol/L       Assessment/Plan          This patient has a urinary catheter   Reason for the urinary catheter remaining today?           Principal Problem:    Sepsis (CMS/[facility])  Active Problems:    Lactic acid acidosis    Acute renal failure (ARF) (CMS/[facility])    Leukocytosis    Bilateral pneumonia    Chronic respiratory failure (CMS/HCC)    Hypokalemia    Dehydration    Transaminitis    Acute encephalopathy    Hypotension    Aspiration/pneumonia- atbs as ordered, leucocytosis trending down   Dysphagia- MBS per speech therapy done, diet as ordered   Sepsis/hypotension-  improving,   off pressors, leucocytosis improving    Encephalopathy/confusion-supportive care, psych service on case    Hypokalemia- replaced, follow up with BMP AM    A fib- rate controlled, was on apixaban-holding now, appreciate cardiology recommendations   New R retroperitoneal hematoma  - apixaban/ASA on hold, follow up with H/H  Erik-BETTER- IV hydration completed, follow up with BMP, nephrology on case, renal US  done    Medically stable, will follow along with consultants              Evan Bates MD

## 2024-01-13 NOTE — CARE PLAN
The patient's goals for the shift include      The clinical goals for the shift include reduce fever

## 2024-01-14 LAB
ANION GAP SERPL CALC-SCNC: 5 MMOL/L
BUN SERPL-MCNC: 17 MG/DL (ref 8–25)
CALCIUM SERPL-MCNC: 7.5 MG/DL (ref 8.5–10.4)
CHLORIDE SERPL-SCNC: 109 MMOL/L (ref 97–107)
CO2 SERPL-SCNC: 25 MMOL/L (ref 24–31)
CREAT SERPL-MCNC: 1.1 MG/DL (ref 0.4–1.6)
EGFRCR SERPLBLD CKD-EPI 2021: 70 ML/MIN/1.73M*2
ERYTHROCYTE [DISTWIDTH] IN BLOOD BY AUTOMATED COUNT: 17.2 % (ref 11.5–14.5)
GLUCOSE SERPL-MCNC: 114 MG/DL (ref 65–99)
HCT VFR BLD AUTO: 27.5 % (ref 41–52)
HGB BLD-MCNC: 8.7 G/DL (ref 13.5–17.5)
MCH RBC QN AUTO: 29.5 PG (ref 26–34)
MCHC RBC AUTO-ENTMCNC: 31.6 G/DL (ref 32–36)
MCV RBC AUTO: 93 FL (ref 80–100)
NRBC BLD-RTO: 0 /100 WBCS (ref 0–0)
PLATELET # BLD AUTO: 313 X10*3/UL (ref 150–450)
POTASSIUM SERPL-SCNC: 3.6 MMOL/L (ref 3.4–5.1)
RBC # BLD AUTO: 2.95 X10*6/UL (ref 4.5–5.9)
SODIUM SERPL-SCNC: 139 MMOL/L (ref 133–145)
WBC # BLD AUTO: 6.3 X10*3/UL (ref 4.4–11.3)

## 2024-01-14 PROCEDURE — 2500000005 HC RX 250 GENERAL PHARMACY W/O HCPCS: Performed by: INTERNAL MEDICINE

## 2024-01-14 PROCEDURE — 2500000001 HC RX 250 WO HCPCS SELF ADMINISTERED DRUGS (ALT 637 FOR MEDICARE OP): Performed by: INTERNAL MEDICINE

## 2024-01-14 PROCEDURE — 2500000004 HC RX 250 GENERAL PHARMACY W/ HCPCS (ALT 636 FOR OP/ED): Performed by: INTERNAL MEDICINE

## 2024-01-14 PROCEDURE — 9420000001 HC RT PATIENT EDUCATION 5 MIN

## 2024-01-14 PROCEDURE — 2060000001 HC INTERMEDIATE ICU ROOM DAILY

## 2024-01-14 PROCEDURE — 80048 BASIC METABOLIC PNL TOTAL CA: CPT | Performed by: INTERNAL MEDICINE

## 2024-01-14 PROCEDURE — 85027 COMPLETE CBC AUTOMATED: CPT | Performed by: INTERNAL MEDICINE

## 2024-01-14 PROCEDURE — 94640 AIRWAY INHALATION TREATMENT: CPT

## 2024-01-14 PROCEDURE — 2500000002 HC RX 250 W HCPCS SELF ADMINISTERED DRUGS (ALT 637 FOR MEDICARE OP, ALT 636 FOR OP/ED): Performed by: INTERNAL MEDICINE

## 2024-01-14 PROCEDURE — 99232 SBSQ HOSP IP/OBS MODERATE 35: CPT | Performed by: INTERNAL MEDICINE

## 2024-01-14 PROCEDURE — 36415 COLL VENOUS BLD VENIPUNCTURE: CPT | Performed by: INTERNAL MEDICINE

## 2024-01-14 RX ORDER — POLYETHYLENE GLYCOL 3350 17 G/17G
17 POWDER, FOR SOLUTION ORAL 2 TIMES DAILY
Status: DISCONTINUED | OUTPATIENT
Start: 2024-01-14 | End: 2024-01-19 | Stop reason: HOSPADM

## 2024-01-14 RX ADMIN — Medication 4 L/MIN: at 15:00

## 2024-01-14 RX ADMIN — NYSTATIN 500000 UNITS: 100000 SUSPENSION ORAL at 13:52

## 2024-01-14 RX ADMIN — PIPERACILLIN SODIUM AND TAZOBACTAM SODIUM 3.38 G: 3; .375 INJECTION, SOLUTION INTRAVENOUS at 19:22

## 2024-01-14 RX ADMIN — Medication 4 L/MIN: at 07:00

## 2024-01-14 RX ADMIN — IPRATROPIUM BROMIDE AND ALBUTEROL SULFATE 3 ML: 2.5; .5 SOLUTION RESPIRATORY (INHALATION) at 16:38

## 2024-01-14 RX ADMIN — QUETIAPINE FUMARATE 12.5 MG: 25 TABLET ORAL at 20:53

## 2024-01-14 RX ADMIN — IPRATROPIUM BROMIDE AND ALBUTEROL SULFATE 3 ML: 2.5; .5 SOLUTION RESPIRATORY (INHALATION) at 19:52

## 2024-01-14 RX ADMIN — MIDODRINE HYDROCHLORIDE 5 MG: 5 TABLET ORAL at 19:22

## 2024-01-14 RX ADMIN — PIPERACILLIN SODIUM AND TAZOBACTAM SODIUM 3.38 G: 3; .375 INJECTION, SOLUTION INTRAVENOUS at 06:07

## 2024-01-14 RX ADMIN — IPRATROPIUM BROMIDE AND ALBUTEROL SULFATE 3 ML: 2.5; .5 SOLUTION RESPIRATORY (INHALATION) at 08:34

## 2024-01-14 RX ADMIN — MIDODRINE HYDROCHLORIDE 5 MG: 5 TABLET ORAL at 03:16

## 2024-01-14 RX ADMIN — PIPERACILLIN SODIUM AND TAZOBACTAM SODIUM 3.38 G: 3; .375 INJECTION, SOLUTION INTRAVENOUS at 13:51

## 2024-01-14 RX ADMIN — LEVOTHYROXINE SODIUM 50 MCG: 0.05 TABLET ORAL at 05:48

## 2024-01-14 RX ADMIN — MIDODRINE HYDROCHLORIDE 5 MG: 5 TABLET ORAL at 11:28

## 2024-01-14 RX ADMIN — POLYETHYLENE GLYCOL 3350 17 G: 17 POWDER, FOR SOLUTION ORAL at 20:53

## 2024-01-14 RX ADMIN — GUAIFENESIN AND DEXTROMETHORPHAN 5 ML: 100; 10 SYRUP ORAL at 06:22

## 2024-01-14 RX ADMIN — Medication: at 23:00

## 2024-01-14 RX ADMIN — PIPERACILLIN SODIUM AND TAZOBACTAM SODIUM 3.38 G: 3; .375 INJECTION, SOLUTION INTRAVENOUS at 00:39

## 2024-01-14 RX ADMIN — PANTOPRAZOLE SODIUM 40 MG: 40 TABLET, DELAYED RELEASE ORAL at 07:00

## 2024-01-14 RX ADMIN — NYSTATIN 500000 UNITS: 100000 SUSPENSION ORAL at 20:53

## 2024-01-14 RX ADMIN — NYSTATIN 500000 UNITS: 100000 SUSPENSION ORAL at 16:54

## 2024-01-14 ASSESSMENT — COGNITIVE AND FUNCTIONAL STATUS - GENERAL
PERSONAL GROOMING: A LOT
MOVING TO AND FROM BED TO CHAIR: TOTAL
DAILY ACTIVITIY SCORE: 10
HELP NEEDED FOR BATHING: A LOT
DRESSING REGULAR LOWER BODY CLOTHING: TOTAL
PERSONAL GROOMING: A LOT
MOVING FROM LYING ON BACK TO SITTING ON SIDE OF FLAT BED WITH BEDRAILS: TOTAL
TURNING FROM BACK TO SIDE WHILE IN FLAT BAD: TOTAL
MOBILITY SCORE: 6
CLIMB 3 TO 5 STEPS WITH RAILING: TOTAL
EATING MEALS: A LOT
DRESSING REGULAR UPPER BODY CLOTHING: A LOT
STANDING UP FROM CHAIR USING ARMS: TOTAL
EATING MEALS: A LOT
MOBILITY SCORE: 6
WALKING IN HOSPITAL ROOM: TOTAL
MOVING TO AND FROM BED TO CHAIR: TOTAL
HELP NEEDED FOR BATHING: A LOT
MOVING FROM LYING ON BACK TO SITTING ON SIDE OF FLAT BED WITH BEDRAILS: TOTAL
DAILY ACTIVITIY SCORE: 10
WALKING IN HOSPITAL ROOM: TOTAL
CLIMB 3 TO 5 STEPS WITH RAILING: TOTAL
DRESSING REGULAR LOWER BODY CLOTHING: TOTAL
DRESSING REGULAR UPPER BODY CLOTHING: A LOT
TOILETING: TOTAL
TOILETING: TOTAL
STANDING UP FROM CHAIR USING ARMS: TOTAL
TURNING FROM BACK TO SIDE WHILE IN FLAT BAD: TOTAL

## 2024-01-14 ASSESSMENT — PAIN SCALES - GENERAL: PAINLEVEL_OUTOF10: 0 - NO PAIN

## 2024-01-14 NOTE — PROGRESS NOTES
Navarro Rabago is a 78 y.o. male on day 8 of admission presenting with Sepsis (CMS/HCC).      Subjective   Patient looks sleepy today       Objective     Last Recorded Vitals  /50 (BP Location: Right arm, Patient Position: Lying)   Pulse 60   Temp 35.2 °C (95.4 °F) (Temporal)   Resp 17   Wt 114 kg (251 lb 5.2 oz)   SpO2 97%   Intake/Output last 3 Shifts:    Intake/Output Summary (Last 24 hours) at 1/14/2024 0831  Last data filed at 1/14/2024 0637  Gross per 24 hour   Intake 600 ml   Output 1100 ml   Net -500 ml       Admission Weight  Weight: 104 kg (228 lb 9.9 oz) (01/06/24 0026)    Daily Weight  01/11/24 : 114 kg (251 lb 5.2 oz)    Image Results  ECG 12 lead  Sinus tachycardia with 1st degree AV block with Premature supraventricular complexes  Low voltage QRS  Left posterior fascicular block  Abnormal QRS-T angle, consider primary T wave abnormality  Abnormal ECG  When compared with ECG of 13-MAY-2021 13:46,  Sinus rhythm has replaced Atrial fibrillation  Vent. rate has increased BY  35 BPM  Left posterior fascicular block is now Present  QT has shortened  See ED provider note for full interpretation and clinical correlation  Confirmed by Alis Tejeda ([id number]) on 1/10/2024 5:08:28 PM  CT abdomen pelvis wo IV contrast  Narrative: STUDY:  CT Abdomen and Pelvis without IV Contrast; 1/10/2024 at 3:30 AM  INDICATION:  Back pain, ecchymosis; trauma.  COMPARISON:  US renal 1/8/2024, CT chest, abdomen and pelvis 1/5/2024.  ACCESSION NUMBER(S):  [id number]  ORDERING CLINICIAN:  DANA AMATO  TECHNIQUE:  CT of the abdomen and pelvis was performed.  Contiguous axial images  were obtained at 3 mm slice thickness through the abdomen and pelvis.   Coronal and sagittal reconstructions at 3 mm slice thickness were  performed. No intravenous contrast was administered.    Automated mA/kV exposure control was utilized and patient examination  was performed in strict accordance with principles of  ANA.  FINDINGS:  Please note that the evaluation of vessels, lymph nodes and organs is  limited without intravenous contrast.      LOWER CHEST:  No cardiomegaly.  No pericardial effusion.  Lung bases are clear.     ABDOMEN:  Partially visualized portions of the lower chest showing small  bilateral pleural effusions and partial atelectasis of the bilateral  lower lobes increased from prior. Heart size normal. No pericardial  effusion.  Unenhanced liver without acute process. No discernible intrahepatic  ductal dilatation. Pericholecystic flu atrophic changes of the  pancreas. No acute abnormality of the spleen is identified. No acute  abnormality of the adrenal glands or kidneys no retroperitoneal  adenopathy. Atherosclerosis of the abdominal aorta without aneurysm.  Asymmetric enlargement of the RIGHT psoas musculature and  retroperitoneal fluid increased from 1/5/24.   Small amount of free fluid within the pelvis. No pelvic adenopathy  identified. Rodriguez catheter within the urinary bladder.  LEFT hip prostheses. No findings of acute fracture of the pelvis.  Spondylotic changes and facet arthrosis of the lumbar spine.   Impression: Impression:  RIGHT psoas and retroperitoneal hematoma. Without the use of  intravenous contrast difficult to determine the exact size of the  hemorrhage.  Increased small bilateral pleural effusions and partial atelectasis of  the lower lobes.  Findings discussed with Dr. DANA AMATO with verbal  understanding at approximately 1/10/2024 4:28 AM.  Signed by Keith Watson MD  CT head wo IV contrast  Narrative: STUDY:  CT Head without IV Contrast; 1/10/2024 at 3:30 AM  INDICATION:  Confusion.  COMPARISON:  None available.  ACCESSION NUMBER(S):  [id number]  ORDERING CLINICIAN:  DANA AMATO  TECHNIQUE:  Noncontrast axial CT scan of head was performed.  Angled reformats in  brain and bone windows were generated.  The images were reviewed in  bone, brain, blood and soft  tissue windows.  Automated mA/kV exposure control was utilized and patient examination  was performed in strict accordance with principles of ALARA.  FINDINGS:  CSF Spaces:  The ventricles, sulci and basal cisterns are prominent  from atrophy.  There is no extraaxial fluid collection.  Mild patchy  areas of low-attenuation are seen in the subcortical and deep  periventricular white matter suggesting areas of chronic microvascular  ischemia.     Parenchyma:  The grey-white differentiation is intact.  There is no  mass effect or midline shift.  There is no intracranial hemorrhage.     Calvarium:  The calvarium is unremarkable.     Paranasal sinuses and mastoids:  Visualized paranasal sinuses and  mastoids are clear.  Impression: Diffuse cerebral atrophy.  Suspected mild chronic small vessel white  matter ischemia.  No definite acute intracranial abnormality.  Signed by Shawn Echols DO      Physical Exam  Constitutional:       Appearance: He is ill-appearing.   HENT:      Head: Normocephalic.   Eyes:      Pupils: Pupils are equal, round, and reactive to light.   Cardiovascular:      Rate and Rhythm: Normal rate and regular rhythm.   Pulmonary:      Effort: Pulmonary effort is normal.   Abdominal:      Palpations: Abdomen is soft.   Musculoskeletal:         General: Normal range of motion.      Cervical back: Neck supple.   Skin:     General: Skin is warm.   Neurological:      Mental Status: Mental status is at baseline.         Relevant Results               Assessment/Plan        Scheduled medications  [Held by provider] apixaban, 2.5 mg, oral, q12h DARIA  [Held by provider] aspirin, 81 mg, oral, Daily  ferrous sulfate (325 mg ferrous sulfate), 1 tablet, oral, Daily with breakfast  finasteride, 5 mg, oral, Daily  ipratropium-albuteroL, 3 mL, nebulization, 4x daily  levothyroxine, 50 mcg, oral, Daily  midodrine, 5 mg, oral, q8h  nystatin, 5 mL, Swish & Swallow, 4x daily  oxygen, , inhalation, q8h  pantoprazole, 40  mg, oral, Daily before breakfast  piperacillin-tazobactam, 3.375 g, intravenous, q6h  QUEtiapine, 12.5 mg, oral, BID      Continuous medications     PRN medications  PRN medications: acetaminophen, benzocaine-menthol, calcium carbonate, dextromethorphan-guaifenesin, dextrose 10 % in water (D10W), dextrose, glucagon, guaiFENesin, LORazepam, ondansetron ODT **OR** ondansetron  Results for orders placed or performed during the hospital encounter of 01/06/24 (from the past 24 hour(s))   Basic Metabolic Panel   Result Value Ref Range    Glucose 114 (H) 65 - 99 mg/dL    Sodium 139 133 - 145 mmol/L    Potassium 3.6 3.4 - 5.1 mmol/L    Chloride 109 (H) 97 - 107 mmol/L    Bicarbonate 25 24 - 31 mmol/L    Urea Nitrogen 17 8 - 25 mg/dL    Creatinine 1.10 0.40 - 1.60 mg/dL    eGFR 70 >60 mL/min/1.73m*2    Calcium 7.5 (L) 8.5 - 10.4 mg/dL    Anion Gap 5 <=19 mmol/L   CBC   Result Value Ref Range    WBC 6.3 4.4 - 11.3 x10*3/uL    nRBC 0.0 0.0 - 0.0 /100 WBCs    RBC 2.95 (L) 4.50 - 5.90 x10*6/uL    Hemoglobin 8.7 (L) 13.5 - 17.5 g/dL    Hematocrit 27.5 (L) 41.0 - 52.0 %    MCV 93 80 - 100 fL    MCH 29.5 26.0 - 34.0 pg    MCHC 31.6 (L) 32.0 - 36.0 g/dL    RDW 17.2 (H) 11.5 - 14.5 %    Platelets 313 150 - 450 x10*3/uL       This patient has a urinary catheter   Reason for the urinary catheter remaining today?           Principal Problem:    Sepsis (CMS/HCC)  Active Problems:    Lactic acid acidosis    Acute renal failure (ARF) (CMS/HCC)    Leukocytosis    Bilateral pneumonia    Chronic respiratory failure (CMS/HCC)    Hypokalemia    Dehydration    Transaminitis    Acute encephalopathy    Hypotension    Aspiration/pneumonia- zosyn as ordered, leucocytosis trending down   Dysphagia- MBS per speech therapy done, diet as ordered   Sepsis/hypotension- improving,   off pressors, leucocytosis improving    Encephalopathy/confusion-supportive care, psych service on case, added scheduled seroquel for agitation- better controlled      Hypokalemia- replaced, follow up with BMP AM    A fib- rate controlled, was on apixaban-holding now, appreciate cardiology recommendations   New R retroperitoneal hematoma  - apixaban/ASA on hold, follow up with H/H  Erik-BETTER- IV hydration completed, follow up with BMP, nephrology on case, renal US  done    Medically stable, will follow along with consultants              Evan Bates MD

## 2024-01-14 NOTE — CARE PLAN
The patient's goals for the shift include Keep NC on     The clinical goals for the shift include reduce fever

## 2024-01-14 NOTE — CARE PLAN
The patient's goals for the shift include      The clinical goals for the shift include monitor patient vital signs    Over the shift, the patient did not make progress toward the following goals. Barriers to progression include none. Recommendations to address these barriers include none.

## 2024-01-14 NOTE — PROGRESS NOTES
Navarro Rabago is a 78 y.o. male on day 8 of admission presenting with Sepsis (CMS/HCC).    Subjective   Interval History:   Awake, confused  Afebrile    Review of Systems   Unable to perform ROS: Mental status change       Objective   Range of Vitals (last 24 hours)  Heart Rate:  [65-85]   Temp:  [35.8 °C (96.4 °F)-37.1 °C (98.8 °F)]   Resp:  [16-19]   BP: ()/(59-94)   SpO2:  [94 %-100 %]   Daily Weight  01/11/24 : 114 kg (251 lb 5.2 oz)    Body mass index is 29.8 kg/m².    Physical Exam  Constitutional:       Comments:  Intermittent confusion   HENT:      Head: Normocephalic and atraumatic.      Nose: Nose normal.    Eyes:      Extraocular Movements: Extraocular movements intact.      Conjunctiva/sclera: Conjunctivae normal.   Cardiovascular:      Rate and Rhythm: Normal rate and regular rhythm.   Pulmonary:      Effort: Pulmonary effort is normal.      Breath sounds: Rhonchi present.   Abdominal:      General: Bowel sounds are normal.      Palpations: Abdomen is soft.   Musculoskeletal:         General: Normal range of motion.      Cervical back: Normal range of motion and neck supple.   Skin:     General: Skin is warm and dry.   Neurological:      General: No focal deficit present.      Mental Status: He is alert.   Psychiatric:         Mood and Affect: Mood normal.         Behavior: Behavior normal.     Antibiotics  sodium chloride 0.9% infusion  heparin (porcine) injection 5,000 Units  sodium chloride 0.9 % with KCl 20 mEq/L infusion  calcium carbonate (Tums) chewable tablet 500 mg  ondansetron ODT (Zofran-ODT) disintegrating tablet 4 mg  ondansetron (Zofran) injection 4 mg  benzocaine-menthol (Cepastat Sore Throat) 15-3.6 mg lozenge 1 lozenge  guaiFENesin (Mucinex) 12 hr tablet 600 mg  dextromethorphan-guaifenesin (Robitussin DM)  mg/5 mL oral liquid 5 mL  dextrose 50 % injection 25 g  glucagon (Glucagen) injection 1 mg  dextrose 10 % in water (D10W) infusion  ipratropium-albuteroL (Duo-Neb)  0.5-2.5 mg/3 mL nebulizer solution 3 mL  oxygen (O2) therapy  piperacillin-tazobactam-dextrose (Zosyn) IV 3.375 g  pantoprazole (ProtoNix) injection 40 mg  ipratropium-albuteroL (Duo-Neb) 0.5-2.5 mg/3 mL nebulizer solution 3 mL  allopurinol (Zyloprim) tablet  apixaban (Eliquis) tablet  aspirin EC tablet  atorvastatin (Lipitor) tablet  bumetanide (Bumex) tablet  colchicine tablet  empagliflozin (Jardiance) tablet  fexofenadine (Allegra) tablet  finasteride (Propecia, Proscar) tablet  guaiFENesin (Mucinex) 12 hr tablet  levothyroxine (Synthroid, Levoxyl) tablet  nitroglycerin (Nitrostat) SL tablet  pantoprazole (ProtoNix) EC tablet  potassium chloride SA (Klor-Con M20) ER tablet  tamsulosin (Flomax) 24 hr capsule  vancomycin 1.5 mg in 300 mL (Xellia) IVPB 1.5 g      levothyroxine (Synthroid, Levoxyl) tablet 50 mcg  finasteride (Proscar) tablet 5 mg  ferrous sulfate (325 mg ferrous sulfate) tablet 1 tablet  aspirin EC tablet 81 mg  apixaban (Eliquis) tablet 5 mg  metoprolol tartrate (Lopressor) tablet 25 mg  potassium chloride 20 mEq in 100 mL IV premix  midodrine (Proamatine) tablet 5 mg  sodium chloride 0.9 % bolus 500 mL  norepinephrine (Levophed) 8 mg in dextrose 5% 250 mL (0.032 mg/mL) infusion (premix)  sodium chloride 0.9 % bolus 250 mL  sodium chloride 0.9 % bolus 250 mL  azithromycin (Zithromax) in dextrose 5 % in water (D5W) 250 mL  mg  vancomycin 1.5 mg in 300 mL (Xellia) IVPB 1.5 g  apixaban (Eliquis) tablet 2.5 mg  vancomycin (Xellia) 1 g in 200 mL (Xellia) IVPB 1,000 mg  norepinephrine (Levophed) 8 mg in dextrose 5% 250 mL (0.032 mg/mL) infusion (premix)  acetaminophen (Tylenol) tablet 650 mg  vancomycin 1.5 mg in 300 mL (Xellia) IVPB 1.5 g  sodium bicarbonate 150 mEq in dextrose 5 % in water (D5W) 1,000 mL infusion  midodrine (Proamatine) tablet 5 mg  barium sulfate (Varibar Honey) 40 % (w/v) 29% (w/w) suspension 10 mL  barium sulfate (Varibar Pudding) 40 % (w/v), 30% (w/w) oral paste 5 mL  barium  "sulfate (Varibar Nectar, Varibar Honey) 40 % (w/v) suspension 10 mL  barium sulfate (Varibar THIN Liquid) 81 % (w/w) suspension 10 mL  nystatin (Mycostatin) 100,000 unit/mL suspension 500,000 Units  potassium chloride CR (Klor-Con M20) ER tablet 20 mEq  oxygen (O2) therapy  pantoprazole (ProtoNix) EC tablet 40 mg  dextrose 5%-0.45 % sodium chloride infusion  vancomycin 1.5 mg in 300 mL (Xellia) IVPB 1.5 g  potassium chloride CR (Klor-Con M20) ER tablet 40 mEq  LORazepam (Ativan) injection 1 mg  QUEtiapine (SEROquel) tablet 12.5 mg  LORazepam (Ativan) injection 2 mg  potassium chloride CR (Klor-Con M20) ER tablet 20 mEq  potassium chloride 20 mEq in 100 mL IV premix  sodium chloride 0.9% infusion  potassium chloride (Klor-Con) packet 20 mEq  potassium chloride CR (Klor-Con M20) ER tablet 20 mEq  potassium chloride CR (Klor-Con M20) ER tablet 20 mEq  potassium chloride CR (Klor-Con M20) ER tablet 20 mEq      Relevant Results  Labs  Results from last 72 hours   Lab Units 01/13/24  0601 01/12/24  0611 01/11/24  0554   WBC AUTO x10*3/uL 8.7 9.7 13.6*   HEMOGLOBIN g/dL 9.1* 9.3* 9.3*   HEMATOCRIT % 27.9* 28.0* 28.2*   PLATELETS AUTO x10*3/uL 363 309 277     Results from last 72 hours   Lab Units 01/13/24  0602 01/12/24  0611 01/11/24  0554   SODIUM mmol/L 139 137 138   POTASSIUM mmol/L 3.4 3.2* 3.1*   CHLORIDE mmol/L 107 106 106   CO2 mmol/L 24 24 25   BUN mg/dL 23 31* 35*   CREATININE mg/dL 1.20 1.50 1.70*   GLUCOSE mg/dL 93 90 132*   CALCIUM mg/dL 7.5* 7.3* 7.7*   ANION GAP mmol/L 8 7 7   EGFR mL/min/1.73m*2 63 48* 41*   PHOSPHORUS mg/dL  --  2.6  --      Results from last 72 hours   Lab Units 01/12/24  0611   ALBUMIN g/dL 2.0*     Estimated Creatinine Clearance: 73.4 mL/min (by C-G formula based on SCr of 1.2 mg/dL).  No results found for: \"CRP\"  Microbiology  Reviewed  Imaging  ECG 12 lead    Result Date: 1/10/2024  Sinus tachycardia with 1st degree AV block with Premature supraventricular complexes Low voltage QRS " Left posterior fascicular block Abnormal QRS-T angle, consider primary T wave abnormality Abnormal ECG When compared with ECG of 13-MAY-2021 13:46, Sinus rhythm has replaced Atrial fibrillation Vent. rate has increased BY  35 BPM Left posterior fascicular block is now Present QT has shortened See ED provider note for full interpretation and clinical correlation Confirmed by Alis Tejeda ([id number]) on 1/10/2024 5:08:28 PM    CT abdomen pelvis wo IV contrast    Result Date: 1/10/2024  STUDY: CT Abdomen and Pelvis without IV Contrast; 1/10/2024 at 3:30 AM INDICATION: Back pain, ecchymosis; trauma. COMPARISON: US renal 1/8/2024, CT chest, abdomen and pelvis 1/5/2024. ACCESSION NUMBER(S): [id number] ORDERING CLINICIAN: DANA AMATO TECHNIQUE: CT of the abdomen and pelvis was performed.  Contiguous axial images were obtained at 3 mm slice thickness through the abdomen and pelvis. Coronal and sagittal reconstructions at 3 mm slice thickness were performed. No intravenous contrast was administered.  Automated mA/kV exposure control was utilized and patient examination was performed in strict accordance with principles of ALARA. FINDINGS: Please note that the evaluation of vessels, lymph nodes and organs is limited without intravenous contrast.  LOWER CHEST: No cardiomegaly.  No pericardial effusion.  Lung bases are clear.  ABDOMEN: Partially visualized portions of the lower chest showing small bilateral pleural effusions and partial atelectasis of the bilateral lower lobes increased from prior. Heart size normal. No pericardial effusion. Unenhanced liver without acute process. No discernible intrahepatic ductal dilatation. Pericholecystic flu atrophic changes of the pancreas. No acute abnormality of the spleen is identified. No acute abnormality of the adrenal glands or kidneys no retroperitoneal adenopathy. Atherosclerosis of the abdominal aorta without aneurysm. Asymmetric enlargement of the RIGHT psoas  musculature and retroperitoneal fluid increased from 1/5/24. Small amount of free fluid within the pelvis. No pelvic adenopathy identified. Rodriguez catheter within the urinary bladder. LEFT hip prostheses. No findings of acute fracture of the pelvis. Spondylotic changes and facet arthrosis of the lumbar spine.     Impression: RIGHT psoas and retroperitoneal hematoma. Without the use of intravenous contrast difficult to determine the exact size of the hemorrhage. Increased small bilateral pleural effusions and partial atelectasis of the lower lobes. Findings discussed with Dr. DANA AMATO with verbal understanding at approximately 1/10/2024 4:28 AM. Signed by Keith Watson MD    CT head wo IV contrast    Result Date: 1/10/2024  STUDY: CT Head without IV Contrast; 1/10/2024 at 3:30 AM INDICATION: Confusion. COMPARISON: None available. ACCESSION NUMBER(S): [id number] ORDERING CLINICIAN: DANA AMATO TECHNIQUE: Noncontrast axial CT scan of head was performed.  Angled reformats in brain and bone windows were generated.  The images were reviewed in bone, brain, blood and soft tissue windows. Automated mA/kV exposure control was utilized and patient examination was performed in strict accordance with principles of ALARA. FINDINGS: CSF Spaces:  The ventricles, sulci and basal cisterns are prominent from atrophy.  There is no extraaxial fluid collection.  Mild patchy areas of low-attenuation are seen in the subcortical and deep periventricular white matter suggesting areas of chronic microvascular ischemia.  Parenchyma:  The grey-white differentiation is intact.  There is no mass effect or midline shift.  There is no intracranial hemorrhage.  Calvarium:  The calvarium is unremarkable.  Paranasal sinuses and mastoids:  Visualized paranasal sinuses and mastoids are clear.    Diffuse cerebral atrophy.  Suspected mild chronic small vessel white matter ischemia.  No definite acute intracranial abnormality.  Signed by Shawn Echols DO    Transthoracic Echo (TTE) Complete    Result Date: 1/8/2024            [facility]– [place] [address], [place] [address]             Phone [phone] TRANSTHORACIC ECHOCARDIOGRAM REPORT  Patient Name:      LILIAN MCDERMOTT CHERIE Lizarraga Physician:   [id number] Nance MD Study Date:        1/8/2024           Ordering Provider:   [id number] EMILY NANCE MRN/PID:           [id number]           Fellow: Accession#:        [id number]       Nurse: Date of Birth/Age: 1945 / 78      Sonographer:         Lilli Mata RDCS                    years Gender:            M                  Additional Staff: Height:            195.58 cm          Admit Date:          1/8/2024 Weight:            103.42 kg          Admission Status:    Inpatient - Routine BSA:               2.36 m2            Department Location: [facility] Blood Pressure: 96 /74 mmHg Study Type:    TRANSTHORACIC ECHO (TTE) COMPLETE Diagnosis/ICD: Other hypotension-I95.89 Indication:    hypotension CPT Codes:     Echo Complete w Full Doppler-[id number]  Study Detail: The following Echo studies were performed: 2D, M-Mode, Doppler and               color flow.  PHYSICIAN INTERPRETATION: Left Ventricle: Left ventricular systolic function is moderately decreased, with an estimated ejection fraction of 35-40%. Wall motion is abnormal. The left ventricular cavity size is mild to moderately dilated. Abnormal (paradoxical) septal motion, consistent with an intraventricular conduction delay. Spectral Doppler shows a normal pattern of left ventricular diastolic filling. There is moderate to severe hypokinesis of the inferoposterior wall. There is mild hypokinesis and dyssynchrony of the anteroseptal wall. Left Atrium: The left atrium is mild to moderately dilated. Right Ventricle: The right ventricle is upper limits of normal in size. There is normal right ventriclar wall  thickness. There is normal right ventricular global systolic function. Right Atrium: The right atrium is mildly dilated. Aortic Valve: The aortic valve is trileaflet. The aortic valve appears tricuspid and non-restricted. There is mild aortic valve cusp calcification. There is no evidence of reduced aortic valve leaflet excursion excursion. There is evidence of mildly elevated transaortic gradients consistent with sclerosis of the aortic valve. There is mild aortic valve regurgitation. The peak instantaneous gradient of the aortic valve is 10.8 mmHg. The mean gradient of the aortic valve is 6.0 mmHg. There is moderate sclerotic calcification of the noncoronary aortic valve cusp. Mitral Valve: The mitral valve is normal in structure. There is normal mitral valve leaflet mobility. There is mild to moderate mitral valve regurgitation. Tricuspid Valve: The tricuspid valve is structurally normal. There is normal tricuspid valve leaflet mobility. There is mild to moderate tricuspid regurgitation. The Doppler estimated RVSP is mildly elevated at 43.0 mmHg. Pulmonic Valve: The pulmonic valve is structurally normal. There is trace pulmonic valve regurgitation. Pericardium: There is no pericardial effusion noted. Aorta: The aortic root is normal. There is no dilatation of the aortic arch. There is no dilatation of the ascending aorta. There is no dilatation of the aortic root. Pulmonary Artery: The pulmonary artery is normal in size. The tricuspid regurgitant velocity is 2.96 m/s, and with an estimated right atrial pressure of 8 mmHg, the estimated pulmonary artery pressure is mildly elevated with the RVSP at 43.0 mmHg. The estimated PASP is 43 mmHg. Systemic Veins: The inferior vena cava appears moderately dilated.  CONCLUSIONS:  1. Left ventricular systolic function is moderately decreased with a 35-40% estimated ejection fraction.  2. There is moderate to severe hypokinesis of the inferoposterior wall.     There is mild  hypokinesis and dyssynchrony of the anteroseptal wall.  3. The left atrium is mild to moderately dilated.  4. Mild to moderate mitral valve regurgitation.  5. Mild to moderate tricuspid regurgitation.  6. Mildly elevated RVSP.  7. Aortic valve sclerosis.  8. There is moderate sclerotic calcification of the noncoronary aortic valve cusp.  9. Mild aortic valve regurgitation. 10. The estimated PASP is 43 mmHg. 11. The inferior vena cava appears moderately dilated. QUANTITATIVE DATA SUMMARY: 2D MEASUREMENTS:                          Normal Ranges: IVSd:          1.08 cm   (0.6-1.1cm) LVPWd:         0.98 cm   (0.6-1.1cm) LVIDd:         5.65 cm   (3.9-5.9cm) LVIDs:         4.00 cm LV Mass Index: 97.7 g/m2 LV % FS        29.2 % LA VOLUME:                               Normal Ranges: LA Vol A4C:        59.2 ml    (22+/-6mL/m2) LA Vol Index A4C:  25.0ml/m2 LA Area A4C:       20.3 cm2 LA Major Axis A4C: 5.9 cm LA Volume Index:   25.3 ml/m2 LA Vol A4C:        54.4 ml RA VOLUME BY A/L METHOD:                               Normal Ranges: RA Vol A4C:        38.2 ml    (8.3-19.5ml) RA Vol Index A4C:  16.2 ml/m2 RA Area A4C:       15.2 cm2 RA Major Axis A4C: 5.1 cm M-MODE MEASUREMENTS:                  Normal Ranges: Ao Root: 3.70 cm (2.0-3.7cm) AORTA MEASUREMENTS:                      Normal Ranges: Ao Sinus, d: 3.21 cm (2.1-3.5cm) LV SYSTOLIC FUNCTION BY 2D PLANIMETRY (MOD):                     Normal Ranges: EF-A4C View: 54.1 % (>=55%) EF-A2C View: 51.4 % EF-Biplane:  54.1 % AORTIC VALVE:                                    Normal Ranges: AoV Vmax:                1.64 m/s  (<=1.7m/s) AoV Peak PG:             10.8 mmHg (<20mmHg) AoV Mean P.0 mmHg  (1.7-11.5mmHg) LVOT Max Angel Luis:            0.99 m/s  (<=1.1m/s) AoV VTI:                 31.80 cm  (18-25cm) LVOT VTI:                19.70 cm LVOT Diameter:           2.00 cm   (1.8-2.4cm) AoV Area, VTI:           1.95 cm2  (2.5-5.5cm2) AoV Area,Vmax:           1.89 cm2   (2.5-4.5cm2) AoV Dimensionless Index: 0.62 AORTIC INSUFFICIENCY: AI Vmax:       3.44 m/s AI Half-time:  677 msec AI Decel Rate: 150.00 cm/s2  RIGHT VENTRICLE: RV Basal 3.83 cm RV Mid   4.43 cm RV Major 7.2 cm TRICUSPID VALVE/RVSP:                             Normal Ranges: Peak TR Velocity: 2.96 m/s RV Syst Pressure: 43.0 mmHg (< 30mmHg) IVC Diam:         2.68 cm  [id number] Lb Nance MD Electronically signed on 1/8/2024 at 9:32:37 PM  ** Final **     FL modified barium swallow study    Result Date: 1/8/2024  Interpreted By:  Antoniou, Elias, and Casey Corinne STUDY: FL MODIFIED BARIUM SWALLOW STUDY;; 1/8/2024 1:27 pm   INDICATION: Signs/Symptoms:suspected pharyngeal dysphagia.   COMPARISON: None.   ACCESSION NUMBER(S): [id number]   ORDERING CLINICIAN: JOSE DAVID SOLER   TECHNIQUE: MBSS completed. Informed verbal consent obtained prior to completion of exam. Trials of thin, nectar thick, honey thick, puree, and regular solids given. Fluoroscopy time :  3 minutes and 54 seconds. Total dose air kerma 21.15 mGy..   SLP: Corinne E. Casey, M.A. CCC-SLP Phone/Pager: [phone] opt 2, via [employer], or via [employer] secure chat   SPEECH FINDINGS: Reason for referral: Dysphagia, aspiration pneumonia Patient hx: Sepsis, HTN, HLD, GERD, CHF Respiratory status: Nasal cannula Previous diet: Regular and thin   FINAL SPEECH RECOMMENDATIONS   Diet recommendations/feeding strategies: Minced/moist solids with thin liquids, NO STRAWS.   Upright seating, no straw. Meds crushed in puree. Please downgrade to nectar thick and notify SLP if s/s of aspiration present with PO intake.   Follow-up speech therapy recommended: Yes.   Education provided: Yes. Results and recs discussed with pt, RN, and physician with verbalized understanding noted.   Treatment Provided today: Yes, see progress notes   Repeat study/ dc plan: Continue skilled speech therapy services after discharge   Mechanics of the swallow summary: *Oral phase: Moderate impairment  as characterized by disorganized/prolonged mastication, poor tongue control during bolus hold with premature posterior phrayngeal spillage, and delayed swallow onset (at level of pyriform sinuese for thin and nectar liquids, and at valleculae for honey liquids, puree, and cracker consistencies *Pharyngeal phase: Mild impairment as characterized by decreased tongue base retraction and trace vallecular retention *Esophageal phase: WNL, limited assessment   SLP impressions with severity rating: Pt. Presenting with singular episode of aspiration during initial trial of thin liquid.  Penetration with thin liquid via straw (successive swallows) and with nectar liquids via tsp. No further penetration nor aspiration presents with remaining trials of thin liquids via cup, nectar liquids via cup, honey liquids, puree, nor cracker trials.   Pt noted with mild oral and pharyngeal dysphagia as characterized by prolonged mastication, premature posterior pharyngeal spillage, delayed swallow onset, and diminished tongue base retraction.   Thin Liquids (MBSS) Rosenbek's Penetration Aspiration Scale, Thin Liquids (MBSS): 7. OVERT ASPIRATION, material is not cleared - contrast passes glottis, visible residue, W/pt response Singluar episode of aspiration on initial trial of thin via tsp - appears r/t premature posterior pharyngeal spillage and occurs prior to swallow onset. Penetration without aspiration (PAS 2) during trials of thin liquid via straw (successive swallows); No penetration nor aspiration with thin liquids via cup (PAS 1) Response to Aspiration, Thin Liquid (MBSS): unproductive reflexive involuntary cough,       Nectar Thick Liquids (MBSS) Rosenbek's Penetration Aspiration Scale, Nectar thick liquids (MBSS): 2. PENETRATION that CLEARS - contrast enter airway, above vocal cords, no residue       Honey Thick Liquids (MBSS) Rosenbek's Penetration Aspiration Scale, Honey thick liquids (MBSS): 1. NO ASPIRATION & NO PENETRATION  - no aspiration, contrast does not enter airway     Response to Pharyngeal Residue, Honey thick liquids (MBSS): no spontaneous response,   Purees (MBSS) Rosenbek's Penetration Aspiration Scale, Purees (MBSS): 1. NO ASPIRATION & NO PENETRATION - no aspiration, contrast does not enter airway     Response to Pharyngeal Residue, Purees (MBSS): no spontaneous response, Clears with thin liquid wash,     Solids (MBSS) Rosenbek's Penetration Aspiration Scale, Solids (MBSS): 1. NO ASPIRATION & NO PENETRATION - no aspiration, contrast does not enter airway   Response to Pharyngeal Residue, Solids (MBSS): no spontaneous response, Clears with thin liquid wash. ,     Speech Therapy section of this report signed by Corinne E. Casey, M.A. CCC-SLP on 1/8/2024 at 1:41 pm.   RADIOLOGY FINDINGS: Prolonged oral phase with uncoordinated mastication/lingual motion. Single episode of aspiration with thin barium. A couple of additional episodes of laryngeal penetration without aspiration. No laryngeal penetration or aspiration seen with thicker consistencies of barium. Retention of barium is noted in the vallecula and piriform sinuses.   Radiology section of this report signed by .       Dysphagia as above.   MACRO: None   Signed by: Roby Villarreal 1/8/2024 4:31 PM Dictation workstation:   [id number]    US renal complete    Result Date: 1/8/2024  Interpreted By:  Cathy Hernandez, STUDY: US RENAL COMPLETE; 1/8/2024 11:45 am   INDICATION: Acute renal insufficiency.   COMPARISON: None.   ACCESSION NUMBER(S): [id number]   ORDERING CLINICIAN: PERCY CABRERA   TECHNIQUE: Grayscale and color Doppler imaging of the kidneys is performed portably..   FINDINGS: The right kidney measures 12.1 cm x 7.2 cm x 5.1 cm. A simple right renal cyst measuring 1.9 cm in size is seen. The left kidney measures 12.6 cm x 5.6 cm x 6.4 cm. There is no shadowing calculus, hydronephrosis, or solid mass identified. The renal cortical echogenicity is normal bilaterally with  renal cortical thickness within normal limits.   Cursory evaluation of urinary bladder shows presence of a Rodriguez catheter with bladder decompressed.       1.9 cm simple right renal cyst with unremarkable appearance of the left kidney.   No renal atrophy or hydronephrosis.   MACRO: None.   Signed by: Cathy Hernandez 1/8/2024 12:04 PM Dictation workstation:   [id number]    XR chest 1 view    Result Date: 1/7/2024  Interpreted By:  Cathy Hernandze, STUDY: XR CHEST 1 VIEW 1/7/2024 7:36 am   INDICATION: Follow-up pneumonia   COMPARISON: 05/29/2014 as well as CT examination of the chest done on 01/05/2024.   ACCESSION NUMBER(S): [id number]   ORDERING CLINICIAN: EMILY MAKI   TECHNIQUE: AP erect view of the chest   FINDINGS: The heart is at the upper limits of normal in size. There is infiltrate and airspace consolidation observed centrally within the right lung with upper lobar predominance. There is also some patchy infiltrate at the left lung base. No obvious pleural abnormality is seen.       Infiltrate and airspace consolidation identified within the right lung most pronounced centrally and within the right upper lobe with mild left basilar infiltrate.   Signed by: Cathy Hernandez 1/7/2024 8:11 AM Dictation workstation:   [id number]    CT chest abdomen pelvis w IV contrast    Result Date: 1/5/2024  Interpreted By:  Yordy Urena, STUDY: CT CHEST ABDOMEN PELVIS W IV CONTRAST;  1/5/2024 6:33 pm   INDICATION: Signs/Symptoms:abd pain, sob.   COMPARISON: None.   ACCESSION NUMBER(S): [id number]   ORDERING CLINICIAN: NELLA MELGOZA   TECHNIQUE: Contiguous axial images of the chest, abdomen and pelvis were obtained after the intravenous administration of  contrast. Coronal and sagittal reformatted images were obtained from the axial images.   FINDINGS: CT CHEST:   No axillary lymphadenopathy. 1.6 cm right paratracheal lymph node and 1.5 cm subcarinal lymph node. There is limited evaluation for hilar lymphadenopathy secondary  stripping motion.   The heart is normal in size. Coronary artery vascular calcifications. No significant pericardial effusion.   There is pulmonary emphysematous change. There is consolidative opacity in the right upper lobe and right lower lobe compatible with pneumonia and also minimal opacity in the left lower lobe. Trace pleural effusions. No pneumothorax.   Multilevel degenerative change of the thoracic spine.     CT ABDOMEN PELVIS:   Evaluation of the abdomen and pelvis is limited secondary to patient motion. No evidence of liver mass. The gallbladder is present, not well evaluated secondary to motion. No dilatation common bile duct.   Atrophy of the pancreas.   No splenomegaly.   Question mild nodularity of the left adrenal gland. The right adrenal gland appears unremarkable.   Symmetric enhancement of the kidneys. Right renal cysts with the largest measuring 3.1 cm and several bowel subcentimeter hypodensity too small to characterize. No hydronephrosis.   Atherosclerotic calcification of the aorta and abdominal and pelvic vasculature.   No evidence of bowel obstruction. Evaluation the bowel is otherwise limited secondary patient motion.   Urinary bladder is underdistended and not well evaluated. 18 mm right posterior bladder diverticulum.   Postsurgical change of left hip arthroplasty resulting in beam hardening artifact and limiting evaluation the pelvis.   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       Consolidative opacity in the right upper lobe and lower lobe compatible with pneumonia and also mild left basilar opacity and trace pleural effusions.   No definite evidence of acute abnormality of the abdominal viscera within the limitations of the motion degraded examination.   MACRO: None   Signed by: Yordy Urena 1/5/2024 6:49 PM Dictation workstation:   [id number]     Assessment/Plan   Sepsis, with shock, resolved  Acute renal failure-improving  Pneumonia-MRSA PCR negative   Diarrhea-c.diff  negative  Transaminitis-resolving  Leukocytosis, improving  Oral candida   Chronic respiratory failure-at baseline 5LNC  Retroperitoneal hematoma     Continue IV zosyn-started on 1/6  Continue Nystatin suspension  Monitor WBC and temperature  Monitor renal function  Oxygen as needed  Supportive care  Monitor mental status      Eduardo Higgins MD

## 2024-01-14 NOTE — PROGRESS NOTES
Subjective Data:  Resting comfortably    Overnight Events:    None cardiac     Objective Data:  Last Recorded Vitals:  Vitals:    01/13/24 2300 01/14/24 0338 01/14/24 0700 01/14/24 0836   BP: 99/59 102/50  109/58   BP Location: Right arm Right arm  Right arm   Patient Position: Lying Lying  Lying   Pulse: 65 60  54   Resp: 17 17  20   Temp: 35.8 °C (96.4 °F) 35.2 °C (95.4 °F)  35.8 °C (96.4 °F)   TempSrc: Temporal Temporal  Temporal   SpO2: 98% 97% 95% 100%   Weight:       Height:           Last Labs:  CBC - 1/14/2024:  5:49 AM  6.3 8.7 313    27.5      CMP - 1/14/2024:  5:49 AM  7.5 5.5 151 --- 1.3   2.6 2.0 59 99      PTT - No results in last year.  _   _ _     HGBA1C   Date/Time Value Ref Range Status   06/19/2023 02:07 PM 5.5 4.3 - 5.6 % Final     Comment:     American Diabetes Association guidelines indicate that patients with HgbA1c in the range 5.7-6.4% are at increased risk for development of diabetes, and intervention by lifestyle modification may be beneficial. HgbA1c greater or equal to 6.5% is considered diagnostic of diabetes.   03/09/2023 12:45 PM 5.5 4.3 - 5.6 % Final     Comment:     American Diabetes Association guidelines indicate that patients with HgbA1c in the range 5.7-6.4% are at increased risk for development of diabetes, and intervention by lifestyle modification may be beneficial. HgbA1c greater or equal to 6.5% is considered diagnostic of diabetes.      Last I/O:  I/O last 3 completed shifts:  In: 600 (5.3 mL/kg) [IV Piggyback:600]  Out: 1850 (16.2 mL/kg) [Urine:1850 (0.5 mL/kg/hr)]  Weight: 114 kg     Past Cardiology Tests (Last 3 Years):  EKG:  ECG 12 lead 01/08/2024    Echo:  Transthoracic Echo (TTE) Complete 01/08/2024    Ejection Fractions:  EF   Date/Time Value Ref Range Status   01/08/2024 09:59 AM 54       Cath:  No results found for this or any previous visit from the past 1095 days.    Stress Test:  No results found for this or any previous visit from the past 1095  days.    Cardiac Imaging:  No results found for this or any previous visit from the past 1095 days.      Inpatient Medications:  Scheduled medications   Medication Dose Route Frequency    [Held by provider] apixaban  2.5 mg oral q12h DARIA    [Held by provider] aspirin  81 mg oral Daily    ferrous sulfate (325 mg ferrous sulfate)  1 tablet oral Daily with breakfast    finasteride  5 mg oral Daily    ipratropium-albuteroL  3 mL nebulization 4x daily    levothyroxine  50 mcg oral Daily    midodrine  5 mg oral q8h    nystatin  5 mL Swish & Swallow 4x daily    oxygen   inhalation q8h    pantoprazole  40 mg oral Daily before breakfast    piperacillin-tazobactam  3.375 g intravenous q6h    polyethylene glycol  17 g oral BID    QUEtiapine  12.5 mg oral BID     PRN medications   Medication    acetaminophen    benzocaine-menthol    calcium carbonate    dextromethorphan-guaifenesin    dextrose 10 % in water (D10W)    dextrose    glucagon    guaiFENesin    LORazepam    ondansetron ODT    Or    ondansetron     Continuous Medications   Medication Dose Last Rate       Physical Exam:       Assessment/Plan   Assessment/Plan   Principal Problem:    Sepsis (CMS/HCC)  Active Problems:    Lactic acid acidosis    Acute renal failure (ARF) (CMS/HCC)    Leukocytosis    Bilateral pneumonia    Chronic respiratory failure (CMS/HCC)    Hypokalemia    Dehydration    Transaminitis    Acute encephalopathy    Hypotension  Iliopsoas hematoma  ASHD  Chronic systolic heart failure  Longstanding persistent atrial fibrillation       1/6: The patient is a 78-year-old white male with a history of longstanding coronary artery disease with multiple PCI procedures.  He does have ischemic congestive cardiomyopathy with an echocardiogram performed on 9/26/2023 estimating his LV ejection fraction of 35-40% with 2+ mitral valve regurgitation moderate left atrial enlargement mild RV chamber dilatation and a normal estimated PA systolic pressure.  He also has a  history of longstanding persistent now permanent atrial fibrillation for which she underwent multiple electrical DC cardioversions and a radiofrequency ablation procedure.  He is currently on a rate control strategy.  He has a history of former smoking COPD with O2 dependence.  He is admitted with increasing weakness shortness of breath cough with evidence of right-sided pneumonia by the initial chest CT for which she has been started on empiric IV antibiotics.  He does remain in atrial fibrillation borderline increase in ventricular rate and will begin low-dose metoprolol 25 mg twice daily.  He has been continued on Eliquis anticoagulation 5 mg twice daily along with a low-dose aspirin for CAD as well as statin therapy.  Will continue the modest IV fluid hydration given the transient hypotension in the emergency room with the potassium supplement.  Will check a repeat echocardiogram.  Follow-up chest x-ray tomorrow.      1/7: Patient's seen and events reviewed in detail.  He is lying supine somewhat fatigued no overt respiratory distress on nasal cannula.  He did develop hypotension with systolic blood pressure values in the lower 80 mmHg range.  He was given 500 cc of IV normal saline fluid bolus remains on the maintenance infusion at 100 cc/h.  He was placed on low-dose Levophed for pressure support.  Comprehensive metabolic panel noted of both for acute on chronic renal insuf transaminases remain elevated  ALT 68 bilirubin 1.4.  Transaminitis presumably related to sepsis hypotension.  Gastroenterology is following.  Patient remains in the atrial fibrillation with controlled ventricular rate and low-dose metoprolol.  Will reduce dose of Eliquis to 2.5 mg twice daily.  Acute on chronic renal insufficiency presumably due to sepsis and hypotension creatinine is risen from 1.4-2.2 today and urine output has diminished.  The repeat chest x-ray again demonstrates infiltration consolidation within the right lung  predominantly centrally and in the right upper lobe.  Patient currently on IV Zosyn azithromycin and vancomycin.     1/8: Patient still receiving low-dose IV norepinephrine to support blood pressure.  IV antibiotic therapy continues as well.  Remains in atrial fibrillation with controlled heart rate response.  Eliquis dose has been decreased down to 2.5 mg twice daily given the renal insufficiency.  Will continue to follow renal function.  Patient slowly improving.     1/9: Clinically overall improved over the last 24 hours.  IV norepinephrine being titrated down gradually.  Patient remains in atrial fibrillation with a well-controlled heart rate.  Serum creatinine unchanged at 2.6.  Potassium at 3.0 thus supplement will be needed.  Continue antibiotic therapy, patient continues to gradually improve.     1/10: Stable over the past 24 hours.  Now off pressor support.  Continues in a rate controlled atrial fibrillation.  Creatinine has improved to current of 2.1.  White blood cell count has significantly increased to current of 17.5.  Hemoglobin stable at 10.2.  Calcium does remain low at 3.1.  I have ordered for further oral repletion.  He is chest pain-free breathing comfortably nasal cannula oxygen.  Breath sounds are significant diminished throughout.  Does continue on antibiotics as directed by admitting.  Overall slowly improving.  Will follow with you.     1/11: Blood pressure has improved considerably over the last 24 hours.  As noted yesterday patient found to have evidence of an iliopsoas hematoma and antiplatelet and anticoagulant therapy has been placed on hold.  Hemoglobin has remained stable at 9.3 on morning labs.  Serum creatinine continues to improve and has decreased down to 1.7.  Potassium level remains suppressed and will require supplementation.  Heart rate is adequately controlled on current regimen.  Patient continues to slowly improve.     1/12: Blood pressure continues to improve and is now  stabilized significantly.  As noted previously anticoagulation has been held secondary to iliopsoas hematoma.  Kidney function also continues to improve with serum creatinine now decreased to 1.5.  Remains hypokalemic and is receiving potassium supplementation.  The patient continues to improve on a daily basis.     1/13: Resting comfortably and breathing easily.  Blood pressure is stable.  Kidney function continues to improve with creatinine of 1.2 and BUN of 23 with a GFR of 63.  Cardiac status is stable and he remains off of Eliquis in the setting of rate controlled atrial fibrillation due to his previous iliopsoas hematoma    1/14: Resting comfortably, angina free, stable hemodynamics off pressors, off of his antiplatelet therapy and anticoagulation in the setting of psoas hematoma.  No other active cardiac issues so we will sign off and he will follow-up with Dr. Gabriel as an outpatient in the next 2 to 3 weeks after discharge      Code Status:  Full Code    I spent 20 minutes in the professional and overall care of this patient.        Johan Flores DO

## 2024-01-14 NOTE — NURSING NOTE
Pt very drowsy at this time.  He wakes up to verbal commands.  Pt unable to swallow meds at this time Dr Bates aware of drowsiness. No new orders at this time

## 2024-01-15 PROBLEM — Z78.9 DECREASED ACTIVITIES OF DAILY LIVING (ADL): Status: ACTIVE | Noted: 2024-01-15

## 2024-01-15 LAB
ANION GAP SERPL CALC-SCNC: 7 MMOL/L
BUN SERPL-MCNC: 12 MG/DL (ref 8–25)
CALCIUM SERPL-MCNC: 7.7 MG/DL (ref 8.5–10.4)
CHLORIDE SERPL-SCNC: 107 MMOL/L (ref 97–107)
CO2 SERPL-SCNC: 26 MMOL/L (ref 24–31)
CREAT SERPL-MCNC: 1 MG/DL (ref 0.4–1.6)
EGFRCR SERPLBLD CKD-EPI 2021: 78 ML/MIN/1.73M*2
ERYTHROCYTE [DISTWIDTH] IN BLOOD BY AUTOMATED COUNT: 17 % (ref 11.5–14.5)
GLUCOSE SERPL-MCNC: 120 MG/DL (ref 65–99)
HCT VFR BLD AUTO: 27.7 % (ref 41–52)
HGB BLD-MCNC: 8.8 G/DL (ref 13.5–17.5)
MCH RBC QN AUTO: 28.9 PG (ref 26–34)
MCHC RBC AUTO-ENTMCNC: 31.8 G/DL (ref 32–36)
MCV RBC AUTO: 91 FL (ref 80–100)
NRBC BLD-RTO: 0 /100 WBCS (ref 0–0)
PLATELET # BLD AUTO: 306 X10*3/UL (ref 150–450)
POTASSIUM SERPL-SCNC: 3.8 MMOL/L (ref 3.4–5.1)
RBC # BLD AUTO: 3.04 X10*6/UL (ref 4.5–5.9)
SODIUM SERPL-SCNC: 140 MMOL/L (ref 133–145)
WBC # BLD AUTO: 8.8 X10*3/UL (ref 4.4–11.3)

## 2024-01-15 PROCEDURE — 2500000005 HC RX 250 GENERAL PHARMACY W/O HCPCS: Performed by: INTERNAL MEDICINE

## 2024-01-15 PROCEDURE — 97110 THERAPEUTIC EXERCISES: CPT | Mod: GO

## 2024-01-15 PROCEDURE — 2500000001 HC RX 250 WO HCPCS SELF ADMINISTERED DRUGS (ALT 637 FOR MEDICARE OP): Performed by: INTERNAL MEDICINE

## 2024-01-15 PROCEDURE — 80048 BASIC METABOLIC PNL TOTAL CA: CPT | Performed by: INTERNAL MEDICINE

## 2024-01-15 PROCEDURE — 2500000004 HC RX 250 GENERAL PHARMACY W/ HCPCS (ALT 636 FOR OP/ED): Performed by: INTERNAL MEDICINE

## 2024-01-15 PROCEDURE — 85027 COMPLETE CBC AUTOMATED: CPT | Performed by: INTERNAL MEDICINE

## 2024-01-15 PROCEDURE — 97535 SELF CARE MNGMENT TRAINING: CPT | Mod: GO

## 2024-01-15 PROCEDURE — 2060000001 HC INTERMEDIATE ICU ROOM DAILY

## 2024-01-15 PROCEDURE — 94640 AIRWAY INHALATION TREATMENT: CPT

## 2024-01-15 PROCEDURE — 36415 COLL VENOUS BLD VENIPUNCTURE: CPT | Performed by: INTERNAL MEDICINE

## 2024-01-15 PROCEDURE — 9420000001 HC RT PATIENT EDUCATION 5 MIN

## 2024-01-15 PROCEDURE — 2500000002 HC RX 250 W HCPCS SELF ADMINISTERED DRUGS (ALT 637 FOR MEDICARE OP, ALT 636 FOR OP/ED): Performed by: INTERNAL MEDICINE

## 2024-01-15 PROCEDURE — 92526 ORAL FUNCTION THERAPY: CPT | Mod: GN

## 2024-01-15 RX ADMIN — QUETIAPINE FUMARATE 12.5 MG: 25 TABLET ORAL at 10:38

## 2024-01-15 RX ADMIN — POLYETHYLENE GLYCOL 3350 17 G: 17 POWDER, FOR SOLUTION ORAL at 20:15

## 2024-01-15 RX ADMIN — MIDODRINE HYDROCHLORIDE 5 MG: 5 TABLET ORAL at 11:49

## 2024-01-15 RX ADMIN — PIPERACILLIN SODIUM AND TAZOBACTAM SODIUM 3.38 G: 3; .375 INJECTION, SOLUTION INTRAVENOUS at 18:24

## 2024-01-15 RX ADMIN — NYSTATIN 500000 UNITS: 100000 SUSPENSION ORAL at 06:10

## 2024-01-15 RX ADMIN — QUETIAPINE FUMARATE 12.5 MG: 25 TABLET ORAL at 20:15

## 2024-01-15 RX ADMIN — FERROUS SULFATE TAB 325 MG (65 MG ELEMENTAL FE) 1 TABLET: 325 (65 FE) TAB at 08:20

## 2024-01-15 RX ADMIN — NYSTATIN 500000 UNITS: 100000 SUSPENSION ORAL at 20:56

## 2024-01-15 RX ADMIN — POLYETHYLENE GLYCOL 3350 17 G: 17 POWDER, FOR SOLUTION ORAL at 10:38

## 2024-01-15 RX ADMIN — PIPERACILLIN SODIUM AND TAZOBACTAM SODIUM 3.38 G: 3; .375 INJECTION, SOLUTION INTRAVENOUS at 06:11

## 2024-01-15 RX ADMIN — MIDODRINE HYDROCHLORIDE 5 MG: 5 TABLET ORAL at 02:39

## 2024-01-15 RX ADMIN — NYSTATIN 500000 UNITS: 100000 SUSPENSION ORAL at 18:24

## 2024-01-15 RX ADMIN — Medication: at 07:30

## 2024-01-15 RX ADMIN — PIPERACILLIN SODIUM AND TAZOBACTAM SODIUM 3.38 G: 3; .375 INJECTION, SOLUTION INTRAVENOUS at 01:05

## 2024-01-15 RX ADMIN — NYSTATIN 500000 UNITS: 100000 SUSPENSION ORAL at 14:11

## 2024-01-15 RX ADMIN — IPRATROPIUM BROMIDE AND ALBUTEROL SULFATE 3 ML: 2.5; .5 SOLUTION RESPIRATORY (INHALATION) at 07:30

## 2024-01-15 RX ADMIN — LEVOTHYROXINE SODIUM 50 MCG: 0.05 TABLET ORAL at 06:11

## 2024-01-15 RX ADMIN — IPRATROPIUM BROMIDE AND ALBUTEROL SULFATE 3 ML: 2.5; .5 SOLUTION RESPIRATORY (INHALATION) at 11:38

## 2024-01-15 RX ADMIN — MIDODRINE HYDROCHLORIDE 5 MG: 5 TABLET ORAL at 19:46

## 2024-01-15 RX ADMIN — PANTOPRAZOLE SODIUM 40 MG: 40 TABLET, DELAYED RELEASE ORAL at 06:11

## 2024-01-15 RX ADMIN — IPRATROPIUM BROMIDE AND ALBUTEROL SULFATE 3 ML: 2.5; .5 SOLUTION RESPIRATORY (INHALATION) at 19:02

## 2024-01-15 RX ADMIN — PIPERACILLIN SODIUM AND TAZOBACTAM SODIUM 3.38 G: 3; .375 INJECTION, SOLUTION INTRAVENOUS at 14:11

## 2024-01-15 RX ADMIN — FINASTERIDE 5 MG: 5 TABLET, FILM COATED ORAL at 10:38

## 2024-01-15 ASSESSMENT — COGNITIVE AND FUNCTIONAL STATUS - GENERAL
PERSONAL GROOMING: A LOT
EATING MEALS: A LOT
MOVING FROM LYING ON BACK TO SITTING ON SIDE OF FLAT BED WITH BEDRAILS: A LOT
DAILY ACTIVITIY SCORE: 10
CLIMB 3 TO 5 STEPS WITH RAILING: TOTAL
STANDING UP FROM CHAIR USING ARMS: TOTAL
EATING MEALS: A LOT
TOILETING: TOTAL
WALKING IN HOSPITAL ROOM: TOTAL
DRESSING REGULAR LOWER BODY CLOTHING: TOTAL
MOVING FROM LYING ON BACK TO SITTING ON SIDE OF FLAT BED WITH BEDRAILS: TOTAL
DRESSING REGULAR LOWER BODY CLOTHING: TOTAL
DAILY ACTIVITIY SCORE: 10
HELP NEEDED FOR BATHING: A LOT
MOBILITY SCORE: 8
DRESSING REGULAR UPPER BODY CLOTHING: A LOT
STANDING UP FROM CHAIR USING ARMS: TOTAL
MOVING TO AND FROM BED TO CHAIR: TOTAL
MOBILITY SCORE: 6
PERSONAL GROOMING: A LOT
TURNING FROM BACK TO SIDE WHILE IN FLAT BAD: TOTAL
DRESSING REGULAR UPPER BODY CLOTHING: A LOT
DAILY ACTIVITIY SCORE: 10
TURNING FROM BACK TO SIDE WHILE IN FLAT BAD: A LOT
EATING MEALS: A LOT
DRESSING REGULAR LOWER BODY CLOTHING: TOTAL
HELP NEEDED FOR BATHING: A LOT
TOILETING: TOTAL
PERSONAL GROOMING: A LOT
HELP NEEDED FOR BATHING: A LOT
WALKING IN HOSPITAL ROOM: TOTAL
TOILETING: TOTAL
DRESSING REGULAR UPPER BODY CLOTHING: A LOT
MOVING TO AND FROM BED TO CHAIR: TOTAL
CLIMB 3 TO 5 STEPS WITH RAILING: TOTAL

## 2024-01-15 ASSESSMENT — PAIN - FUNCTIONAL ASSESSMENT
PAIN_FUNCTIONAL_ASSESSMENT: 0-10
PAIN_FUNCTIONAL_ASSESSMENT: 0-10

## 2024-01-15 ASSESSMENT — PAIN SCALES - GENERAL
PAINLEVEL_OUTOF10: 0 - NO PAIN

## 2024-01-15 ASSESSMENT — ACTIVITIES OF DAILY LIVING (ADL): HOME_MANAGEMENT_TIME_ENTRY: 14

## 2024-01-15 ASSESSMENT — PAIN SCALES - WONG BAKER: WONGBAKER_NUMERICALRESPONSE: NO HURT

## 2024-01-15 NOTE — CARE PLAN
Problem: Discharge Planning  Goal: Discharge to home or other facility with appropriate resources  Outcome: Progressing  Flowsheets (Taken 1/15/2024 0946)  Discharge to home or other facility with appropriate resources: Identify barriers to discharge with patient and caregiver     Problem: Skin  Goal: Prevent/minimize sheer/friction injuries  Outcome: Progressing  Flowsheets (Taken 1/15/2024 0946)  Prevent/minimize sheer/friction injuries:   Complete micro-shifts as needed if patient unable. Adjust patient position to relieve pressure points, not a full turn   Use pull sheet   Turn/reposition every 2 hours/use positioning/transfer devices  Goal: Promote/optimize nutrition  Outcome: Progressing  Flowsheets (Taken 1/15/2024 0946)  Promote/optimize nutrition:   Monitor/record intake including meals   Consume > 50% meals/supplements   Assist with feeding   The patient's goals for the shift include      The clinical goals for the shift include monitor V/S    Over the shift, the patient did make progress toward the following goals. Barriers to progression include low activity. Recommendations to address these barriers include increased activity.

## 2024-01-15 NOTE — PROGRESS NOTES
Navarro Rabago is a 78 y.o. male on day 9 of admission presenting with Sepsis (CMS/HCC).    Subjective   Interval History:   Awake  Afebrile  ON 4 liters oxygen  Denies shortness of breat or chest pain  Denies nausea or vomiting     Objective   Range of Vitals (last 24 hours)  Heart Rate:  [56-91]   Temp:  [36.1 °C (97 °F)-36.9 °C (98.4 °F)]   Resp:  [18-22]   BP: (125-142)/(51-67)   SpO2:  [97 %-100 %]   Daily Weight  01/11/24 : 114 kg (251 lb 5.2 oz)    Body mass index is 29.8 kg/m².    Physical Exam  Constitutional:       Comments:  Intermittent confusion   HENT:      Head: Normocephalic and atraumatic.      Nose: Nose normal.    Eyes:      Extraocular Movements: Extraocular movements intact.      Conjunctiva/sclera: Conjunctivae normal.   Cardiovascular:      Rate and Rhythm: Normal rate and regular rhythm.   Pulmonary:      Effort: Pulmonary effort is normal.      Breath sounds:  No wheezing or rales present.   Abdominal:      General: Bowel sounds are normal.      Palpations: Abdomen is soft.   Musculoskeletal:         General: Normal range of motion.      Cervical back: Normal range of motion and neck supple.   Skin:     General: Skin is warm and dry.   Neurological:      General: No focal deficit present.      Mental Status: He is alert.   Psychiatric:         Mood and Affect: Mood normal.         Behavior: Behavior normal.     Antibiotics  sodium chloride 0.9% infusion  heparin (porcine) injection 5,000 Units  sodium chloride 0.9 % with KCl 20 mEq/L infusion  calcium carbonate (Tums) chewable tablet 500 mg  ondansetron ODT (Zofran-ODT) disintegrating tablet 4 mg  ondansetron (Zofran) injection 4 mg  benzocaine-menthol (Cepastat Sore Throat) 15-3.6 mg lozenge 1 lozenge  guaiFENesin (Mucinex) 12 hr tablet 600 mg  dextromethorphan-guaifenesin (Robitussin DM)  mg/5 mL oral liquid 5 mL  dextrose 50 % injection 25 g  glucagon (Glucagen) injection 1 mg  dextrose 10 % in water (D10W)  infusion  ipratropium-albuteroL (Duo-Neb) 0.5-2.5 mg/3 mL nebulizer solution 3 mL  oxygen (O2) therapy  piperacillin-tazobactam-dextrose (Zosyn) IV 3.375 g  pantoprazole (ProtoNix) injection 40 mg  ipratropium-albuteroL (Duo-Neb) 0.5-2.5 mg/3 mL nebulizer solution 3 mL  allopurinol (Zyloprim) tablet  apixaban (Eliquis) tablet  aspirin EC tablet  atorvastatin (Lipitor) tablet  bumetanide (Bumex) tablet  colchicine tablet  empagliflozin (Jardiance) tablet  fexofenadine (Allegra) tablet  finasteride (Propecia, Proscar) tablet  guaiFENesin (Mucinex) 12 hr tablet  levothyroxine (Synthroid, Levoxyl) tablet  nitroglycerin (Nitrostat) SL tablet  pantoprazole (ProtoNix) EC tablet  potassium chloride SA (Klor-Con M20) ER tablet  tamsulosin (Flomax) 24 hr capsule  vancomycin 1.5 mg in 300 mL (Xellia) IVPB 1.5 g      levothyroxine (Synthroid, Levoxyl) tablet 50 mcg  finasteride (Proscar) tablet 5 mg  ferrous sulfate (325 mg ferrous sulfate) tablet 1 tablet  aspirin EC tablet 81 mg  apixaban (Eliquis) tablet 5 mg  metoprolol tartrate (Lopressor) tablet 25 mg  potassium chloride 20 mEq in 100 mL IV premix  midodrine (Proamatine) tablet 5 mg  sodium chloride 0.9 % bolus 500 mL  norepinephrine (Levophed) 8 mg in dextrose 5% 250 mL (0.032 mg/mL) infusion (premix)  sodium chloride 0.9 % bolus 250 mL  sodium chloride 0.9 % bolus 250 mL  azithromycin (Zithromax) in dextrose 5 % in water (D5W) 250 mL  mg  vancomycin 1.5 mg in 300 mL (Xellia) IVPB 1.5 g  apixaban (Eliquis) tablet 2.5 mg  vancomycin (Xellia) 1 g in 200 mL (Xellia) IVPB 1,000 mg  norepinephrine (Levophed) 8 mg in dextrose 5% 250 mL (0.032 mg/mL) infusion (premix)  acetaminophen (Tylenol) tablet 650 mg  vancomycin 1.5 mg in 300 mL (Xellia) IVPB 1.5 g  sodium bicarbonate 150 mEq in dextrose 5 % in water (D5W) 1,000 mL infusion  midodrine (Proamatine) tablet 5 mg  barium sulfate (Varibar Honey) 40 % (w/v) 29% (w/w) suspension 10 mL  barium sulfate (Varibar Pudding) 40 %  "(w/v), 30% (w/w) oral paste 5 mL  barium sulfate (Varibar Nectar, Varibar Honey) 40 % (w/v) suspension 10 mL  barium sulfate (Varibar THIN Liquid) 81 % (w/w) suspension 10 mL  nystatin (Mycostatin) 100,000 unit/mL suspension 500,000 Units  potassium chloride CR (Klor-Con M20) ER tablet 20 mEq  oxygen (O2) therapy  pantoprazole (ProtoNix) EC tablet 40 mg  dextrose 5%-0.45 % sodium chloride infusion  vancomycin 1.5 mg in 300 mL (Xellia) IVPB 1.5 g  potassium chloride CR (Klor-Con M20) ER tablet 40 mEq  LORazepam (Ativan) injection 1 mg  QUEtiapine (SEROquel) tablet 12.5 mg  LORazepam (Ativan) injection 2 mg  potassium chloride CR (Klor-Con M20) ER tablet 20 mEq  potassium chloride 20 mEq in 100 mL IV premix  sodium chloride 0.9% infusion  potassium chloride (Klor-Con) packet 20 mEq  potassium chloride CR (Klor-Con M20) ER tablet 20 mEq  potassium chloride CR (Klor-Con M20) ER tablet 20 mEq  potassium chloride CR (Klor-Con M20) ER tablet 20 mEq      Relevant Results  Labs  Results from last 72 hours   Lab Units 01/15/24  0814 01/14/24  0549 01/13/24  0601   WBC AUTO x10*3/uL 8.8 6.3 8.7   HEMOGLOBIN g/dL 8.8* 8.7* 9.1*   HEMATOCRIT % 27.7* 27.5* 27.9*   PLATELETS AUTO x10*3/uL 306 313 363       Results from last 72 hours   Lab Units 01/15/24  0813 01/14/24  0549 01/13/24  0602   SODIUM mmol/L 140 139 139   POTASSIUM mmol/L 3.8 3.6 3.4   CHLORIDE mmol/L 107 109* 107   CO2 mmol/L 26 25 24   BUN mg/dL 12 17 23   CREATININE mg/dL 1.00 1.10 1.20   GLUCOSE mg/dL 120* 114* 93   CALCIUM mg/dL 7.7* 7.5* 7.5*   ANION GAP mmol/L 7 5 8   EGFR mL/min/1.73m*2 78 70 63             Estimated Creatinine Clearance: 88.1 mL/min (by C-G formula based on SCr of 1 mg/dL).  No results found for: \"CRP\"  Microbiology  Reviewed  Imaging  ECG 12 lead    Result Date: 1/10/2024  Sinus tachycardia with 1st degree AV block with Premature supraventricular complexes Low voltage QRS Left posterior fascicular block Abnormal QRS-T angle, consider primary " T wave abnormality Abnormal ECG When compared with ECG of 13-MAY-2021 13:46, Sinus rhythm has replaced Atrial fibrillation Vent. rate has increased BY  35 BPM Left posterior fascicular block is now Present QT has shortened See ED provider note for full interpretation and clinical correlation Confirmed by Alis Tejeda ([id number]) on 1/10/2024 5:08:28 PM    CT abdomen pelvis wo IV contrast    Result Date: 1/10/2024  STUDY: CT Abdomen and Pelvis without IV Contrast; 1/10/2024 at 3:30 AM INDICATION: Back pain, ecchymosis; trauma. COMPARISON: US renal 1/8/2024, CT chest, abdomen and pelvis 1/5/2024. ACCESSION NUMBER(S): [id number] ORDERING CLINICIAN: DANA AMATO TECHNIQUE: CT of the abdomen and pelvis was performed.  Contiguous axial images were obtained at 3 mm slice thickness through the abdomen and pelvis. Coronal and sagittal reconstructions at 3 mm slice thickness were performed. No intravenous contrast was administered.  Automated mA/kV exposure control was utilized and patient examination was performed in strict accordance with principles of ALARA. FINDINGS: Please note that the evaluation of vessels, lymph nodes and organs is limited without intravenous contrast.  LOWER CHEST: No cardiomegaly.  No pericardial effusion.  Lung bases are clear.  ABDOMEN: Partially visualized portions of the lower chest showing small bilateral pleural effusions and partial atelectasis of the bilateral lower lobes increased from prior. Heart size normal. No pericardial effusion. Unenhanced liver without acute process. No discernible intrahepatic ductal dilatation. Pericholecystic flu atrophic changes of the pancreas. No acute abnormality of the spleen is identified. No acute abnormality of the adrenal glands or kidneys no retroperitoneal adenopathy. Atherosclerosis of the abdominal aorta without aneurysm. Asymmetric enlargement of the RIGHT psoas musculature and retroperitoneal fluid increased from 1/5/24. Small amount of  free fluid within the pelvis. No pelvic adenopathy identified. Rodriguez catheter within the urinary bladder. LEFT hip prostheses. No findings of acute fracture of the pelvis. Spondylotic changes and facet arthrosis of the lumbar spine.     Impression: RIGHT psoas and retroperitoneal hematoma. Without the use of intravenous contrast difficult to determine the exact size of the hemorrhage. Increased small bilateral pleural effusions and partial atelectasis of the lower lobes. Findings discussed with Dr. DANA AMATO with verbal understanding at approximately 1/10/2024 4:28 AM. Signed by Keith Watson MD    CT head wo IV contrast    Result Date: 1/10/2024  STUDY: CT Head without IV Contrast; 1/10/2024 at 3:30 AM INDICATION: Confusion. COMPARISON: None available. ACCESSION NUMBER(S): [id number] ORDERING CLINICIAN: DANA AMATO TECHNIQUE: Noncontrast axial CT scan of head was performed.  Angled reformats in brain and bone windows were generated.  The images were reviewed in bone, brain, blood and soft tissue windows. Automated mA/kV exposure control was utilized and patient examination was performed in strict accordance with principles of ALARA. FINDINGS: CSF Spaces:  The ventricles, sulci and basal cisterns are prominent from atrophy.  There is no extraaxial fluid collection.  Mild patchy areas of low-attenuation are seen in the subcortical and deep periventricular white matter suggesting areas of chronic microvascular ischemia.  Parenchyma:  The grey-white differentiation is intact.  There is no mass effect or midline shift.  There is no intracranial hemorrhage.  Calvarium:  The calvarium is unremarkable.  Paranasal sinuses and mastoids:  Visualized paranasal sinuses and mastoids are clear.    Diffuse cerebral atrophy.  Suspected mild chronic small vessel white matter ischemia.  No definite acute intracranial abnormality. Signed by Shawn Echols DO    Transthoracic Echo (TTE) Complete    Result  Date: 1/8/2024            [facility] [address], [place] [address]             Phone [phone] TRANSTHORACIC ECHOCARDIOGRAM REPORT  Patient Name:      LILIAN Lizarraga Physician:   [id number] Lb Maki MD Study Date:        1/8/2024           Ordering Provider:   [id number] LB MAKI MRN/PID:           [id number]           Fellow: Accession#:        [id number]       Nurse: Date of Birth/Age: 1945 / 78      Sonographer:         Lilli Mata RDCS                    years Gender:            M                  Additional Staff: Height:            195.58 cm          Admit Date:          1/8/2024 Weight:            103.42 kg          Admission Status:    Inpatient - Routine BSA:               2.36 m2            Department Location: [facility] Blood Pressure: 96 /74 mmHg Study Type:    TRANSTHORACIC ECHO (TTE) COMPLETE Diagnosis/ICD: Other hypotension-I95.89 Indication:    hypotension CPT Codes:     Echo Complete w Full [id number]  Study Detail: The following Echo studies were performed: 2D, M-Mode, Doppler and               color flow.  PHYSICIAN INTERPRETATION: Left Ventricle: Left ventricular systolic function is moderately decreased, with an estimated ejection fraction of 35-40%. Wall motion is abnormal. The left ventricular cavity size is mild to moderately dilated. Abnormal (paradoxical) septal motion, consistent with an intraventricular conduction delay. Spectral Doppler shows a normal pattern of left ventricular diastolic filling. There is moderate to severe hypokinesis of the inferoposterior wall. There is mild hypokinesis and dyssynchrony of the anteroseptal wall. Left Atrium: The left atrium is mild to moderately dilated. Right Ventricle: The right ventricle is upper limits of normal in size. There is normal right ventriclar wall thickness. There is normal right ventricular global systolic function. Right  Atrium: The right atrium is mildly dilated. Aortic Valve: The aortic valve is trileaflet. The aortic valve appears tricuspid and non-restricted. There is mild aortic valve cusp calcification. There is no evidence of reduced aortic valve leaflet excursion excursion. There is evidence of mildly elevated transaortic gradients consistent with sclerosis of the aortic valve. There is mild aortic valve regurgitation. The peak instantaneous gradient of the aortic valve is 10.8 mmHg. The mean gradient of the aortic valve is 6.0 mmHg. There is moderate sclerotic calcification of the noncoronary aortic valve cusp. Mitral Valve: The mitral valve is normal in structure. There is normal mitral valve leaflet mobility. There is mild to moderate mitral valve regurgitation. Tricuspid Valve: The tricuspid valve is structurally normal. There is normal tricuspid valve leaflet mobility. There is mild to moderate tricuspid regurgitation. The Doppler estimated RVSP is mildly elevated at 43.0 mmHg. Pulmonic Valve: The pulmonic valve is structurally normal. There is trace pulmonic valve regurgitation. Pericardium: There is no pericardial effusion noted. Aorta: The aortic root is normal. There is no dilatation of the aortic arch. There is no dilatation of the ascending aorta. There is no dilatation of the aortic root. Pulmonary Artery: The pulmonary artery is normal in size. The tricuspid regurgitant velocity is 2.96 m/s, and with an estimated right atrial pressure of 8 mmHg, the estimated pulmonary artery pressure is mildly elevated with the RVSP at 43.0 mmHg. The estimated PASP is 43 mmHg. Systemic Veins: The inferior vena cava appears moderately dilated.  CONCLUSIONS:  1. Left ventricular systolic function is moderately decreased with a 35-40% estimated ejection fraction.  2. There is moderate to severe hypokinesis of the inferoposterior wall.     There is mild hypokinesis and dyssynchrony of the anteroseptal wall.  3. The left atrium is  mild to moderately dilated.  4. Mild to moderate mitral valve regurgitation.  5. Mild to moderate tricuspid regurgitation.  6. Mildly elevated RVSP.  7. Aortic valve sclerosis.  8. There is moderate sclerotic calcification of the noncoronary aortic valve cusp.  9. Mild aortic valve regurgitation. 10. The estimated PASP is 43 mmHg. 11. The inferior vena cava appears moderately dilated. QUANTITATIVE DATA SUMMARY: 2D MEASUREMENTS:                          Normal Ranges: IVSd:          1.08 cm   (0.6-1.1cm) LVPWd:         0.98 cm   (0.6-1.1cm) LVIDd:         5.65 cm   (3.9-5.9cm) LVIDs:         4.00 cm LV Mass Index: 97.7 g/m2 LV % FS        29.2 % LA VOLUME:                               Normal Ranges: LA Vol A4C:        59.2 ml    (22+/-6mL/m2) LA Vol Index A4C:  25.0ml/m2 LA Area A4C:       20.3 cm2 LA Major Axis A4C: 5.9 cm LA Volume Index:   25.3 ml/m2 LA Vol A4C:        54.4 ml RA VOLUME BY A/L METHOD:                               Normal Ranges: RA Vol A4C:        38.2 ml    (8.3-19.5ml) RA Vol Index A4C:  16.2 ml/m2 RA Area A4C:       15.2 cm2 RA Major Axis A4C: 5.1 cm M-MODE MEASUREMENTS:                  Normal Ranges: Ao Root: 3.70 cm (2.0-3.7cm) AORTA MEASUREMENTS:                      Normal Ranges: Ao Sinus, d: 3.21 cm (2.1-3.5cm) LV SYSTOLIC FUNCTION BY 2D PLANIMETRY (MOD):                     Normal Ranges: EF-A4C View: 54.1 % (>=55%) EF-A2C View: 51.4 % EF-Biplane:  54.1 % AORTIC VALVE:                                    Normal Ranges: AoV Vmax:                1.64 m/s  (<=1.7m/s) AoV Peak PG:             10.8 mmHg (<20mmHg) AoV Mean P.0 mmHg  (1.7-11.5mmHg) LVOT Max Angel Luis:            0.99 m/s  (<=1.1m/s) AoV VTI:                 31.80 cm  (18-25cm) LVOT VTI:                19.70 cm LVOT Diameter:           2.00 cm   (1.8-2.4cm) AoV Area, VTI:           1.95 cm2  (2.5-5.5cm2) AoV Area,Vmax:           1.89 cm2  (2.5-4.5cm2) AoV Dimensionless Index: 0.62 AORTIC INSUFFICIENCY: AI Vmax:        3.44 m/s AI Half-time:  677 msec AI Decel Rate: 150.00 cm/s2  RIGHT VENTRICLE: RV Basal 3.83 cm RV Mid   4.43 cm RV Major 7.2 cm TRICUSPID VALVE/RVSP:                             Normal Ranges: Peak TR Velocity: 2.96 m/s RV Syst Pressure: 43.0 mmHg (< 30mmHg) IVC Diam:         2.68 cm  [id number] Lb Nance MD Electronically signed on 1/8/2024 at 9:32:37 PM  ** Final **     FL modified barium swallow study    Result Date: 1/8/2024  Interpreted By:  Antoniou, Elias, and Casey Corinne STUDY: FL MODIFIED BARIUM SWALLOW STUDY;; 1/8/2024 1:27 pm   INDICATION: Signs/Symptoms:suspected pharyngeal dysphagia.   COMPARISON: None.   ACCESSION NUMBER(S): [id number]   ORDERING CLINICIAN: JOSE DAVID SOLER   TECHNIQUE: MBSS completed. Informed verbal consent obtained prior to completion of exam. Trials of thin, nectar thick, honey thick, puree, and regular solids given. Fluoroscopy time :  3 minutes and 54 seconds. Total dose air kerma 21.15 mGy..   SLP: Corinne E. Casey, M.A. CCC-SLP Phone/Pager: [phone] opt 2, via [employer], or via [employer] secure chat   SPEECH FINDINGS: Reason for referral: Dysphagia, aspiration pneumonia Patient hx: Sepsis, HTN, HLD, GERD, CHF Respiratory status: Nasal cannula Previous diet: Regular and thin   FINAL SPEECH RECOMMENDATIONS   Diet recommendations/feeding strategies: Minced/moist solids with thin liquids, NO STRAWS.   Upright seating, no straw. Meds crushed in puree. Please downgrade to nectar thick and notify SLP if s/s of aspiration present with PO intake.   Follow-up speech therapy recommended: Yes.   Education provided: Yes. Results and recs discussed with pt, RN, and physician with verbalized understanding noted.   Treatment Provided today: Yes, see progress notes   Repeat study/ dc plan: Continue skilled speech therapy services after discharge   Mechanics of the swallow summary: *Oral phase: Moderate impairment as characterized by disorganized/prolonged mastication, poor tongue control  during bolus hold with premature posterior phrayngeal spillage, and delayed swallow onset (at level of pyriform sinuese for thin and nectar liquids, and at valleculae for honey liquids, puree, and cracker consistencies *Pharyngeal phase: Mild impairment as characterized by decreased tongue base retraction and trace vallecular retention *Esophageal phase: WNL, limited assessment   SLP impressions with severity rating: Pt. Presenting with singular episode of aspiration during initial trial of thin liquid.  Penetration with thin liquid via straw (successive swallows) and with nectar liquids via tsp. No further penetration nor aspiration presents with remaining trials of thin liquids via cup, nectar liquids via cup, honey liquids, puree, nor cracker trials.   Pt noted with mild oral and pharyngeal dysphagia as characterized by prolonged mastication, premature posterior pharyngeal spillage, delayed swallow onset, and diminished tongue base retraction.   Thin Liquids (MBSS) Rosenbek's Penetration Aspiration Scale, Thin Liquids (MBSS): 7. OVERT ASPIRATION, material is not cleared - contrast passes glottis, visible residue, W/pt response Singluar episode of aspiration on initial trial of thin via tsp - appears r/t premature posterior pharyngeal spillage and occurs prior to swallow onset. Penetration without aspiration (PAS 2) during trials of thin liquid via straw (successive swallows); No penetration nor aspiration with thin liquids via cup (PAS 1) Response to Aspiration, Thin Liquid (MBSS): unproductive reflexive involuntary cough,       Nectar Thick Liquids (MBSS) Rosenbek's Penetration Aspiration Scale, Nectar thick liquids (MBSS): 2. PENETRATION that CLEARS - contrast enter airway, above vocal cords, no residue       Honey Thick Liquids (MBSS) Rosenbek's Penetration Aspiration Scale, Honey thick liquids (MBSS): 1. NO ASPIRATION & NO PENETRATION - no aspiration, contrast does not enter airway     Response to Pharyngeal  Residue, Honey thick liquids (MBSS): no spontaneous response,   Purees (MBSS) Rosenbek's Penetration Aspiration Scale, Purees (MBSS): 1. NO ASPIRATION & NO PENETRATION - no aspiration, contrast does not enter airway     Response to Pharyngeal Residue, Purees (MBSS): no spontaneous response, Clears with thin liquid wash,     Solids (MBSS) Rosenbek's Penetration Aspiration Scale, Solids (MBSS): 1. NO ASPIRATION & NO PENETRATION - no aspiration, contrast does not enter airway   Response to Pharyngeal Residue, Solids (MBSS): no spontaneous response, Clears with thin liquid wash. ,     Speech Therapy section of this report signed by Corinne E. Casey, M.A. CCC-SLP on 1/8/2024 at 1:41 pm.   RADIOLOGY FINDINGS: Prolonged oral phase with uncoordinated mastication/lingual motion. Single episode of aspiration with thin barium. A couple of additional episodes of laryngeal penetration without aspiration. No laryngeal penetration or aspiration seen with thicker consistencies of barium. Retention of barium is noted in the vallecula and piriform sinuses.   Radiology section of this report signed by .       Dysphagia as above.   MACRO: None   Signed by: Roby Villarreal 1/8/2024 4:31 PM Dictation workstation:   [id number]    US renal complete    Result Date: 1/8/2024  Interpreted By:  Cathy Hernandez, STUDY: US RENAL COMPLETE; 1/8/2024 11:45 am   INDICATION: Acute renal insufficiency.   COMPARISON: None.   ACCESSION NUMBER(S): [id number]   ORDERING CLINICIAN: PERCY CABRERA   TECHNIQUE: Grayscale and color Doppler imaging of the kidneys is performed portably..   FINDINGS: The right kidney measures 12.1 cm x 7.2 cm x 5.1 cm. A simple right renal cyst measuring 1.9 cm in size is seen. The left kidney measures 12.6 cm x 5.6 cm x 6.4 cm. There is no shadowing calculus, hydronephrosis, or solid mass identified. The renal cortical echogenicity is normal bilaterally with renal cortical thickness within normal limits.   Cursory evaluation of  urinary bladder shows presence of a Rodriguez catheter with bladder decompressed.       1.9 cm simple right renal cyst with unremarkable appearance of the left kidney.   No renal atrophy or hydronephrosis.   MACRO: None.   Signed by: Cathy Hernandez 1/8/2024 12:04 PM Dictation workstation:   [id number]    XR chest 1 view    Result Date: 1/7/2024  Interpreted By:  Cathy Hernandez, STUDY: XR CHEST 1 VIEW 1/7/2024 7:36 am   INDICATION: Follow-up pneumonia   COMPARISON: 05/29/2014 as well as CT examination of the chest done on 01/05/2024.   ACCESSION NUMBER(S): [id number]   ORDERING CLINICIAN: EMILY MAKI   TECHNIQUE: AP erect view of the chest   FINDINGS: The heart is at the upper limits of normal in size. There is infiltrate and airspace consolidation observed centrally within the right lung with upper lobar predominance. There is also some patchy infiltrate at the left lung base. No obvious pleural abnormality is seen.       Infiltrate and airspace consolidation identified within the right lung most pronounced centrally and within the right upper lobe with mild left basilar infiltrate.   Signed by: Cathy Hernandez 1/7/2024 8:11 AM Dictation workstation:   [id number]    CT chest abdomen pelvis w IV contrast    Result Date: 1/5/2024  Interpreted By:  Yordy Urena, STUDY: CT CHEST ABDOMEN PELVIS W IV CONTRAST;  1/5/2024 6:33 pm   INDICATION: Signs/Symptoms:abd pain, sob.   COMPARISON: None.   ACCESSION NUMBER(S): [id number]   ORDERING CLINICIAN: NELLA MELGOZA   TECHNIQUE: Contiguous axial images of the chest, abdomen and pelvis were obtained after the intravenous administration of  contrast. Coronal and sagittal reformatted images were obtained from the axial images.   FINDINGS: CT CHEST:   No axillary lymphadenopathy. 1.6 cm right paratracheal lymph node and 1.5 cm subcarinal lymph node. There is limited evaluation for hilar lymphadenopathy secondary stripping motion.   The heart is normal in size. Coronary artery vascular  calcifications. No significant pericardial effusion.   There is pulmonary emphysematous change. There is consolidative opacity in the right upper lobe and right lower lobe compatible with pneumonia and also minimal opacity in the left lower lobe. Trace pleural effusions. No pneumothorax.   Multilevel degenerative change of the thoracic spine.     CT ABDOMEN PELVIS:   Evaluation of the abdomen and pelvis is limited secondary to patient motion. No evidence of liver mass. The gallbladder is present, not well evaluated secondary to motion. No dilatation common bile duct.   Atrophy of the pancreas.   No splenomegaly.   Question mild nodularity of the left adrenal gland. The right adrenal gland appears unremarkable.   Symmetric enhancement of the kidneys. Right renal cysts with the largest measuring 3.1 cm and several bowel subcentimeter hypodensity too small to characterize. No hydronephrosis.   Atherosclerotic calcification of the aorta and abdominal and pelvic vasculature.   No evidence of bowel obstruction. Evaluation the bowel is otherwise limited secondary patient motion.   Urinary bladder is underdistended and not well evaluated. 18 mm right posterior bladder diverticulum.   Postsurgical change of left hip arthroplasty resulting in beam hardening artifact and limiting evaluation the pelvis.   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       Consolidative opacity in the right upper lobe and lower lobe compatible with pneumonia and also mild left basilar opacity and trace pleural effusions.   No definite evidence of acute abnormality of the abdominal viscera within the limitations of the motion degraded examination.   MACRO: None   Signed by: Yordy Urena 1/5/2024 6:49 PM Dictation workstation:   [id number]     Assessment/Plan   Sepsis, with shock, resolved  Acute renal failure-improving  Pneumonia-MRSA PCR negative   Diarrhea-c.diff negative  Transaminitis-resolving  Leukocytosis, improving  Oral candida    Chronic respiratory failure-at baseline 5LNC  Retroperitoneal hematoma     Continue IV zosyn-started on 1/6  Continue Nystatin suspension  Monitor WBC and temperature  Monitor renal function  Oxygen as needed  Supportive care  Monitor mental status  Evaluate for discontinuation of antibiotic in 1-2 days      Tess Gonzalez, APRN-CNP

## 2024-01-15 NOTE — PROGRESS NOTES
Pulmonary Progress Note 01/15/24     Patient seen in follow-up for pneumonia and respiratory failure    Subjective   Interval History:   No new complaints.  Tells me his breathing is fine.    Objective   Vital signs in last 24 hours:  Temp:  [36.1 °C (97 °F)-36.9 °C (98.4 °F)] 36.9 °C (98.4 °F)  Heart Rate:  [56-91] 69  Resp:  [18-22] 19  BP: (125-142)/(51-67) 130/67  FiO2 (%):  [36 %] 36 %    Intake/Output last 3 shifts:  I/O last 3 completed shifts:  In: 250 (2.2 mL/kg) [IV Piggyback:250]  Out: 2000 (17.5 mL/kg) [Urine:2000 (0.5 mL/kg/hr)]  Weight: 114 kg   Intake/Output this shift:  No intake/output data recorded.    Physical Exam  Vitals reviewed.   Constitutional:       General: He is not in acute distress.     Appearance: Normal appearance.   Cardiovascular:      Rate and Rhythm: Normal rate.      Pulses: Normal pulses.   Pulmonary:      Effort: Pulmonary effort is normal.      Breath sounds: No wheezing or rales.   Musculoskeletal:      Right lower leg: No edema.      Left lower leg: No edema.   Skin:     General: Skin is warm and dry.   Neurological:      General: No focal deficit present.      Mental Status: Mental status is at baseline.   Psychiatric:         Mood and Affect: Mood normal.         Behavior: Behavior normal.         Scheduled medications  [Held by provider] apixaban, 2.5 mg, oral, q12h DARIA  [Held by provider] aspirin, 81 mg, oral, Daily  ferrous sulfate (325 mg ferrous sulfate), 1 tablet, oral, Daily with breakfast  finasteride, 5 mg, oral, Daily  ipratropium-albuteroL, 3 mL, nebulization, 4x daily  levothyroxine, 50 mcg, oral, Daily  midodrine, 5 mg, oral, q8h  nystatin, 5 mL, Swish & Swallow, 4x daily  oxygen, , inhalation, q8h  pantoprazole, 40 mg, oral, Daily before breakfast  piperacillin-tazobactam, 3.375 g, intravenous, q6h  polyethylene glycol, 17 g, oral, BID  QUEtiapine, 12.5 mg, oral, BID      Continuous medications       PRN medications  PRN medications:  acetaminophen, benzocaine-menthol, calcium carbonate, dextromethorphan-guaifenesin, dextrose 10 % in water (D10W), dextrose, glucagon, guaiFENesin, LORazepam, ondansetron ODT **OR** ondansetron     Labs:  Lab Results   Component Value Date     01/15/2024    K 3.8 01/15/2024     01/15/2024    CO2 26 01/15/2024    BUN 12 01/15/2024    CREATININE 1.00 01/15/2024    GLUCOSE 120 (H) 01/15/2024    CALCIUM 7.7 (L) 01/15/2024     Lab Results   Component Value Date    WBC 8.8 01/15/2024    HGB 8.8 (L) 01/15/2024    HCT 27.7 (L) 01/15/2024    MCV 91 01/15/2024     01/15/2024       Imaging:  ECG 12 lead    Result Date: 1/10/2024  Sinus tachycardia with 1st degree AV block with Premature supraventricular complexes Low voltage QRS Left posterior fascicular block Abnormal QRS-T angle, consider primary T wave abnormality Abnormal ECG When compared with ECG of 13-MAY-2021 13:46, Sinus rhythm has replaced Atrial fibrillation Vent. rate has increased BY  35 BPM Left posterior fascicular block is now Present QT has shortened See ED provider note for full interpretation and clinical correlation Confirmed by Alis Tejeda ([id number]) on 1/10/2024 5:08:28 PM    CT abdomen pelvis wo IV contrast    Result Date: 1/10/2024  STUDY: CT Abdomen and Pelvis without IV Contrast; 1/10/2024 at 3:30 AM INDICATION: Back pain, ecchymosis; trauma. COMPARISON: US renal 1/8/2024, CT chest, abdomen and pelvis 1/5/2024. ACCESSION NUMBER(S): [id number] ORDERING CLINICIAN: DANA AMATO TECHNIQUE: CT of the abdomen and pelvis was performed.  Contiguous axial images were obtained at 3 mm slice thickness through the abdomen and pelvis. Coronal and sagittal reconstructions at 3 mm slice thickness were performed. No intravenous contrast was administered.  Automated mA/kV exposure control was utilized and patient examination was performed in strict accordance with principles of ALARA. FINDINGS: Please note that the evaluation of  vessels, lymph nodes and organs is limited without intravenous contrast.  LOWER CHEST: No cardiomegaly.  No pericardial effusion.  Lung bases are clear.  ABDOMEN: Partially visualized portions of the lower chest showing small bilateral pleural effusions and partial atelectasis of the bilateral lower lobes increased from prior. Heart size normal. No pericardial effusion. Unenhanced liver without acute process. No discernible intrahepatic ductal dilatation. Pericholecystic flu atrophic changes of the pancreas. No acute abnormality of the spleen is identified. No acute abnormality of the adrenal glands or kidneys no retroperitoneal adenopathy. Atherosclerosis of the abdominal aorta without aneurysm. Asymmetric enlargement of the RIGHT psoas musculature and retroperitoneal fluid increased from 1/5/24. Small amount of free fluid within the pelvis. No pelvic adenopathy identified. Rodriguez catheter within the urinary bladder. LEFT hip prostheses. No findings of acute fracture of the pelvis. Spondylotic changes and facet arthrosis of the lumbar spine.     Impression: RIGHT psoas and retroperitoneal hematoma. Without the use of intravenous contrast difficult to determine the exact size of the hemorrhage. Increased small bilateral pleural effusions and partial atelectasis of the lower lobes. Findings discussed with Dr. DANA AMATO with verbal understanding at approximately 1/10/2024 4:28 AM. Signed by Keith Watson MD    CT head wo IV contrast    Result Date: 1/10/2024  STUDY: CT Head without IV Contrast; 1/10/2024 at 3:30 AM INDICATION: Confusion. COMPARISON: None available. ACCESSION NUMBER(S): [id number] ORDERING CLINICIAN: DANA AMATO TECHNIQUE: Noncontrast axial CT scan of head was performed.  Angled reformats in brain and bone windows were generated.  The images were reviewed in bone, brain, blood and soft tissue windows. Automated mA/kV exposure control was utilized and patient examination was  performed in strict accordance with principles of ALARA. FINDINGS: CSF Spaces:  The ventricles, sulci and basal cisterns are prominent from atrophy.  There is no extraaxial fluid collection.  Mild patchy areas of low-attenuation are seen in the subcortical and deep periventricular white matter suggesting areas of chronic microvascular ischemia.  Parenchyma:  The grey-white differentiation is intact.  There is no mass effect or midline shift.  There is no intracranial hemorrhage.  Calvarium:  The calvarium is unremarkable.  Paranasal sinuses and mastoids:  Visualized paranasal sinuses and mastoids are clear.    Diffuse cerebral atrophy.  Suspected mild chronic small vessel white matter ischemia.  No definite acute intracranial abnormality. Signed by Shawn Echols,     Transthoracic Echo (TTE) Complete    Result Date: 1/8/2024            [facility] [place] [address], [place] [id number]             Phone [phone] TRANSTHORACIC ECHOCARDIOGRAM REPORT  Patient Name:      LILIAN MCDERMOTT CHERIE    Reading Physician:   [id number] Nance MD Study Date:        1/8/2024           Ordering Provider:   [id number] EMILY NANCE MRN/PID:           [id number]           Fellow: Accession#:        [id number]       Nurse: Date of Birth/Age: 1945 / 78      Sonographer:         Lilli Mata RDCS                    years Gender:            M                  Additional Staff: Height:            195.58 cm          Admit Date:          1/8/2024 Weight:            103.42 kg          Admission Status:    Inpatient - Routine BSA:               2.36 m2            Department Location: [facility] Blood Pressure: 96 /74 mmHg Study Type:    TRANSTHORACIC ECHO (TTE) COMPLETE Diagnosis/ICD: Other hypotension-I95.89 Indication:    hypotension CPT Codes:     Echo Complete w Full Doppler-[id number]  Study Detail: The following Echo studies were performed: 2D, M-Mode, Doppler  and               color flow.  PHYSICIAN INTERPRETATION: Left Ventricle: Left ventricular systolic function is moderately decreased, with an estimated ejection fraction of 35-40%. Wall motion is abnormal. The left ventricular cavity size is mild to moderately dilated. Abnormal (paradoxical) septal motion, consistent with an intraventricular conduction delay. Spectral Doppler shows a normal pattern of left ventricular diastolic filling. There is moderate to severe hypokinesis of the inferoposterior wall. There is mild hypokinesis and dyssynchrony of the anteroseptal wall. Left Atrium: The left atrium is mild to moderately dilated. Right Ventricle: The right ventricle is upper limits of normal in size. There is normal right ventriclar wall thickness. There is normal right ventricular global systolic function. Right Atrium: The right atrium is mildly dilated. Aortic Valve: The aortic valve is trileaflet. The aortic valve appears tricuspid and non-restricted. There is mild aortic valve cusp calcification. There is no evidence of reduced aortic valve leaflet excursion excursion. There is evidence of mildly elevated transaortic gradients consistent with sclerosis of the aortic valve. There is mild aortic valve regurgitation. The peak instantaneous gradient of the aortic valve is 10.8 mmHg. The mean gradient of the aortic valve is 6.0 mmHg. There is moderate sclerotic calcification of the noncoronary aortic valve cusp. Mitral Valve: The mitral valve is normal in structure. There is normal mitral valve leaflet mobility. There is mild to moderate mitral valve regurgitation. Tricuspid Valve: The tricuspid valve is structurally normal. There is normal tricuspid valve leaflet mobility. There is mild to moderate tricuspid regurgitation. The Doppler estimated RVSP is mildly elevated at 43.0 mmHg. Pulmonic Valve: The pulmonic valve is structurally normal. There is trace pulmonic valve regurgitation. Pericardium: There is no  pericardial effusion noted. Aorta: The aortic root is normal. There is no dilatation of the aortic arch. There is no dilatation of the ascending aorta. There is no dilatation of the aortic root. Pulmonary Artery: The pulmonary artery is normal in size. The tricuspid regurgitant velocity is 2.96 m/s, and with an estimated right atrial pressure of 8 mmHg, the estimated pulmonary artery pressure is mildly elevated with the RVSP at 43.0 mmHg. The estimated PASP is 43 mmHg. Systemic Veins: The inferior vena cava appears moderately dilated.  CONCLUSIONS:  1. Left ventricular systolic function is moderately decreased with a 35-40% estimated ejection fraction.  2. There is moderate to severe hypokinesis of the inferoposterior wall.     There is mild hypokinesis and dyssynchrony of the anteroseptal wall.  3. The left atrium is mild to moderately dilated.  4. Mild to moderate mitral valve regurgitation.  5. Mild to moderate tricuspid regurgitation.  6. Mildly elevated RVSP.  7. Aortic valve sclerosis.  8. There is moderate sclerotic calcification of the noncoronary aortic valve cusp.  9. Mild aortic valve regurgitation. 10. The estimated PASP is 43 mmHg. 11. The inferior vena cava appears moderately dilated. QUANTITATIVE DATA SUMMARY: 2D MEASUREMENTS:                          Normal Ranges: IVSd:          1.08 cm   (0.6-1.1cm) LVPWd:         0.98 cm   (0.6-1.1cm) LVIDd:         5.65 cm   (3.9-5.9cm) LVIDs:         4.00 cm LV Mass Index: 97.7 g/m2 LV % FS        29.2 % LA VOLUME:                               Normal Ranges: LA Vol A4C:        59.2 ml    (22+/-6mL/m2) LA Vol Index A4C:  25.0ml/m2 LA Area A4C:       20.3 cm2 LA Major Axis A4C: 5.9 cm LA Volume Index:   25.3 ml/m2 LA Vol A4C:        54.4 ml RA VOLUME BY A/L METHOD:                               Normal Ranges: RA Vol A4C:        38.2 ml    (8.3-19.5ml) RA Vol Index A4C:  16.2 ml/m2 RA Area A4C:       15.2 cm2 RA Major Axis A4C: 5.1 cm M-MODE MEASUREMENTS:                   Normal Ranges: Ao Root: 3.70 cm (2.0-3.7cm) AORTA MEASUREMENTS:                      Normal Ranges: Ao Sinus, d: 3.21 cm (2.1-3.5cm) LV SYSTOLIC FUNCTION BY 2D PLANIMETRY (MOD):                     Normal Ranges: EF-A4C View: 54.1 % (>=55%) EF-A2C View: 51.4 % EF-Biplane:  54.1 % AORTIC VALVE:                                    Normal Ranges: AoV Vmax:                1.64 m/s  (<=1.7m/s) AoV Peak PG:             10.8 mmHg (<20mmHg) AoV Mean P.0 mmHg  (1.7-11.5mmHg) LVOT Max Angel Luis:            0.99 m/s  (<=1.1m/s) AoV VTI:                 31.80 cm  (18-25cm) LVOT VTI:                19.70 cm LVOT Diameter:           2.00 cm   (1.8-2.4cm) AoV Area, VTI:           1.95 cm2  (2.5-5.5cm2) AoV Area,Vmax:           1.89 cm2  (2.5-4.5cm2) AoV Dimensionless Index: 0.62 AORTIC INSUFFICIENCY: AI Vmax:       3.44 m/s AI Half-time:  677 msec AI Decel Rate: 150.00 cm/s2  RIGHT VENTRICLE: RV Basal 3.83 cm RV Mid   4.43 cm RV Major 7.2 cm TRICUSPID VALVE/RVSP:                             Normal Ranges: Peak TR Velocity: 2.96 m/s RV Syst Pressure: 43.0 mmHg (< 30mmHg) IVC Diam:         2.68 cm  [id number] Lb Nance MD Electronically signed on 2024 at 9:32:37 PM  ** Final **     FL modified barium swallow study    Result Date: 2024  Interpreted By:  Antoniou, Elias, and Casey Corinne STUDY: FL MODIFIED BARIUM SWALLOW STUDY;; 2024 1:27 pm   INDICATION: Signs/Symptoms:suspected pharyngeal dysphagia.   COMPARISON: None.   ACCESSION NUMBER(S): [id number]   ORDERING CLINICIAN: JOSE DAVID SOLER   TECHNIQUE: MBSS completed. Informed verbal consent obtained prior to completion of exam. Trials of thin, nectar thick, honey thick, puree, and regular solids given. Fluoroscopy time :  3 minutes and 54 seconds. Total dose air kerma 21.15 mGy..   SLP: Corinne E. Casey, M.A. CCC-SLP Phone/Pager: [phone] opt 2, via [employer], or via [employer] secure chat   SPEECH FINDINGS: Reason for referral: Dysphagia, aspiration  pneumonia Patient hx: Sepsis, HTN, HLD, GERD, CHF Respiratory status: Nasal cannula Previous diet: Regular and thin   FINAL SPEECH RECOMMENDATIONS   Diet recommendations/feeding strategies: Minced/moist solids with thin liquids, NO STRAWS.   Upright seating, no straw. Meds crushed in puree. Please downgrade to nectar thick and notify SLP if s/s of aspiration present with PO intake.   Follow-up speech therapy recommended: Yes.   Education provided: Yes. Results and recs discussed with pt, RN, and physician with verbalized understanding noted.   Treatment Provided today: Yes, see progress notes   Repeat study/ dc plan: Continue skilled speech therapy services after discharge   Mechanics of the swallow summary: *Oral phase: Moderate impairment as characterized by disorganized/prolonged mastication, poor tongue control during bolus hold with premature posterior phrayngeal spillage, and delayed swallow onset (at level of pyriform sinuese for thin and nectar liquids, and at valleculae for honey liquids, puree, and cracker consistencies *Pharyngeal phase: Mild impairment as characterized by decreased tongue base retraction and trace vallecular retention *Esophageal phase: WNL, limited assessment   SLP impressions with severity rating: Pt. Presenting with singular episode of aspiration during initial trial of thin liquid.  Penetration with thin liquid via straw (successive swallows) and with nectar liquids via tsp. No further penetration nor aspiration presents with remaining trials of thin liquids via cup, nectar liquids via cup, honey liquids, puree, nor cracker trials.   Pt noted with mild oral and pharyngeal dysphagia as characterized by prolonged mastication, premature posterior pharyngeal spillage, delayed swallow onset, and diminished tongue base retraction.   Thin Liquids (MBSS) Rosenbek's Penetration Aspiration Scale, Thin Liquids (MBSS): 7. OVERT ASPIRATION, material is not cleared - contrast passes glottis,  visible residue, W/pt response Singluar episode of aspiration on initial trial of thin via tsp - appears r/t premature posterior pharyngeal spillage and occurs prior to swallow onset. Penetration without aspiration (PAS 2) during trials of thin liquid via straw (successive swallows); No penetration nor aspiration with thin liquids via cup (PAS 1) Response to Aspiration, Thin Liquid (MBSS): unproductive reflexive involuntary cough,       Nectar Thick Liquids (MBSS) Rosenbek's Penetration Aspiration Scale, Nectar thick liquids (MBSS): 2. PENETRATION that CLEARS - contrast enter airway, above vocal cords, no residue       Honey Thick Liquids (MBSS) Rosenbek's Penetration Aspiration Scale, Honey thick liquids (MBSS): 1. NO ASPIRATION & NO PENETRATION - no aspiration, contrast does not enter airway     Response to Pharyngeal Residue, Honey thick liquids (MBSS): no spontaneous response,   Purees (MBSS) Rosenbek's Penetration Aspiration Scale, Purees (MBSS): 1. NO ASPIRATION & NO PENETRATION - no aspiration, contrast does not enter airway     Response to Pharyngeal Residue, Purees (MBSS): no spontaneous response, Clears with thin liquid wash,     Solids (MBSS) Rosenbek's Penetration Aspiration Scale, Solids (MBSS): 1. NO ASPIRATION & NO PENETRATION - no aspiration, contrast does not enter airway   Response to Pharyngeal Residue, Solids (MBSS): no spontaneous response, Clears with thin liquid wash. ,     Speech Therapy section of this report signed by Corinne E. Casey, M.A. CCC-SLP on 1/8/2024 at 1:41 pm.   RADIOLOGY FINDINGS: Prolonged oral phase with uncoordinated mastication/lingual motion. Single episode of aspiration with thin barium. A couple of additional episodes of laryngeal penetration without aspiration. No laryngeal penetration or aspiration seen with thicker consistencies of barium. Retention of barium is noted in the vallecula and piriform sinuses.   Radiology section of this report signed by .       Dysphagia  as above.   MACRO: None   Signed by: Roby Villarreal 1/8/2024 4:31 PM Dictation workstation:   [id number]    US renal complete    Result Date: 1/8/2024  Interpreted By:  Cathy Hernandez, STUDY: US RENAL COMPLETE; 1/8/2024 11:45 am   INDICATION: Acute renal insufficiency.   COMPARISON: None.   ACCESSION NUMBER(S): [id number]   ORDERING CLINICIAN: PERCY CABRERA   TECHNIQUE: Grayscale and color Doppler imaging of the kidneys is performed portably..   FINDINGS: The right kidney measures 12.1 cm x 7.2 cm x 5.1 cm. A simple right renal cyst measuring 1.9 cm in size is seen. The left kidney measures 12.6 cm x 5.6 cm x 6.4 cm. There is no shadowing calculus, hydronephrosis, or solid mass identified. The renal cortical echogenicity is normal bilaterally with renal cortical thickness within normal limits.   Cursory evaluation of urinary bladder shows presence of a Rodriguez catheter with bladder decompressed.       1.9 cm simple right renal cyst with unremarkable appearance of the left kidney.   No renal atrophy or hydronephrosis.   MACRO: None.   Signed by: Cathy Hernandez 1/8/2024 12:04 PM Dictation workstation:   [id number]    XR chest 1 view    Result Date: 1/7/2024  Interpreted By:  Cathy Hernandez, STUDY: XR CHEST 1 VIEW 1/7/2024 7:36 am   INDICATION: Follow-up pneumonia   COMPARISON: 05/29/2014 as well as CT examination of the chest done on 01/05/2024.   ACCESSION NUMBER(S): [id number]   ORDERING CLINICIAN: EMILY MAKI   TECHNIQUE: AP erect view of the chest   FINDINGS: The heart is at the upper limits of normal in size. There is infiltrate and airspace consolidation observed centrally within the right lung with upper lobar predominance. There is also some patchy infiltrate at the left lung base. No obvious pleural abnormality is seen.       Infiltrate and airspace consolidation identified within the right lung most pronounced centrally and within the right upper lobe with mild left basilar infiltrate.   Signed by: Cathy Hernandez  1/7/2024 8:11 AM Dictation workstation:   [id number]    CT chest abdomen pelvis w IV contrast    Result Date: 1/5/2024  Interpreted By:  Yordy Urena, STUDY: CT CHEST ABDOMEN PELVIS W IV CONTRAST;  1/5/2024 6:33 pm   INDICATION: Signs/Symptoms:abd pain, sob.   COMPARISON: None.   ACCESSION NUMBER(S): [id number]   ORDERING CLINICIAN: NELLA MELGOZA   TECHNIQUE: Contiguous axial images of the chest, abdomen and pelvis were obtained after the intravenous administration of  contrast. Coronal and sagittal reformatted images were obtained from the axial images.   FINDINGS: CT CHEST:   No axillary lymphadenopathy. 1.6 cm right paratracheal lymph node and 1.5 cm subcarinal lymph node. There is limited evaluation for hilar lymphadenopathy secondary stripping motion.   The heart is normal in size. Coronary artery vascular calcifications. No significant pericardial effusion.   There is pulmonary emphysematous change. There is consolidative opacity in the right upper lobe and right lower lobe compatible with pneumonia and also minimal opacity in the left lower lobe. Trace pleural effusions. No pneumothorax.   Multilevel degenerative change of the thoracic spine.     CT ABDOMEN PELVIS:   Evaluation of the abdomen and pelvis is limited secondary to patient motion. No evidence of liver mass. The gallbladder is present, not well evaluated secondary to motion. No dilatation common bile duct.   Atrophy of the pancreas.   No splenomegaly.   Question mild nodularity of the left adrenal gland. The right adrenal gland appears unremarkable.   Symmetric enhancement of the kidneys. Right renal cysts with the largest measuring 3.1 cm and several bowel subcentimeter hypodensity too small to characterize. No hydronephrosis.   Atherosclerotic calcification of the aorta and abdominal and pelvic vasculature.   No evidence of bowel obstruction. Evaluation the bowel is otherwise limited secondary patient motion.   Urinary bladder is  underdistended and not well evaluated. 18 mm right posterior bladder diverticulum.   Postsurgical change of left hip arthroplasty resulting in beam hardening artifact and limiting evaluation the pelvis.   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       Consolidative opacity in the right upper lobe and lower lobe compatible with pneumonia and also mild left basilar opacity and trace pleural effusions.   No definite evidence of acute abnormality of the abdominal viscera within the limitations of the motion degraded examination.   MACRO: None   Signed by: Yordy Urena 1/5/2024 6:49 PM Dictation workstation:   [id number]              Assessment/Plan   Principal Problem:    Sepsis (CMS/HCC)  Active Problems:    Lactic acid acidosis    Acute renal failure (ARF) (CMS/HCC)    Leukocytosis    Bilateral pneumonia    Chronic respiratory failure (CMS/HCC)    Hypokalemia    Dehydration    Transaminitis    Acute encephalopathy    Hypotension    Continues to improve.    Supplemental oxygen with bronchodilators  Antibiotics de-escalation per ID  PT OT  No absolute contraindication for discharge planning    Can follow-up with us in the outpatient office in 1 to 2 weeks.    We will sign off.  Please reconsult as needed      Jaren Zapien MD  Pulmonary Medicine   [facility]       LOS: 9 days

## 2024-01-15 NOTE — PROGRESS NOTES
Occupational Therapy    Occupational Therapy Treatment    Name: Navarro Rabago  MRN: [id number]  : 1945  Date: 01/15/24  Time Calculation  Start Time: 1428  Stop Time: 1452  Time Calculation (min): 24 min    Assessment:  OT Assessment: Pt demon. good progress toward POC, requires some encouragement to particiapte, does fatigue quickly. Will benefit from continued OT services to increase independence toward goals.  End of Session Communication: Bedside nurse  End of Session Patient Position: Bed, 3 rail up (spouse present)  Plan:  Treatment Interventions: ADL retraining, Functional transfer training, Endurance training, Patient/family training, Compensatory technique education  OT Frequency: 4 times per week  OT Discharge Recommendations: Moderate intensity level of continued care  Equipment Recommended upon Discharge: Lift  OT Recommended Transfer Status: Assist of 2, Dependent  OT - OK to Discharge: Yes    Subjective   Previous Visit Info:  OT Last Visit  OT Received On: 01/15/24  General:  General  Prior to Session Communication: Bedside nurse  Patient Position Received: Bed, 3 rail up  Preferred Learning Style: verbal  General Comment: Pt cleared by nursing to see, spouse present  Precautions:  Hearing/Visual Limitations: [place]  Medical Precautions: Fall precautions  Precautions Comment: Rodriguez, IV, 4 L O2  Vitals:     Pain Assessment:  Pain Assessment  Pain Assessment: 0-10  Pain Score: 0 - No pain     Objective   Activities of Daily Living: Grooming  Grooming Level of Assistance: Minimum assistance (set up to wash face, min. assist to comb hair, assist for back of head)  Grooming Where Assessed: Edge of bed    UE Bathing  UE Bathing Level of Assistance: Moderate assistance  UE Bathing Where Assessed: Edge of bed  UE Bathing Comments: CHG wipes, max. verbal cues for throughness    UE Dressing  UE Dressing Level of Assistance: Maximum assistance (to don/ doff gown, verbqal cues for threading arms into gown,  asist for IV, telemetry)  UE Dressing Where Assessed: Edge of bed    Functional Standing Tolerance:     Bed Mobility/Transfers: Bed Mobility  Bed Mobility: Yes  Bed Mobility 1  Bed Mobility 1: Supine to sitting  Level of Assistance 1: Maximum assistance  Bed Mobility Comments 1: x2 for trunk up, legs out, scooting w/ draw sheet,pt tried to assist w/ arms for scooting  Bed Mobility 2  Bed Mobility  2: Sitting to supine  Level of Assistance 2: Dependent  Bed Mobility Comments 2: assist for trunk dwon, legs in  Bed Mobility 3  Bed Mobility 3: Scooting  Level of Assistance 3: Dependent  Bed Mobility Comments 3: draw sheet to boost to HOB x2 person  IADL's:   Communication:     Splinting:     Therapy/Activity: Therapeutic Exercise  Therapeutic Exercise Performed: Yes (Supine in bed)  Therapeutic Exercise Activity 1: x10 (B Shld flex/ext)  Therapeutic Exercise Activity 2: x10 (B Elbow flex/ext)  Therapeutic Exercise Activity 3: x10 (B pron/ sup)  Therapeutic Exercise Activity 4: x10 (B wrist flex/ ext.)  Sensory:     Cognitive Skill Development:     Vision:     Strength:     Other Activity:             Outcome Measures:  [facility] Daily Activity  Putting on and taking off regular lower body clothing: Total  Bathing (including washing, rinsing, drying): A lot  Putting on and taking off regular upper body clothing: A lot  Toileting, which includes using toilet, bedpan or urinal: Total  Taking care of personal grooming such as brushing teeth: A lot  Eating Meals: A lot  Daily Activity - Total Score: 10        Education Documentation  Home Exercise Program, taught by Krista Simms OT at 1/15/2024  3:13 PM.  Learner: Patient  Readiness: Acceptance  Method: Explanation  Response: Needs Reinforcement    ADL Training, taught by Krista Simms OT at 1/15/2024  3:13 PM.  Learner: Patient  Readiness: Acceptance  Method: Explanation  Response: Needs Reinforcement    Education Comments  No comments found.      Goals:  Encounter  Problems       Encounter Problems (Active)       Balance       Sitting Balance (Progressing)       Start:  01/08/24    Expected End:  01/16/24       Pt will demonstrate good sitting balance to promote safe engagement with out of bed activities           Standing Balance (Progressing)       Start:  01/08/24    Expected End:  01/16/24       Pt will demonstrate good static standing balance to promote safe participation with out of bed activity, transfers, and mobility              Mobility       Ambulation (Progressing)       Start:  01/08/24    Expected End:  01/16/24       Pt will ambulate 50' modified independent assist with walker to promote safe home mobility              OT Goals       ADLs (Progressing)       Start:  01/08/24    Expected End:  01/16/24       Patient will perform ADLs with MOD A using AE/compensatory techniques as needed.          Sitting Tolerance (Progressing)       Start:  01/08/24    Expected End:  01/16/24       Patient will demonstrate improved sitting tolerance AEB completing EOB activities for >/= 15 minutes with supervision assist for stability.         UE Strengthening (Progressing)       Start:  01/08/24    Expected End:  01/16/24       Patient will improve UE strength to 3-/5 in preparation for functional transfers.             Safety       Safe Mobility Techniques (Progressing)       Start:  01/08/24    Expected End:  01/16/24       Pt will correctly identify and demonstrate safe mobility techniques to reduce their risks for falls during their acute care stay               Transfers       Supine to sit (Progressing)       Start:  01/08/24    Expected End:  01/16/24       Pt will transfer supine to sitting at edge of bed with modified independent assist to promote acute care out of bed activity           Sit to stand (Progressing)       Start:  01/08/24    Expected End:  01/16/24       Pt will transfer sit to standing position with modified independent assist and walker to promote  safe out of bed activity           Bed to chair (Progressing)       Start:  01/08/24    Expected End:  01/16/24       Pt will transfer from sitting edge of bed to the chair with modified independent assist and walker to promote out of bed activity and reduce the risks of prolonged acute care bedrest

## 2024-01-15 NOTE — CARE PLAN
Problem: Respiratory  Goal: Wean oxygen to maintain O2 saturation per order/standard this shift  Outcome: Progressing  Goal: No signs of respiratory distress (eg. Use of accessory muscles. Peds grunting)  Outcome: Progressing  Goal: Verbalize decreased shortness of breath this shift  Outcome: Progressing

## 2024-01-15 NOTE — PROGRESS NOTES
"Navarro Rabago is a 78 y.o. male on day 9 of admission presenting with Sepsis (CMS/HCC).    Subjective   Doing well today, no significant complaints.       Objective     Physical Exam  Generally no acute distress  HEENT PERRL EOMI  Cardiovascular S1-S2 heard regular rate rhythm  Lungs diminished anteriorly  Abdomen bowel sounds present  Extremities no clubbing cyanosis edema  Last Recorded Vitals  Blood pressure 140/73, pulse 70, temperature 36.9 °C (98.4 °F), temperature source Temporal, resp. rate 19, height 1.956 m (6' 5\"), weight 114 kg (251 lb 5.2 oz), SpO2 97 %.  Intake/Output last 3 Shifts:  I/O last 3 completed shifts:  In: 250 (2.2 mL/kg) [IV Piggyback:250]  Out: 2000 (17.5 mL/kg) [Urine:2000 (0.5 mL/kg/hr)]  Weight: 114 kg     Relevant Results                    Impression: 78-year-old gentleman admitted with likely aspiration pneumonia and hypotension in the setting of a history of ischemic cardiomyopathy.    Plan:    1.  Pneumonia/sepsis  -Improved, continue with antibiotic de-escalation, appreciate infectious disease and pulmonary consultation  -Continue with pulmonary toilet    2.  Acute renal insufficiency  -Resolved, creatinine normalized    3.  Ischemic cardiomyopathy  -Stable at this time appreciate cardiology input    4.  Aspiration  -Appreciate recommendations by speech therapy.  Continue with recommended diet.    GI and DVT prophylaxis    DC planning, plan discharge in the next 24 to 48 hours        Assessment/Plan   Principal Problem:    Sepsis (CMS/HCC)  Active Problems:    Lactic acid acidosis    Acute renal failure (ARF) (CMS/HCC)    Leukocytosis    Bilateral pneumonia    Chronic respiratory failure (CMS/HCC)    Hypokalemia    Dehydration    Transaminitis    Acute encephalopathy    Hypotension           I spent 25 minutes in the professional and overall care of this patient.      Ulises Tariq MD      "

## 2024-01-15 NOTE — PROGRESS NOTES
Speech-Language Pathology    Inpatient Speech Language Pathology Dysphagia Treatment note     Patient Name: Navarro Rabago  MRN: [id number]  : 1945  Today's Date: 01/15/24  Time Calculation  Start Time: 1156  Stop Time: 1231  Time Calculation (min): 35 min     Total Number of Visits: 3 (sw, MON )    1. Sepsis (CMS/HCC)        2. Hypotension due to hypovolemia  Transthoracic Echo (TTE) Complete    Transthoracic Echo (TTE) Complete      3. Chronic respiratory failure with hypoxia (CMS/HCC)  Transthoracic Echo (TTE) Complete    Transthoracic Echo (TTE) Complete      4. Acute encephalopathy [G93.40]        5. Impaired mobility            Past Medical History Relevant to Rehab: CHF, afib, CAD, COPD, HTN, hernia    PLAN:  Skilled speech therapy for dysphagia treatment continues to be warranted to provide training and instruction regarding the use of compensatory swallow strategies, for pt/caregiver education in order to reduce risk of aspiration, dehydration and malnutrition. , to assess tolerance of diet , to determine ability to upgrade diet after PO trials with SLP      Inpatient/Swing Bed or Outpatient: Inpatient  Treatment/Interventions: dysphagia tx  SLP TX Plan: Continue Plan of Care  SLP Plan: Skilled SLP  SLP Frequency: 3x per week  Duration: Current admission  SLP Discharge Recommendations: Continue skilled SLP services at the next level of care  Next Treatment Priority: diet tolerance, possibility of upgrade  Discussed POC: Patient, Caregiver/family  Discussed Risks/Benefits: Yes, Patient, Caregiver/Family  Patient/Caregiver Agreeable: Yes    Recommended Diet:   Solid Diet Recommendations: Minced & moist/ground (IDDSI Level 5)  Liquid Diet Recommendations: Thin (IDDSI Level 0)  Compensatory strategies: small bites/sips, upright 90 degrees for intake   Medication administration: whole in puree    Subjective:  Pt. seen at bedside for skilled dysphagia treatment. Pt's wife present for latter half of  session.  Pt reported pain in his mouth due to oral candida after trials were initiated.   Pt reported that this, as well as bitterness from meds crushed in applesauce, have interfered with his appetite.  Pain:  Pain Assessment  Pain Assessment: 0-10  Pain Score: 0 - No pain Denies pain        Oxygen Status:   nasal cannula       Therapeutic Swallow Intervention : Compensatory Strategies, PO Trials  Solid Diet Recommendations: Minced & moist/ground (IDDSI Level 5)  Liquid Diet Recommendations: Thin (IDDSI Level 0)  Swallow Comments:  (Pt has sores on tongue d/t oral candida, for which he is currently being treated.)     Goals:   1) Patient will tolerate recommended diet without observed clinical signs of aspiration,    Current Session: tolerated PO trials of thin liquids without overt s/s of aspiration  2) Patient will demonstrate appropriate strategies for swallowing safety,   Current Session: Reviewed strategies with pt with poor follow through during PO trials. Pt resistant to upright positioning and self-feeds large boluses of solid foods.  3) Patient will progress to advanced diet   Current Session: Trial of advanced consistency given (Bite sized/soft).  Pt took a larger than recommended bite size, had difficulty with mastication, and had to spit bolus out.  4) Pt to participate in assessment of oropharyngeal swallow function via MBSS for assessment of possible aspiration and to determine least restrictive diet for meeting pt's nutritional and hydration needs.  - Goal met 1/8  5) ADD GOAL: Pt to participate in oropharyngeal therapeutic exercises to help improve oropharyngeal swallow function.   Current Session: not targeted    SLP Assessment:  Pt safe to continue currently ordered diet of minced & moist with thin liquids.  Pt may have medications whole in puree consistency.  See details above.  SLP TX Intervention Outcome: No Progress Made  SLP Assessment Results: Other (Comment) (dysphagia and reduced  appetite/oral intake)  Treatment Provided: Yes; skilled dysphagia therapy at bedside  Treatment Tolerance: Treatment limited secondary to medical complications  Medical Staff Made Aware: Yes      Treatment Outcome:  SLP TX Intervention Outcome: No Progress Made    Treatment Tolerance: Patient tolerated treatment well  Prognosis: Good   Barriers: Cognition   Medical Staff Made Aware: Yes; SLP dicussed recommendation for meds whole in applesauce with FRANCISCO Charles.      Inpatient Education:  Individual(s) Educated: Patient, Spouse  Verbal Education : diet recommendations, risk of aspiration, importance of nutritional intake to recovery  Risk and Benefits Discussed with Patient/Caregiver/Other: yes  Patient/Caregiver Demonstrated Understanding: yes  Plan of Care Discussed and Agreed Upon: yes  Patient Response to Education: Patient/Caregiver Verbalized Understanding of Information

## 2024-01-15 NOTE — PROGRESS NOTES
Patient is from home with spouse.  Referral in review at [facility].  Reached out to [facility] for updates regarding acceptance.  Patient will need a precert prior to discharge.  Awaiting updates.  RN TCC following.    1230  Met with patient and spouse at bedside to provide updates.  [facility] is FOC if patient is accepted.  Per [facility] still reviewing.  Awaiting updates.  RN TCC following.    Eloina Crespo RN

## 2024-01-15 NOTE — CARE PLAN
The patient's goals for the shift include  keep NC on    The clinical goals for the shift include monitor V/S    Over the shift, the patient did not make progress toward the following goals. Barriers to progression include none. Recommendations to address these barriers include none.

## 2024-01-16 ENCOUNTER — APPOINTMENT (OUTPATIENT)
Dept: CARDIOLOGY | Facility: HOSPITAL | Age: 79
DRG: 871 | End: 2024-01-16
Payer: MEDICARE

## 2024-01-16 LAB
ERYTHROCYTE [DISTWIDTH] IN BLOOD BY AUTOMATED COUNT: 17.2 % (ref 11.5–14.5)
HCT VFR BLD AUTO: 28.1 % (ref 41–52)
HGB BLD-MCNC: 8.9 G/DL (ref 13.5–17.5)
MCH RBC QN AUTO: 29.7 PG (ref 26–34)
MCHC RBC AUTO-ENTMCNC: 31.7 G/DL (ref 32–36)
MCV RBC AUTO: 94 FL (ref 80–100)
NRBC BLD-RTO: 0 /100 WBCS (ref 0–0)
PLATELET # BLD AUTO: 289 X10*3/UL (ref 150–450)
RBC # BLD AUTO: 3 X10*6/UL (ref 4.5–5.9)
WBC # BLD AUTO: 7.7 X10*3/UL (ref 4.4–11.3)

## 2024-01-16 PROCEDURE — 97110 THERAPEUTIC EXERCISES: CPT | Mod: GP

## 2024-01-16 PROCEDURE — 2500000001 HC RX 250 WO HCPCS SELF ADMINISTERED DRUGS (ALT 637 FOR MEDICARE OP): Performed by: INTERNAL MEDICINE

## 2024-01-16 PROCEDURE — 93010 ELECTROCARDIOGRAM REPORT: CPT | Performed by: INTERNAL MEDICINE

## 2024-01-16 PROCEDURE — 94760 N-INVAS EAR/PLS OXIMETRY 1: CPT

## 2024-01-16 PROCEDURE — 2500000005 HC RX 250 GENERAL PHARMACY W/O HCPCS: Performed by: INTERNAL MEDICINE

## 2024-01-16 PROCEDURE — 2500000004 HC RX 250 GENERAL PHARMACY W/ HCPCS (ALT 636 FOR OP/ED): Performed by: INTERNAL MEDICINE

## 2024-01-16 PROCEDURE — 97535 SELF CARE MNGMENT TRAINING: CPT | Mod: GO

## 2024-01-16 PROCEDURE — 85027 COMPLETE CBC AUTOMATED: CPT | Performed by: INTERNAL MEDICINE

## 2024-01-16 PROCEDURE — 9420000001 HC RT PATIENT EDUCATION 5 MIN

## 2024-01-16 PROCEDURE — 93005 ELECTROCARDIOGRAM TRACING: CPT

## 2024-01-16 PROCEDURE — 2060000001 HC INTERMEDIATE ICU ROOM DAILY

## 2024-01-16 PROCEDURE — 94640 AIRWAY INHALATION TREATMENT: CPT

## 2024-01-16 PROCEDURE — 36415 COLL VENOUS BLD VENIPUNCTURE: CPT | Performed by: INTERNAL MEDICINE

## 2024-01-16 PROCEDURE — 2500000004 HC RX 250 GENERAL PHARMACY W/ HCPCS (ALT 636 FOR OP/ED): Performed by: NURSE PRACTITIONER

## 2024-01-16 PROCEDURE — 2500000002 HC RX 250 W HCPCS SELF ADMINISTERED DRUGS (ALT 637 FOR MEDICARE OP, ALT 636 FOR OP/ED): Performed by: INTERNAL MEDICINE

## 2024-01-16 PROCEDURE — 97530 THERAPEUTIC ACTIVITIES: CPT | Mod: GP

## 2024-01-16 RX ADMIN — NYSTATIN 500000 UNITS: 100000 SUSPENSION ORAL at 06:11

## 2024-01-16 RX ADMIN — IPRATROPIUM BROMIDE AND ALBUTEROL SULFATE 3 ML: 2.5; .5 SOLUTION RESPIRATORY (INHALATION) at 07:13

## 2024-01-16 RX ADMIN — FERROUS SULFATE TAB 325 MG (65 MG ELEMENTAL FE) 1 TABLET: 325 (65 FE) TAB at 10:16

## 2024-01-16 RX ADMIN — NYSTATIN 500000 UNITS: 100000 SUSPENSION ORAL at 20:12

## 2024-01-16 RX ADMIN — ACETAMINOPHEN 650 MG: 325 TABLET ORAL at 02:19

## 2024-01-16 RX ADMIN — LEVOTHYROXINE SODIUM 50 MCG: 0.05 TABLET ORAL at 06:11

## 2024-01-16 RX ADMIN — IPRATROPIUM BROMIDE AND ALBUTEROL SULFATE 3 ML: 2.5; .5 SOLUTION RESPIRATORY (INHALATION) at 11:26

## 2024-01-16 RX ADMIN — PIPERACILLIN SODIUM AND TAZOBACTAM SODIUM 3.38 G: 3; .375 INJECTION, SOLUTION INTRAVENOUS at 18:41

## 2024-01-16 RX ADMIN — APIXABAN 2.5 MG: 2.5 TABLET, FILM COATED ORAL at 10:16

## 2024-01-16 RX ADMIN — PIPERACILLIN SODIUM AND TAZOBACTAM SODIUM 3.38 G: 3; .375 INJECTION, SOLUTION INTRAVENOUS at 01:28

## 2024-01-16 RX ADMIN — APIXABAN 5 MG: 5 TABLET, FILM COATED ORAL at 20:10

## 2024-01-16 RX ADMIN — POLYETHYLENE GLYCOL 3350 17 G: 17 POWDER, FOR SOLUTION ORAL at 10:16

## 2024-01-16 RX ADMIN — NYSTATIN 500000 UNITS: 100000 SUSPENSION ORAL at 14:22

## 2024-01-16 RX ADMIN — MIDODRINE HYDROCHLORIDE 5 MG: 5 TABLET ORAL at 03:25

## 2024-01-16 RX ADMIN — IPRATROPIUM BROMIDE AND ALBUTEROL SULFATE 3 ML: 2.5; .5 SOLUTION RESPIRATORY (INHALATION) at 15:30

## 2024-01-16 RX ADMIN — MIDODRINE HYDROCHLORIDE 5 MG: 5 TABLET ORAL at 12:27

## 2024-01-16 RX ADMIN — QUETIAPINE FUMARATE 12.5 MG: 25 TABLET ORAL at 20:10

## 2024-01-16 RX ADMIN — QUETIAPINE FUMARATE 12.5 MG: 25 TABLET ORAL at 10:16

## 2024-01-16 RX ADMIN — IPRATROPIUM BROMIDE AND ALBUTEROL SULFATE 3 ML: 2.5; .5 SOLUTION RESPIRATORY (INHALATION) at 19:21

## 2024-01-16 RX ADMIN — LORAZEPAM 1 MG: 2 INJECTION, SOLUTION INTRAMUSCULAR; INTRAVENOUS at 03:26

## 2024-01-16 RX ADMIN — Medication: at 07:13

## 2024-01-16 RX ADMIN — Medication 4 L/MIN: at 15:00

## 2024-01-16 RX ADMIN — PIPERACILLIN SODIUM AND TAZOBACTAM SODIUM 3.38 G: 3; .375 INJECTION, SOLUTION INTRAVENOUS at 06:22

## 2024-01-16 RX ADMIN — NYSTATIN 500000 UNITS: 100000 SUSPENSION ORAL at 16:53

## 2024-01-16 RX ADMIN — ASPIRIN 81 MG: 81 TABLET, COATED ORAL at 10:16

## 2024-01-16 RX ADMIN — MIDODRINE HYDROCHLORIDE 5 MG: 5 TABLET ORAL at 18:41

## 2024-01-16 RX ADMIN — PANTOPRAZOLE SODIUM 40 MG: 40 TABLET, DELAYED RELEASE ORAL at 06:11

## 2024-01-16 RX ADMIN — PIPERACILLIN SODIUM AND TAZOBACTAM SODIUM 3.38 G: 3; .375 INJECTION, SOLUTION INTRAVENOUS at 14:22

## 2024-01-16 RX ADMIN — FINASTERIDE 5 MG: 5 TABLET, FILM COATED ORAL at 10:16

## 2024-01-16 ASSESSMENT — PAIN SCALES - GENERAL
PAINLEVEL_OUTOF10: 0 - NO PAIN
PAINLEVEL_OUTOF10: 0 - NO PAIN
PAINLEVEL_OUTOF10: 8
PAINLEVEL_OUTOF10: 0 - NO PAIN
PAINLEVEL_OUTOF10: 2

## 2024-01-16 ASSESSMENT — ACTIVITIES OF DAILY LIVING (ADL)
HOME_MANAGEMENT_TIME_ENTRY: 29
BATHING_WHERE_ASSESSED: BED LEVEL
BATHING_LEVEL_OF_ASSISTANCE: DEPENDENT

## 2024-01-16 ASSESSMENT — COGNITIVE AND FUNCTIONAL STATUS - GENERAL
TOILETING: TOTAL
TOILETING: TOTAL
EATING MEALS: A LOT
MOBILITY SCORE: 8
WALKING IN HOSPITAL ROOM: TOTAL
TURNING FROM BACK TO SIDE WHILE IN FLAT BAD: A LOT
STANDING UP FROM CHAIR USING ARMS: TOTAL
MOVING FROM LYING ON BACK TO SITTING ON SIDE OF FLAT BED WITH BEDRAILS: A LOT
WALKING IN HOSPITAL ROOM: TOTAL
DRESSING REGULAR LOWER BODY CLOTHING: TOTAL
PERSONAL GROOMING: A LOT
DAILY ACTIVITIY SCORE: 10
MOVING TO AND FROM BED TO CHAIR: TOTAL
DRESSING REGULAR UPPER BODY CLOTHING: A LOT
CLIMB 3 TO 5 STEPS WITH RAILING: TOTAL
DAILY ACTIVITIY SCORE: 10
HELP NEEDED FOR BATHING: A LOT
EATING MEALS: A LOT
STANDING UP FROM CHAIR USING ARMS: TOTAL
HELP NEEDED FOR BATHING: A LOT
DRESSING REGULAR LOWER BODY CLOTHING: TOTAL
MOVING FROM LYING ON BACK TO SITTING ON SIDE OF FLAT BED WITH BEDRAILS: A LOT
CLIMB 3 TO 5 STEPS WITH RAILING: TOTAL
DRESSING REGULAR UPPER BODY CLOTHING: A LOT
MOBILITY SCORE: 8
MOVING TO AND FROM BED TO CHAIR: TOTAL
PERSONAL GROOMING: A LOT
TURNING FROM BACK TO SIDE WHILE IN FLAT BAD: A LOT

## 2024-01-16 ASSESSMENT — PAIN - FUNCTIONAL ASSESSMENT
PAIN_FUNCTIONAL_ASSESSMENT: 0-10
PAIN_FUNCTIONAL_ASSESSMENT: FLACC (FACE, LEGS, ACTIVITY, CRY, CONSOLABILITY)

## 2024-01-16 ASSESSMENT — PAIN SCALES - WONG BAKER
WONGBAKER_NUMERICALRESPONSE: NO HURT
WONGBAKER_NUMERICALRESPONSE: HURTS LITTLE MORE
WONGBAKER_NUMERICALRESPONSE: NO HURT

## 2024-01-16 ASSESSMENT — PAIN DESCRIPTION - LOCATION: LOCATION: BACK

## 2024-01-16 ASSESSMENT — PAIN DESCRIPTION - ORIENTATION: ORIENTATION: LOWER

## 2024-01-16 NOTE — PROGRESS NOTES
Navarro Rabago is a 78 y.o. male on day 10 of admission presenting with Sepsis (CMS/HCC).    Subjective   Interval History:   Wife at bedside  Afebrile, no chills  Remains on  4 liters oxygen  Denies shortness of breat or chest pain  Denies nausea or vomiting     Objective   Range of Vitals (last 24 hours)  Heart Rate:  []   Temp:  [36.1 °C (97 °F)-36.9 °C (98.4 °F)]   Resp:  [17-19]   BP: (116-145)/(63-80)   Weight:  [116 kg (255 lb 4.7 oz)]   SpO2:  [96 %-100 %]   Daily Weight  01/16/24 : 116 kg (255 lb 4.7 oz)    Body mass index is 30.27 kg/m².    Physical Exam  Constitutional:       Comments:  Intermittent confusion   HENT:      Head: Normocephalic and atraumatic.      Nose: Nose normal.    Eyes:      Extraocular Movements: Extraocular movements intact.      Conjunctiva/sclera: Conjunctivae normal.   Cardiovascular:      Rate and Rhythm: Normal rate and regular rhythm.   Pulmonary:      Effort: Pulmonary effort is normal.      Breath sounds:  No wheezing or rales present.   Abdominal:      General: Bowel sounds are normal.      Palpations: Abdomen is soft.   Musculoskeletal:         General: Normal range of motion.      Cervical back: Normal range of motion and neck supple.   Skin:     General: Skin is warm and dry.   Neurological:      General: No focal deficit present.      Mental Status: He is alert.   Psychiatric:         Mood and Affect: Mood normal.         Behavior: Behavior normal.     Antibiotics  sodium chloride 0.9% infusion  heparin (porcine) injection 5,000 Units  sodium chloride 0.9 % with KCl 20 mEq/L infusion  calcium carbonate (Tums) chewable tablet 500 mg  ondansetron ODT (Zofran-ODT) disintegrating tablet 4 mg  ondansetron (Zofran) injection 4 mg  benzocaine-menthol (Cepastat Sore Throat) 15-3.6 mg lozenge 1 lozenge  guaiFENesin (Mucinex) 12 hr tablet 600 mg  dextromethorphan-guaifenesin (Robitussin DM)  mg/5 mL oral liquid 5 mL  dextrose 50 % injection 25 g  glucagon (Glucagen)  injection 1 mg  dextrose 10 % in water (D10W) infusion  ipratropium-albuteroL (Duo-Neb) 0.5-2.5 mg/3 mL nebulizer solution 3 mL  oxygen (O2) therapy  piperacillin-tazobactam-dextrose (Zosyn) IV 3.375 g  pantoprazole (ProtoNix) injection 40 mg  ipratropium-albuteroL (Duo-Neb) 0.5-2.5 mg/3 mL nebulizer solution 3 mL  allopurinol (Zyloprim) tablet  apixaban (Eliquis) tablet  aspirin EC tablet  atorvastatin (Lipitor) tablet  bumetanide (Bumex) tablet  colchicine tablet  empagliflozin (Jardiance) tablet  fexofenadine (Allegra) tablet  finasteride (Propecia, Proscar) tablet  guaiFENesin (Mucinex) 12 hr tablet  levothyroxine (Synthroid, Levoxyl) tablet  nitroglycerin (Nitrostat) SL tablet  pantoprazole (ProtoNix) EC tablet  potassium chloride SA (Klor-Con M20) ER tablet  tamsulosin (Flomax) 24 hr capsule  vancomycin 1.5 mg in 300 mL (Xellia) IVPB 1.5 g      levothyroxine (Synthroid, Levoxyl) tablet 50 mcg  finasteride (Proscar) tablet 5 mg  ferrous sulfate (325 mg ferrous sulfate) tablet 1 tablet  aspirin EC tablet 81 mg  apixaban (Eliquis) tablet 5 mg  metoprolol tartrate (Lopressor) tablet 25 mg  potassium chloride 20 mEq in 100 mL IV premix  midodrine (Proamatine) tablet 5 mg  sodium chloride 0.9 % bolus 500 mL  norepinephrine (Levophed) 8 mg in dextrose 5% 250 mL (0.032 mg/mL) infusion (premix)  sodium chloride 0.9 % bolus 250 mL  sodium chloride 0.9 % bolus 250 mL  azithromycin (Zithromax) in dextrose 5 % in water (D5W) 250 mL  mg  vancomycin 1.5 mg in 300 mL (Xellia) IVPB 1.5 g  apixaban (Eliquis) tablet 2.5 mg  vancomycin (Xellia) 1 g in 200 mL (Xellia) IVPB 1,000 mg  norepinephrine (Levophed) 8 mg in dextrose 5% 250 mL (0.032 mg/mL) infusion (premix)  acetaminophen (Tylenol) tablet 650 mg  vancomycin 1.5 mg in 300 mL (Xellia) IVPB 1.5 g  sodium bicarbonate 150 mEq in dextrose 5 % in water (D5W) 1,000 mL infusion  midodrine (Proamatine) tablet 5 mg  barium sulfate (Varibar Honey) 40 % (w/v) 29% (w/w)  "suspension 10 mL  barium sulfate (Varibar Pudding) 40 % (w/v), 30% (w/w) oral paste 5 mL  barium sulfate (Varibar Nectar, Varibar Honey) 40 % (w/v) suspension 10 mL  barium sulfate (Varibar THIN Liquid) 81 % (w/w) suspension 10 mL  nystatin (Mycostatin) 100,000 unit/mL suspension 500,000 Units  potassium chloride CR (Klor-Con M20) ER tablet 20 mEq  oxygen (O2) therapy  pantoprazole (ProtoNix) EC tablet 40 mg  dextrose 5%-0.45 % sodium chloride infusion  vancomycin 1.5 mg in 300 mL (Xellia) IVPB 1.5 g  potassium chloride CR (Klor-Con M20) ER tablet 40 mEq  LORazepam (Ativan) injection 1 mg  QUEtiapine (SEROquel) tablet 12.5 mg  LORazepam (Ativan) injection 2 mg  potassium chloride CR (Klor-Con M20) ER tablet 20 mEq  potassium chloride 20 mEq in 100 mL IV premix  sodium chloride 0.9% infusion  potassium chloride (Klor-Con) packet 20 mEq  potassium chloride CR (Klor-Con M20) ER tablet 20 mEq  potassium chloride CR (Klor-Con M20) ER tablet 20 mEq  potassium chloride CR (Klor-Con M20) ER tablet 20 mEq      Relevant Results  Labs  Results from last 72 hours   Lab Units 01/16/24  0617 01/15/24  0814 01/14/24  0549   WBC AUTO x10*3/uL 7.7 8.8 6.3   HEMOGLOBIN g/dL 8.9* 8.8* 8.7*   HEMATOCRIT % 28.1* 27.7* 27.5*   PLATELETS AUTO x10*3/uL 289 306 313       Results from last 72 hours   Lab Units 01/15/24  0813 01/14/24  0549   SODIUM mmol/L 140 139   POTASSIUM mmol/L 3.8 3.6   CHLORIDE mmol/L 107 109*   CO2 mmol/L 26 25   BUN mg/dL 12 17   CREATININE mg/dL 1.00 1.10   GLUCOSE mg/dL 120* 114*   CALCIUM mg/dL 7.7* 7.5*   ANION GAP mmol/L 7 5   EGFR mL/min/1.73m*2 78 70             Estimated Creatinine Clearance: 88.8 mL/min (by C-G formula based on SCr of 1 mg/dL).  No results found for: \"CRP\"  Microbiology  Reviewed  Imaging  ECG 12 lead    Result Date: 1/10/2024  Sinus tachycardia with 1st degree AV block with Premature supraventricular complexes Low voltage QRS Left posterior fascicular block Abnormal QRS-T angle, consider " primary T wave abnormality Abnormal ECG When compared with ECG of 13-MAY-2021 13:46, Sinus rhythm has replaced Atrial fibrillation Vent. rate has increased BY  35 BPM Left posterior fascicular block is now Present QT has shortened See ED provider note for full interpretation and clinical correlation Confirmed by Alis Tejeda ([id number]) on 1/10/2024 5:08:28 PM    CT abdomen pelvis wo IV contrast    Result Date: 1/10/2024  STUDY: CT Abdomen and Pelvis without IV Contrast; 1/10/2024 at 3:30 AM INDICATION: Back pain, ecchymosis; trauma. COMPARISON: US renal 1/8/2024, CT chest, abdomen and pelvis 1/5/2024. ACCESSION NUMBER(S): [id number] ORDERING CLINICIAN: DANA AMATO TECHNIQUE: CT of the abdomen and pelvis was performed.  Contiguous axial images were obtained at 3 mm slice thickness through the abdomen and pelvis. Coronal and sagittal reconstructions at 3 mm slice thickness were performed. No intravenous contrast was administered.  Automated mA/kV exposure control was utilized and patient examination was performed in strict accordance with principles of ALARA. FINDINGS: Please note that the evaluation of vessels, lymph nodes and organs is limited without intravenous contrast.  LOWER CHEST: No cardiomegaly.  No pericardial effusion.  Lung bases are clear.  ABDOMEN: Partially visualized portions of the lower chest showing small bilateral pleural effusions and partial atelectasis of the bilateral lower lobes increased from prior. Heart size normal. No pericardial effusion. Unenhanced liver without acute process. No discernible intrahepatic ductal dilatation. Pericholecystic flu atrophic changes of the pancreas. No acute abnormality of the spleen is identified. No acute abnormality of the adrenal glands or kidneys no retroperitoneal adenopathy. Atherosclerosis of the abdominal aorta without aneurysm. Asymmetric enlargement of the RIGHT psoas musculature and retroperitoneal fluid increased from 1/5/24. Small  amount of free fluid within the pelvis. No pelvic adenopathy identified. Rodriguez catheter within the urinary bladder. LEFT hip prostheses. No findings of acute fracture of the pelvis. Spondylotic changes and facet arthrosis of the lumbar spine.     Impression: RIGHT psoas and retroperitoneal hematoma. Without the use of intravenous contrast difficult to determine the exact size of the hemorrhage. Increased small bilateral pleural effusions and partial atelectasis of the lower lobes. Findings discussed with Dr. DANA AMATO with verbal understanding at approximately 1/10/2024 4:28 AM. Signed by Keith Watson MD    CT head wo IV contrast    Result Date: 1/10/2024  STUDY: CT Head without IV Contrast; 1/10/2024 at 3:30 AM INDICATION: Confusion. COMPARISON: None available. ACCESSION NUMBER(S): [id number] ORDERING CLINICIAN: DANA AMATO TECHNIQUE: Noncontrast axial CT scan of head was performed.  Angled reformats in brain and bone windows were generated.  The images were reviewed in bone, brain, blood and soft tissue windows. Automated mA/kV exposure control was utilized and patient examination was performed in strict accordance with principles of ALARA. FINDINGS: CSF Spaces:  The ventricles, sulci and basal cisterns are prominent from atrophy.  There is no extraaxial fluid collection.  Mild patchy areas of low-attenuation are seen in the subcortical and deep periventricular white matter suggesting areas of chronic microvascular ischemia.  Parenchyma:  The grey-white differentiation is intact.  There is no mass effect or midline shift.  There is no intracranial hemorrhage.  Calvarium:  The calvarium is unremarkable.  Paranasal sinuses and mastoids:  Visualized paranasal sinuses and mastoids are clear.    Diffuse cerebral atrophy.  Suspected mild chronic small vessel white matter ischemia.  No definite acute intracranial abnormality. Signed by Shawn Echols DO    Transthoracic Echo (TTE)  Complete    Result Date: 1/8/2024            [facility] [address], [place] [address]             Phone [phone] TRANSTHORACIC ECHOCARDIOGRAM REPORT  Patient Name:      LILIAN MCDERMOTT CHERIE Lizarraga Physician:   [id number] Lb Nance MD Study Date:        1/8/2024           Ordering Provider:   [id number] LB NANCE MRN/PID:           [id number]           Fellow: Accession#:        [id number]       Nurse: Date of Birth/Age: 1945 / 78      Sonographer:         Lilli Mata RDCS                    years Gender:            M                  Additional Staff: Height:            195.58 cm          Admit Date:          1/8/2024 Weight:            103.42 kg          Admission Status:    Inpatient - Routine BSA:               2.36 m2            Department Location: [facility] Blood Pressure: 96 /74 mmHg Study Type:    TRANSTHORACIC ECHO (TTE) COMPLETE Diagnosis/ICD: Other hypotension-I95.89 Indication:    hypotension CPT Codes:     Echo Complete w Full Doppler-[id number]  Study Detail: The following Echo studies were performed: 2D, M-Mode, Doppler and               color flow.  PHYSICIAN INTERPRETATION: Left Ventricle: Left ventricular systolic function is moderately decreased, with an estimated ejection fraction of 35-40%. Wall motion is abnormal. The left ventricular cavity size is mild to moderately dilated. Abnormal (paradoxical) septal motion, consistent with an intraventricular conduction delay. Spectral Doppler shows a normal pattern of left ventricular diastolic filling. There is moderate to severe hypokinesis of the inferoposterior wall. There is mild hypokinesis and dyssynchrony of the anteroseptal wall. Left Atrium: The left atrium is mild to moderately dilated. Right Ventricle: The right ventricle is upper limits of normal in size. There is normal right ventriclar wall thickness. There is normal right ventricular global systolic  function. Right Atrium: The right atrium is mildly dilated. Aortic Valve: The aortic valve is trileaflet. The aortic valve appears tricuspid and non-restricted. There is mild aortic valve cusp calcification. There is no evidence of reduced aortic valve leaflet excursion excursion. There is evidence of mildly elevated transaortic gradients consistent with sclerosis of the aortic valve. There is mild aortic valve regurgitation. The peak instantaneous gradient of the aortic valve is 10.8 mmHg. The mean gradient of the aortic valve is 6.0 mmHg. There is moderate sclerotic calcification of the noncoronary aortic valve cusp. Mitral Valve: The mitral valve is normal in structure. There is normal mitral valve leaflet mobility. There is mild to moderate mitral valve regurgitation. Tricuspid Valve: The tricuspid valve is structurally normal. There is normal tricuspid valve leaflet mobility. There is mild to moderate tricuspid regurgitation. The Doppler estimated RVSP is mildly elevated at 43.0 mmHg. Pulmonic Valve: The pulmonic valve is structurally normal. There is trace pulmonic valve regurgitation. Pericardium: There is no pericardial effusion noted. Aorta: The aortic root is normal. There is no dilatation of the aortic arch. There is no dilatation of the ascending aorta. There is no dilatation of the aortic root. Pulmonary Artery: The pulmonary artery is normal in size. The tricuspid regurgitant velocity is 2.96 m/s, and with an estimated right atrial pressure of 8 mmHg, the estimated pulmonary artery pressure is mildly elevated with the RVSP at 43.0 mmHg. The estimated PASP is 43 mmHg. Systemic Veins: The inferior vena cava appears moderately dilated.  CONCLUSIONS:  1. Left ventricular systolic function is moderately decreased with a 35-40% estimated ejection fraction.  2. There is moderate to severe hypokinesis of the inferoposterior wall.     There is mild hypokinesis and dyssynchrony of the anteroseptal wall.  3. The  left atrium is mild to moderately dilated.  4. Mild to moderate mitral valve regurgitation.  5. Mild to moderate tricuspid regurgitation.  6. Mildly elevated RVSP.  7. Aortic valve sclerosis.  8. There is moderate sclerotic calcification of the noncoronary aortic valve cusp.  9. Mild aortic valve regurgitation. 10. The estimated PASP is 43 mmHg. 11. The inferior vena cava appears moderately dilated. QUANTITATIVE DATA SUMMARY: 2D MEASUREMENTS:                          Normal Ranges: IVSd:          1.08 cm   (0.6-1.1cm) LVPWd:         0.98 cm   (0.6-1.1cm) LVIDd:         5.65 cm   (3.9-5.9cm) LVIDs:         4.00 cm LV Mass Index: 97.7 g/m2 LV % FS        29.2 % LA VOLUME:                               Normal Ranges: LA Vol A4C:        59.2 ml    (22+/-6mL/m2) LA Vol Index A4C:  25.0ml/m2 LA Area A4C:       20.3 cm2 LA Major Axis A4C: 5.9 cm LA Volume Index:   25.3 ml/m2 LA Vol A4C:        54.4 ml RA VOLUME BY A/L METHOD:                               Normal Ranges: RA Vol A4C:        38.2 ml    (8.3-19.5ml) RA Vol Index A4C:  16.2 ml/m2 RA Area A4C:       15.2 cm2 RA Major Axis A4C: 5.1 cm M-MODE MEASUREMENTS:                  Normal Ranges: Ao Root: 3.70 cm (2.0-3.7cm) AORTA MEASUREMENTS:                      Normal Ranges: Ao Sinus, d: 3.21 cm (2.1-3.5cm) LV SYSTOLIC FUNCTION BY 2D PLANIMETRY (MOD):                     Normal Ranges: EF-A4C View: 54.1 % (>=55%) EF-A2C View: 51.4 % EF-Biplane:  54.1 % AORTIC VALVE:                                    Normal Ranges: AoV Vmax:                1.64 m/s  (<=1.7m/s) AoV Peak PG:             10.8 mmHg (<20mmHg) AoV Mean P.0 mmHg  (1.7-11.5mmHg) LVOT Max Angel Luis:            0.99 m/s  (<=1.1m/s) AoV VTI:                 31.80 cm  (18-25cm) LVOT VTI:                19.70 cm LVOT Diameter:           2.00 cm   (1.8-2.4cm) AoV Area, VTI:           1.95 cm2  (2.5-5.5cm2) AoV Area,Vmax:           1.89 cm2  (2.5-4.5cm2) AoV Dimensionless Index: 0.62 AORTIC INSUFFICIENCY:  AI Vmax:       3.44 m/s AI Half-time:  677 msec AI Decel Rate: 150.00 cm/s2  RIGHT VENTRICLE: RV Basal 3.83 cm RV Mid   4.43 cm RV Major 7.2 cm TRICUSPID VALVE/RVSP:                             Normal Ranges: Peak TR Velocity: 2.96 m/s RV Syst Pressure: 43.0 mmHg (< 30mmHg) IVC Diam:         2.68 cm  [id number] Lb Nance MD Electronically signed on 1/8/2024 at 9:32:37 PM  ** Final **     FL modified barium swallow study    Result Date: 1/8/2024  Interpreted By:  Antoniou, Elias, and Casey Corinne STUDY: FL MODIFIED BARIUM SWALLOW STUDY;; 1/8/2024 1:27 pm   INDICATION: Signs/Symptoms:suspected pharyngeal dysphagia.   COMPARISON: None.   ACCESSION NUMBER(S): [id number]   ORDERING CLINICIAN: JOSE DAVID SOLER   TECHNIQUE: MBSS completed. Informed verbal consent obtained prior to completion of exam. Trials of thin, nectar thick, honey thick, puree, and regular solids given. Fluoroscopy time :  3 minutes and 54 seconds. Total dose air kerma 21.15 mGy..   SLP: Corinne E. Casey, M.A. CCC-SLP Phone/Pager: [phone] opt 2, via [employer], or via EP[employer] secure chat   SPEECH FINDINGS: Reason for referral: Dysphagia, aspiration pneumonia Patient hx: Sepsis, HTN, HLD, GERD, CHF Respiratory status: Nasal cannula Previous diet: Regular and thin   FINAL SPEECH RECOMMENDATIONS   Diet recommendations/feeding strategies: Minced/moist solids with thin liquids, NO STRAWS.   Upright seating, no straw. Meds crushed in puree. Please downgrade to nectar thick and notify SLP if s/s of aspiration present with PO intake.   Follow-up speech therapy recommended: Yes.   Education provided: Yes. Results and recs discussed with pt, RN, and physician with verbalized understanding noted.   Treatment Provided today: Yes, see progress notes   Repeat study/ dc plan: Continue skilled speech therapy services after discharge   Mechanics of the swallow summary: *Oral phase: Moderate impairment as characterized by disorganized/prolonged mastication, poor  tongue control during bolus hold with premature posterior phrayngeal spillage, and delayed swallow onset (at level of pyriform sinuese for thin and nectar liquids, and at valleculae for honey liquids, puree, and cracker consistencies *Pharyngeal phase: Mild impairment as characterized by decreased tongue base retraction and trace vallecular retention *Esophageal phase: WNL, limited assessment   SLP impressions with severity rating: Pt. Presenting with singular episode of aspiration during initial trial of thin liquid.  Penetration with thin liquid via straw (successive swallows) and with nectar liquids via tsp. No further penetration nor aspiration presents with remaining trials of thin liquids via cup, nectar liquids via cup, honey liquids, puree, nor cracker trials.   Pt noted with mild oral and pharyngeal dysphagia as characterized by prolonged mastication, premature posterior pharyngeal spillage, delayed swallow onset, and diminished tongue base retraction.   Thin Liquids (MBSS) Rosenbek's Penetration Aspiration Scale, Thin Liquids (MBSS): 7. OVERT ASPIRATION, material is not cleared - contrast passes glottis, visible residue, W/pt response Singluar episode of aspiration on initial trial of thin via tsp - appears r/t premature posterior pharyngeal spillage and occurs prior to swallow onset. Penetration without aspiration (PAS 2) during trials of thin liquid via straw (successive swallows); No penetration nor aspiration with thin liquids via cup (PAS 1) Response to Aspiration, Thin Liquid (MBSS): unproductive reflexive involuntary cough,       Nectar Thick Liquids (MBSS) Rosenbek's Penetration Aspiration Scale, Nectar thick liquids (MBSS): 2. PENETRATION that CLEARS - contrast enter airway, above vocal cords, no residue       Honey Thick Liquids (MBSS) Rosenbek's Penetration Aspiration Scale, Honey thick liquids (MBSS): 1. NO ASPIRATION & NO PENETRATION - no aspiration, contrast does not enter airway     Response  to Pharyngeal Residue, Honey thick liquids (MBSS): no spontaneous response,   Purees (MBSS) Rosenbek's Penetration Aspiration Scale, Purees (MBSS): 1. NO ASPIRATION & NO PENETRATION - no aspiration, contrast does not enter airway     Response to Pharyngeal Residue, Purees (MBSS): no spontaneous response, Clears with thin liquid wash,     Solids (MBSS) Rosenbek's Penetration Aspiration Scale, Solids (MBSS): 1. NO ASPIRATION & NO PENETRATION - no aspiration, contrast does not enter airway   Response to Pharyngeal Residue, Solids (MBSS): no spontaneous response, Clears with thin liquid wash. ,     Speech Therapy section of this report signed by Corinne E. Casey, M.A. CCC-SLP on 1/8/2024 at 1:41 pm.   RADIOLOGY FINDINGS: Prolonged oral phase with uncoordinated mastication/lingual motion. Single episode of aspiration with thin barium. A couple of additional episodes of laryngeal penetration without aspiration. No laryngeal penetration or aspiration seen with thicker consistencies of barium. Retention of barium is noted in the vallecula and piriform sinuses.   Radiology section of this report signed by .       Dysphagia as above.   MACRO: None   Signed by: Roby Villarreal 1/8/2024 4:31 PM Dictation workstation:   [id number]    US renal complete    Result Date: 1/8/2024  Interpreted By:  Cathy Hernandez, STUDY: US RENAL COMPLETE; 1/8/2024 11:45 am   INDICATION: Acute renal insufficiency.   COMPARISON: None.   ACCESSION NUMBER(S): [id number]   ORDERING CLINICIAN: PERCY CABRERA   TECHNIQUE: Grayscale and color Doppler imaging of the kidneys is performed portably..   FINDINGS: The right kidney measures 12.1 cm x 7.2 cm x 5.1 cm. A simple right renal cyst measuring 1.9 cm in size is seen. The left kidney measures 12.6 cm x 5.6 cm x 6.4 cm. There is no shadowing calculus, hydronephrosis, or solid mass identified. The renal cortical echogenicity is normal bilaterally with renal cortical thickness within normal limits.   Cursory  evaluation of urinary bladder shows presence of a Rodriguez catheter with bladder decompressed.       1.9 cm simple right renal cyst with unremarkable appearance of the left kidney.   No renal atrophy or hydronephrosis.   MACRO: None.   Signed by: Cathy Hernandez 1/8/2024 12:04 PM Dictation workstation:   [id number]    XR chest 1 view    Result Date: 1/7/2024  Interpreted By:  Cathy Hernandez, STUDY: XR CHEST 1 VIEW 1/7/2024 7:36 am   INDICATION: Follow-up pneumonia   COMPARISON: 05/29/2014 as well as CT examination of the chest done on 01/05/2024.   ACCESSION NUMBER(S): [id number]   ORDERING CLINICIAN: EMILY MAKI   TECHNIQUE: AP erect view of the chest   FINDINGS: The heart is at the upper limits of normal in size. There is infiltrate and airspace consolidation observed centrally within the right lung with upper lobar predominance. There is also some patchy infiltrate at the left lung base. No obvious pleural abnormality is seen.       Infiltrate and airspace consolidation identified within the right lung most pronounced centrally and within the right upper lobe with mild left basilar infiltrate.   Signed by: Cathy Hernandez 1/7/2024 8:11 AM Dictation workstation:   [id number]    CT chest abdomen pelvis w IV contrast    Result Date: 1/5/2024  Interpreted By:  Yordy Urena, STUDY: CT CHEST ABDOMEN PELVIS W IV CONTRAST;  1/5/2024 6:33 pm   INDICATION: Signs/Symptoms:abd pain, sob.   COMPARISON: None.   ACCESSION NUMBER(S): [id number]   ORDERING CLINICIAN: NELLA MELGOZA   TECHNIQUE: Contiguous axial images of the chest, abdomen and pelvis were obtained after the intravenous administration of  contrast. Coronal and sagittal reformatted images were obtained from the axial images.   FINDINGS: CT CHEST:   No axillary lymphadenopathy. 1.6 cm right paratracheal lymph node and 1.5 cm subcarinal lymph node. There is limited evaluation for hilar lymphadenopathy secondary stripping motion.   The heart is normal in size. Coronary  artery vascular calcifications. No significant pericardial effusion.   There is pulmonary emphysematous change. There is consolidative opacity in the right upper lobe and right lower lobe compatible with pneumonia and also minimal opacity in the left lower lobe. Trace pleural effusions. No pneumothorax.   Multilevel degenerative change of the thoracic spine.     CT ABDOMEN PELVIS:   Evaluation of the abdomen and pelvis is limited secondary to patient motion. No evidence of liver mass. The gallbladder is present, not well evaluated secondary to motion. No dilatation common bile duct.   Atrophy of the pancreas.   No splenomegaly.   Question mild nodularity of the left adrenal gland. The right adrenal gland appears unremarkable.   Symmetric enhancement of the kidneys. Right renal cysts with the largest measuring 3.1 cm and several bowel subcentimeter hypodensity too small to characterize. No hydronephrosis.   Atherosclerotic calcification of the aorta and abdominal and pelvic vasculature.   No evidence of bowel obstruction. Evaluation the bowel is otherwise limited secondary patient motion.   Urinary bladder is underdistended and not well evaluated. 18 mm right posterior bladder diverticulum.   Postsurgical change of left hip arthroplasty resulting in beam hardening artifact and limiting evaluation the pelvis.   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       Consolidative opacity in the right upper lobe and lower lobe compatible with pneumonia and also mild left basilar opacity and trace pleural effusions.   No definite evidence of acute abnormality of the abdominal viscera within the limitations of the motion degraded examination.   MACRO: None   Signed by: Yordy Urena 1/5/2024 6:49 PM Dictation workstation:   [id number]     Assessment/Plan   Sepsis, with shock, resolved  Acute renal failure-improving  Pneumonia-MRSA PCR negative   Diarrhea-c.diff negative  Transaminitis-resolving  Leukocytosis,  improving  Oral candida   Chronic respiratory failure-at baseline 5LNC  Retroperitoneal hematoma     IV zosyn-started on 1/6-discontinue after last dose today-completed 10 days  Monitor off antibiotics   Continue Nystatin suspension  Monitor WBC and temperature  Monitor renal function  Oxygen as needed  Supportive care  Monitor mental status        Tess Gonzalez, APRN-CNP

## 2024-01-16 NOTE — PROGRESS NOTES
"Navarro Rabago is a 78 y.o. male on day 10 of admission presenting with Sepsis (CMS/HCC).    Subjective   Just had physical therapy, tired.-Is at bedside as well today.       Objective     Physical Exam  Generally no acute distress  HEENT PERRL EOMI  Cardiovascular S1-S2 heard regular rate rhythm  Lungs diminished anteriorly  Abdomen bowel sounds present  Extremities no clubbing cyanosis edema  Last Recorded Vitals  Blood pressure 145/75, pulse (!) 119, temperature 36.2 °C (97.2 °F), temperature source Temporal, resp. rate 18, height 1.956 m (6' 5\"), weight 116 kg (255 lb 4.7 oz), SpO2 97 %.  Intake/Output last 3 Shifts:  I/O last 3 completed shifts:  In: 350 (3 mL/kg) [IV Piggyback:350]  Out: 1850 (16 mL/kg) [Urine:1850 (0.4 mL/kg/hr)]  Weight: 115.8 kg     Relevant Results  Scheduled medications  apixaban, 2.5 mg, oral, q12h DARIA  aspirin, 81 mg, oral, Daily  ferrous sulfate (325 mg ferrous sulfate), 1 tablet, oral, Daily with breakfast  finasteride, 5 mg, oral, Daily  ipratropium-albuteroL, 3 mL, nebulization, 4x daily  levothyroxine, 50 mcg, oral, Daily  midodrine, 5 mg, oral, q8h  nystatin, 5 mL, Swish & Swallow, 4x daily  oxygen, , inhalation, q8h  pantoprazole, 40 mg, oral, Daily before breakfast  piperacillin-tazobactam, 3.375 g, intravenous, q6h  polyethylene glycol, 17 g, oral, BID  QUEtiapine, 12.5 mg, oral, BID      Continuous medications     PRN medications  PRN medications: acetaminophen, benzocaine-menthol, calcium carbonate, dextromethorphan-guaifenesin, dextrose 10 % in water (D10W), dextrose, glucagon, guaiFENesin, LORazepam, ondansetron ODT **OR** ondansetron  Results for orders placed or performed during the hospital encounter of 01/06/24 (from the past 24 hour(s))   CBC   Result Value Ref Range    WBC 7.7 4.4 - 11.3 x10*3/uL    nRBC 0.0 0.0 - 0.0 /100 WBCs    RBC 3.00 (L) 4.50 - 5.90 x10*6/uL    Hemoglobin 8.9 (L) 13.5 - 17.5 g/dL    Hematocrit 28.1 (L) 41.0 - 52.0 %    MCV 94 80 - 100 fL    MCH " 29.7 26.0 - 34.0 pg    MCHC 31.7 (L) 32.0 - 36.0 g/dL    RDW 17.2 (H) 11.5 - 14.5 %    Platelets 289 150 - 450 x10*3/uL                   Impression: 78-year-old gentleman admitted with likely aspiration pneumonia and hypotension in the setting of a history of ischemic cardiomyopathy.    Plan:    1.  Pneumonia/sepsis  -Improved, antibiotics discontinued today.  Labs terms of white count are normal, no fever.  Appreciate infectious disease and pulmonary consultation  -Continue with pulmonary toilet    2.  Acute renal insufficiency  -Resolved, creatinine normalized    3.  Ischemic cardiomyopathy  -Stable at this time appreciate cardiology input    4.  Aspiration  -Appreciate recommendations by speech therapy.  Continue with recommended diet.    GI and DVT prophylaxis, on apixaban for A-fib    DC planning to [facility], [facility] representative is here today to meet with the patient.  Anticipating discharge in the next 24 hours        Assessment/Plan   Principal Problem:    Sepsis (CMS/HCC)  Active Problems:    Lactic acid acidosis    Acute renal failure (ARF) (CMS/HCC)    Leukocytosis    Bilateral pneumonia    Chronic respiratory failure (CMS/HCC)    Hypokalemia    Dehydration    Transaminitis    Acute encephalopathy    Hypotension    Decreased activities of daily living (ADL)           I spent 25 minutes in the professional and overall care of this patient.      Ulises Tariq MD

## 2024-01-16 NOTE — PROGRESS NOTES
Physical Therapy    Physical Therapy Treatment    Patient Name: Navarro Rabago  MRN: [id number]  Today's Date: 1/16/2024  Time Calculation  Start Time: 1135  Stop Time: 1215  Time Calculation (min): 40 min       Assessment/Plan   PT Assessment  PT Assessment Results: Decreased strength, Decreased range of motion, Decreased endurance, Impaired balance, Decreased mobility, Decreased coordination, Decreased cognition, Impaired judgement, Decreased safety awareness, Decreased skin integrity  Rehab Prognosis: Fair  Evaluation/Treatment Tolerance: Patient limited by fatigue  Strengths: Support of Caregivers  Barriers to Participation: Comorbidities  Assessment Comment: Pt fatigued prior to session, gives effort as able, much cueing and assist requiring max assist of 2, pt fearful of falling.  End of Session Patient Position: Bed, 4 rail up  PT Plan  Inpatient/Swing Bed or Outpatient: Inpatient  PT Plan  Treatment/Interventions: Bed mobility, Transfer training, Gait training, Balance training, Strengthening, Endurance training, Range of motion, Therapeutic exercise, Therapeutic activity  PT Plan: Skilled PT  PT Frequency: 4 times per week  PT Discharge Recommendations: Moderate intensity level of continued care  Equipment Recommended upon Discharge: Lift  PT Recommended Transfer Status: Assist x2  PT - OK to Discharge: Yes      General Visit Information:   PT  Visit  PT Received On: 01/16/24  General  Reason for Referral: impaired mobility  Referred By: Dr. Bates  Past Medical History Relevant to Rehab: CHF, afib, CAD, COPD, HTN, hernia  Family/Caregiver Present: Yes  Caregiver Feedback: spouse supportive  Co-Treatment: OT  Co-Treatment Reason: safe mobility, pt tolerance  Patient Position Received: Bed, 4 rail up  Preferred Learning Style: verbal  General Comment: Pt agreeable to PT session.    Subjective   Precautions:  Precautions  Hearing/Visual Limitations:  (cues to keep eyes open)  Medical Precautions: Fall  precautions, Oxygen therapy device and L/min (4L)    Objective   Pain:  Pain Assessment  Pain Assessment: 0-10  Pain Score: 0 - No pain  Pain Type:  (pt reports chronic, all over, not affecting therapy)  Cognition:  Cognition  Overall Cognitive Status: Impaired (lethargic)  Processing Speed: Delayed  Postural Control:  Postural Control  Posture Comment: flexed posture  Static Sitting Balance  Static Sitting-Balance Support: Bilateral upper extremity supported  Static Sitting-Level of Assistance: Contact guard, Moderate assistance (variable pending pt engagement)  Static Sitting-Comment/Number of Minutes: 15 minutes at EOB  Dynamic Sitting Balance  Dynamic Sitting-Balance Support: Right upper extremity supported, Left upper extremity supported (w/ UE or LE mobility)  Dynamic Sitting-Balance:  (w/ UE or LE mobility tasks)  Dynamic Sitting-Comments: Fair-, contact guard to min assist at most times; required assist for balanced sitting first.    Activity Tolerance:  Activity Tolerance  Endurance: Decreased tolerance for upright activites  Treatments:  Therapeutic Exercise  Therapeutic Exercise Performed: Yes  Therapeutic Exercise Activity 1: Pt performed supine bilat ankle pumps w/ assist Rt, quad sets, glute sets, heel slides w/ asisst, hip abd slides w/ assist, seated LAQs x 10 reps.    Bed Mobility  Bed Mobility: Yes  Bed Mobility 1  Bed Mobility 1: Supine to sitting  Level of Assistance 1: Maximum assistance (+2)  Bed Mobility Comments 1: Pt initiated LEs to Rt EOB, cues/assist for trunk up and LEs over EOB, cues for UE support, assist for balanced sitting, scooting hips, pt using footboard for assist.  Bed Mobility 2  Bed Mobility  2: Sitting to supine  Level of Assistance 2: Dependent, Maximum assistance (+2)  Bed Mobility Comments 2: Assist for trunk down onto Rt side,LEs into bed, assist to roll for L & R for linen positioning and hygiene, dependnet boost to HOB.    Outcome Measures:  [facility] Basic  Mobility  Turning from your back to your side while in a flat bed without using bedrails: A lot  Moving from lying on your back to sitting on the side of a flat bed without using bedrails: A lot  Moving to and from bed to chair (including a wheelchair): Total  Standing up from a chair using your arms (e.g. wheelchair or bedside chair): Total  To walk in hospital room: Total  Climbing 3-5 steps with railing: Total  Basic Mobility - Total Score: 8    Education Documentation  Home Exercise Program, taught by Danielle Pickens, PT at 1/16/2024  1:34 PM.  Learner: Patient  Readiness: Acceptance  Method: Demonstration  Response: Needs Reinforcement    Mobility Training, taught by Danielle Pickens, PT at 1/16/2024  1:34 PM.  Learner: Patient  Readiness: Acceptance  Method: Demonstration  Response: Needs Reinforcement    Education Comments  No comments found.        OP EDUCATION:       Encounter Problems       Encounter Problems (Active)       Balance       Sitting Balance (Progressing)       Start:  01/08/24    Expected End:  01/16/24       Pt will demonstrate good sitting balance to promote safe engagement with out of bed activities           Standing Balance (Progressing)       Start:  01/08/24    Expected End:  01/16/24       Pt will demonstrate good static standing balance to promote safe participation with out of bed activity, transfers, and mobility              Mobility       Ambulation (Progressing)       Start:  01/08/24    Expected End:  01/16/24       Pt will ambulate 50' modified independent assist with walker to promote safe home mobility              Pain - Adult          Safety       Safe Mobility Techniques (Progressing)       Start:  01/08/24    Expected End:  01/16/24       Pt will correctly identify and demonstrate safe mobility techniques to reduce their risks for falls during their acute care stay               Transfers       Supine to sit (Progressing)       Start:  01/08/24    Expected End:  01/16/24        Pt will transfer supine to sitting at edge of bed with modified independent assist to promote acute care out of bed activity           Sit to stand (Progressing)       Start:  01/08/24    Expected End:  01/16/24       Pt will transfer sit to standing position with modified independent assist and walker to promote safe out of bed activity           Bed to chair (Progressing)       Start:  01/08/24    Expected End:  01/16/24       Pt will transfer from sitting edge of bed to the chair with modified independent assist and walker to promote out of bed activity and reduce the risks of prolonged acute care bedrest

## 2024-01-16 NOTE — CARE PLAN
Problem: Skin  Goal: Prevent/minimize sheer/friction injuries  Outcome: Progressing  Flowsheets (Taken 1/16/2024 0900)  Prevent/minimize sheer/friction injuries:   Use pull sheet   HOB 30 degrees or less   Turn/reposition every 2 hours/use positioning/transfer devices     Problem: Skin  Goal: Promote/optimize nutrition  Outcome: Progressing  Flowsheets (Taken 1/16/2024 1531)  Promote/optimize nutrition:   Monitor/record intake including meals   Consume > 50% meals/supplements   Offer water/supplements/favorite foods   The patient's goals for the shift include      The clinical goals for the shift include maintain hemodynamic stability    Over the shift, the patient did not make progress toward the following goals. Barriers to progression include. Recommendations to address these barriers include.

## 2024-01-16 NOTE — PROGRESS NOTES
"Nutrition Follow up Note    Nutrition Assessment      Current diet per SLP recs. Fair po intake noted. Plan for discharge to skilled rehab.    Nutrition History:  Food and Nutrient History: skipping breakfast  Energy Intake: Fair 50-75 %    Anthropometrics:  Ht: 195.6 cm (6' 5\"), Wt: 116 kg (255 lb 4.7 oz), BMI: 30.27  IBW/kg (Dietitian Calculated): 94.55 kg  Percent of IBW: 123 %  Adjusted Body Weight (kg): 100 kg    Weight Change:  Daily Weight  01/16/24 : 116 kg (255 lb 4.7 oz)  01/05/24 : 81.6 kg (180 lb)     Weight History / % Weight Change: Pt's SO denies any wt changes aside from fluid related.    Nutrition Focused Physical Exam Findings:   Physical Findings (Nutrition Deficiency/Toxicity)  Skin: Positive (R toe wound)    Nutrition Significant Labs:  Lab Results   Component Value Date    WBC 7.7 01/16/2024    HGB 8.9 (L) 01/16/2024    HCT 28.1 (L) 01/16/2024     01/16/2024    ALT 59 (H) 01/08/2024     (H) 01/08/2024     01/15/2024    K 3.8 01/15/2024     01/15/2024    CREATININE 1.00 01/15/2024    BUN 12 01/15/2024    CO2 26 01/15/2024    TSH 3.74 05/19/2021    PSA 1.36 05/19/2021    INR 1.2 (H) 05/13/2021    HGBA1C 5.5 06/19/2023       Current Facility-Administered Medications:     acetaminophen (Tylenol) tablet 650 mg, 650 mg, oral, q6h PRN, Ricardo Kaplan MD, 650 mg at 01/16/24 0219    apixaban (Eliquis) tablet 2.5 mg, 2.5 mg, oral, q12h DARIA, Ricardo Kaplan MD, 2.5 mg at 01/16/24 1016    aspirin EC tablet 81 mg, 81 mg, oral, Daily, Ricardo Kaplan MD, 81 mg at 01/16/24 1016    benzocaine-menthol (Cepastat Sore Throat) 15-3.6 mg lozenge 1 lozenge, 1 lozenge, Mouth/Throat, q2h PRN, Ricardo Kaplan MD    calcium carbonate (Tums) chewable tablet 500 mg, 500 mg, oral, 4x daily PRN, Ricardo Kaplan MD    dextromethorphan-guaifenesin (Robitussin DM)  mg/5 mL oral liquid 5 mL, 5 mL, oral, q4h PRN, Ricardo Kaplan MD, 5 mL at 01/14/24 " 0622    dextrose 10 % in water (D10W) infusion, 0.3 g/kg/hr, intravenous, Once PRN, Ricardo Kaplan MD    dextrose 50 % injection 25 g, 25 g, intravenous, q15 min PRN, Ricardo Kaplan MD    ferrous sulfate (325 mg ferrous sulfate) tablet 1 tablet, 1 tablet, oral, Daily with breakfast, Ricardo Kaplan MD, 1 tablet at 01/16/24 1016    finasteride (Proscar) tablet 5 mg, 5 mg, oral, Daily, Ricardo Kaplan MD, 5 mg at 01/16/24 1016    glucagon (Glucagen) injection 1 mg, 1 mg, intramuscular, q15 min PRN, Ricardo Kaplan MD    guaiFENesin (Mucinex) 12 hr tablet 600 mg, 600 mg, oral, q12h PRN, Ricardo Kaplan MD, 600 mg at 01/12/24 1114    ipratropium-albuteroL (Duo-Neb) 0.5-2.5 mg/3 mL nebulizer solution 3 mL, 3 mL, nebulization, 4x daily, Ricardo Kaplan MD, 3 mL at 01/16/24 1126    levothyroxine (Synthroid, Levoxyl) tablet 50 mcg, 50 mcg, oral, Daily, Ricardo Kaplan MD, 50 mcg at 01/16/24 0611    LORazepam (Ativan) injection 1 mg, 1 mg, intravenous, q4h PRN, Evan Bates MD, 1 mg at 01/16/24 0326    midodrine (Proamatine) tablet 5 mg, 5 mg, oral, q8h, Ricardo Kaplan MD, 5 mg at 01/16/24 0325    nystatin (Mycostatin) 100,000 unit/mL suspension 500,000 Units, 5 mL, Swish & Swallow, 4x daily, Ricardo Kaplan MD, 500,000 Units at 01/16/24 0611    ondansetron ODT (Zofran-ODT) disintegrating tablet 4 mg, 4 mg, oral, q8h PRN **OR** ondansetron (Zofran) injection 4 mg, 4 mg, intravenous, q8h PRN, Ricardo Kaplan MD, 4 mg at 01/07/24 0321    oxygen (O2) therapy, , inhalation, q8h, Ricardo Kaplan MD, Start at 01/16/24 0713    pantoprazole (ProtoNix) EC tablet 40 mg, 40 mg, oral, Daily before breakfast, Ricardo Kaplan MD, 40 mg at 01/16/24 0611    piperacillin-tazobactam-dextrose (Zosyn) IV 3.375 g, 3.375 g, intravenous, q6h, Ricardo Kaplan MD, Last Rate: 100 mL/hr at 01/16/24 0622, 3.375 g at 01/16/24 0622    polyethylene  glycol (Glycolax, Miralax) packet 17 g, 17 g, oral, BID, Evan Bates MD, 17 g at 01/16/24 1016    QUEtiapine (SEROquel) tablet 12.5 mg, 12.5 mg, oral, BID, Evan Bates MD, 12.5 mg at 01/16/24 1016    Dietary Orders (From admission, onward)       Start     Ordered    01/08/24 1333  Adult diet Carb Controlled; 60 gram carb/meal, 30 gram Carb evening snack; Minced/moist food 5; Thin 0; No straw  Diet effective now        Comments: Upright seating, no straw. Meds crushed in puree. Please downgrade to nectar thick and notify SLP if s/s of aspiration present with PO intake.   Question Answer Comment   Diet type Carb Controlled    Carb diet selection: 60 gram carb/meal, 30 gram Carb evening snack    Texture Minced/moist food 5    Fluid consistency Thin 0    Select tray type: No straw        01/08/24 1332                  Estimated Needs:   Estimated Energy Needs  Total Energy Estimated Needs (kCal): 2497 kCal  Total Estimated Energy Need per Day (kCal/kg): 25 kCal/kg  Method for Estimating Needs: ABW    Estimated Protein Needs  Total Protein Estimated Needs (g): 120 g  Total Protein Estimated Needs (g/kg): 1.2 g/kg  Method for Estimating Needs: ABW    Estimated Fluid Needs  Total Fluid Estimated Needs (mL):  (<2000)  Method for Estimating Needs: <2000 mL/d        Nutrition Diagnosis   Nutrition Diagnosis:  Nutrition Diagnosis  Patient has Nutrition Diagnosis: Yes  Diagnosis Status (1): Ongoing  Nutrition Diagnosis 1: Increased nutrient needs  Related to (1): Increased demand for nutrient  As Evidenced by (1): conditions associated with dx       Nutrition Interventions/Recommendations   Nutrition Interventions and Recommendations:    Nutrition Prescription:  Individualized Nutrition Prescription Provided for : 2497 kcals and 120g protein to be provided via diet    Nutrition Interventions:   Food and/or Nutrient Delivery Interventions  Interventions: Meals and snacks  Meals and Snacks: Carbohydrate-modified diet,  Texture-modified diet  Goal: provide as ordered    Education Documentation  No documentation found.           Nutrition Monitoring and Evaluation   Monitoring/Evaluation:   Food/Nutrient Related History Monitoring  Monitoring and Evaluation Plan: Energy intake  Energy Intake: Estimated energy intake  Criteria: pt to cosnume >/= 75% estimated needs       Time Spent/Follow-up:   Follow Up  Time Spent (min): 20 minutes  Last Date of Nutrition Visit: 01/16/24  Nutrition Follow-Up Needed?: 5-7 days  Follow up Comment: 1/22/24

## 2024-01-16 NOTE — PROGRESS NOTES
Occupational Therapy    Occupational Therapy Treatment    Name: Navarro Rabago  MRN: [id number]  : 1945  Date: 24  Time Calculation  Start Time: 1145  Stop Time: 1214  Time Calculation (min): 29 min    Assessment:  OT Assessment: Pt demon. good progress toward POC, pt does fatigue easily, needs cues to keep eyes open at times. REcommend continues skilled OT services to address goals to increase indep. in ADL tasks, endurance and functional mobility.  Evaluation/Treatment Tolerance: Patient limited by fatigue  End of Session Communication: Bedside nurse  End of Session Patient Position: Bed, 3 rail up (Spouse present)  Plan:  Treatment Interventions: ADL retraining, Functional transfer training, Endurance training, Patient/family training, Compensatory technique education  OT Frequency: 4 times per week  OT Discharge Recommendations: Moderate intensity level of continued care  Equipment Recommended upon Discharge: Lift  OT Recommended Transfer Status: Assist of 2, Dependent  OT - OK to Discharge: Yes    Subjective   Previous Visit Info:  OT Last Visit  OT Received On: 24  General:  General  Family/Caregiver Present: Yes  Caregiver Feedback: Spouse present  Co-Treatment: PT  Co-Treatment Reason: safety in mobility, pt tolerance  Prior to Session Communication: Bedside nurse  Patient Position Received: Bed, 4 rail up  Preferred Learning Style: verbal  General Comment: cleared to see for rx session, prt agreeable to therapy  Precautions:  Hearing/Visual Limitations: [place]  Medical Precautions: Fall precautions, Oxygen therapy device and L/min (4L)  Precautions Comment: Rodriguez, IV, 4 L O2  Vitals:     Pain Assessment:  Pain Assessment  Pain Assessment: 0-10  Pain Score: 0 - No pain     Objective   Activities of Daily Living: Grooming  Grooming Level of Assistance: Minimum assistance (set up to wash face, assist to complete combing hair at back of head)  Grooming Where Assessed: Edge of bed    UE  Bathing  UE Bathing Level of Assistance: Close supervision, Minimal verbal cues (for washing arms, chest, underarms, cues for throughness)  UE Bathing Where Assessed: Edge of bed  UE Bathing Comments: CHG wipes, max. verbal cues for throughness    LE Bathing  LE Bathing Level of Assistance: Dependent (to bath bottom)  LE Bathing Where Assessed: Bed level    UE Dressing  UE Dressing Level of Assistance: Moderate assistance (to thread arms to don/ doff gown, assist d/t IV telemetry)  UE Dressing Where Assessed: Edge of bed    Functional Standing Tolerance:     Bed Mobility/Transfers: Bed Mobility  Bed Mobility: Yes  Bed Mobility 1  Bed Mobility 1: Supine to sitting  Level of Assistance 1: Maximum assistance (x2)  Bed Mobility Comments 1: Pt initiated BLE to EOB, assist to complete, assist for trunk up, BLE off EOB, scooting hips forward,  Bed Mobility 2  Bed Mobility  2: Sitting to supine  Level of Assistance 2: Dependent, Maximum assistance (x2)  Bed Mobility Comments 2: Assist for trunk down, legs in rolling for hygiene, dependent x2 for boost to HOB  Bed Mobility 3  Bed Mobility 3: Scooting  Level of Assistance 3: Dependent  Bed Mobility Comments 3: draw sheet to boost to HOB x2 person  IADL's:   Communication:     Splinting:     Therapy/Activity:   Sensory:     Cognitive Skill Development:     Vision:     Strength:     Other Activity:             Outcome Measures:  [facility] Daily Activity  Putting on and taking off regular lower body clothing: Total  Bathing (including washing, rinsing, drying): A lot  Putting on and taking off regular upper body clothing: A lot  Toileting, which includes using toilet, bedpan or urinal: Total  Taking care of personal grooming such as brushing teeth: A lot  Eating Meals: A lot  Daily Activity - Total Score: 10        Education Documentation  ADL Training, taught by Krista Simms OT at 1/16/2024  1:46 PM.  Learner: Patient  Readiness: Acceptance  Method: Explanation  Response: Needs  Reinforcement    Home Exercise Program, taught by Krista Simms OT at 1/15/2024  3:13 PM.  Learner: Patient  Readiness: Acceptance  Method: Explanation  Response: Needs Reinforcement    ADL Training, taught by Krista Simms OT at 1/15/2024  3:13 PM.  Learner: Patient  Readiness: Acceptance  Method: Explanation  Response: Needs Reinforcement    Education Comments  No comments found.      Goals:  Encounter Problems       Encounter Problems (Active)       Balance       Sitting Balance (Progressing)       Start:  01/08/24    Expected End:  01/16/24       Pt will demonstrate good sitting balance to promote safe engagement with out of bed activities           Standing Balance (Progressing)       Start:  01/08/24    Expected End:  01/16/24       Pt will demonstrate good static standing balance to promote safe participation with out of bed activity, transfers, and mobility              Mobility       Ambulation (Progressing)       Start:  01/08/24    Expected End:  01/16/24       Pt will ambulate 50' modified independent assist with walker to promote safe home mobility              OT Goals       ADLs (Progressing)       Start:  01/08/24    Expected End:  01/16/24       Patient will perform ADLs with MOD A using AE/compensatory techniques as needed.          Sitting Tolerance (Progressing)       Start:  01/08/24    Expected End:  01/16/24       Patient will demonstrate improved sitting tolerance AEB completing EOB activities for >/= 15 minutes with supervision assist for stability.         UE Strengthening (Progressing)       Start:  01/08/24    Expected End:  01/16/24       Patient will improve UE strength to 3-/5 in preparation for functional transfers.             Safety       Safe Mobility Techniques (Progressing)       Start:  01/08/24    Expected End:  01/16/24       Pt will correctly identify and demonstrate safe mobility techniques to reduce their risks for falls during their acute care stay                Transfers       Supine to sit (Progressing)       Start:  01/08/24    Expected End:  01/16/24       Pt will transfer supine to sitting at edge of bed with modified independent assist to promote acute care out of bed activity           Sit to stand (Progressing)       Start:  01/08/24    Expected End:  01/16/24       Pt will transfer sit to standing position with modified independent assist and walker to promote safe out of bed activity           Bed to chair (Progressing)       Start:  01/08/24    Expected End:  01/16/24       Pt will transfer from sitting edge of bed to the chair with modified independent assist and walker to promote out of bed activity and reduce the risks of prolonged acute care bedrest

## 2024-01-16 NOTE — PROGRESS NOTES
Patient is from home with spouse.  Referral in review at [facility].  Liaison advised he will be in to meet with patient at bedside.  Patient will need a precert prior to discharge.  Awaiting updates.  RN TCC following.     Eloina Crespo RN

## 2024-01-16 NOTE — PROGRESS NOTES
Speech-Language Pathology    Inpatient  Speech-Language Pathology Treatment     Patient Name: Navarro Rabago  MRN: [id number]  Today's Date: 1/16/2024        Current Problem:   1. Sepsis (CMS/HCC)        2. Hypotension due to hypovolemia  Transthoracic Echo (TTE) Complete    Transthoracic Echo (TTE) Complete      3. Chronic respiratory failure with hypoxia (CMS/HCC)  Transthoracic Echo (TTE) Complete    Transthoracic Echo (TTE) Complete      4. Acute encephalopathy [G93.40]        5. Impaired mobility          PLAN:  Pt continues to require skilled speech therapy for dysphagia treatment. Therapy goals include instruction in the use of compensatory/safe swallow strategies, pt/caregiver education in order to reduce risk of aspiration, dehydration and malnutrition, to assess tolerance of diet textures and readiness for upgrade to least restrictive diet textures/consistencies.         Recommended Diet:   Solid Diet Recommendations: Minced & moist/ground (IDDSI Level 5)  Liquid Diet Recommendations: Thin (IDDSI Level 0)  Compensatory strategies: small bites/sips, upright 90 degrees for intake   Medication administration: whole in puree     Subjective:  Pt. seen at bedside for skilled dysphagia treatment. Pt's wife present for duration of session.  Pt reports continued poor appetite and mild pain in abdomen; SLP repositioned. Pt very talkative this session. Spouse feels pt's overall status is improving.    Objective:   Goals:   1) Patient will tolerate recommended diet without observed clinical signs of aspiration,               Current Session: tolerated PO trials of thin liquids via cup without overt s/s of aspiration.   2) Patient will demonstrate appropriate strategies for swallowing safety,   Current Session: Reviewed strategies with pt for appropriate bolus size and rate. Pt took single sips thin trials following verbal prompts. Required multiple prompts for single bites of solid trial.  Pt agreeable to sitting  upright in bed for all trials.   3) Patient will progress to advanced diet   Current Session:Saltine cracker trialed with attempted mastication but pt unable to fully masticate and removed cracker bolus. Pt attempted to take multiple large bites of cracker.   4) Pt to participate in assessment of oropharyngeal swallow function via MBSS for assessment of possible aspiration and to determine least restrictive diet for meeting pt's nutritional and hydration needs.  - Goal met 1/8.  5) Pt to participate in oropharyngeal therapeutic exercises to help improve oropharyngeal swallow function.              Current Session: Not targeted this session.        SLP Assessment:  SLP TX Intervention Outcome: Making Progress Towards Goals  Medical Staff Made Aware: Yes  Education Provided: Yes     Plan:  Inpatient/Swing Bed or Outpatient: Inpatient  SLP Plan: Skilled SLP  SLP Frequency: 3x per week  Duration: Current admission  SLP Discharge Recommendations: Continue skilled SLP services at the next level of care  Next Treatment Priority: to/strat; trials  Discussed POC: Patient, Caregiver/family, Nursing (Melvin SALDIVAR)  Discussed Risks/Benefits: Yes, Patient, Caregiver/Family  Patient/Caregiver Agreeable: Yes      Education:  Adult Outpatient Education  Individual(s) Educated: Patient, Spouse  Verbal Education : diet recommendations, risk of aspiration, importance of nutritional intake to recovery  Risk and Benefits Discussed with Patient/Caregiver/Other: yes  Patient/Caregiver Demonstrated Understanding: yes  Plan of Care Discussed and Agreed Upon: yes  Patient Response to Education: Patient/Caregiver Verbalized Understanding of Information       no

## 2024-01-16 NOTE — PROGRESS NOTES
"Navarro Rabago is a 78 y.o. male on day 10 of admission seen in follow-up for pneumonia and acute on chronic hypoxic respiratory failure.    Subjective   On 4 L nasal cannula oxygen; O2 sats 98%.  No respiratory complaints.  Denies pain.  Afebrile.       Objective     Physical Exam  Vitals and nursing note reviewed.   Constitutional:       Appearance: Normal appearance.   HENT:      Head: Normocephalic and atraumatic.      Nose: Nose normal.      Mouth/Throat:      Mouth: Mucous membranes are moist.   Eyes:      Extraocular Movements: Extraocular movements intact.      Conjunctiva/sclera: Conjunctivae normal.      Pupils: Pupils are equal, round, and reactive to light.   Cardiovascular:      Rate and Rhythm: Normal rate and regular rhythm.      Pulses: Normal pulses.      Heart sounds: Normal heart sounds.   Pulmonary:      Effort: Pulmonary effort is normal.      Breath sounds: Normal breath sounds.   Abdominal:      General: Bowel sounds are normal.      Palpations: Abdomen is soft.   Musculoskeletal:         General: Normal range of motion.   Skin:     General: Skin is warm and dry.      Capillary Refill: Capillary refill takes less than 2 seconds.   Neurological:      General: No focal deficit present.      Mental Status: He is alert and oriented to person, place, and time.   Psychiatric:         Mood and Affect: Mood normal.         Behavior: Behavior normal.         Last Recorded Vitals  Blood pressure 116/63, pulse (!) 119, temperature 36.2 °C (97.2 °F), temperature source Temporal, resp. rate 18, height 1.956 m (6' 5\"), weight 116 kg (255 lb 4.7 oz), SpO2 98 %.  Intake/Output last 3 Shifts:  I/O last 3 completed shifts:  In: 350 (3 mL/kg) [IV Piggyback:350]  Out: 1850 (16 mL/kg) [Urine:1850 (0.4 mL/kg/hr)]  Weight: 115.8 kg   apixaban, 2.5 mg, oral, q12h DARIA  aspirin, 81 mg, oral, Daily  ferrous sulfate (325 mg ferrous sulfate), 1 tablet, oral, Daily with breakfast  finasteride, 5 mg, oral, " Daily  ipratropium-albuteroL, 3 mL, nebulization, 4x daily  levothyroxine, 50 mcg, oral, Daily  midodrine, 5 mg, oral, q8h  nystatin, 5 mL, Swish & Swallow, 4x daily  oxygen, , inhalation, q8h  pantoprazole, 40 mg, oral, Daily before breakfast  piperacillin-tazobactam, 3.375 g, intravenous, q6h  polyethylene glycol, 17 g, oral, BID  QUEtiapine, 12.5 mg, oral, BID       PRN medications: acetaminophen, benzocaine-menthol, calcium carbonate, dextromethorphan-guaifenesin, dextrose 10 % in water (D10W), dextrose, glucagon, guaiFENesin, LORazepam, ondansetron ODT **OR** ondansetron         Relevant Results  Results for orders placed or performed during the hospital encounter of 01/06/24 (from the past 24 hour(s))   CBC   Result Value Ref Range    WBC 7.7 4.4 - 11.3 x10*3/uL    nRBC 0.0 0.0 - 0.0 /100 WBCs    RBC 3.00 (L) 4.50 - 5.90 x10*6/uL    Hemoglobin 8.9 (L) 13.5 - 17.5 g/dL    Hematocrit 28.1 (L) 41.0 - 52.0 %    MCV 94 80 - 100 fL    MCH 29.7 26.0 - 34.0 pg    MCHC 31.7 (L) 32.0 - 36.0 g/dL    RDW 17.2 (H) 11.5 - 14.5 %    Platelets 289 150 - 450 x10*3/uL     CT abdomen pelvis wo IV contrast  Result Date: 1/10/2024  Please note that the evaluation of vessels, lymph nodes and organs is limited without intravenous contrast.  LOWER CHEST: No cardiomegaly.  No pericardial effusion.  Lung bases are clear.  ABDOMEN: Partially visualized portions of the lower chest showing small bilateral pleural effusions and partial atelectasis of the bilateral lower lobes increased from prior. Heart size normal. No pericardial effusion. Unenhanced liver without acute process. No discernible intrahepatic ductal dilatation. Pericholecystic flu atrophic changes of the pancreas. No acute abnormality of the spleen is identified. No acute abnormality of the adrenal glands or kidneys no retroperitoneal adenopathy. Atherosclerosis of the abdominal aorta without aneurysm. Asymmetric enlargement of the RIGHT psoas musculature and retroperitoneal  fluid increased from 1/5/24. Small amount of free fluid within the pelvis. No pelvic adenopathy identified. Rodriguez catheter within the urinary bladder. LEFT hip prostheses. No findings of acute fracture of the pelvis. Spondylotic changes and facet arthrosis of the lumbar spine. Impression: RIGHT psoas and retroperitoneal hematoma. Without the use of intravenous contrast difficult to determine the exact size of the hemorrhage. Increased small bilateral pleural effusions and partial atelectasis of the lower lobes.     CT head wo IV contrast  Result Date: 1/10/2024  CSF Spaces:  The ventricles, sulci and basal cisterns are prominent from atrophy.  There is no extraaxial fluid collection.  Mild patchy areas of low-attenuation are seen in the subcortical and deep periventricular white matter suggesting areas of chronic microvascular ischemia.  Parenchyma:  The grey-white differentiation is intact.  There is no mass effect or midline shift.  There is no intracranial hemorrhage.  Calvarium:  The calvarium is unremarkable.  Paranasal sinuses and mastoids:  Visualized paranasal sinuses and mastoids are clear. Diffuse cerebral atrophy.  Suspected mild chronic small vessel white matter ischemia.  No definite acute intracranial abnormality.    Transthoracic Echo (TTE) Complete  Result Date: 1/8/2024  1. Left ventricular systolic function is moderately decreased with a 35-40% estimated ejection fraction.  2. There is moderate to severe hypokinesis of the inferoposterior wall.     There is mild hypokinesis and dyssynchrony of the anteroseptal wall.  3. The left atrium is mild to moderately dilated.  4. Mild to moderate mitral valve regurgitation.  5. Mild to moderate tricuspid regurgitation.  6. Mildly elevated RVSP.  7. Aortic valve sclerosis.  8. There is moderate sclerotic calcification of the noncoronary aortic valve cusp.  9. Mild aortic valve regurgitation. 10. The estimated PASP is 43 mmHg. 11. The inferior vena cava  appears moderately dilated.    FL modified barium swallow study  Result Date: 1/8/2024  Interpreted By:  Antoniou, Elias, and Casey Corinne STUDY: FL MODIFIED BARIUM SWALLOW STUDY;; 1/8/2024 1:27 pm   INDICATION: Signs/Symptoms:suspected pharyngeal dysphagia.   COMPARISON: None.   ACCESSION NUMBER(S): [id number]   ORDERING CLINICIAN: JOSE DAVID SOLER   TECHNIQUE: MBSS completed. Informed verbal consent obtained prior to completion of exam. Trials of thin, nectar thick, honey thick, puree, and regular solids given. Fluoroscopy time :  3 minutes and 54 seconds. Total dose air kerma 21.15 mGy..   SLP: Corinne E. Casey, M.A. CCC-SLP Phone/Pager: [phone] opt 2, via [email or web address], or via [employer] secure chat   SPEECH FINDINGS: Reason for referral: Dysphagia, aspiration pneumonia Patient hx: Sepsis, HTN, HLD, GERD, CHF Respiratory status: Nasal cannula Previous diet: Regular and thin   FINAL SPEECH RECOMMENDATIONS   Diet recommendations/feeding strategies: Minced/moist solids with thin liquids, NO STRAWS.   Upright seating, no straw. Meds crushed in puree. Please downgrade to nectar thick and notify SLP if s/s of aspiration present with PO intake.   Follow-up speech therapy recommended: Yes.   Education provided: Yes. Results and recs discussed with pt, RN, and physician with verbalized understanding noted.   Treatment Provided today: Yes, see progress notes   Repeat study/ dc plan: Continue skilled speech therapy services after discharge   Mechanics of the swallow summary: *Oral phase: Moderate impairment as characterized by disorganized/prolonged mastication, poor tongue control during bolus hold with premature posterior phrayngeal spillage, and delayed swallow onset (at level of pyriform sinuese for thin and nectar liquids, and at valleculae for honey liquids, puree, and cracker consistencies *Pharyngeal phase: Mild impairment as characterized by decreased tongue base retraction and trace vallecular retention *Esophageal  phase: WNL, limited assessment   SLP impressions with severity rating: Pt. Presenting with singular episode of aspiration during initial trial of thin liquid.  Penetration with thin liquid via straw (successive swallows) and with nectar liquids via tsp. No further penetration nor aspiration presents with remaining trials of thin liquids via cup, nectar liquids via cup, honey liquids, puree, nor cracker trials.   Pt noted with mild oral and pharyngeal dysphagia as characterized by prolonged mastication, premature posterior pharyngeal spillage, delayed swallow onset, and diminished tongue base retraction.   Thin Liquids (MBSS) Rosenbek's Penetration Aspiration Scale, Thin Liquids (MBSS): 7. OVERT ASPIRATION, material is not cleared - contrast passes glottis, visible residue, W/pt response Singluar episode of aspiration on initial trial of thin via tsp - appears r/t premature posterior pharyngeal spillage and occurs prior to swallow onset. Penetration without aspiration (PAS 2) during trials of thin liquid via straw (successive swallows); No penetration nor aspiration with thin liquids via cup (PAS 1) Response to Aspiration, Thin Liquid (MBSS): unproductive reflexive involuntary cough,       Nectar Thick Liquids (MBSS) Rosenbek's Penetration Aspiration Scale, Nectar thick liquids (MBSS): 2. PENETRATION that CLEARS - contrast enter airway, above vocal cords, no residue       Honey Thick Liquids (MBSS) Rosenbek's Penetration Aspiration Scale, Honey thick liquids (MBSS): 1. NO ASPIRATION & NO PENETRATION - no aspiration, contrast does not enter airway     Response to Pharyngeal Residue, Honey thick liquids (MBSS): no spontaneous response,   Purees (MBSS) Rosenbek's Penetration Aspiration Scale, Purees (MBSS): 1. NO ASPIRATION & NO PENETRATION - no aspiration, contrast does not enter airway     Response to Pharyngeal Residue, Purees (MBSS): no spontaneous response, Clears with thin liquid wash,     Solids (MBSS) Rosenbek's  Penetration Aspiration Scale, Solids (MBSS): 1. NO ASPIRATION & NO PENETRATION - no aspiration, contrast does not enter airway   Response to Pharyngeal Residue, Solids (MBSS): no spontaneous response, Clears with thin liquid wash. ,     Speech Therapy section of this report signed by Corinne E. Casey, M.A. CCC-SLP on 1/8/2024 at 1:41 pm.   RADIOLOGY FINDINGS: Prolonged oral phase with uncoordinated mastication/lingual motion. Single episode of aspiration with thin barium. A couple of additional episodes of laryngeal penetration without aspiration. No laryngeal penetration or aspiration seen with thicker consistencies of barium. Retention of barium is noted in the vallecula and piriform sinuses.  Dysphagia as above.       US renal complete  Result Date: 1/8/2024  The right kidney measures 12.1 cm x 7.2 cm x 5.1 cm. A simple right renal cyst measuring 1.9 cm in size is seen. The left kidney measures 12.6 cm x 5.6 cm x 6.4 cm. There is no shadowing calculus, hydronephrosis, or solid mass identified. The renal cortical echogenicity is normal bilaterally with renal cortical thickness within normal limits.   Cursory evaluation of urinary bladder shows presence of a Rodriguez catheter with bladder decompressed.  1.9 cm simple right renal cyst with unremarkable appearance of the left kidney.   No renal atrophy or hydronephrosis.       XR chest 1 view  Result Date: 1/7/2024  The heart is at the upper limits of normal in size. There is infiltrate and airspace consolidation observed centrally within the right lung with upper lobar predominance. There is also some patchy infiltrate at the left lung base. No obvious pleural abnormality is seen.   Infiltrate and airspace consolidation identified within the right lung most pronounced centrally and within the right upper lobe with mild left basilar infiltrate.       CT chest abdomen pelvis w IV contrast  Result Date: 1/5/2024  Interpreted By:  Yordy Urena, STUDY: CT CHEST ABDOMEN  PELVIS W IV CONTRAST;  1/5/2024 6:33 pm   INDICATION: Signs/Symptoms:abd pain, sob.   COMPARISON: None.   ACCESSION NUMBER(S): [id number]   ORDERING CLINICIAN: NELLA MELGOZA   TECHNIQUE: Contiguous axial images of the chest, abdomen and pelvis were obtained after the intravenous administration of  contrast. Coronal and sagittal reformatted images were obtained from the axial images.   FINDINGS: CT CHEST:   No axillary lymphadenopathy. 1.6 cm right paratracheal lymph node and 1.5 cm subcarinal lymph node. There is limited evaluation for hilar lymphadenopathy secondary stripping motion.   The heart is normal in size. Coronary artery vascular calcifications. No significant pericardial effusion.   There is pulmonary emphysematous change. There is consolidative opacity in the right upper lobe and right lower lobe compatible with pneumonia and also minimal opacity in the left lower lobe. Trace pleural effusions. No pneumothorax.   Multilevel degenerative change of the thoracic spine.     CT ABDOMEN PELVIS:   Evaluation of the abdomen and pelvis is limited secondary to patient motion. No evidence of liver mass. The gallbladder is present, not well evaluated secondary to motion. No dilatation common bile duct.   Atrophy of the pancreas.   No splenomegaly.   Question mild nodularity of the left adrenal gland. The right adrenal gland appears unremarkable.   Symmetric enhancement of the kidneys. Right renal cysts with the largest measuring 3.1 cm and several bowel subcentimeter hypodensity too small to characterize. No hydronephrosis.   Atherosclerotic calcification of the aorta and abdominal and pelvic vasculature.   No evidence of bowel obstruction. Evaluation the bowel is otherwise limited secondary patient motion.   Urinary bladder is underdistended and not well evaluated. 18 mm right posterior bladder diverticulum.   Postsurgical change of left hip arthroplasty resulting in beam hardening artifact and limiting evaluation  the pelvis.   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 Consolidative opacity in the right upper lobe and lower lobe compatible with pneumonia and also mild left basilar opacity and trace pleural effusions.   No definite evidence of acute abnormality of the abdominal viscera within the limitations of the motion degraded examination.                  Assessment/Plan   Principal Problem:    Sepsis (CMS/HCC)  Active Problems:    Lactic acid acidosis    Acute renal failure (ARF) (CMS/HCC)    Leukocytosis    Bilateral pneumonia    Chronic respiratory failure (CMS/HCC)    Hypokalemia    Dehydration    Transaminitis    Acute encephalopathy    Hypotension    Decreased activities of daily living (ADL)  Acute on chronic hypoxic respiratory failure    Plan   Oxygen at baseline  Continue  IBD/ICS  Insentive spirometry/pulmonary hygiene  Antibiotics per Infectious Disease  Eliquis  Prophylaxis  PT/OT/out of bed  Discharge planning-[facility]    ANTONIO Chandra-CNP  [facility]

## 2024-01-16 NOTE — CARE PLAN
The patient's goals for the shift include  keep NC on    The clinical goals for the shift include maintain hemodynamic stability    Over the shift, the patient did not make progress toward the following goals. Barriers to progression include none. Recommendations to address these barriers include none.

## 2024-01-17 LAB
ANION GAP SERPL CALC-SCNC: 7 MMOL/L
BUN SERPL-MCNC: 10 MG/DL (ref 8–25)
CALCIUM SERPL-MCNC: 8.1 MG/DL (ref 8.5–10.4)
CHLORIDE SERPL-SCNC: 108 MMOL/L (ref 97–107)
CO2 SERPL-SCNC: 28 MMOL/L (ref 24–31)
CREAT SERPL-MCNC: 0.9 MG/DL (ref 0.4–1.6)
EGFRCR SERPLBLD CKD-EPI 2021: 89 ML/MIN/1.73M*2
GLUCOSE SERPL-MCNC: 108 MG/DL (ref 65–99)
POTASSIUM SERPL-SCNC: 3.8 MMOL/L (ref 3.4–5.1)
SODIUM SERPL-SCNC: 143 MMOL/L (ref 133–145)

## 2024-01-17 PROCEDURE — 2500000004 HC RX 250 GENERAL PHARMACY W/ HCPCS (ALT 636 FOR OP/ED): Performed by: INTERNAL MEDICINE

## 2024-01-17 PROCEDURE — 97110 THERAPEUTIC EXERCISES: CPT | Mod: GO

## 2024-01-17 PROCEDURE — 2500000002 HC RX 250 W HCPCS SELF ADMINISTERED DRUGS (ALT 637 FOR MEDICARE OP, ALT 636 FOR OP/ED): Performed by: INTERNAL MEDICINE

## 2024-01-17 PROCEDURE — 2500000001 HC RX 250 WO HCPCS SELF ADMINISTERED DRUGS (ALT 637 FOR MEDICARE OP): Performed by: INTERNAL MEDICINE

## 2024-01-17 PROCEDURE — 36415 COLL VENOUS BLD VENIPUNCTURE: CPT | Performed by: INTERNAL MEDICINE

## 2024-01-17 PROCEDURE — 97110 THERAPEUTIC EXERCISES: CPT | Mod: GP

## 2024-01-17 PROCEDURE — 92526 ORAL FUNCTION THERAPY: CPT | Mod: GN

## 2024-01-17 PROCEDURE — 2060000001 HC INTERMEDIATE ICU ROOM DAILY

## 2024-01-17 PROCEDURE — 9420000001 HC RT PATIENT EDUCATION 5 MIN

## 2024-01-17 PROCEDURE — 80048 BASIC METABOLIC PNL TOTAL CA: CPT | Performed by: INTERNAL MEDICINE

## 2024-01-17 PROCEDURE — 94640 AIRWAY INHALATION TREATMENT: CPT

## 2024-01-17 PROCEDURE — 2500000005 HC RX 250 GENERAL PHARMACY W/O HCPCS: Performed by: INTERNAL MEDICINE

## 2024-01-17 PROCEDURE — 97535 SELF CARE MNGMENT TRAINING: CPT | Mod: GO

## 2024-01-17 PROCEDURE — 97530 THERAPEUTIC ACTIVITIES: CPT | Mod: GP

## 2024-01-17 PROCEDURE — 94760 N-INVAS EAR/PLS OXIMETRY 1: CPT

## 2024-01-17 RX ORDER — TRAZODONE HYDROCHLORIDE 50 MG/1
25 TABLET ORAL NIGHTLY PRN
Status: COMPLETED | OUTPATIENT
Start: 2024-01-17 | End: 2024-01-18

## 2024-01-17 RX ORDER — METOPROLOL TARTRATE 25 MG/1
25 TABLET, FILM COATED ORAL 2 TIMES DAILY
Status: DISCONTINUED | OUTPATIENT
Start: 2024-01-17 | End: 2024-01-19 | Stop reason: HOSPADM

## 2024-01-17 RX ORDER — FUROSEMIDE 10 MG/ML
40 INJECTION INTRAMUSCULAR; INTRAVENOUS ONCE
Status: COMPLETED | OUTPATIENT
Start: 2024-01-17 | End: 2024-01-17

## 2024-01-17 RX ADMIN — QUETIAPINE FUMARATE 12.5 MG: 25 TABLET ORAL at 08:52

## 2024-01-17 RX ADMIN — APIXABAN 5 MG: 5 TABLET, FILM COATED ORAL at 20:20

## 2024-01-17 RX ADMIN — GUAIFENESIN AND DEXTROMETHORPHAN 5 ML: 100; 10 SYRUP ORAL at 20:39

## 2024-01-17 RX ADMIN — ASPIRIN 81 MG: 81 TABLET, COATED ORAL at 08:53

## 2024-01-17 RX ADMIN — NYSTATIN 500000 UNITS: 100000 SUSPENSION ORAL at 08:51

## 2024-01-17 RX ADMIN — FERROUS SULFATE TAB 325 MG (65 MG ELEMENTAL FE) 1 TABLET: 325 (65 FE) TAB at 08:51

## 2024-01-17 RX ADMIN — Medication: at 15:00

## 2024-01-17 RX ADMIN — NYSTATIN 500000 UNITS: 100000 SUSPENSION ORAL at 20:19

## 2024-01-17 RX ADMIN — QUETIAPINE FUMARATE 12.5 MG: 25 TABLET ORAL at 20:20

## 2024-01-17 RX ADMIN — APIXABAN 5 MG: 5 TABLET, FILM COATED ORAL at 08:52

## 2024-01-17 RX ADMIN — Medication: at 07:00

## 2024-01-17 RX ADMIN — FUROSEMIDE 40 MG: 10 INJECTION, SOLUTION INTRAMUSCULAR; INTRAVENOUS at 22:53

## 2024-01-17 RX ADMIN — IPRATROPIUM BROMIDE AND ALBUTEROL SULFATE 3 ML: 2.5; .5 SOLUTION RESPIRATORY (INHALATION) at 15:22

## 2024-01-17 RX ADMIN — IPRATROPIUM BROMIDE AND ALBUTEROL SULFATE 3 ML: 2.5; .5 SOLUTION RESPIRATORY (INHALATION) at 11:31

## 2024-01-17 RX ADMIN — FINASTERIDE 5 MG: 5 TABLET, FILM COATED ORAL at 08:52

## 2024-01-17 RX ADMIN — METOPROLOL TARTRATE 25 MG: 25 TABLET, FILM COATED ORAL at 08:53

## 2024-01-17 RX ADMIN — METOPROLOL TARTRATE 25 MG: 25 TABLET, FILM COATED ORAL at 20:20

## 2024-01-17 RX ADMIN — TRAZODONE HYDROCHLORIDE 25 MG: 50 TABLET ORAL at 22:12

## 2024-01-17 RX ADMIN — PANTOPRAZOLE SODIUM 40 MG: 40 TABLET, DELAYED RELEASE ORAL at 06:12

## 2024-01-17 RX ADMIN — LEVOTHYROXINE SODIUM 50 MCG: 0.05 TABLET ORAL at 06:12

## 2024-01-17 RX ADMIN — MIDODRINE HYDROCHLORIDE 5 MG: 5 TABLET ORAL at 03:33

## 2024-01-17 RX ADMIN — METHYLPREDNISOLONE SODIUM SUCCINATE 60 MG: 125 INJECTION, POWDER, FOR SOLUTION INTRAMUSCULAR; INTRAVENOUS at 22:55

## 2024-01-17 RX ADMIN — POLYETHYLENE GLYCOL 3350 17 G: 17 POWDER, FOR SOLUTION ORAL at 20:19

## 2024-01-17 RX ADMIN — IPRATROPIUM BROMIDE AND ALBUTEROL SULFATE 3 ML: 2.5; .5 SOLUTION RESPIRATORY (INHALATION) at 07:46

## 2024-01-17 RX ADMIN — IPRATROPIUM BROMIDE AND ALBUTEROL SULFATE 3 ML: 2.5; .5 SOLUTION RESPIRATORY (INHALATION) at 19:42

## 2024-01-17 RX ADMIN — LORAZEPAM 1 MG: 2 INJECTION, SOLUTION INTRAMUSCULAR; INTRAVENOUS at 21:16

## 2024-01-17 RX ADMIN — MIDODRINE HYDROCHLORIDE 5 MG: 5 TABLET ORAL at 11:41

## 2024-01-17 ASSESSMENT — PAIN SCALES - GENERAL
PAINLEVEL_OUTOF10: 0 - NO PAIN
PAINLEVEL_OUTOF10: 0 - NO PAIN
PAINLEVEL_OUTOF10: 4
PAINLEVEL_OUTOF10: 4
PAINLEVEL_OUTOF10: 0 - NO PAIN

## 2024-01-17 ASSESSMENT — COGNITIVE AND FUNCTIONAL STATUS - GENERAL
DRESSING REGULAR LOWER BODY CLOTHING: TOTAL
PERSONAL GROOMING: A LITTLE
STANDING UP FROM CHAIR USING ARMS: TOTAL
MOBILITY SCORE: 8
HELP NEEDED FOR BATHING: A LOT
EATING MEALS: A LOT
CLIMB 3 TO 5 STEPS WITH RAILING: TOTAL
WALKING IN HOSPITAL ROOM: TOTAL
DAILY ACTIVITIY SCORE: 10
HELP NEEDED FOR BATHING: A LOT
TOILETING: TOTAL
WALKING IN HOSPITAL ROOM: TOTAL
EATING MEALS: A LOT
DRESSING REGULAR UPPER BODY CLOTHING: A LITTLE
CLIMB 3 TO 5 STEPS WITH RAILING: TOTAL
EATING MEALS: A LOT
MOVING FROM LYING ON BACK TO SITTING ON SIDE OF FLAT BED WITH BEDRAILS: A LOT
DAILY ACTIVITIY SCORE: 12
DRESSING REGULAR UPPER BODY CLOTHING: A LOT
MOBILITY SCORE: 8
MOVING FROM LYING ON BACK TO SITTING ON SIDE OF FLAT BED WITH BEDRAILS: A LOT
MOBILITY SCORE: 8
STANDING UP FROM CHAIR USING ARMS: TOTAL
CLIMB 3 TO 5 STEPS WITH RAILING: TOTAL
TURNING FROM BACK TO SIDE WHILE IN FLAT BAD: A LOT
HELP NEEDED FOR BATHING: A LOT
WALKING IN HOSPITAL ROOM: TOTAL
DRESSING REGULAR LOWER BODY CLOTHING: TOTAL
MOVING TO AND FROM BED TO CHAIR: TOTAL
PERSONAL GROOMING: A LOT
TURNING FROM BACK TO SIDE WHILE IN FLAT BAD: A LOT
DAILY ACTIVITIY SCORE: 12
MOVING FROM LYING ON BACK TO SITTING ON SIDE OF FLAT BED WITH BEDRAILS: A LOT
TURNING FROM BACK TO SIDE WHILE IN FLAT BAD: A LOT
STANDING UP FROM CHAIR USING ARMS: TOTAL
MOVING TO AND FROM BED TO CHAIR: TOTAL
DRESSING REGULAR LOWER BODY CLOTHING: TOTAL
MOVING TO AND FROM BED TO CHAIR: TOTAL
TOILETING: TOTAL
PERSONAL GROOMING: A LITTLE
DRESSING REGULAR UPPER BODY CLOTHING: A LITTLE
TOILETING: TOTAL

## 2024-01-17 ASSESSMENT — PAIN - FUNCTIONAL ASSESSMENT
PAIN_FUNCTIONAL_ASSESSMENT: 0-10

## 2024-01-17 ASSESSMENT — PAIN DESCRIPTION - DESCRIPTORS: DESCRIPTORS: TIGHTNESS

## 2024-01-17 ASSESSMENT — ACTIVITIES OF DAILY LIVING (ADL): HOME_MANAGEMENT_TIME_ENTRY: 18

## 2024-01-17 NOTE — PROGRESS NOTES
"Navarro Rabago is a 78 y.o. male on day 11 of admission seen in follow-up for pneumonia and acute on chronic hypoxic respiratory failure.    Subjective   On 2 L nasal cannula oxygen; O2 sats 97%.  No respiratory complaints.  Denies pain.  Afebrile.       Objective     Physical Exam  Vitals and nursing note reviewed.   Constitutional:       Appearance: Normal appearance.   HENT:      Head: Normocephalic and atraumatic.      Nose: Nose normal.      Mouth/Throat:      Mouth: Mucous membranes are moist.   Eyes:      Extraocular Movements: Extraocular movements intact.      Conjunctiva/sclera: Conjunctivae normal.      Pupils: Pupils are equal, round, and reactive to light.   Cardiovascular:      Rate and Rhythm: Normal rate and regular rhythm.      Pulses: Normal pulses.      Heart sounds: Normal heart sounds.   Pulmonary:      Effort: Pulmonary effort is normal.      Breath sounds: Normal breath sounds.   Abdominal:      General: Bowel sounds are normal.      Palpations: Abdomen is soft.   Musculoskeletal:         General: Normal range of motion.   Skin:     General: Skin is warm and dry.      Capillary Refill: Capillary refill takes less than 2 seconds.   Neurological:      General: No focal deficit present.      Mental Status: He is alert and oriented to person, place, and time.   Psychiatric:         Mood and Affect: Mood normal.         Behavior: Behavior normal.         Last Recorded Vitals  Blood pressure 128/69, pulse 73, temperature 36.5 °C (97.7 °F), temperature source Temporal, resp. rate 16, height 1.956 m (6' 5\"), weight 112 kg (246 lb 0.5 oz), SpO2 97 %.  Intake/Output last 3 Shifts:  I/O last 3 completed shifts:  In: 200 (1.8 mL/kg) [IV Piggyback:200]  Out: 1600 (14.3 mL/kg) [Urine:1600 (0.4 mL/kg/hr)]  Weight: 111.6 kg   apixaban, 5 mg, oral, q12h DARIA  aspirin, 81 mg, oral, Daily  ferrous sulfate (325 mg ferrous sulfate), 1 tablet, oral, Daily with breakfast  finasteride, 5 mg, oral, " Daily  ipratropium-albuteroL, 3 mL, nebulization, 4x daily  levothyroxine, 50 mcg, oral, Daily  metoprolol tartrate, 25 mg, oral, BID  midodrine, 5 mg, oral, q8h  nystatin, 5 mL, Swish & Swallow, 4x daily  oxygen, , inhalation, q8h  pantoprazole, 40 mg, oral, Daily before breakfast  polyethylene glycol, 17 g, oral, BID  QUEtiapine, 12.5 mg, oral, BID       PRN medications: acetaminophen, benzocaine-menthol, calcium carbonate, dextromethorphan-guaifenesin, dextrose 10 % in water (D10W), dextrose, glucagon, guaiFENesin, LORazepam, ondansetron ODT **OR** ondansetron         Relevant Results  Results for orders placed or performed during the hospital encounter of 01/06/24 (from the past 24 hour(s))   Electrocardiogram, 12-lead PRN ACS symptoms   Result Value Ref Range    Ventricular Rate 58 BPM    Atrial Rate 277 BPM    QRS Duration 102 ms    QT Interval 408 ms    QTC Calculation(Bazett) 400 ms    R Axis 52 degrees    T Axis 37 degrees    QRS Count 10 beats    Q Onset 219 ms    T Offset 423 ms    QTC Fredericia 403 ms   Basic metabolic panel   Result Value Ref Range    Glucose 108 (H) 65 - 99 mg/dL    Sodium 143 133 - 145 mmol/L    Potassium 3.8 3.4 - 5.1 mmol/L    Chloride 108 (H) 97 - 107 mmol/L    Bicarbonate 28 24 - 31 mmol/L    Urea Nitrogen 10 8 - 25 mg/dL    Creatinine 0.90 0.40 - 1.60 mg/dL    eGFR 89 >60 mL/min/1.73m*2    Calcium 8.1 (L) 8.5 - 10.4 mg/dL    Anion Gap 7 <=19 mmol/L     CT abdomen pelvis wo IV contrast  Result Date: 1/10/2024  Please note that the evaluation of vessels, lymph nodes and organs is limited without intravenous contrast.  LOWER CHEST: No cardiomegaly.  No pericardial effusion.  Lung bases are clear.  ABDOMEN: Partially visualized portions of the lower chest showing small bilateral pleural effusions and partial atelectasis of the bilateral lower lobes increased from prior. Heart size normal. No pericardial effusion. Unenhanced liver without acute process. No discernible intrahepatic  ductal dilatation. Pericholecystic flu atrophic changes of the pancreas. No acute abnormality of the spleen is identified. No acute abnormality of the adrenal glands or kidneys no retroperitoneal adenopathy. Atherosclerosis of the abdominal aorta without aneurysm. Asymmetric enlargement of the RIGHT psoas musculature and retroperitoneal fluid increased from 1/5/24. Small amount of free fluid within the pelvis. No pelvic adenopathy identified. Rodriguez catheter within the urinary bladder. LEFT hip prostheses. No findings of acute fracture of the pelvis. Spondylotic changes and facet arthrosis of the lumbar spine. Impression: RIGHT psoas and retroperitoneal hematoma. Without the use of intravenous contrast difficult to determine the exact size of the hemorrhage. Increased small bilateral pleural effusions and partial atelectasis of the lower lobes.     CT head wo IV contrast  Result Date: 1/10/2024  CSF Spaces:  The ventricles, sulci and basal cisterns are prominent from atrophy.  There is no extraaxial fluid collection.  Mild patchy areas of low-attenuation are seen in the subcortical and deep periventricular white matter suggesting areas of chronic microvascular ischemia.  Parenchyma:  The grey-white differentiation is intact.  There is no mass effect or midline shift.  There is no intracranial hemorrhage.  Calvarium:  The calvarium is unremarkable.  Paranasal sinuses and mastoids:  Visualized paranasal sinuses and mastoids are clear. Diffuse cerebral atrophy.  Suspected mild chronic small vessel white matter ischemia.  No definite acute intracranial abnormality.    Transthoracic Echo (TTE) Complete  Result Date: 1/8/2024  1. Left ventricular systolic function is moderately decreased with a 35-40% estimated ejection fraction.  2. There is moderate to severe hypokinesis of the inferoposterior wall.     There is mild hypokinesis and dyssynchrony of the anteroseptal wall.  3. The left atrium is mild to moderately dilated.   4. Mild to moderate mitral valve regurgitation.  5. Mild to moderate tricuspid regurgitation.  6. Mildly elevated RVSP.  7. Aortic valve sclerosis.  8. There is moderate sclerotic calcification of the noncoronary aortic valve cusp.  9. Mild aortic valve regurgitation. 10. The estimated PASP is 43 mmHg. 11. The inferior vena cava appears moderately dilated.    FL modified barium swallow study  Result Date: 1/8/2024  Interpreted By:  Antoniou, Elias, and Casey Corinne STUDY: FL MODIFIED BARIUM SWALLOW STUDY;; 1/8/2024 1:27 pm   INDICATION: Signs/Symptoms:suspected pharyngeal dysphagia.   COMPARISON: None.   ACCESSION NUMBER(S): [id number]   ORDERING CLINICIAN: JOSE DAVID SOLER   TECHNIQUE: MBSS completed. Informed verbal consent obtained prior to completion of exam. Trials of thin, nectar thick, honey thick, puree, and regular solids given. Fluoroscopy time :  3 minutes and 54 seconds. Total dose air kerma 21.15 mGy..   SLP: Corinne E. Casey, M.A. CCC-SLP Phone/Pager: [phone] opt 2, via [employer], or via [employer] secure chat   SPEECH FINDINGS: Reason for referral: Dysphagia, aspiration pneumonia Patient hx: Sepsis, HTN, HLD, GERD, CHF Respiratory status: Nasal cannula Previous diet: Regular and thin   FINAL SPEECH RECOMMENDATIONS   Diet recommendations/feeding strategies: Minced/moist solids with thin liquids, NO STRAWS.   Upright seating, no straw. Meds crushed in puree. Please downgrade to nectar thick and notify SLP if s/s of aspiration present with PO intake.   Follow-up speech therapy recommended: Yes.   Education provided: Yes. Results and recs discussed with pt, RN, and physician with verbalized understanding noted.   Treatment Provided today: Yes, see progress notes   Repeat study/ dc plan: Continue skilled speech therapy services after discharge   Mechanics of the swallow summary: *Oral phase: Moderate impairment as characterized by disorganized/prolonged mastication, poor tongue control during bolus hold  with premature posterior phrayngeal spillage, and delayed swallow onset (at level of pyriform sinuese for thin and nectar liquids, and at valleculae for honey liquids, puree, and cracker consistencies *Pharyngeal phase: Mild impairment as characterized by decreased tongue base retraction and trace vallecular retention *Esophageal phase: WNL, limited assessment   SLP impressions with severity rating: Pt. Presenting with singular episode of aspiration during initial trial of thin liquid.  Penetration with thin liquid via straw (successive swallows) and with nectar liquids via tsp. No further penetration nor aspiration presents with remaining trials of thin liquids via cup, nectar liquids via cup, honey liquids, puree, nor cracker trials.   Pt noted with mild oral and pharyngeal dysphagia as characterized by prolonged mastication, premature posterior pharyngeal spillage, delayed swallow onset, and diminished tongue base retraction.   Thin Liquids (MBSS) Rosenbek's Penetration Aspiration Scale, Thin Liquids (MBSS): 7. OVERT ASPIRATION, material is not cleared - contrast passes glottis, visible residue, W/pt response Singluar episode of aspiration on initial trial of thin via tsp - appears r/t premature posterior pharyngeal spillage and occurs prior to swallow onset. Penetration without aspiration (PAS 2) during trials of thin liquid via straw (successive swallows); No penetration nor aspiration with thin liquids via cup (PAS 1) Response to Aspiration, Thin Liquid (MBSS): unproductive reflexive involuntary cough,       Nectar Thick Liquids (MBSS) Rosenbek's Penetration Aspiration Scale, Nectar thick liquids (MBSS): 2. PENETRATION that CLEARS - contrast enter airway, above vocal cords, no residue       Honey Thick Liquids (MBSS) Rosenbek's Penetration Aspiration Scale, Honey thick liquids (MBSS): 1. NO ASPIRATION & NO PENETRATION - no aspiration, contrast does not enter airway     Response to Pharyngeal Residue, Honey  thick liquids (MBSS): no spontaneous response,   Purees (MBSS) Rosenbek's Penetration Aspiration Scale, Purees (MBSS): 1. NO ASPIRATION & NO PENETRATION - no aspiration, contrast does not enter airway     Response to Pharyngeal Residue, Purees (MBSS): no spontaneous response, Clears with thin liquid wash,     Solids (MBSS) Rosenbek's Penetration Aspiration Scale, Solids (MBSS): 1. NO ASPIRATION & NO PENETRATION - no aspiration, contrast does not enter airway   Response to Pharyngeal Residue, Solids (MBSS): no spontaneous response, Clears with thin liquid wash. ,     Speech Therapy section of this report signed by Corinne E. Casey, M.A. CCC-SLP on 1/8/2024 at 1:41 pm.   RADIOLOGY FINDINGS: Prolonged oral phase with uncoordinated mastication/lingual motion. Single episode of aspiration with thin barium. A couple of additional episodes of laryngeal penetration without aspiration. No laryngeal penetration or aspiration seen with thicker consistencies of barium. Retention of barium is noted in the vallecula and piriform sinuses.  Dysphagia as above.       US renal complete  Result Date: 1/8/2024  The right kidney measures 12.1 cm x 7.2 cm x 5.1 cm. A simple right renal cyst measuring 1.9 cm in size is seen. The left kidney measures 12.6 cm x 5.6 cm x 6.4 cm. There is no shadowing calculus, hydronephrosis, or solid mass identified. The renal cortical echogenicity is normal bilaterally with renal cortical thickness within normal limits.   Cursory evaluation of urinary bladder shows presence of a Rodriguez catheter with bladder decompressed.  1.9 cm simple right renal cyst with unremarkable appearance of the left kidney.   No renal atrophy or hydronephrosis.       XR chest 1 view  Result Date: 1/7/2024  The heart is at the upper limits of normal in size. There is infiltrate and airspace consolidation observed centrally within the right lung with upper lobar predominance. There is also some patchy infiltrate at the left lung  base. No obvious pleural abnormality is seen.   Infiltrate and airspace consolidation identified within the right lung most pronounced centrally and within the right upper lobe with mild left basilar infiltrate.       CT chest abdomen pelvis w IV contrast  Result Date: 1/5/2024  Interpreted By:  Yordy Urena, STUDY: CT CHEST ABDOMEN PELVIS W IV CONTRAST;  1/5/2024 6:33 pm   INDICATION: Signs/Symptoms:abd pain, sob.   COMPARISON: None.   ACCESSION NUMBER(S): [id number]   ORDERING CLINICIAN: NELLA MELGOZA   TECHNIQUE: Contiguous axial images of the chest, abdomen and pelvis were obtained after the intravenous administration of  contrast. Coronal and sagittal reformatted images were obtained from the axial images.   FINDINGS: CT CHEST:   No axillary lymphadenopathy. 1.6 cm right paratracheal lymph node and 1.5 cm subcarinal lymph node. There is limited evaluation for hilar lymphadenopathy secondary stripping motion.   The heart is normal in size. Coronary artery vascular calcifications. No significant pericardial effusion.   There is pulmonary emphysematous change. There is consolidative opacity in the right upper lobe and right lower lobe compatible with pneumonia and also minimal opacity in the left lower lobe. Trace pleural effusions. No pneumothorax.   Multilevel degenerative change of the thoracic spine.     CT ABDOMEN PELVIS:   Evaluation of the abdomen and pelvis is limited secondary to patient motion. No evidence of liver mass. The gallbladder is present, not well evaluated secondary to motion. No dilatation common bile duct.   Atrophy of the pancreas.   No splenomegaly.   Question mild nodularity of the left adrenal gland. The right adrenal gland appears unremarkable.   Symmetric enhancement of the kidneys. Right renal cysts with the largest measuring 3.1 cm and several bowel subcentimeter hypodensity too small to characterize. No hydronephrosis.   Atherosclerotic calcification of the aorta and abdominal and  pelvic vasculature.   No evidence of bowel obstruction. Evaluation the bowel is otherwise limited secondary patient motion.   Urinary bladder is underdistended and not well evaluated. 18 mm right posterior bladder diverticulum.   Postsurgical change of left hip arthroplasty resulting in beam hardening artifact and limiting evaluation the pelvis.   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 Consolidative opacity in the right upper lobe and lower lobe compatible with pneumonia and also mild left basilar opacity and trace pleural effusions.   No definite evidence of acute abnormality of the abdominal viscera within the limitations of the motion degraded examination.                  Assessment/Plan   Principal Problem:    Sepsis (CMS/HCC)  Active Problems:    Lactic acid acidosis    Acute renal failure (ARF) (CMS/HCC)    Leukocytosis    Bilateral pneumonia    Chronic respiratory failure (CMS/HCC)    Hypokalemia    Dehydration    Transaminitis    Acute encephalopathy    Hypotension    Decreased activities of daily living (ADL)  Acute on chronic hypoxic respiratory failure    Plan   Oxygen at baseline  Continue  IBD/ICS  Insentive spirometry/pulmonary hygiene  Antibiotics per Infectious Disease  Eliquis  GI prophylaxis  PT/OT/out of bed  Discharge planning-awaiting precert for [facility]   Stable for discharge to rehab from a pulmonary standpoint  Follow-up with Dr. Zapien in 2 weeks with a chest x-ray  Will sign off, please reconsult if needed    ANTONIO Chandra-CNP  [facility]

## 2024-01-17 NOTE — PROGRESS NOTES
Speech-Language Pathology    Inpatient Speech Language Pathology Dysphagia Treatment note     Patient Name: Navarro Rabago  MRN: [id number]  : 1945  Today's Date: 24  Time Calculation  Start Time: 1210  Stop Time: 1246  Time Calculation (min): 36 min     Total Number of Visits: 1/3 (, MON )    1. Sepsis (CMS/HCC)        2. Hypotension due to hypovolemia  Transthoracic Echo (TTE) Complete    Transthoracic Echo (TTE) Complete      3. Chronic respiratory failure with hypoxia (CMS/HCC)  Transthoracic Echo (TTE) Complete    Transthoracic Echo (TTE) Complete      4. Acute encephalopathy [G93.40]        5. Impaired mobility            Past Medical History Relevant to Rehab: CHF, afib, CAD, COPD, HTN, hernia    PLAN:  Skilled speech therapy for dysphagia treatment continues to be warranted to provide training and instruction regarding the use of compensatory swallow strategies, for pt/caregiver education in order to reduce risk of aspiration, dehydration and malnutrition. , to assess tolerance of diet , to determine ability to upgrade diet after PO trials with SLP      Inpatient/Swing Bed or Outpatient: Inpatient  Treatment/Interventions: dysphagia tx  SLP TX Plan: Continue Plan of Care  SLP Plan: Skilled SLP  SLP Frequency: 3x per week  Duration: Current admission  SLP Discharge Recommendations: Continue skilled SLP services at the next level of care  Next Treatment Priority: diet tolerance, possibility of upgrade; trial of soft/bite sized  Discussed POC: Patient, Caregiver/family  Discussed Risks/Benefits: Yes, Patient, Caregiver/Family  Patient/Caregiver Agreeable: Yes    Recommended Diet:   Solid Diet Recommendations: Minced & moist/ground (IDDSI Level 5)  Liquid Diet Recommendations: Thin (IDDSI Level 0)  Compensatory strategies: small bites/sips, upright 90 degrees for intake   Medication administration: whole in puree    Subjective:  Pt. seen at bedside for skilled dysphagia treatment. Pt's wife  present for latter half of session.  Pt reported pain in his mouth due to oral candida after trials were initiated.   Pt reported that this, as well as bitterness from meds crushed in applesauce, have interfered with his appetite.  Pain:  Pain Assessment  Pain Assessment: 0-10  Pain Score: 0 - No pain Denies pain        Oxygen Status:   nasal cannula       Therapeutic Swallow Intervention : Compensatory Strategies, PO Trials  Solid Diet Recommendations: Minced & moist/ground (IDDSI Level 5)  Liquid Diet Recommendations: Thin (IDDSI Level 0)  Swallow Comments:  Oral pain from thrush is resolving    Goals:   1) Patient will tolerate recommended diet without observed clinical signs of aspiration,   Current Session: tolerated PO trials of thin liquids via cup without overt s/s of aspiration; prolonged/inadequate mastication noted with soft/bite sized foods (trial macaroni and cheese).  Pt demonstrated adequate oral clearance eventually with max verbal cues to strictly follow strategies (no talking with food in mouth, reduced rate, reduced bolus size, clear before next bite)    2) Patient will demonstrate appropriate strategies for swallowing safety,   Current Session: Reviewed strategies with pt with poor follow through during PO trials. Pt dependent on max verbal and visual cues to follow strategies (listed above); follow through with 60% acc.    3) Patient will progress to advanced diet   Current Session: Trial of advanced consistency given (Bite sized/soft).  Prolonged/inadequate mastication due to lack of upper dentition.  Pt inserted plastic fork to mash food against to compensate for lack of upper dentition.    4) Pt to participate in assessment of oropharyngeal swallow function via MBSS for assessment of possible aspiration and to determine least restrictive diet for meeting pt's nutritional and hydration needs.  - Goal met 1/8    5) ADD GOAL: Pt to participate in oropharyngeal therapeutic exercises to help  improve oropharyngeal swallow function.   Current Session: not targeted    SLP Assessment:  Pt safe to continue currently ordered diet of minced & moist with thin liquids via cup.  Pt may have medications whole in puree consistency.  Pt not safe for upgrade at this time, but recommend continue trials with cues for use of strategies.  SLP asked pt's wife to bring in upper dentures, as diet advancement not likely to be safe until they are present.  She is hesitant due to concern they will get lost.    SLP TX Intervention Outcome: No Progress Made  SLP Assessment Results: Other (Comment) (dysphagia and reduced appetite/oral intake)  Treatment Provided: Yes; skilled dysphagia therapy at bedside  Treatment Tolerance: Treatment limited secondary to medical complications  Medical Staff Made Aware: Yes      Treatment Outcome:  SLP TX Intervention Outcome: Making Progress Towards Goals    Treatment Tolerance: Patient tolerated treatment well  Prognosis: Good   Barriers: Cognition   Medical Staff Made Aware: Yes; SLP dicussed recommendation for meds whole in applesauce with FRANCISCO Charles.      Inpatient Education:  Individual(s) Educated: Patient, Spouse  Verbal Education : diet recommendations, risk of aspiration, improvements made, POC  Risk and Benefits Discussed with Patient/Caregiver/Other: yes  Patient/Caregiver Demonstrated Understanding: yes  Plan of Care Discussed and Agreed Upon: yes  Patient Response to Education: Patient/Caregiver Verbalized Understanding of Information.  Pt impulsive with strategies and will require further review/training.

## 2024-01-17 NOTE — CARE PLAN
Problem: Respiratory  Goal: Wean oxygen to maintain O2 saturation per order/standard this shift  Outcome: Progressing  Goal: No signs of respiratory distress (eg. Use of accessory muscles. Peds grunting)  Outcome: Progressing

## 2024-01-17 NOTE — PROGRESS NOTES
Occupational Therapy    Occupational Therapy Treatment    Name: Navarro Rabago  MRN: [id number]  : 1945  Date: 24  Time Calculation  Start Time: 910  Stop Time: 936  Time Calculation (min): 26 min    Assessment:  OT Assessment: Pt much more alert and engaged today, good tolerance for ADL tasks however had increased fatigue toward end of session. Pt making good progress towards POC, Continue w/ skilled OT services to increse independence in self care tasks/ functional mobility and improve activity tolerance.  Evaluation/Treatment Tolerance: Patient limited by fatigue  End of Session Communication: Bedside nurse  End of Session Patient Position: Bed, 4 rail up  Plan:  Treatment Interventions: ADL retraining, UE strengthening/ROM  OT Frequency: 4 times per week  OT Discharge Recommendations: Moderate intensity level of continued care  Equipment Recommended upon Discharge: Lift  OT Recommended Transfer Status: Assist of 2, Maximum assist  OT - OK to Discharge: Yes    Subjective   Previous Visit Info:  OT Last Visit  OT Received On: 24  General:  General  Family/Caregiver Present: No  Caregiver Feedback: Spouse present  Co-Treatment: PT  Co-Treatment Reason: safety in mobility, pt tolerance  Prior to Session Communication: Bedside nurse  Patient Position Received:  (Seated EOB, PT present)  Preferred Learning Style: verbal  General Comment: Pt agreeable to therapy  Precautions:  Hearing/Visual Limitations: glasses, [place]  Medical Precautions: Fall precautions, Oxygen therapy device and L/min (4L)  Vitals:     Pain Assessment:  Pain Assessment  Pain Assessment: 0-10  Pain Score: 4  Pain Type: Chronic pain  Pain Location: Shoulder  Pain Orientation: Right     Objective   Activities of Daily Living: Grooming  Grooming Level of Assistance: Minimum assistance (to complete combing hair, set up to wash face, brush teeth)  Grooming Where Assessed: Edge of bed    UE Bathing  UE Bathing Level of Assistance: Close  supervision (to bathe arms, underarms, chest, abdomen, verbal cues for throughness)  UE Bathing Where Assessed: Edge of bed    UE Dressing  UE Dressing Level of Assistance: Minimum assistance (to don/ doff gown, telemetry)  UE Dressing Where Assessed: Edge of bed    Functional Standing Tolerance:     Bed Mobility/Transfers: Bed Mobility  Bed Mobility: Yes  Bed Mobility 1  Bed Mobility 1: Sitting to supine  Level of Assistance 1: Maximum assistance (x2, for BLE in, trunk down)  Bed Mobility Comments 1: Pt initiated BLE to EOB, assist to complete, assist for trunk up, BLE off EOB, scooting hips forward,  Bed Mobility 2  Bed Mobility  2: Scooting  Level of Assistance 2: Dependent (x2)  Bed Mobility Comments 2: use of drawsheet to boost pt to HOB  IADL's:                                         Communication:     Splinting:     Therapy/Activity: Therapeutic Exercise  Therapeutic Exercise Performed: Yes (pereformed supine in bed)  Therapeutic Exercise Activity 1: x10 (B shld flex/ext)  Therapeutic Exercise Activity 2: x10 (B elbow flex/ ext)  Therapeutic Exercise Activity 3: x10 (B forearm pron/ sup)  Therapeutic Exercise Activity 4: x10 (B wrist flex/ ext)    Therapeutic Activity  Therapeutic Activity Performed: Yes  Therapeutic Activity 1: Pt sat EOB ~15-18 minutes for ADL tasks w/ good tolerance and balance.  Sensory:     Cognitive Skill Development:     Vision:     Strength:     Other Activity:             Outcome Measures:  [facility] Daily Activity  Putting on and taking off regular lower body clothing: Total  Bathing (including washing, rinsing, drying): A lot  Putting on and taking off regular upper body clothing: A little  Toileting, which includes using toilet, bedpan or urinal: Total  Taking care of personal grooming such as brushing teeth: A little  Eating Meals: A lot  Daily Activity - Total Score: 12        Education Documentation  Home Exercise Program, taught by Krista Simms OT at 1/17/2024 10:33  AM.  Learner: Patient  Readiness: Acceptance  Method: Explanation  Response: Needs Reinforcement    ADL Training, taught by Krista Simms OT at 1/17/2024 10:32 AM.  Learner: Patient  Readiness: Acceptance  Method: Explanation  Response: Demonstrated Understanding, Needs Reinforcement    ADL Training, taught by Krista Simms OT at 1/16/2024  1:46 PM.  Learner: Patient  Readiness: Acceptance  Method: Explanation  Response: Needs Reinforcement    Education Comments  No comments found.      Goals:  Encounter Problems       Encounter Problems (Active)       Balance       Sitting Balance (Progressing)       Start:  01/08/24    Expected End:  01/16/24       Pt will demonstrate good sitting balance to promote safe engagement with out of bed activities           Standing Balance (Progressing)       Start:  01/08/24    Expected End:  01/16/24       Pt will demonstrate good static standing balance to promote safe participation with out of bed activity, transfers, and mobility              Mobility       Ambulation (Progressing)       Start:  01/08/24    Expected End:  01/16/24       Pt will ambulate 50' modified independent assist with walker to promote safe home mobility              OT Goals       ADLs (Progressing)       Start:  01/08/24    Expected End:  01/16/24       Patient will perform ADLs with MOD A using AE/compensatory techniques as needed.          Sitting Tolerance (Progressing)       Start:  01/08/24    Expected End:  01/16/24       Patient will demonstrate improved sitting tolerance AEB completing EOB activities for >/= 15 minutes with supervision assist for stability.         UE Strengthening (Progressing)       Start:  01/08/24    Expected End:  01/16/24       Patient will improve UE strength to 3-/5 in preparation for functional transfers.             Safety       Safe Mobility Techniques (Progressing)       Start:  01/08/24    Expected End:  01/16/24       Pt will correctly identify and demonstrate  safe mobility techniques to reduce their risks for falls during their acute care stay               Transfers       Supine to sit (Progressing)       Start:  01/08/24    Expected End:  01/16/24       Pt will transfer supine to sitting at edge of bed with modified independent assist to promote acute care out of bed activity           Sit to stand (Progressing)       Start:  01/08/24    Expected End:  01/16/24       Pt will transfer sit to standing position with modified independent assist and walker to promote safe out of bed activity           Bed to chair (Progressing)       Start:  01/08/24    Expected End:  01/16/24       Pt will transfer from sitting edge of bed to the chair with modified independent assist and walker to promote out of bed activity and reduce the risks of prolonged acute care bedrest

## 2024-01-17 NOTE — PROGRESS NOTES
Patient is from home with spouse.  Patient denied by [facility].  [place] is OON.  After speaking to the spouse, new referral to [facility] in [place] is now FOC.  [facility] is plan B.  Awaiting final acceptance by [facility].  Awaiting updates.  Patient will need precert which could take 48-72 hours.  RN TCC following.     1130  Per [facility], patient has been accepted by [facility].  Requested precert to be initiated by  Direct Submit Team at this time.  Awaiting updates.  RN TCC following.    1145  Per  Direct Submit Team, precert pending ref#[id number].  [facility] advised of pending number.  Patient and spouse advised of same at bedside.  RN TCC following.    1225  Per [place], [facility] advised N2N report is [phone].    Eloina Crespo RN

## 2024-01-17 NOTE — PROGRESS NOTES
"Navarro Rabago is a 78 y.o. male on day 11 of admission presenting with Sepsis (CMS/HCC).    Subjective   No major complaints today, stable and awaiting discharge to rehab      Objective   Patient did have a run of ventricular tachycardia yesterday afternoon, no events otherwise on telemetry, see plan below  Physical Exam  Generally no acute distress  HEENT PERRL EOMI  Cardiovascular S1-S2 heard regular rate rhythm  Lungs diminished anteriorly  Abdomen bowel sounds present  Extremities no clubbing cyanosis edema  Last Recorded Vitals  Blood pressure 128/69, pulse 73, temperature 36.5 °C (97.7 °F), temperature source Temporal, resp. rate 16, height 1.956 m (6' 5\"), weight 112 kg (246 lb 0.5 oz), SpO2 97 %.  Intake/Output last 3 Shifts:  I/O last 3 completed shifts:  In: 200 (1.8 mL/kg) [IV Piggyback:200]  Out: 1600 (14.3 mL/kg) [Urine:1600 (0.4 mL/kg/hr)]  Weight: 111.6 kg     Relevant Results  Scheduled medications  apixaban, 5 mg, oral, q12h DARIA  aspirin, 81 mg, oral, Daily  ferrous sulfate (325 mg ferrous sulfate), 1 tablet, oral, Daily with breakfast  finasteride, 5 mg, oral, Daily  ipratropium-albuteroL, 3 mL, nebulization, 4x daily  levothyroxine, 50 mcg, oral, Daily  metoprolol tartrate, 25 mg, oral, BID  midodrine, 5 mg, oral, q8h  nystatin, 5 mL, Swish & Swallow, 4x daily  oxygen, , inhalation, q8h  pantoprazole, 40 mg, oral, Daily before breakfast  polyethylene glycol, 17 g, oral, BID  QUEtiapine, 12.5 mg, oral, BID      Continuous medications     PRN medications  PRN medications: acetaminophen, benzocaine-menthol, calcium carbonate, dextromethorphan-guaifenesin, dextrose 10 % in water (D10W), dextrose, glucagon, guaiFENesin, LORazepam, ondansetron ODT **OR** ondansetron  Results for orders placed or performed during the hospital encounter of 01/06/24 (from the past 24 hour(s))   Electrocardiogram, 12-lead PRN ACS symptoms   Result Value Ref Range    Ventricular Rate 58 BPM    Atrial Rate 277 BPM    QRS " Duration 102 ms    QT Interval 408 ms    QTC Calculation(Bazett) 400 ms    R Axis 52 degrees    T Axis 37 degrees    QRS Count 10 beats    Q Onset 219 ms    T Offset 423 ms    QTC Fredericia 403 ms   Basic metabolic panel   Result Value Ref Range    Glucose 108 (H) 65 - 99 mg/dL    Sodium 143 133 - 145 mmol/L    Potassium 3.8 3.4 - 5.1 mmol/L    Chloride 108 (H) 97 - 107 mmol/L    Bicarbonate 28 24 - 31 mmol/L    Urea Nitrogen 10 8 - 25 mg/dL    Creatinine 0.90 0.40 - 1.60 mg/dL    eGFR 89 >60 mL/min/1.73m*2    Calcium 8.1 (L) 8.5 - 10.4 mg/dL    Anion Gap 7 <=19 mmol/L                   Impression: 78-year-old gentleman admitted with likely aspiration pneumonia and hypotension in the setting of a history of ischemic cardiomyopathy.    Plan:    1.  Pneumonia/sepsis  -Improved, antibiotics discontinued.  Labs terms of white count are normal, no fever.  Appreciate infectious disease and pulmonary consultation  -Continue with pulmonary toilet    2.  Acute renal insufficiency  -Resolved, creatinine normalized    3.  Ischemic cardiomyopathy  -Patient did have an episode of V. tach, patient was not on his beta-blocker given his early presentation for sepsis and midodrine was started.  I will discontinue the midodrine and I did restart his beta-blocker.  Monitor blood pressure and will also plan on restarting his Entresto once blood pressure stabilized off midodrine.    4.  Aspiration  -Appreciate recommendations by speech therapy.  Continue with recommended diet.    GI and DVT prophylaxis, on apixaban for A-fib    Still awaiting dispo SNF        Assessment/Plan   Principal Problem:    Sepsis (CMS/HCC)  Active Problems:    Lactic acid acidosis    Acute renal failure (ARF) (CMS/HCC)    Leukocytosis    Bilateral pneumonia    Chronic respiratory failure (CMS/HCC)    Hypokalemia    Dehydration    Transaminitis    Acute encephalopathy    Hypotension    Decreased activities of daily living (ADL)           I spent 25 minutes in  the professional and overall care of this patient.      Ulises Tariq MD

## 2024-01-17 NOTE — PROGRESS NOTES
Physical Therapy    Physical Therapy Treatment    Patient Name: Navarro Rabago  MRN: [id number]  Today's Date: 1/17/2024  Time Calculation  Start Time: 0849  Stop Time: 0912  Time Calculation (min): 23 min       Assessment/Plan   PT Assessment  PT Assessment Results: Decreased strength, Decreased range of motion, Decreased endurance, Impaired balance, Decreased mobility, Decreased coordination, Decreased cognition, Impaired judgement, Decreased safety awareness, Decreased skin integrity  Rehab Prognosis: Fair  Evaluation/Treatment Tolerance: Patient limited by fatigue, Patient limited by pain  Medical Staff Made Aware: Yes  Strengths: Support of Caregivers  Barriers to Participation: Comorbidities  Assessment Comment: Pt more alert today, able to give increased effort, fearful of falling, required mod/max assist of 2-3.  End of Session Patient Position: Bed, 4 rail up (assisted w/ positioning after OT session)  PT Plan  Inpatient/Swing Bed or Outpatient: Inpatient  PT Plan  Treatment/Interventions: Bed mobility, Transfer training, Gait training, Balance training, Strengthening, Endurance training, Range of motion, Therapeutic exercise, Therapeutic activity, Postural re-education, Positioning  PT Plan: Skilled PT  PT Frequency: 4 times per week  PT Discharge Recommendations: Moderate intensity level of continued care  Equipment Recommended upon Discharge: Lift, Wheeled walker  PT Recommended Transfer Status: Assist x2, Assistive device  PT - OK to Discharge: Yes      General Visit Information:   PT  Visit  PT Received On: 01/17/24  General  Reason for Referral: impaired mobility  Referred By: Dr. Bates  Past Medical History Relevant to Rehab: CHF, afib, CAD, COPD, HTN, hernia  Family/Caregiver Present: No  Caregiver Feedback: spouse supportive  Co-Treatment: OT  Co-Treatment Reason: safe mobility, pt tolerance  Prior to Session Communication: Bedside nurse  Patient Position Received: Bed, 4 rail up  Preferred  Learning Style: verbal  General Comment: Pt agreeable to PT session    Subjective   Precautions:  Precautions  Hearing/Visual Limitations: glasses, [place]  Medical Precautions: Fall precautions, Oxygen therapy device and L/min (4L)    Objective   Pain:  Pain Assessment  Pain Assessment: 0-10  Pain Score: 4  Pain Type: Chronic pain  Pain Location: Back  Pain Orientation: Right  Pain Radiating Towards: RT hip/thigh  Pain Descriptors: Tightness  Pain Interventions:  (nurse informed)  Cognition:  Cognition  Overall Cognitive Status:  (much more alert and talkative; fearful of mobility)  Processing Speed: Delayed  Postural Control:  Postural Control  Posture Comment: flexed posture, posterior COG  Static Sitting Balance  Static Sitting-Balance Support: Bilateral upper extremity supported  Static Sitting-Level of Assistance: Contact guard, Moderate assistance (variable pending pt engagement)  Static Sitting-Comment/Number of Minutes: 15 minutes at EOB  Dynamic Sitting Balance  Dynamic Sitting-Balance Support: Bilateral upper extremity supported  Dynamic Sitting-Balance:  (LE exercise)  Dynamic Sitting-Comments: Fair-, better w/ leaning forward onto UEs (walker)  Extremity/Trunk Assessments  Activity Tolerance  Endurance: Decreased tolerance for upright activites  Activity Tolerance Comments: Fair/Fair-  Treatments:  Therapeutic Exercise  Therapeutic Exercise Performed: Yes  Therapeutic Exercise Activity 1: Performed seated EOB bilat ankle pumps w/ assist Rt, LAQs, hip flexion w/ assist Rt, david hip add and abduction x 10 reps. Cues to stay on task.    Bed Mobility  Bed Mobility: Yes  Bed Mobility 1  Bed Mobility 1: Supine to sitting  Level of Assistance 1: Moderate assistance, Moderate verbal cues (+2)  Bed Mobility Comments 1: Pt able to get LEs to EOB Rt, cues for UE use on rail, assist to roll Rt and for trunk up, HOB elevated ~20 degrees.  Bed Mobility 2  Bed Mobility  2: Sitting to supine  Level of Assistance 2: Maximum  assistance, Moderate verbal cues (+2)  Bed Mobility Comments 2: Assist onto RT side, LEs onto bed, cues for technique, assist to roll onto back. Dependent boost to HOB.    Ambulation/Gait Training  Ambulation/Gait Training Performed: No  Transfers  Transfer: Yes  Transfer 1  Technique 1: Sit to stand, Stand to sit  Transfer Device 1: Walker, Gait belt  Transfer Level of Assistance 1: Maximum assistance, Maximum tactile cues (+)  Trials/Comments 1: Bed height elevated, pt allowed to push up on stabilized RW, 3rd person to block knees. Pt attempted x 2, pt unable to unweight from bed, very fearful of falling, quick fatigue, giving effort as able. Pt sat EOB ~ 15 minutes during PT session.    Outcome Measures:  [facility] Basic Mobility  Turning from your back to your side while in a flat bed without using bedrails: A lot  Moving from lying on your back to sitting on the side of a flat bed without using bedrails: A lot  Moving to and from bed to chair (including a wheelchair): Total  Standing up from a chair using your arms (e.g. wheelchair or bedside chair): Total  To walk in hospital room: Total  Climbing 3-5 steps with railing: Total  Basic Mobility - Total Score: 8    Education Documentation  Home Exercise Program, taught by Danielle Pickens, PT at 1/17/2024  9:52 AM.  Learner: Patient  Readiness: Acceptance  Method: Explanation  Response: Needs Reinforcement    Mobility Training, taught by Danielle Pickens, PT at 1/17/2024  9:52 AM.  Learner: Patient  Readiness: Acceptance  Method: Explanation  Response: Needs Reinforcement    Education Comments  No comments found.        OP EDUCATION:       Encounter Problems       Encounter Problems (Active)       Balance       Sitting Balance (Progressing)       Start:  01/08/24    Expected End:  01/16/24       Pt will demonstrate good sitting balance to promote safe engagement with out of bed activities           Standing Balance (Progressing)       Start:  01/08/24    Expected End:   01/16/24       Pt will demonstrate good static standing balance to promote safe participation with out of bed activity, transfers, and mobility              Mobility       Ambulation (Progressing)       Start:  01/08/24    Expected End:  01/16/24       Pt will ambulate 50' modified independent assist with walker to promote safe home mobility              Pain - Adult          Safety       Safe Mobility Techniques (Progressing)       Start:  01/08/24    Expected End:  01/16/24       Pt will correctly identify and demonstrate safe mobility techniques to reduce their risks for falls during their acute care stay               Transfers       Supine to sit (Progressing)       Start:  01/08/24    Expected End:  01/16/24       Pt will transfer supine to sitting at edge of bed with modified independent assist to promote acute care out of bed activity           Sit to stand (Progressing)       Start:  01/08/24    Expected End:  01/16/24       Pt will transfer sit to standing position with modified independent assist and walker to promote safe out of bed activity           Bed to chair (Progressing)       Start:  01/08/24    Expected End:  01/16/24       Pt will transfer from sitting edge of bed to the chair with modified independent assist and walker to promote out of bed activity and reduce the risks of prolonged acute care bedrest

## 2024-01-17 NOTE — PROGRESS NOTES
Navarro Rabago is a 78 y.o. male on day 11 of admission presenting with Sepsis (CMS/HCC).    Subjective   Interval History:   Afebrile, no chills  Reports some SOB earlier this morning, since resolved.  Denies chest pain.  Reports occasional nonproductive cough  Denies nausea, vomiting, diarrhea    Objective   Range of Vitals (last 24 hours)  Heart Rate:  [56-73]   Temp:  [36.4 °C (97.5 °F)-36.9 °C (98.4 °F)]   Resp:  [16-19]   BP: (124-139)/(57-89)   Weight:  [112 kg (246 lb 0.5 oz)]   SpO2:  [97 %-100 %]   Daily Weight  01/17/24 : 112 kg (246 lb 0.5 oz)    Body mass index is 29.18 kg/m².    Physical Exam  Constitutional:       Comments:  Awake and alert  HENT:      Head: Normocephalic and atraumatic.      Nose: Nose normal.    Eyes:      Extraocular Movements: Extraocular movements intact.      Conjunctiva/sclera: Conjunctivae normal.   Cardiovascular:      Rate and Rhythm: Normal rate and regular rhythm.   Pulmonary:      Effort: Pulmonary effort is normal.      Breath sounds:  No wheezing or rales present.   Abdominal:      General: Bowel sounds are normal.      Palpations: Abdomen is soft.   Musculoskeletal:         General: Normal range of motion.      Cervical back: Normal range of motion and neck supple.   Skin:     General: Skin is warm and dry.   Neurological:      General: No focal deficit present.      Mental Status: He is alert.   Psychiatric:         Mood and Affect: Mood normal.         Behavior: Behavior normal.     Antibiotics  sodium chloride 0.9% infusion  heparin (porcine) injection 5,000 Units  sodium chloride 0.9 % with KCl 20 mEq/L infusion  calcium carbonate (Tums) chewable tablet 500 mg  ondansetron ODT (Zofran-ODT) disintegrating tablet 4 mg  ondansetron (Zofran) injection 4 mg  benzocaine-menthol (Cepastat Sore Throat) 15-3.6 mg lozenge 1 lozenge  guaiFENesin (Mucinex) 12 hr tablet 600 mg  dextromethorphan-guaifenesin (Robitussin DM)  mg/5 mL oral liquid 5 mL  dextrose 50 %  injection 25 g  glucagon (Glucagen) injection 1 mg  dextrose 10 % in water (D10W) infusion  ipratropium-albuteroL (Duo-Neb) 0.5-2.5 mg/3 mL nebulizer solution 3 mL  oxygen (O2) therapy  piperacillin-tazobactam-dextrose (Zosyn) IV 3.375 g  pantoprazole (ProtoNix) injection 40 mg  ipratropium-albuteroL (Duo-Neb) 0.5-2.5 mg/3 mL nebulizer solution 3 mL  allopurinol (Zyloprim) tablet  apixaban (Eliquis) tablet  aspirin EC tablet  atorvastatin (Lipitor) tablet  bumetanide (Bumex) tablet  colchicine tablet  empagliflozin (Jardiance) tablet  fexofenadine (Allegra) tablet  finasteride (Propecia, Proscar) tablet  guaiFENesin (Mucinex) 12 hr tablet  levothyroxine (Synthroid, Levoxyl) tablet  nitroglycerin (Nitrostat) SL tablet  pantoprazole (ProtoNix) EC tablet  potassium chloride SA (Klor-Con M20) ER tablet  tamsulosin (Flomax) 24 hr capsule  vancomycin 1.5 mg in 300 mL (Xellia) IVPB 1.5 g      levothyroxine (Synthroid, Levoxyl) tablet 50 mcg  finasteride (Proscar) tablet 5 mg  ferrous sulfate (325 mg ferrous sulfate) tablet 1 tablet  aspirin EC tablet 81 mg  apixaban (Eliquis) tablet 5 mg  metoprolol tartrate (Lopressor) tablet 25 mg  potassium chloride 20 mEq in 100 mL IV premix  midodrine (Proamatine) tablet 5 mg  sodium chloride 0.9 % bolus 500 mL  norepinephrine (Levophed) 8 mg in dextrose 5% 250 mL (0.032 mg/mL) infusion (premix)  sodium chloride 0.9 % bolus 250 mL  sodium chloride 0.9 % bolus 250 mL  azithromycin (Zithromax) in dextrose 5 % in water (D5W) 250 mL  mg  vancomycin 1.5 mg in 300 mL (Xellia) IVPB 1.5 g  apixaban (Eliquis) tablet 2.5 mg  vancomycin (Xellia) 1 g in 200 mL (Xellia) IVPB 1,000 mg  norepinephrine (Levophed) 8 mg in dextrose 5% 250 mL (0.032 mg/mL) infusion (premix)  acetaminophen (Tylenol) tablet 650 mg  vancomycin 1.5 mg in 300 mL (Xellia) IVPB 1.5 g  sodium bicarbonate 150 mEq in dextrose 5 % in water (D5W) 1,000 mL infusion  midodrine (Proamatine) tablet 5 mg  barium sulfate (Varibar  "Honey) 40 % (w/v) 29% (w/w) suspension 10 mL  barium sulfate (Varibar Pudding) 40 % (w/v), 30% (w/w) oral paste 5 mL  barium sulfate (Varibar Nectar, Varibar Honey) 40 % (w/v) suspension 10 mL  barium sulfate (Varibar THIN Liquid) 81 % (w/w) suspension 10 mL  nystatin (Mycostatin) 100,000 unit/mL suspension 500,000 Units  potassium chloride CR (Klor-Con M20) ER tablet 20 mEq  oxygen (O2) therapy  pantoprazole (ProtoNix) EC tablet 40 mg  dextrose 5%-0.45 % sodium chloride infusion  vancomycin 1.5 mg in 300 mL (Xellia) IVPB 1.5 g  potassium chloride CR (Klor-Con M20) ER tablet 40 mEq  LORazepam (Ativan) injection 1 mg  QUEtiapine (SEROquel) tablet 12.5 mg  LORazepam (Ativan) injection 2 mg  potassium chloride CR (Klor-Con M20) ER tablet 20 mEq  potassium chloride 20 mEq in 100 mL IV premix  sodium chloride 0.9% infusion  potassium chloride (Klor-Con) packet 20 mEq  potassium chloride CR (Klor-Con M20) ER tablet 20 mEq  potassium chloride CR (Klor-Con M20) ER tablet 20 mEq  potassium chloride CR (Klor-Con M20) ER tablet 20 mEq      Relevant Results  Labs  Results from last 72 hours   Lab Units 01/16/24  0617 01/15/24  0814   WBC AUTO x10*3/uL 7.7 8.8   HEMOGLOBIN g/dL 8.9* 8.8*   HEMATOCRIT % 28.1* 27.7*   PLATELETS AUTO x10*3/uL 289 306       Results from last 72 hours   Lab Units 01/17/24  0557 01/15/24  0813   SODIUM mmol/L 143 140   POTASSIUM mmol/L 3.8 3.8   CHLORIDE mmol/L 108* 107   CO2 mmol/L 28 26   BUN mg/dL 10 12   CREATININE mg/dL 0.90 1.00   GLUCOSE mg/dL 108* 120*   CALCIUM mg/dL 8.1* 7.7*   ANION GAP mmol/L 7 7   EGFR mL/min/1.73m*2 89 78             Estimated Creatinine Clearance: 97.1 mL/min (by C-G formula based on SCr of 0.9 mg/dL).  No results found for: \"CRP\"  Microbiology  Negative MRSA PCR  C.diff PCR negative  Sputum culture with salivary contamination  Strep pneumo antigen negative  Legionella antigen negative  Blood cultures finalized no growth   Imaging  Electrocardiogram, 12-lead PRN ACS " symptoms    Result Date: 1/16/2024  Atrial fibrillation with slow ventricular response with premature ventricular or aberrantly conducted complexes Low voltage QRS Nonspecific T wave abnormality Abnormal ECG When compared with ECG of 05-JAN-2024 16:11, Atrial fibrillation has replaced Sinus rhythm Vent. rate has decreased BY  49 BPM Left posterior fascicular block is no longer Present     Assessment/Plan   Sepsis, with shock, resolved  Acute renal failure-resolved  Pneumonia-MRSA PCR negative -treated  Diarrhea-c.diff negative  Transaminitis-resolving  Leukocytosis-resolved  Oral candida   Chronic respiratory failure-at baseline 5LNC  Retroperitoneal hematoma       Monitor off antibiotics - completed 10 day course of antibiotics  Continue Nystatin suspension  Monitor WBC and temperature  Monitor renal function  Oxygen as needed  Supportive care  Monitor mental status  Discharge planning        Ilene SCHOFIELD Staff, APRN-CNP

## 2024-01-17 NOTE — CARE PLAN
The patient's goals for the shift include      The clinical goals for the shift include safety

## 2024-01-17 NOTE — PROGRESS NOTES
Pulmonary Progress Note 01/17/24     Patient seen in follow-up for acute respiratory failure in the setting of pneumonia.    Subjective   Interval History:   Breathing is fine.  Endorses cough.  No overnight events.    Objective   Vital signs in last 24 hours:  Temp:  [36.4 °C (97.5 °F)-36.9 °C (98.4 °F)] 36.4 °C (97.5 °F)  Heart Rate:  [56-73] 73  Resp:  [17-19] 18  BP: (124-139)/(57-89) 137/89  FiO2 (%):  [36 %] 36 %    Intake/Output last 3 shifts:  I/O last 3 completed shifts:  In: 200 (1.8 mL/kg) [IV Piggyback:200]  Out: 1600 (14.3 mL/kg) [Urine:1600 (0.4 mL/kg/hr)]  Weight: 111.6 kg   Intake/Output this shift:  No intake/output data recorded.    Physical Exam  Vitals and nursing note reviewed.   Constitutional:       General: He is not in acute distress.     Appearance: Normal appearance.   HENT:      Head: Normocephalic and atraumatic.      Nose: Nose normal.      Mouth/Throat:      Mouth: Mucous membranes are moist.   Eyes:      Extraocular Movements: Extraocular movements intact.      Conjunctiva/sclera: Conjunctivae normal.      Pupils: Pupils are equal, round, and reactive to light.   Cardiovascular:      Rate and Rhythm: Normal rate and regular rhythm.      Pulses: Normal pulses.      Heart sounds: Normal heart sounds.   Pulmonary:      Effort: Pulmonary effort is normal. No respiratory distress.      Breath sounds: Rhonchi present. No wheezing.   Abdominal:      General: Bowel sounds are normal.      Palpations: Abdomen is soft.   Musculoskeletal:         General: Normal range of motion.      Right lower leg: No edema.      Left lower leg: No edema.   Skin:     General: Skin is warm and dry.      Capillary Refill: Capillary refill takes less than 2 seconds.   Neurological:      General: No focal deficit present.      Mental Status: He is alert and oriented to person, place, and time. Mental status is at baseline.   Psychiatric:         Mood and Affect: Mood normal.         Behavior: Behavior  normal.         Scheduled medications  apixaban, 5 mg, oral, q12h DARIA  aspirin, 81 mg, oral, Daily  ferrous sulfate (325 mg ferrous sulfate), 1 tablet, oral, Daily with breakfast  finasteride, 5 mg, oral, Daily  ipratropium-albuteroL, 3 mL, nebulization, 4x daily  levothyroxine, 50 mcg, oral, Daily  metoprolol tartrate, 25 mg, oral, BID  midodrine, 5 mg, oral, q8h  nystatin, 5 mL, Swish & Swallow, 4x daily  oxygen, , inhalation, q8h  pantoprazole, 40 mg, oral, Daily before breakfast  polyethylene glycol, 17 g, oral, BID  QUEtiapine, 12.5 mg, oral, BID      Continuous medications     PRN medications  PRN medications: acetaminophen, benzocaine-menthol, calcium carbonate, dextromethorphan-guaifenesin, dextrose 10 % in water (D10W), dextrose, glucagon, guaiFENesin, LORazepam, ondansetron ODT **OR** ondansetron     Labs:  Lab Results   Component Value Date     01/17/2024    K 3.8 01/17/2024     (H) 01/17/2024    CO2 28 01/17/2024    BUN 10 01/17/2024    CREATININE 0.90 01/17/2024    GLUCOSE 108 (H) 01/17/2024    CALCIUM 8.1 (L) 01/17/2024     Lab Results   Component Value Date    WBC 7.7 01/16/2024    HGB 8.9 (L) 01/16/2024    HCT 28.1 (L) 01/16/2024    MCV 94 01/16/2024     01/16/2024       Imaging:  Electrocardiogram, 12-lead PRN ACS symptoms    Result Date: 1/16/2024  Atrial fibrillation with slow ventricular response with premature ventricular or aberrantly conducted complexes Low voltage QRS Nonspecific T wave abnormality Abnormal ECG When compared with ECG of 05-JAN-2024 16:11, Atrial fibrillation has replaced Sinus rhythm Vent. rate has decreased BY  49 BPM Left posterior fascicular block is no longer Present    ECG 12 lead    Result Date: 1/10/2024  Sinus tachycardia with 1st degree AV block with Premature supraventricular complexes Low voltage QRS Left posterior fascicular block Abnormal QRS-T angle, consider primary T wave abnormality Abnormal ECG When compared with ECG of 13-MAY-2021 13:46,  Sinus rhythm has replaced Atrial fibrillation Vent. rate has increased BY  35 BPM Left posterior fascicular block is now Present QT has shortened See ED provider note for full interpretation and clinical correlation Confirmed by Alis Tejeda ([id number]) on 1/10/2024 5:08:28 PM    CT abdomen pelvis wo IV contrast    Result Date: 1/10/2024  STUDY: CT Abdomen and Pelvis without IV Contrast; 1/10/2024 at 3:30 AM INDICATION: Back pain, ecchymosis; trauma. COMPARISON: US renal 1/8/2024, CT chest, abdomen and pelvis 1/5/2024. ACCESSION NUMBER(S): [id number] ORDERING CLINICIAN: DANA AMATO TECHNIQUE: CT of the abdomen and pelvis was performed.  Contiguous axial images were obtained at 3 mm slice thickness through the abdomen and pelvis. Coronal and sagittal reconstructions at 3 mm slice thickness were performed. No intravenous contrast was administered.  Automated mA/kV exposure control was utilized and patient examination was performed in strict accordance with principles of ALARA. FINDINGS: Please note that the evaluation of vessels, lymph nodes and organs is limited without intravenous contrast.  LOWER CHEST: No cardiomegaly.  No pericardial effusion.  Lung bases are clear.  ABDOMEN: Partially visualized portions of the lower chest showing small bilateral pleural effusions and partial atelectasis of the bilateral lower lobes increased from prior. Heart size normal. No pericardial effusion. Unenhanced liver without acute process. No discernible intrahepatic ductal dilatation. Pericholecystic flu atrophic changes of the pancreas. No acute abnormality of the spleen is identified. No acute abnormality of the adrenal glands or kidneys no retroperitoneal adenopathy. Atherosclerosis of the abdominal aorta without aneurysm. Asymmetric enlargement of the RIGHT psoas musculature and retroperitoneal fluid increased from 1/5/24. Small amount of free fluid within the pelvis. No pelvic adenopathy identified. Rodriguez catheter  within the urinary bladder. LEFT hip prostheses. No findings of acute fracture of the pelvis. Spondylotic changes and facet arthrosis of the lumbar spine.     Impression: RIGHT psoas and retroperitoneal hematoma. Without the use of intravenous contrast difficult to determine the exact size of the hemorrhage. Increased small bilateral pleural effusions and partial atelectasis of the lower lobes. Findings discussed with Dr. DANA AMATO with verbal understanding at approximately 1/10/2024 4:28 AM. Signed by Keith Watson MD    CT head wo IV contrast    Result Date: 1/10/2024  STUDY: CT Head without IV Contrast; 1/10/2024 at 3:30 AM INDICATION: Confusion. COMPARISON: None available. ACCESSION NUMBER(S): [id number] ORDERING CLINICIAN: DANA AMATO TECHNIQUE: Noncontrast axial CT scan of head was performed.  Angled reformats in brain and bone windows were generated.  The images were reviewed in bone, brain, blood and soft tissue windows. Automated mA/kV exposure control was utilized and patient examination was performed in strict accordance with principles of ALARA. FINDINGS: CSF Spaces:  The ventricles, sulci and basal cisterns are prominent from atrophy.  There is no extraaxial fluid collection.  Mild patchy areas of low-attenuation are seen in the subcortical and deep periventricular white matter suggesting areas of chronic microvascular ischemia.  Parenchyma:  The grey-white differentiation is intact.  There is no mass effect or midline shift.  There is no intracranial hemorrhage.  Calvarium:  The calvarium is unremarkable.  Paranasal sinuses and mastoids:  Visualized paranasal sinuses and mastoids are clear.    Diffuse cerebral atrophy.  Suspected mild chronic small vessel white matter ischemia.  No definite acute intracranial abnormality. Signed by Shawn Echols DO    Transthoracic Echo (TTE) Complete    Result Date: 1/8/2024            [facility] [address], [place] [address]             Phone [phone] TRANSTHORACIC ECHOCARDIOGRAM REPORT  Patient Name:      LILIAN CRESPO    [place] Physician:   [id number] Lb Maki MD Study Date:        1/8/2024           Ordering Provider:   [id number] LB MAKI MRN/PID:           [id number]           Fellow: Accession#:        [id number]       Nurse: Date of Birth/Age: 1945 / 78      Sonographer:         Lilli Mata RDCS                    years Gender:            M                  Additional Staff: Height:            195.58 cm          Admit Date:          1/8/2024 Weight:            103.42 kg          Admission Status:    Inpatient - Routine BSA:               2.36 m2            Department Location: [facility] Blood Pressure: 96 /74 mmHg Study Type:    TRANSTHORACIC ECHO (TTE) COMPLETE Diagnosis/ICD: Other hypotension-I95.89 Indication:    hypotension CPT Codes:     Echo Complete w Full [id number]  Study Detail: The following Echo studies were performed: 2D, M-Mode, Doppler and               color flow.  PHYSICIAN INTERPRETATION: Left Ventricle: Left ventricular systolic function is moderately decreased, with an estimated ejection fraction of 35-40%. Wall motion is abnormal. The left ventricular cavity size is mild to moderately dilated. Abnormal (paradoxical) septal motion, consistent with an intraventricular conduction delay. Spectral Doppler shows a normal pattern of left ventricular diastolic filling. There is moderate to severe hypokinesis of the inferoposterior wall. There is mild hypokinesis and dyssynchrony of the anteroseptal wall. Left Atrium: The left atrium is mild to moderately dilated. Right Ventricle: The right ventricle is upper limits of normal in size. There is normal right ventriclar wall thickness. There is normal right ventricular global systolic function. Right Atrium: The right atrium is mildly dilated. Aortic Valve: The aortic valve  is trileaflet. The aortic valve appears tricuspid and non-restricted. There is mild aortic valve cusp calcification. There is no evidence of reduced aortic valve leaflet excursion excursion. There is evidence of mildly elevated transaortic gradients consistent with sclerosis of the aortic valve. There is mild aortic valve regurgitation. The peak instantaneous gradient of the aortic valve is 10.8 mmHg. The mean gradient of the aortic valve is 6.0 mmHg. There is moderate sclerotic calcification of the noncoronary aortic valve cusp. Mitral Valve: The mitral valve is normal in structure. There is normal mitral valve leaflet mobility. There is mild to moderate mitral valve regurgitation. Tricuspid Valve: The tricuspid valve is structurally normal. There is normal tricuspid valve leaflet mobility. There is mild to moderate tricuspid regurgitation. The Doppler estimated RVSP is mildly elevated at 43.0 mmHg. Pulmonic Valve: The pulmonic valve is structurally normal. There is trace pulmonic valve regurgitation. Pericardium: There is no pericardial effusion noted. Aorta: The aortic root is normal. There is no dilatation of the aortic arch. There is no dilatation of the ascending aorta. There is no dilatation of the aortic root. Pulmonary Artery: The pulmonary artery is normal in size. The tricuspid regurgitant velocity is 2.96 m/s, and with an estimated right atrial pressure of 8 mmHg, the estimated pulmonary artery pressure is mildly elevated with the RVSP at 43.0 mmHg. The estimated PASP is 43 mmHg. Systemic Veins: The inferior vena cava appears moderately dilated.  CONCLUSIONS:  1. Left ventricular systolic function is moderately decreased with a 35-40% estimated ejection fraction.  2. There is moderate to severe hypokinesis of the inferoposterior wall.     There is mild hypokinesis and dyssynchrony of the anteroseptal wall.  3. The left atrium is mild to moderately dilated.  4. Mild to moderate mitral valve regurgitation.   5. Mild to moderate tricuspid regurgitation.  6. Mildly elevated RVSP.  7. Aortic valve sclerosis.  8. There is moderate sclerotic calcification of the noncoronary aortic valve cusp.  9. Mild aortic valve regurgitation. 10. The estimated PASP is 43 mmHg. 11. The inferior vena cava appears moderately dilated. QUANTITATIVE DATA SUMMARY: 2D MEASUREMENTS:                          Normal Ranges: IVSd:          1.08 cm   (0.6-1.1cm) LVPWd:         0.98 cm   (0.6-1.1cm) LVIDd:         5.65 cm   (3.9-5.9cm) LVIDs:         4.00 cm LV Mass Index: 97.7 g/m2 LV % FS        29.2 % LA VOLUME:                               Normal Ranges: LA Vol A4C:        59.2 ml    (22+/-6mL/m2) LA Vol Index A4C:  25.0ml/m2 LA Area A4C:       20.3 cm2 LA Major Axis A4C: 5.9 cm LA Volume Index:   25.3 ml/m2 LA Vol A4C:        54.4 ml RA VOLUME BY A/L METHOD:                               Normal Ranges: RA Vol A4C:        38.2 ml    (8.3-19.5ml) RA Vol Index A4C:  16.2 ml/m2 RA Area A4C:       15.2 cm2 RA Major Axis A4C: 5.1 cm M-MODE MEASUREMENTS:                  Normal Ranges: Ao Root: 3.70 cm (2.0-3.7cm) AORTA MEASUREMENTS:                      Normal Ranges: Ao Sinus, d: 3.21 cm (2.1-3.5cm) LV SYSTOLIC FUNCTION BY 2D PLANIMETRY (MOD):                     Normal Ranges: EF-A4C View: 54.1 % (>=55%) EF-A2C View: 51.4 % EF-Biplane:  54.1 % AORTIC VALVE:                                    Normal Ranges: AoV Vmax:                1.64 m/s  (<=1.7m/s) AoV Peak PG:             10.8 mmHg (<20mmHg) AoV Mean P.0 mmHg  (1.7-11.5mmHg) LVOT Max Angel Luis:            0.99 m/s  (<=1.1m/s) AoV VTI:                 31.80 cm  (18-25cm) LVOT VTI:                19.70 cm LVOT Diameter:           2.00 cm   (1.8-2.4cm) AoV Area, VTI:           1.95 cm2  (2.5-5.5cm2) AoV Area,Vmax:           1.89 cm2  (2.5-4.5cm2) AoV Dimensionless Index: 0.62 AORTIC INSUFFICIENCY: AI Vmax:       3.44 m/s AI Half-time:  677 msec AI Decel Rate: 150.00 cm/s2  RIGHT  VENTRICLE: RV Basal 3.83 cm RV Mid   4.43 cm RV Major 7.2 cm TRICUSPID VALVE/RVSP:                             Normal Ranges: Peak TR Velocity: 2.96 m/s RV Syst Pressure: 43.0 mmHg (< 30mmHg) IVC Diam:         2.68 cm  [id number] Lb Nance MD Electronically signed on 1/8/2024 at 9:32:37 PM  ** Final **     FL modified barium swallow study    Result Date: 1/8/2024  Interpreted By:  Antoniou, Elias, and Casey Corinne STUDY: FL MODIFIED BARIUM SWALLOW STUDY;; 1/8/2024 1:27 pm   INDICATION: Signs/Symptoms:suspected pharyngeal dysphagia.   COMPARISON: None.   ACCESSION NUMBER(S): [id number]   ORDERING CLINICIAN: JOSE DAVID SOLER   TECHNIQUE: MBSS completed. Informed verbal consent obtained prior to completion of exam. Trials of thin, nectar thick, honey thick, puree, and regular solids given. Fluoroscopy time :  3 minutes and 54 seconds. Total dose air kerma 21.15 mGy..   SLP: Corinne E. Casey, M.A. CCC-SLP Phone/Pager: [phone] opt 2, via [employer], or via [employer] secure chat   SPEECH FINDINGS: Reason for referral: Dysphagia, aspiration pneumonia Patient hx: Sepsis, HTN, HLD, GERD, CHF Respiratory status: Nasal cannula Previous diet: Regular and thin   FINAL SPEECH RECOMMENDATIONS   Diet recommendations/feeding strategies: Minced/moist solids with thin liquids, NO STRAWS.   Upright seating, no straw. Meds crushed in puree. Please downgrade to nectar thick and notify SLP if s/s of aspiration present with PO intake.   Follow-up speech therapy recommended: Yes.   Education provided: Yes. Results and recs discussed with pt, RN, and physician with verbalized understanding noted.   Treatment Provided today: Yes, see progress notes   Repeat study/ dc plan: Continue skilled speech therapy services after discharge   Mechanics of the swallow summary: *Oral phase: Moderate impairment as characterized by disorganized/prolonged mastication, poor tongue control during bolus hold with premature posterior phrayngeal spillage, and  delayed swallow onset (at level of pyriform sinuese for thin and nectar liquids, and at valleculae for honey liquids, puree, and cracker consistencies *Pharyngeal phase: Mild impairment as characterized by decreased tongue base retraction and trace vallecular retention *Esophageal phase: WNL, limited assessment   SLP impressions with severity rating: Pt. Presenting with singular episode of aspiration during initial trial of thin liquid.  Penetration with thin liquid via straw (successive swallows) and with nectar liquids via tsp. No further penetration nor aspiration presents with remaining trials of thin liquids via cup, nectar liquids via cup, honey liquids, puree, nor cracker trials.   Pt noted with mild oral and pharyngeal dysphagia as characterized by prolonged mastication, premature posterior pharyngeal spillage, delayed swallow onset, and diminished tongue base retraction.   Thin Liquids (MBSS) Rosenbek's Penetration Aspiration Scale, Thin Liquids (MBSS): 7. OVERT ASPIRATION, material is not cleared - contrast passes glottis, visible residue, W/pt response Singluar episode of aspiration on initial trial of thin via tsp - appears r/t premature posterior pharyngeal spillage and occurs prior to swallow onset. Penetration without aspiration (PAS 2) during trials of thin liquid via straw (successive swallows); No penetration nor aspiration with thin liquids via cup (PAS 1) Response to Aspiration, Thin Liquid (MBSS): unproductive reflexive involuntary cough,       Nectar Thick Liquids (MBSS) Rosenbek's Penetration Aspiration Scale, Nectar thick liquids (MBSS): 2. PENETRATION that CLEARS - contrast enter airway, above vocal cords, no residue       Honey Thick Liquids (MBSS) Rosenbek's Penetration Aspiration Scale, Honey thick liquids (MBSS): 1. NO ASPIRATION & NO PENETRATION - no aspiration, contrast does not enter airway     Response to Pharyngeal Residue, Honey thick liquids (MBSS): no spontaneous response,    Purees (MBSS) Rosenbek's Penetration Aspiration Scale, Purees (MBSS): 1. NO ASPIRATION & NO PENETRATION - no aspiration, contrast does not enter airway     Response to Pharyngeal Residue, Purees (MBSS): no spontaneous response, Clears with thin liquid wash,     Solids (MBSS) Rosenbek's Penetration Aspiration Scale, Solids (MBSS): 1. NO ASPIRATION & NO PENETRATION - no aspiration, contrast does not enter airway   Response to Pharyngeal Residue, Solids (MBSS): no spontaneous response, Clears with thin liquid wash. ,     Speech Therapy section of this report signed by Corinne E. Casey, M.A. CCC-SLP on 1/8/2024 at 1:41 pm.   RADIOLOGY FINDINGS: Prolonged oral phase with uncoordinated mastication/lingual motion. Single episode of aspiration with thin barium. A couple of additional episodes of laryngeal penetration without aspiration. No laryngeal penetration or aspiration seen with thicker consistencies of barium. Retention of barium is noted in the vallecula and piriform sinuses.   Radiology section of this report signed by .       Dysphagia as above.   MACRO: None   Signed by: Roby Villarreal 1/8/2024 4:31 PM Dictation workstation:   [id number]    US renal complete    Result Date: 1/8/2024  Interpreted By:  Cathy Hernandez, STUDY: US RENAL COMPLETE; 1/8/2024 11:45 am   INDICATION: Acute renal insufficiency.   COMPARISON: None.   ACCESSION NUMBER(S): [id number]   ORDERING CLINICIAN: PERCY CABRERA   TECHNIQUE: Grayscale and color Doppler imaging of the kidneys is performed portably..   FINDINGS: The right kidney measures 12.1 cm x 7.2 cm x 5.1 cm. A simple right renal cyst measuring 1.9 cm in size is seen. The left kidney measures 12.6 cm x 5.6 cm x 6.4 cm. There is no shadowing calculus, hydronephrosis, or solid mass identified. The renal cortical echogenicity is normal bilaterally with renal cortical thickness within normal limits.   Cursory evaluation of urinary bladder shows presence of a Rodriguez catheter with bladder  decompressed.       1.9 cm simple right renal cyst with unremarkable appearance of the left kidney.   No renal atrophy or hydronephrosis.   MACRO: None.   Signed by: Cathy Hernandez 1/8/2024 12:04 PM Dictation workstation:   [id number]    XR chest 1 view    Result Date: 1/7/2024  Interpreted By:  Cathy Hernandez, STUDY: XR CHEST 1 VIEW 1/7/2024 7:36 am   INDICATION: Follow-up pneumonia   COMPARISON: 05/29/2014 as well as CT examination of the chest done on 01/05/2024.   ACCESSION NUMBER(S): [id number]   ORDERING CLINICIAN: EMILY MAKI   TECHNIQUE: AP erect view of the chest   FINDINGS: The heart is at the upper limits of normal in size. There is infiltrate and airspace consolidation observed centrally within the right lung with upper lobar predominance. There is also some patchy infiltrate at the left lung base. No obvious pleural abnormality is seen.       Infiltrate and airspace consolidation identified within the right lung most pronounced centrally and within the right upper lobe with mild left basilar infiltrate.   Signed by: Cathy Hernandez 1/7/2024 8:11 AM Dictation workstation:   [id number]    CT chest abdomen pelvis w IV contrast    Result Date: 1/5/2024  Interpreted By:  Yordy Urena, STUDY: CT CHEST ABDOMEN PELVIS W IV CONTRAST;  1/5/2024 6:33 pm   INDICATION: Signs/Symptoms:abd pain, sob.   COMPARISON: None.   ACCESSION NUMBER(S): [id number]   ORDERING CLINICIAN: NELLA MELGOZA   TECHNIQUE: Contiguous axial images of the chest, abdomen and pelvis were obtained after the intravenous administration of  contrast. Coronal and sagittal reformatted images were obtained from the axial images.   FINDINGS: CT CHEST:   No axillary lymphadenopathy. 1.6 cm right paratracheal lymph node and 1.5 cm subcarinal lymph node. There is limited evaluation for hilar lymphadenopathy secondary stripping motion.   The heart is normal in size. Coronary artery vascular calcifications. No significant pericardial effusion.   There is  pulmonary emphysematous change. There is consolidative opacity in the right upper lobe and right lower lobe compatible with pneumonia and also minimal opacity in the left lower lobe. Trace pleural effusions. No pneumothorax.   Multilevel degenerative change of the thoracic spine.     CT ABDOMEN PELVIS:   Evaluation of the abdomen and pelvis is limited secondary to patient motion. No evidence of liver mass. The gallbladder is present, not well evaluated secondary to motion. No dilatation common bile duct.   Atrophy of the pancreas.   No splenomegaly.   Question mild nodularity of the left adrenal gland. The right adrenal gland appears unremarkable.   Symmetric enhancement of the kidneys. Right renal cysts with the largest measuring 3.1 cm and several bowel subcentimeter hypodensity too small to characterize. No hydronephrosis.   Atherosclerotic calcification of the aorta and abdominal and pelvic vasculature.   No evidence of bowel obstruction. Evaluation the bowel is otherwise limited secondary patient motion.   Urinary bladder is underdistended and not well evaluated. 18 mm right posterior bladder diverticulum.   Postsurgical change of left hip arthroplasty resulting in beam hardening artifact and limiting evaluation the pelvis.   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       Consolidative opacity in the right upper lobe and lower lobe compatible with pneumonia and also mild left basilar opacity and trace pleural effusions.   No definite evidence of acute abnormality of the abdominal viscera within the limitations of the motion degraded examination.   MACRO: None   Signed by: Yordy Urena 1/5/2024 6:49 PM Dictation workstation:   [id number]              Assessment/Plan   Principal Problem:    Sepsis (CMS/HCC)  Active Problems:    Lactic acid acidosis    Acute renal failure (ARF) (CMS/HCC)    Leukocytosis    Bilateral pneumonia    Chronic respiratory failure (CMS/HCC)    Hypokalemia    Dehydration     Transaminitis    Acute encephalopathy    Hypotension    Decreased activities of daily living (ADL)      Stable for discharge from our perspective.  We will sign off.  Please reconsult as needed or call with any questions.    Jaren Zapien MD  Pulmonary/ Medicine  [facility]     LOS: 11 days

## 2024-01-18 LAB — GLUCOSE BLD MANUAL STRIP-MCNC: 152 MG/DL (ref 74–99)

## 2024-01-18 PROCEDURE — 94760 N-INVAS EAR/PLS OXIMETRY 1: CPT

## 2024-01-18 PROCEDURE — 94640 AIRWAY INHALATION TREATMENT: CPT

## 2024-01-18 PROCEDURE — 97535 SELF CARE MNGMENT TRAINING: CPT | Mod: GO

## 2024-01-18 PROCEDURE — 97110 THERAPEUTIC EXERCISES: CPT | Mod: GO

## 2024-01-18 PROCEDURE — 2500000001 HC RX 250 WO HCPCS SELF ADMINISTERED DRUGS (ALT 637 FOR MEDICARE OP): Performed by: INTERNAL MEDICINE

## 2024-01-18 PROCEDURE — 92526 ORAL FUNCTION THERAPY: CPT | Mod: GN | Performed by: SPEECH-LANGUAGE PATHOLOGIST

## 2024-01-18 PROCEDURE — 2500000002 HC RX 250 W HCPCS SELF ADMINISTERED DRUGS (ALT 637 FOR MEDICARE OP, ALT 636 FOR OP/ED): Performed by: INTERNAL MEDICINE

## 2024-01-18 PROCEDURE — 9420000001 HC RT PATIENT EDUCATION 5 MIN

## 2024-01-18 PROCEDURE — 2500000005 HC RX 250 GENERAL PHARMACY W/O HCPCS: Performed by: INTERNAL MEDICINE

## 2024-01-18 PROCEDURE — 97530 THERAPEUTIC ACTIVITIES: CPT | Mod: GP

## 2024-01-18 PROCEDURE — 2060000001 HC INTERMEDIATE ICU ROOM DAILY

## 2024-01-18 PROCEDURE — 2500000004 HC RX 250 GENERAL PHARMACY W/ HCPCS (ALT 636 FOR OP/ED): Performed by: INTERNAL MEDICINE

## 2024-01-18 PROCEDURE — 82947 ASSAY GLUCOSE BLOOD QUANT: CPT

## 2024-01-18 RX ORDER — IPRATROPIUM BROMIDE AND ALBUTEROL SULFATE 2.5; .5 MG/3ML; MG/3ML
3 SOLUTION RESPIRATORY (INHALATION)
Status: DISCONTINUED | OUTPATIENT
Start: 2024-01-18 | End: 2024-01-19 | Stop reason: HOSPADM

## 2024-01-18 RX ORDER — POTASSIUM CHLORIDE 1.5 G/1.58G
20 POWDER, FOR SOLUTION ORAL ONCE
Status: COMPLETED | OUTPATIENT
Start: 2024-01-18 | End: 2024-01-18

## 2024-01-18 RX ADMIN — PANTOPRAZOLE SODIUM 40 MG: 40 TABLET, DELAYED RELEASE ORAL at 06:02

## 2024-01-18 RX ADMIN — APIXABAN 5 MG: 5 TABLET, FILM COATED ORAL at 22:10

## 2024-01-18 RX ADMIN — POLYETHYLENE GLYCOL 3350 17 G: 17 POWDER, FOR SOLUTION ORAL at 22:15

## 2024-01-18 RX ADMIN — SACUBITRIL AND VALSARTAN 1 TABLET: 24; 26 TABLET, FILM COATED ORAL at 22:13

## 2024-01-18 RX ADMIN — NYSTATIN 500000 UNITS: 100000 SUSPENSION ORAL at 17:13

## 2024-01-18 RX ADMIN — METOPROLOL TARTRATE 25 MG: 25 TABLET, FILM COATED ORAL at 09:50

## 2024-01-18 RX ADMIN — IPRATROPIUM BROMIDE AND ALBUTEROL SULFATE 3 ML: 2.5; .5 SOLUTION RESPIRATORY (INHALATION) at 11:50

## 2024-01-18 RX ADMIN — ASPIRIN 81 MG: 81 TABLET, COATED ORAL at 09:50

## 2024-01-18 RX ADMIN — IPRATROPIUM BROMIDE AND ALBUTEROL SULFATE 3 ML: 2.5; .5 SOLUTION RESPIRATORY (INHALATION) at 07:39

## 2024-01-18 RX ADMIN — Medication 3 L/MIN: at 15:00

## 2024-01-18 RX ADMIN — FINASTERIDE 5 MG: 5 TABLET, FILM COATED ORAL at 09:50

## 2024-01-18 RX ADMIN — LEVOTHYROXINE SODIUM 50 MCG: 0.05 TABLET ORAL at 06:01

## 2024-01-18 RX ADMIN — Medication: at 07:00

## 2024-01-18 RX ADMIN — NYSTATIN 500000 UNITS: 100000 SUSPENSION ORAL at 22:15

## 2024-01-18 RX ADMIN — SACUBITRIL AND VALSARTAN 1 TABLET: 24; 26 TABLET, FILM COATED ORAL at 14:41

## 2024-01-18 RX ADMIN — QUETIAPINE FUMARATE 12.5 MG: 25 TABLET ORAL at 22:12

## 2024-01-18 RX ADMIN — FERROUS SULFATE TAB 325 MG (65 MG ELEMENTAL FE) 1 TABLET: 325 (65 FE) TAB at 09:50

## 2024-01-18 RX ADMIN — IPRATROPIUM BROMIDE AND ALBUTEROL SULFATE 3 ML: 2.5; .5 SOLUTION RESPIRATORY (INHALATION) at 19:11

## 2024-01-18 RX ADMIN — TRAZODONE HYDROCHLORIDE 25 MG: 50 TABLET ORAL at 22:10

## 2024-01-18 RX ADMIN — APIXABAN 5 MG: 5 TABLET, FILM COATED ORAL at 09:50

## 2024-01-18 RX ADMIN — METOPROLOL TARTRATE 25 MG: 25 TABLET, FILM COATED ORAL at 22:11

## 2024-01-18 RX ADMIN — NYSTATIN 500000 UNITS: 100000 SUSPENSION ORAL at 14:41

## 2024-01-18 RX ADMIN — QUETIAPINE FUMARATE 12.5 MG: 25 TABLET ORAL at 09:50

## 2024-01-18 RX ADMIN — NYSTATIN 500000 UNITS: 100000 SUSPENSION ORAL at 06:01

## 2024-01-18 RX ADMIN — POTASSIUM CHLORIDE 20 MEQ: 1.5 POWDER, FOR SOLUTION ORAL at 09:50

## 2024-01-18 ASSESSMENT — COGNITIVE AND FUNCTIONAL STATUS - GENERAL
MOBILITY SCORE: 8
PERSONAL GROOMING: A LITTLE
DAILY ACTIVITIY SCORE: 12
MOBILITY SCORE: 8
DAILY ACTIVITIY SCORE: 12
HELP NEEDED FOR BATHING: A LOT
EATING MEALS: A LITTLE
DRESSING REGULAR UPPER BODY CLOTHING: A LOT
MOVING FROM LYING ON BACK TO SITTING ON SIDE OF FLAT BED WITH BEDRAILS: A LOT
MOVING FROM LYING ON BACK TO SITTING ON SIDE OF FLAT BED WITH BEDRAILS: A LOT
HELP NEEDED FOR BATHING: A LOT
CLIMB 3 TO 5 STEPS WITH RAILING: TOTAL
MOVING TO AND FROM BED TO CHAIR: TOTAL
TURNING FROM BACK TO SIDE WHILE IN FLAT BAD: A LOT
PERSONAL GROOMING: A LITTLE
CLIMB 3 TO 5 STEPS WITH RAILING: TOTAL
STANDING UP FROM CHAIR USING ARMS: TOTAL
TOILETING: TOTAL
TURNING FROM BACK TO SIDE WHILE IN FLAT BAD: A LOT
DRESSING REGULAR UPPER BODY CLOTHING: A LOT
DRESSING REGULAR LOWER BODY CLOTHING: TOTAL
WALKING IN HOSPITAL ROOM: TOTAL
STANDING UP FROM CHAIR USING ARMS: TOTAL
EATING MEALS: A LITTLE
DRESSING REGULAR LOWER BODY CLOTHING: TOTAL
WALKING IN HOSPITAL ROOM: TOTAL
MOVING TO AND FROM BED TO CHAIR: TOTAL
TOILETING: TOTAL

## 2024-01-18 ASSESSMENT — PAIN SCALES - GENERAL
PAINLEVEL_OUTOF10: 0 - NO PAIN
PAINLEVEL_OUTOF10: 5 - MODERATE PAIN
PAINLEVEL_OUTOF10: 0 - NO PAIN
PAINLEVEL_OUTOF10: 6

## 2024-01-18 ASSESSMENT — PAIN SCALES - WONG BAKER: WONGBAKER_NUMERICALRESPONSE: NO HURT

## 2024-01-18 ASSESSMENT — PAIN - FUNCTIONAL ASSESSMENT
PAIN_FUNCTIONAL_ASSESSMENT: 0-10

## 2024-01-18 ASSESSMENT — ACTIVITIES OF DAILY LIVING (ADL): HOME_MANAGEMENT_TIME_ENTRY: 10

## 2024-01-18 NOTE — PROGRESS NOTES
Speech-Language Pathology    Inpatient Speech Language Pathology Dysphagia Treatment note     Patient Name: Navarro Rabago  MRN: [id number]  : 1945  Today's Date: 24  Time Calculation  Start Time: 1035  Stop Time: 1106  Time Calculation (min): 31 min     Total Number of Visits: 3/3 (, MON )    1. Sepsis (CMS/HCC)        2. Hypotension due to hypovolemia  Transthoracic Echo (TTE) Complete    Transthoracic Echo (TTE) Complete      3. Chronic respiratory failure with hypoxia (CMS/HCC)  Transthoracic Echo (TTE) Complete    Transthoracic Echo (TTE) Complete      4. Acute encephalopathy [G93.40]        5. Impaired mobility            Past Medical History Relevant to Rehab: CHF, afib, CAD, COPD, HTN, hernia    PLAN:  Skilled speech therapy for dysphagia treatment continues to be warranted to provide training and instruction regarding the use of compensatory swallow strategies, for pt/caregiver education in order to reduce risk of aspiration, dehydration and malnutrition. , to assess tolerance of diet , to determine ability to upgrade diet after PO trials with SLP      Inpatient/Swing Bed or Outpatient: Inpatient  Treatment/Interventions: dysphagia tx  SLP TX Plan: Continue Plan of Care  SLP Plan: Skilled SLP  SLP Frequency: 3x per week  Duration: Current admission  SLP Discharge Recommendations: Continue skilled SLP services at the next level of care  Next Treatment Priority: diet tolerance, possibility of upgrade; trial of soft/bite sized  Discussed POC: Patient, Caregiver/family  Discussed Risks/Benefits: Yes, Patient, Caregiver/Family  Patient/Caregiver Agreeable: Yes    Recommended Diet:   Solid Diet Recommendations: Minced & moist/ground (IDDSI Level 5)  Liquid Diet Recommendations: Thin (IDDSI Level 0)  Compensatory strategies: small bites/sips, upright 90 degrees for intake   Medication administration: whole in puree    Subjective:  Pt. seen at bedside for skilled dysphagia treatment. Pt's wife  present for session  Pt reported pain of his entire upper trunk.   Pt reported that this, as well as bitterness from meds crushed in applesauce, have interfered with his appetite.  Pain:  Pain Assessment  Pain Assessment: 0-10  Pain Score: 6  Pain Type: Chronic pain  Pain Location: Abdomen  Pain Interventions: Repositioned Denies pain        Oxygen Status:   nasal cannula       Therapeutic Swallow Intervention : Compensatory Strategies, PO Trials  Solid Diet Recommendations: Minced & moist/ground (IDDSI Level 5)  Liquid Diet Recommendations: Thin (IDDSI Level 0)  Swallow Comments:  Oral pain from thrush is resolving    Goals:   1) Patient will tolerate recommended diet without observed clinical signs of aspiration,   Current Session: tolerated PO trials of thin liquids via cup without overt s/s of aspiration; prolonged/inadequate mastication noted with soft/bite sized foods (trial cheerios softened in milk). Pt expectorates all solids trials.  Pt required max verbal cues for appropriate positioning throughout all trials and was not accepting of need for upright (>35*) positioning, max allowed by pt was 50* for 30 seconds max.     2) Patient will demonstrate appropriate strategies for swallowing safety,   Current Session: Reviewed strategies with pt with poor follow through during PO trials. Pt dependent on max verbal and visual cues to follow strategies (listed above); follow through with resistance.     3) Patient will progress to advanced diet   Current Session: Trial of advanced consistency given (Bite sized/soft).  Prolonged/inadequate mastication due to lack of upper dentition. Expectoration of solids. Increased respiratory rate on exertion from mastication.     4) Pt to participate in assessment of oropharyngeal swallow function via MBSS for assessment of possible aspiration and to determine least restrictive diet for meeting pt's nutritional and hydration needs.  - Goal met 1/8    5) Pt to participate in  oropharyngeal therapeutic exercises to help improve oropharyngeal swallow function.   Current Session: not targeted    SLP Assessment:  Pt safe to continue currently ordered diet of minced & moist with thin liquids via cup.  Pt may have medications whole in puree consistency.  Pt not safe for upgrade at this time, but recommend continue trials with cues for use of strategies.     SLP TX Intervention Outcome: No Progress Made  SLP Assessment Results: Other (Comment) (dysphagia and reduced appetite/oral intake)  Treatment Provided: Yes; skilled dysphagia therapy at bedside  Treatment Tolerance: Treatment limited secondary to medical complications  Medical Staff Made Aware: Yes      Treatment Outcome:  SLP TX Intervention Outcome: Making Progress Towards Goals    Treatment Tolerance: Patient tolerated treatment well  Prognosis: Good   Barriers: Cognition         Inpatient Education:  Individual(s) Educated: Patient, Spouse  Verbal Education : diet recommendations, risk of aspiration, improvements made, POC  Risk and Benefits Discussed with Patient/Caregiver/Other: yes  Patient/Caregiver Demonstrated Understanding: yes  Plan of Care Discussed and Agreed Upon: yes  Patient Response to Education: Patient/Caregiver Verbalized Understanding of Information.  Pt impulsive with strategies and will require further review/training.

## 2024-01-18 NOTE — CARE PLAN
Problem: Skin  Goal: Prevent/minimize sheer/friction injuries  Recent Flowsheet Documentation  Taken 1/18/2024 0831 by Maddy Kaur RN  Prevent/minimize sheer/friction injuries: HOB 30 degrees or less  Goal: Promote/optimize nutrition  Recent Flowsheet Documentation  Taken 1/18/2024 0831 by Maddy Kaur RN  Promote/optimize nutrition: Assist with feeding   The patient's goals for the shift include  comfort and safety    The clinical goals for the shift include comfort and safety    Over the shift, the patient did not make progress toward the following goals. Barriers to progression include confusion and restlessness. Recommendations to address these barriers include attempting to re-orient.

## 2024-01-18 NOTE — PROGRESS NOTES
"Navarro Rabago is a 78 y.o. male on day 12 of admission presenting with Sepsis (CMS/[facility]).    Subjective   Patient was a little agitated last evening, but otherwise no acute medical events.      Objective   No telemetry events overnight  Physical Exam  Generally no acute distress  HEENT PERRL EOMI  Cardiovascular S1-S2 heard regular rate rhythm  Lungs diminished anteriorly  Abdomen bowel sounds present  Extremities no clubbing cyanosis edema  Last Recorded Vitals  Blood pressure 122/69, pulse 76, temperature 35.6 °C (96.1 °F), temperature source Temporal, resp. rate 21, height 1.956 m (6' 5\"), weight 114 kg (251 lb 12.3 oz), SpO2 96 %.  Intake/Output last 3 Shifts:  I/O last 3 completed shifts:  In: - (0 mL/kg)   Out: 1900 (16.6 mL/kg) [Urine:1900 (0.5 mL/kg/hr)]  Weight: 114.2 kg     Relevant Results  Scheduled medications  apixaban, 5 mg, oral, q12h DARIA  aspirin, 81 mg, oral, Daily  ferrous sulfate (325 mg ferrous sulfate), 1 tablet, oral, Daily with breakfast  finasteride, 5 mg, oral, Daily  ipratropium-albuteroL, 3 mL, nebulization, 4x daily  levothyroxine, 50 mcg, oral, Daily  metoprolol tartrate, 25 mg, oral, BID  [Held by provider] midodrine, 5 mg, oral, q8h  nystatin, 5 mL, Swish & Swallow, 4x daily  oxygen, , inhalation, q8h  pantoprazole, 40 mg, oral, Daily before breakfast  polyethylene glycol, 17 g, oral, BID  QUEtiapine, 12.5 mg, oral, BID      Continuous medications     PRN medications  PRN medications: acetaminophen, benzocaine-menthol, calcium carbonate, dextromethorphan-guaifenesin, dextrose 10 % in water (D10W), dextrose, glucagon, guaiFENesin, LORazepam, ondansetron ODT **OR** ondansetron, traZODone  No results found for this or any previous visit (from the past 24 hour(s)).                  Impression: 78-year-old gentleman admitted with likely aspiration pneumonia and hypotension in the setting of a history of ischemic cardiomyopathy.    Plan:    1.  Pneumonia/sepsis  -Resolved, continue with " pulmonary toilet while in the hospital, incentive spirometry    2.  Acute renal insufficiency  -Resolved, creatinine normalized    3.  Ischemic cardiomyopathy  -Blood pressure stable off midodrine  -Restarted patient's beta-blocker, no ectopy since starting, optimize electrolytes potassium 4-4.5  -Can start adding back previously held medications such as Entresto    4.  Aspiration  -Appreciate recommendations by speech therapy.  Continue with recommended diet.    GI and DVT prophylaxis, on apixaban for A-fib    Still awaiting dispo SNF        Assessment/Plan   Principal Problem:    Sepsis (CMS/HCC)  Active Problems:    Lactic acid acidosis    Acute renal failure (ARF) (CMS/HCC)    Leukocytosis    Bilateral pneumonia    Chronic respiratory failure (CMS/HCC)    Hypokalemia    Dehydration    Transaminitis    Acute encephalopathy    Hypotension    Decreased activities of daily living (ADL)           I spent 25 minutes in the professional and overall care of this patient.      Ulises Tariq MD

## 2024-01-18 NOTE — PROGRESS NOTES
Occupational Therapy  Treatment        Patient Name: Navarro Rabago  MRN: [id number]  : 1945  Today's Date: 24  Time Calculation  Start Time: 1410  Stop Time: 1435  Time Calculation (min): 25 min     Assessment:  OT Assessment: Pt was receptive to therapy and instructions throughout.  Prognosis: Good  Evaluation/Treatment Tolerance: Patient limited by fatigue  Medical Staff Made Aware: Yes  End of Session Communication: Bedside nurse  End of Session Patient Position: Bed, 4 rail up, Alarm on  Prognosis: Good  Evaluation/Treatment Tolerance: Patient limited by fatigue  Medical Staff Made Aware: Yes    Plan:  Treatment Interventions: Functional transfer training, UE strengthening/ROM, Endurance training, Patient/family training  OT Frequency: 4 times per week  OT Discharge Recommendations: Moderate intensity level of continued care  Equipment Recommended upon Discharge: Lift  OT Recommended Transfer Status: Assist of 2  OT - OK to Discharge: Yes  Treatment Interventions: Functional transfer training, UE strengthening/ROM, Endurance training, Patient/family training    Subjective   Current Problem:  1. Chronic respiratory failure with hypoxia (CMS/HCC)  Transthoracic Echo (TTE) Complete    Transthoracic Echo (TTE) Complete    XR chest 2 views    CANCELED: XR chest 2 views      2. Sepsis (CMS/HCC)        3. Hypotension due to hypovolemia  Transthoracic Echo (TTE) Complete    Transthoracic Echo (TTE) Complete      4. Acute encephalopathy [G93.40]        5. Impaired mobility          General:   OT Received On: 24  General  Prior to Session Communication: Bedside nurse  Patient Position Received: Bed, 4 rail up, Alarm on  Preferred Learning Style: verbal  General Comment: Agreeable for therapy.  Spouse present and receptive as well.    Precautions:  Medical Precautions: Fall precautions, Oxygen therapy device and L/min (4L via NC)    Pain:  Pain Assessment  Pain Assessment: 0-10  Pain Score: 5 - Moderate  "pain  Pain Location: Abdomen    Objective     Cognition:  Overall Cognitive Status:  (Pt extremely fearful of falling and expressed unrealistic expectations for goals given current deficits:  \"Can you take me on the stairs?\")     Bed Mobility/Transfers: Bed Mobility  Bed Mobility: Yes  Bed Mobility 1  Bed Mobility 1: Rolling right, Rolling left  Level of Assistance 1: Maximum assistance  Bed Mobility Comments 1: using siderails  Bed Mobility 2  Bed Mobility  2: Sitting to supine  Level of Assistance 2: Maximum assistance (x2)  Bed Mobility Comments 2: toward the right with head of bed elevated ~40 degrees  Bed Mobility 3  Bed Mobility 3: Sitting to supine  Level of Assistance 3: Maximum assistance (x2)    Therapy/Activity: Therapeutic Exercise  Therapeutic Exercise Performed: Yes (orange theraband exs, 3 x 15 reps each, to increase UE strength and functional endurance needed for ADLs)    OP EDUCATION:  Education  Individual(s) Educated: Patient  Education Provided: Other (progressive strengthening and functional transfers)  Patient Response to Education: Patient/Caregiver Verbalized Understanding of Information    Goals:  Encounter Problems       Encounter Problems (Active)       OT Goals       ADLs (Progressing)       Start:  01/08/24    Expected End:  01/30/24       Patient will perform ADLs with MOD A using AE/compensatory techniques as needed.          Sitting Tolerance (Progressing)       Start:  01/08/24    Expected End:  01/30/24       Patient will demonstrate improved sitting tolerance AEB completing EOB activities for >/= 15 minutes with supervision assist for stability.         UE Strengthening (Progressing)       Start:  01/08/24    Expected End:  01/30/24       Patient will improve UE strength to 3-/5 in preparation for functional transfers.                 "

## 2024-01-18 NOTE — PROGRESS NOTES
Physical Therapy    Physical Therapy Treatment    Patient Name: Navarro Rabago  MRN: [id number]  Today's Date: 1/18/2024  Time Calculation  Start Time: 0911  Stop Time: 0939  Time Calculation (min): 28 min       Assessment/Plan   PT Assessment  Evaluation/Treatment Tolerance: Patient limited by fatigue  End of Session Communication: Bedside nurse  End of Session Patient Position: Bed, 3 rail up, Alarm on  PT Plan  Inpatient/Swing Bed or Outpatient: Inpatient  PT Plan  Treatment/Interventions: Bed mobility, Transfer training, Gait training, Balance training, Strengthening, Endurance training, Range of motion, Therapeutic exercise, Therapeutic activity, Postural re-education, Positioning  PT Plan: Skilled PT  PT Frequency: 4 times per week  PT Discharge Recommendations: Moderate intensity level of continued care  Equipment Recommended upon Discharge: Lift, Wheeled walker  PT Recommended Transfer Status: Total assist  PT - OK to Discharge: Yes      General Visit Information:   PT  Visit  PT Received On: 01/18/24  Response to Previous Treatment: Patient with no complaints from previous session.  General  Family/Caregiver Present: No  Prior to Session Communication: Bedside nurse  Patient Position Received: Bed, 4 rail up, Alarm on  Preferred Learning Style: verbal  General Comment: Agreeable to PT follow up    Subjective   Precautions:  Precautions  Medical Precautions: Fall precautions    Objective   Pain:  Pain Assessment  Pain Assessment: 0-10  Cognition:  Cognition  Overall Cognitive Status: Impaired  Orientation Level: Disoriented to place, Disoriented to time, Disoriented to situation  Cognition Comments: Limited memory to 30s at a time, not aware he is even on a bedpan, forgets he sat up with therapy by end of session  Insight: Severe  Impulsive: Severely  Processing Speed: Delayed    Activity Tolerance:  Activity Tolerance  Endurance: Decreased tolerance for upright activites  Activity Tolerance Comments:  Tolerates sitting at EOB for ~30s before max fatigue. Max A to remain sitting and balance during this time  Treatments:  Bed Mobility  Bed Mobility: Yes  Bed Mobility 1  Bed Mobility 1: Supine to sitting  Level of Assistance 1: Maximum assistance  Bed Mobility Comments 1: assist for BLEs and trunk control  Bed Mobility 2  Bed Mobility  2: Sitting to supine  Level of Assistance 2: Maximum assistance  Bed Mobility Comments 2: assist for BLEs and trunk control  Bed Mobility 3  Bed Mobility 3: Scooting  Level of Assistance 3: Dependent (x2)  Bed Mobility Comments 3: with use of PCA and green draw sheet  Bed Mobility 4  Bed Mobility 4: Rolling left, Rolling right  Level of Assistance 4: Maximum assistance  Bed Mobility Comments 4: numerous attempts L/R for hygeine needs. Cues for rail use and technique    Transfers  Transfer: No    Outcome Measures:  [facility] Basic Mobility  Turning from your back to your side while in a flat bed without using bedrails: A lot  Moving from lying on your back to sitting on the side of a flat bed without using bedrails: A lot  Moving to and from bed to chair (including a wheelchair): Total  Standing up from a chair using your arms (e.g. wheelchair or bedside chair): Total  To walk in hospital room: Total  Climbing 3-5 steps with railing: Total  Basic Mobility - Total Score: 8    Education Documentation  Mobility Training, taught by Saeed Lock, PT at 1/18/2024 11:02 AM.  Learner: Patient  Readiness: Acceptance  Method: Demonstration, Explanation  Response: Needs Reinforcement, No Evidence of Learning  Comment: safe mobility techniques, importance of OOB activity    Education Comments  No comments found.        OP EDUCATION:       Encounter Problems       Encounter Problems (Active)       Balance       Sitting Balance (Progressing)       Start:  01/08/24    Expected End:  01/16/24       Pt will demonstrate good sitting balance to promote safe engagement with out of bed activities            Standing Balance (Progressing)       Start:  01/08/24    Expected End:  01/16/24       Pt will demonstrate good static standing balance to promote safe participation with out of bed activity, transfers, and mobility              Mobility       Ambulation (Progressing)       Start:  01/08/24    Expected End:  01/16/24       Pt will ambulate 50' modified independent assist with walker to promote safe home mobility              Pain - Adult          Safety       Safe Mobility Techniques (Progressing)       Start:  01/08/24    Expected End:  01/16/24       Pt will correctly identify and demonstrate safe mobility techniques to reduce their risks for falls during their acute care stay               Transfers       Supine to sit (Progressing)       Start:  01/08/24    Expected End:  01/16/24       Pt will transfer supine to sitting at edge of bed with modified independent assist to promote acute care out of bed activity           Sit to stand (Progressing)       Start:  01/08/24    Expected End:  01/16/24       Pt will transfer sit to standing position with modified independent assist and walker to promote safe out of bed activity           Bed to chair (Progressing)       Start:  01/08/24    Expected End:  01/16/24       Pt will transfer from sitting edge of bed to the chair with modified independent assist and walker to promote out of bed activity and reduce the risks of prolonged acute care bedrest

## 2024-01-18 NOTE — PROGRESS NOTES
Patient is from home with spouse.  Therapy recommends MODERATE intensity at discharge.  Patient has been accepted by [facility] in [place].  Precert initiated 1/17/24 with pending ref#[id number].  Awaiting precert (could take 48-72 hrs).  N2N report number is [phone].  Patient is medically clear for discharge to [facility] when precert is received.  RN T[facility] following.    Eloina Crespo RN

## 2024-01-18 NOTE — PROGRESS NOTES
Navarro Rabago is a 78 y.o. male on day 12 of admission presenting with Sepsis (CMS/HCC).    Subjective   Interval History:   Afebrile  Reports an occasional cough   Working with therapy when evaluated  Discussed with nursing    Objective   Range of Vitals (last 24 hours)  Heart Rate:  [73-84]   Temp:  [35.6 °C (96.1 °F)-36.4 °C (97.5 °F)]   Resp:  [18-36]   BP: (114-144)/(63-81)   Weight:  [114 kg (251 lb 12.3 oz)]   SpO2:  [95 %-100 %]   Daily Weight  01/18/24 : 114 kg (251 lb 12.3 oz)    Body mass index is 29.86 kg/m².    Physical Exam  Constitutional:       Comments:  Awake and alert  HENT:      Head: Normocephalic and atraumatic.      Nose: Nose normal.    Eyes:      Extraocular Movements: Extraocular movements intact.      Conjunctiva/sclera: Conjunctivae normal.   Cardiovascular:      Rate and Rhythm: Normal rate and regular rhythm.   Pulmonary:      Effort: Pulmonary effort is normal.      Breath sounds:  No wheezing or rales present.   Abdominal:      General: Bowel sounds are normal.      Palpations: Abdomen is soft.   Musculoskeletal:         General: Normal range of motion.      Cervical back: Normal range of motion and neck supple.   Skin:     General: Skin is warm and dry.   Neurological:      General: No focal deficit present.      Mental Status: He is alert.   Psychiatric:         Mood and Affect: Mood normal.         Behavior: Behavior normal.     Antibiotics  sodium chloride 0.9% infusion  heparin (porcine) injection 5,000 Units  sodium chloride 0.9 % with KCl 20 mEq/L infusion  calcium carbonate (Tums) chewable tablet 500 mg  ondansetron ODT (Zofran-ODT) disintegrating tablet 4 mg  ondansetron (Zofran) injection 4 mg  benzocaine-menthol (Cepastat Sore Throat) 15-3.6 mg lozenge 1 lozenge  guaiFENesin (Mucinex) 12 hr tablet 600 mg  dextromethorphan-guaifenesin (Robitussin DM)  mg/5 mL oral liquid 5 mL  dextrose 50 % injection 25 g  glucagon (Glucagen) injection 1 mg  dextrose 10 % in water  (D10W) infusion  ipratropium-albuteroL (Duo-Neb) 0.5-2.5 mg/3 mL nebulizer solution 3 mL  oxygen (O2) therapy  piperacillin-tazobactam-dextrose (Zosyn) IV 3.375 g  pantoprazole (ProtoNix) injection 40 mg  ipratropium-albuteroL (Duo-Neb) 0.5-2.5 mg/3 mL nebulizer solution 3 mL  allopurinol (Zyloprim) tablet  apixaban (Eliquis) tablet  aspirin EC tablet  atorvastatin (Lipitor) tablet  bumetanide (Bumex) tablet  colchicine tablet  empagliflozin (Jardiance) tablet  fexofenadine (Allegra) tablet  finasteride (Propecia, Proscar) tablet  guaiFENesin (Mucinex) 12 hr tablet  levothyroxine (Synthroid, Levoxyl) tablet  nitroglycerin (Nitrostat) SL tablet  pantoprazole (ProtoNix) EC tablet  potassium chloride SA (Klor-Con M20) ER tablet  tamsulosin (Flomax) 24 hr capsule  vancomycin 1.5 mg in 300 mL (Xellia) IVPB 1.5 g      levothyroxine (Synthroid, Levoxyl) tablet 50 mcg  finasteride (Proscar) tablet 5 mg  ferrous sulfate (325 mg ferrous sulfate) tablet 1 tablet  aspirin EC tablet 81 mg  apixaban (Eliquis) tablet 5 mg  metoprolol tartrate (Lopressor) tablet 25 mg  potassium chloride 20 mEq in 100 mL IV premix  midodrine (Proamatine) tablet 5 mg  sodium chloride 0.9 % bolus 500 mL  norepinephrine (Levophed) 8 mg in dextrose 5% 250 mL (0.032 mg/mL) infusion (premix)  sodium chloride 0.9 % bolus 250 mL  sodium chloride 0.9 % bolus 250 mL  azithromycin (Zithromax) in dextrose 5 % in water (D5W) 250 mL  mg  vancomycin 1.5 mg in 300 mL (Xellia) IVPB 1.5 g  apixaban (Eliquis) tablet 2.5 mg  vancomycin (Xellia) 1 g in 200 mL (Xellia) IVPB 1,000 mg  norepinephrine (Levophed) 8 mg in dextrose 5% 250 mL (0.032 mg/mL) infusion (premix)  acetaminophen (Tylenol) tablet 650 mg  vancomycin 1.5 mg in 300 mL (Xellia) IVPB 1.5 g  sodium bicarbonate 150 mEq in dextrose 5 % in water (D5W) 1,000 mL infusion  midodrine (Proamatine) tablet 5 mg  barium sulfate (Varibar Honey) 40 % (w/v) 29% (w/w) suspension 10 mL  barium sulfate (Varibar  "Pudding) 40 % (w/v), 30% (w/w) oral paste 5 mL  barium sulfate (Varibar Nectar, Varibar Honey) 40 % (w/v) suspension 10 mL  barium sulfate (Varibar THIN Liquid) 81 % (w/w) suspension 10 mL  nystatin (Mycostatin) 100,000 unit/mL suspension 500,000 Units  potassium chloride CR (Klor-Con M20) ER tablet 20 mEq  oxygen (O2) therapy  pantoprazole (ProtoNix) EC tablet 40 mg  dextrose 5%-0.45 % sodium chloride infusion  vancomycin 1.5 mg in 300 mL (Xellia) IVPB 1.5 g  potassium chloride CR (Klor-Con M20) ER tablet 40 mEq  LORazepam (Ativan) injection 1 mg  QUEtiapine (SEROquel) tablet 12.5 mg  LORazepam (Ativan) injection 2 mg  potassium chloride CR (Klor-Con M20) ER tablet 20 mEq  potassium chloride 20 mEq in 100 mL IV premix  sodium chloride 0.9% infusion  potassium chloride (Klor-Con) packet 20 mEq  potassium chloride CR (Klor-Con M20) ER tablet 20 mEq  potassium chloride CR (Klor-Con M20) ER tablet 20 mEq  potassium chloride CR (Klor-Con M20) ER tablet 20 mEq      Relevant Results  Labs  Results from last 72 hours   Lab Units 01/16/24  0617   WBC AUTO x10*3/uL 7.7   HEMOGLOBIN g/dL 8.9*   HEMATOCRIT % 28.1*   PLATELETS AUTO x10*3/uL 289       Results from last 72 hours   Lab Units 01/17/24  0557   SODIUM mmol/L 143   POTASSIUM mmol/L 3.8   CHLORIDE mmol/L 108*   CO2 mmol/L 28   BUN mg/dL 10   CREATININE mg/dL 0.90   GLUCOSE mg/dL 108*   CALCIUM mg/dL 8.1*   ANION GAP mmol/L 7   EGFR mL/min/1.73m*2 89             Estimated Creatinine Clearance: 97.9 mL/min (by C-G formula based on SCr of 0.9 mg/dL).  No results found for: \"CRP\"  Microbiology  Negative MRSA PCR  C.diff PCR negative  Sputum culture with salivary contamination  Strep pneumo antigen negative  Legionella antigen negative  Blood cultures finalized no growth   Imaging  Electrocardiogram, 12-lead PRN ACS symptoms    Result Date: 1/16/2024  Atrial fibrillation with slow ventricular response with premature ventricular or aberrantly conducted complexes Low voltage " QRS Nonspecific T wave abnormality Abnormal ECG When compared with ECG of 05-JAN-2024 16:11, Atrial fibrillation has replaced Sinus rhythm Vent. rate has decreased BY  49 BPM Left posterior fascicular block is no longer Present     Assessment/Plan   Sepsis, with shock, resolved  Acute renal failure-resolved  Pneumonia-MRSA PCR negative -treated  Diarrhea-c.diff negative  Transaminitis-resolving  Leukocytosis-resolved  Oral candida   Chronic respiratory failure-at baseline 5LNC  Retroperitoneal hematoma       Monitor off antibiotics - completed 10 day course of antibiotics  Continue Nystatin suspension  Monitor WBC and temperature  Monitor renal function  Oxygen as needed  Supportive care  Monitor mental status  Discharge planning        Ilene SCHOFIELD Staff, APRN-CNP

## 2024-01-18 NOTE — CARE PLAN
Problem: Respiratory  Goal: Minimize anxiety/maximize coping throughout shift  Outcome: Progressing  Goal: Minimal/no exertional discomfort or dyspnea this shift  Outcome: Progressing  Goal: No signs of respiratory distress (eg. Use of accessory muscles. Peds grunting)  Outcome: Progressing

## 2024-01-19 VITALS
OXYGEN SATURATION: 98 % | HEIGHT: 77 IN | WEIGHT: 259.04 LBS | DIASTOLIC BLOOD PRESSURE: 55 MMHG | TEMPERATURE: 97.5 F | RESPIRATION RATE: 15 BRPM | SYSTOLIC BLOOD PRESSURE: 91 MMHG | HEART RATE: 56 BPM | BODY MASS INDEX: 30.59 KG/M2

## 2024-01-19 PROBLEM — E87.6 HYPOKALEMIA: Status: RESOLVED | Noted: 2024-01-06 | Resolved: 2024-01-19

## 2024-01-19 PROBLEM — D72.829 LEUKOCYTOSIS: Status: RESOLVED | Noted: 2024-01-06 | Resolved: 2024-01-19

## 2024-01-19 PROBLEM — E86.0 DEHYDRATION: Status: RESOLVED | Noted: 2024-01-06 | Resolved: 2024-01-19

## 2024-01-19 PROBLEM — J18.9 BILATERAL PNEUMONIA: Status: RESOLVED | Noted: 2024-01-06 | Resolved: 2024-01-19

## 2024-01-19 PROBLEM — N17.9 ACUTE RENAL FAILURE (ARF) (CMS-HCC): Status: RESOLVED | Noted: 2024-01-06 | Resolved: 2024-01-19

## 2024-01-19 PROBLEM — R74.01 TRANSAMINITIS: Status: RESOLVED | Noted: 2024-01-06 | Resolved: 2024-01-19

## 2024-01-19 PROBLEM — E87.20 LACTIC ACID ACIDOSIS: Status: RESOLVED | Noted: 2024-01-06 | Resolved: 2024-01-19

## 2024-01-19 PROBLEM — A41.9 SEPSIS (MULTI): Status: RESOLVED | Noted: 2024-01-06 | Resolved: 2024-01-19

## 2024-01-19 PROBLEM — I95.9 HYPOTENSION: Status: RESOLVED | Noted: 2024-01-06 | Resolved: 2024-01-19

## 2024-01-19 PROBLEM — G93.40 ACUTE ENCEPHALOPATHY: Status: RESOLVED | Noted: 2024-01-06 | Resolved: 2024-01-19

## 2024-01-19 LAB
ANION GAP SERPL CALC-SCNC: 10 MMOL/L
BUN SERPL-MCNC: 26 MG/DL (ref 8–25)
CALCIUM SERPL-MCNC: 8.4 MG/DL (ref 8.5–10.4)
CHLORIDE SERPL-SCNC: 105 MMOL/L (ref 97–107)
CO2 SERPL-SCNC: 26 MMOL/L (ref 24–31)
CREAT SERPL-MCNC: 1.1 MG/DL (ref 0.4–1.6)
EGFRCR SERPLBLD CKD-EPI 2021: 70 ML/MIN/1.73M*2
ERYTHROCYTE [DISTWIDTH] IN BLOOD BY AUTOMATED COUNT: 17.2 % (ref 11.5–14.5)
GLUCOSE SERPL-MCNC: 141 MG/DL (ref 65–99)
HCT VFR BLD AUTO: 27.3 % (ref 41–52)
HGB BLD-MCNC: 8.7 G/DL (ref 13.5–17.5)
MCH RBC QN AUTO: 29.4 PG (ref 26–34)
MCHC RBC AUTO-ENTMCNC: 31.9 G/DL (ref 32–36)
MCV RBC AUTO: 92 FL (ref 80–100)
NRBC BLD-RTO: 0 /100 WBCS (ref 0–0)
PLATELET # BLD AUTO: 331 X10*3/UL (ref 150–450)
POTASSIUM SERPL-SCNC: 4 MMOL/L (ref 3.4–5.1)
RBC # BLD AUTO: 2.96 X10*6/UL (ref 4.5–5.9)
SODIUM SERPL-SCNC: 141 MMOL/L (ref 133–145)
WBC # BLD AUTO: 10.6 X10*3/UL (ref 4.4–11.3)

## 2024-01-19 PROCEDURE — 2500000001 HC RX 250 WO HCPCS SELF ADMINISTERED DRUGS (ALT 637 FOR MEDICARE OP): Performed by: INTERNAL MEDICINE

## 2024-01-19 PROCEDURE — 2500000004 HC RX 250 GENERAL PHARMACY W/ HCPCS (ALT 636 FOR OP/ED): Performed by: INTERNAL MEDICINE

## 2024-01-19 PROCEDURE — 85027 COMPLETE CBC AUTOMATED: CPT | Performed by: INTERNAL MEDICINE

## 2024-01-19 PROCEDURE — 80048 BASIC METABOLIC PNL TOTAL CA: CPT | Performed by: INTERNAL MEDICINE

## 2024-01-19 PROCEDURE — 94640 AIRWAY INHALATION TREATMENT: CPT

## 2024-01-19 PROCEDURE — 2500000005 HC RX 250 GENERAL PHARMACY W/O HCPCS: Performed by: INTERNAL MEDICINE

## 2024-01-19 PROCEDURE — 36415 COLL VENOUS BLD VENIPUNCTURE: CPT | Performed by: INTERNAL MEDICINE

## 2024-01-19 PROCEDURE — 97110 THERAPEUTIC EXERCISES: CPT | Mod: GP

## 2024-01-19 PROCEDURE — 9420000001 HC RT PATIENT EDUCATION 5 MIN

## 2024-01-19 PROCEDURE — 2500000002 HC RX 250 W HCPCS SELF ADMINISTERED DRUGS (ALT 637 FOR MEDICARE OP, ALT 636 FOR OP/ED): Performed by: INTERNAL MEDICINE

## 2024-01-19 PROCEDURE — 97530 THERAPEUTIC ACTIVITIES: CPT | Mod: GP

## 2024-01-19 RX ORDER — METOPROLOL TARTRATE 25 MG/1
25 TABLET, FILM COATED ORAL 2 TIMES DAILY
Start: 2024-01-19

## 2024-01-19 RX ORDER — POLYETHYLENE GLYCOL 3350 17 G/17G
17 POWDER, FOR SOLUTION ORAL DAILY PRN
Start: 2024-01-19

## 2024-01-19 RX ORDER — QUETIAPINE FUMARATE 25 MG/1
12.5 TABLET, FILM COATED ORAL EVERY 12 HOURS PRN
Start: 2024-01-19

## 2024-01-19 RX ORDER — NYSTATIN 100000 [USP'U]/ML
5 SUSPENSION ORAL 4 TIMES DAILY
Start: 2024-01-19 | End: 2024-01-23

## 2024-01-19 RX ADMIN — Medication 4 L/MIN: at 07:00

## 2024-01-19 RX ADMIN — NYSTATIN 500000 UNITS: 100000 SUSPENSION ORAL at 07:00

## 2024-01-19 RX ADMIN — SACUBITRIL AND VALSARTAN 1 TABLET: 24; 26 TABLET, FILM COATED ORAL at 08:40

## 2024-01-19 RX ADMIN — LEVOTHYROXINE SODIUM 50 MCG: 0.05 TABLET ORAL at 05:44

## 2024-01-19 RX ADMIN — IPRATROPIUM BROMIDE AND ALBUTEROL SULFATE 3 ML: 2.5; .5 SOLUTION RESPIRATORY (INHALATION) at 07:57

## 2024-01-19 RX ADMIN — QUETIAPINE FUMARATE 12.5 MG: 25 TABLET ORAL at 08:40

## 2024-01-19 RX ADMIN — FERROUS SULFATE TAB 325 MG (65 MG ELEMENTAL FE) 1 TABLET: 325 (65 FE) TAB at 08:40

## 2024-01-19 RX ADMIN — PANTOPRAZOLE SODIUM 40 MG: 40 TABLET, DELAYED RELEASE ORAL at 06:12

## 2024-01-19 RX ADMIN — POLYETHYLENE GLYCOL 3350 17 G: 17 POWDER, FOR SOLUTION ORAL at 08:40

## 2024-01-19 RX ADMIN — METOPROLOL TARTRATE 25 MG: 25 TABLET, FILM COATED ORAL at 08:40

## 2024-01-19 RX ADMIN — APIXABAN 5 MG: 5 TABLET, FILM COATED ORAL at 08:39

## 2024-01-19 RX ADMIN — LORAZEPAM 1 MG: 2 INJECTION, SOLUTION INTRAMUSCULAR; INTRAVENOUS at 11:37

## 2024-01-19 RX ADMIN — IPRATROPIUM BROMIDE AND ALBUTEROL SULFATE 3 ML: 2.5; .5 SOLUTION RESPIRATORY (INHALATION) at 12:56

## 2024-01-19 RX ADMIN — ASPIRIN 81 MG: 81 TABLET, COATED ORAL at 08:40

## 2024-01-19 RX ADMIN — FINASTERIDE 5 MG: 5 TABLET, FILM COATED ORAL at 08:40

## 2024-01-19 ASSESSMENT — COGNITIVE AND FUNCTIONAL STATUS - GENERAL
MOVING FROM LYING ON BACK TO SITTING ON SIDE OF FLAT BED WITH BEDRAILS: A LOT
TURNING FROM BACK TO SIDE WHILE IN FLAT BAD: A LOT
WALKING IN HOSPITAL ROOM: TOTAL
MOVING TO AND FROM BED TO CHAIR: TOTAL
MOBILITY SCORE: 8
CLIMB 3 TO 5 STEPS WITH RAILING: TOTAL
STANDING UP FROM CHAIR USING ARMS: TOTAL

## 2024-01-19 ASSESSMENT — PAIN SCALES - GENERAL: PAINLEVEL_OUTOF10: 0 - NO PAIN

## 2024-01-19 ASSESSMENT — PAIN - FUNCTIONAL ASSESSMENT: PAIN_FUNCTIONAL_ASSESSMENT: 0-10

## 2024-01-19 NOTE — PROGRESS NOTES
Navarro Rabago is a 78 y.o. male on day 13 of admission presenting with Sepsis (CMS/HCC).    Subjective   Interval History:   Afebrile, denies chills  Denies shortness of breath or chest pain  Reports an occasional cough   Working with therapy when evaluated  Nursing at bedside    Objective   Range of Vitals (last 24 hours)  Heart Rate:  [66-84]   Temp:  [35.5 °C (95.9 °F)-36.1 °C (97 °F)]   Resp:  [17-20]   BP: (104-124)/(64-82)   Weight:  [118 kg (259 lb 0.7 oz)]   SpO2:  [94 %-99 %]   Daily Weight  01/19/24 : 118 kg (259 lb 0.7 oz)    Body mass index is 30.72 kg/m².    Physical Exam  Constitutional:       Comments:  Awake and alert  HENT:      Head: Normocephalic and atraumatic.      Nose: Nose normal.    Eyes:      Extraocular Movements: Extraocular movements intact.      Conjunctiva/sclera: Conjunctivae normal.   Cardiovascular:      Rate and Rhythm: Normal rate and regular rhythm.   Pulmonary:      Effort: Pulmonary effort is normal.      Breath sounds:  No wheezing or rales present.   Abdominal:      General: Bowel sounds are normal.      Palpations: Abdomen is soft.   Musculoskeletal:         General: Normal range of motion.      Cervical back: Normal range of motion and neck supple.   Skin:     General: Skin is warm and dry.   Neurological:      General: No focal deficit present.      Mental Status: He is alert.   Psychiatric:         Mood and Affect: Mood normal.         Behavior: Behavior normal.     Antibiotics  sodium chloride 0.9% infusion  heparin (porcine) injection 5,000 Units  sodium chloride 0.9 % with KCl 20 mEq/L infusion  calcium carbonate (Tums) chewable tablet 500 mg  ondansetron ODT (Zofran-ODT) disintegrating tablet 4 mg  ondansetron (Zofran) injection 4 mg  benzocaine-menthol (Cepastat Sore Throat) 15-3.6 mg lozenge 1 lozenge  guaiFENesin (Mucinex) 12 hr tablet 600 mg  dextromethorphan-guaifenesin (Robitussin DM)  mg/5 mL oral liquid 5 mL  dextrose 50 % injection 25 g  glucagon  (Glucagen) injection 1 mg  dextrose 10 % in water (D10W) infusion  ipratropium-albuteroL (Duo-Neb) 0.5-2.5 mg/3 mL nebulizer solution 3 mL  oxygen (O2) therapy  piperacillin-tazobactam-dextrose (Zosyn) IV 3.375 g  pantoprazole (ProtoNix) injection 40 mg  ipratropium-albuteroL (Duo-Neb) 0.5-2.5 mg/3 mL nebulizer solution 3 mL  allopurinol (Zyloprim) tablet  apixaban (Eliquis) tablet  aspirin EC tablet  atorvastatin (Lipitor) tablet  bumetanide (Bumex) tablet  colchicine tablet  empagliflozin (Jardiance) tablet  fexofenadine (Allegra) tablet  finasteride (Propecia, Proscar) tablet  guaiFENesin (Mucinex) 12 hr tablet  levothyroxine (Synthroid, Levoxyl) tablet  nitroglycerin (Nitrostat) SL tablet  pantoprazole (ProtoNix) EC tablet  potassium chloride SA (Klor-Con M20) ER tablet  tamsulosin (Flomax) 24 hr capsule  vancomycin 1.5 mg in 300 mL (Xellia) IVPB 1.5 g      levothyroxine (Synthroid, Levoxyl) tablet 50 mcg  finasteride (Proscar) tablet 5 mg  ferrous sulfate (325 mg ferrous sulfate) tablet 1 tablet  aspirin EC tablet 81 mg  apixaban (Eliquis) tablet 5 mg  metoprolol tartrate (Lopressor) tablet 25 mg  potassium chloride 20 mEq in 100 mL IV premix  midodrine (Proamatine) tablet 5 mg  sodium chloride 0.9 % bolus 500 mL  norepinephrine (Levophed) 8 mg in dextrose 5% 250 mL (0.032 mg/mL) infusion (premix)  sodium chloride 0.9 % bolus 250 mL  sodium chloride 0.9 % bolus 250 mL  azithromycin (Zithromax) in dextrose 5 % in water (D5W) 250 mL  mg  vancomycin 1.5 mg in 300 mL (Xellia) IVPB 1.5 g  apixaban (Eliquis) tablet 2.5 mg  vancomycin (Xellia) 1 g in 200 mL (Xellia) IVPB 1,000 mg  norepinephrine (Levophed) 8 mg in dextrose 5% 250 mL (0.032 mg/mL) infusion (premix)  acetaminophen (Tylenol) tablet 650 mg  vancomycin 1.5 mg in 300 mL (Xellia) IVPB 1.5 g  sodium bicarbonate 150 mEq in dextrose 5 % in water (D5W) 1,000 mL infusion  midodrine (Proamatine) tablet 5 mg  barium sulfate (Varibar Honey) 40 % (w/v) 29%  "(w/w) suspension 10 mL  barium sulfate (Varibar Pudding) 40 % (w/v), 30% (w/w) oral paste 5 mL  barium sulfate (Varibar Nectar, Varibar Honey) 40 % (w/v) suspension 10 mL  barium sulfate (Varibar THIN Liquid) 81 % (w/w) suspension 10 mL  nystatin (Mycostatin) 100,000 unit/mL suspension 500,000 Units  potassium chloride CR (Klor-Con M20) ER tablet 20 mEq  oxygen (O2) therapy  pantoprazole (ProtoNix) EC tablet 40 mg  dextrose 5%-0.45 % sodium chloride infusion  vancomycin 1.5 mg in 300 mL (Xellia) IVPB 1.5 g  potassium chloride CR (Klor-Con M20) ER tablet 40 mEq  LORazepam (Ativan) injection 1 mg  QUEtiapine (SEROquel) tablet 12.5 mg  LORazepam (Ativan) injection 2 mg  potassium chloride CR (Klor-Con M20) ER tablet 20 mEq  potassium chloride 20 mEq in 100 mL IV premix  sodium chloride 0.9% infusion  potassium chloride (Klor-Con) packet 20 mEq  potassium chloride CR (Klor-Con M20) ER tablet 20 mEq  potassium chloride CR (Klor-Con M20) ER tablet 20 mEq  potassium chloride CR (Klor-Con M20) ER tablet 20 mEq      Relevant Results  Labs  Results from last 72 hours   Lab Units 01/19/24  0817   WBC AUTO x10*3/uL 10.6   HEMOGLOBIN g/dL 8.7*   HEMATOCRIT % 27.3*   PLATELETS AUTO x10*3/uL 331       Results from last 72 hours   Lab Units 01/19/24  0817 01/17/24  0557   SODIUM mmol/L 141 143   POTASSIUM mmol/L 4.0 3.8   CHLORIDE mmol/L 105 108*   CO2 mmol/L 26 28   BUN mg/dL 26* 10   CREATININE mg/dL 1.10 0.90   GLUCOSE mg/dL 141* 108*   CALCIUM mg/dL 8.4* 8.1*   ANION GAP mmol/L 10 7   EGFR mL/min/1.73m*2 70 89             Estimated Creatinine Clearance: 81.6 mL/min (by C-G formula based on SCr of 1.1 mg/dL).  No results found for: \"CRP\"  Microbiology  Negative MRSA PCR  C.diff PCR negative  Sputum culture with salivary contamination  Strep pneumo antigen negative  Legionella antigen negative  Blood cultures finalized no growth   Imaging  Electrocardiogram, 12-lead PRN ACS symptoms    Result Date: 1/16/2024  Atrial fibrillation " with slow ventricular response with premature ventricular or aberrantly conducted complexes Low voltage QRS Nonspecific T wave abnormality Abnormal ECG When compared with ECG of 05-JAN-2024 16:11, Atrial fibrillation has replaced Sinus rhythm Vent. rate has decreased BY  49 BPM Left posterior fascicular block is no longer Present     Assessment/Plan   Sepsis, with shock, resolved  Acute renal failure-resolved  Pneumonia-MRSA PCR negative -treated  Diarrhea-c.diff negative  Transaminitis-resolving  Leukocytosis-resolved  Oral candida   Chronic respiratory failure-at baseline 5LNC  Retroperitoneal hematoma       Monitor off antibiotics - completed 10 day course of antibiotics  Continue Nystatin suspension  Monitor WBC and temperature  Monitor renal function  Oxygen as needed  Supportive care  Monitor mental status  Discharge planning        Tess Gonzalez, APRN-CNP

## 2024-01-19 NOTE — PROGRESS NOTES
Patient is from home with spouse.  Therapy recommends MODERATE intensity at discharge.  Patient has been accepted by [facility] in [place].  Precert initiated 1/17/24 with pending ref#[id number].  Awaiting precert (could take 48-72 hrs).  N report number is [phone].  Patient is medically clear for discharge to [facility] when precert is received.  RN TCC following.     1145  Per  Direct Submit Team, precert is approved 1/19-1/25 NRD 1/25 auth#[id number].  Patient can discharge to [facility] in [place].  Bedside nurse and attending advised.  RN TCC following.    1245  Transportation arranged for 1:30 pm via [employer].    1345  Patient, spouse and daughter at bedside.  Aware of transportation to SNF.      Eloina Crespo, RN

## 2024-01-19 NOTE — DISCHARGE SUMMARY
Discharge Diagnosis  Sepsis (CMS/HCC)    Issues Requiring Follow-Up  Pulmonary follow-up, cardiology follow-up at his normal cardiologist with [facility] and primary care follow-up once discharged from skilled nursing facility    Discharge Meds     Your medication list        START taking these medications        Instructions Last Dose Given Next Dose Due   metoprolol tartrate 25 mg tablet  Commonly known as: Lopressor      Take 1 tablet (25 mg) by mouth 2 times a day.       oxygen gas therapy  Commonly known as: O2      Inhale 4 L/min every 8 hours.       polyethylene glycol 17 gram packet  Commonly known as: Glycolax, Miralax      Take 17 g by mouth once daily as needed (constipation).       QUEtiapine 25 mg tablet  Commonly known as: SEROquel      Take 0.5 tablets (12.5 mg) by mouth every 12 hours if needed (agitation).              CHANGE how you take these medications        Instructions Last Dose Given Next Dose Due   apixaban 5 mg tablet  Commonly known as: Eliquis  What changed: when to take this      Take 1 tablet (5 mg) by mouth every 12 hours.              CONTINUE taking these medications        Instructions Last Dose Given Next Dose Due   aspirin 81 mg EC tablet           atorvastatin 80 mg tablet  Commonly known as: Lipitor           bumetanide 2 mg tablet  Commonly known as: Bumex           colchicine 0.6 mg tablet           empagliflozin 10 mg  Commonly known as: Jardiance           Entresto 24-26 mg tablet  Generic drug: sacubitriL-valsartan           ferrous sulfate (325 mg ferrous sulfate) tablet           fexofenadine 180 mg tablet  Commonly known as: Allegra           finasteride 5 mg tablet  Commonly known as: Proscar           guaiFENesin 600 mg 12 hr tablet  Commonly known as: Mucinex           levothyroxine 50 mcg tablet  Commonly known as: Synthroid, Levoxyl           nitroglycerin 0.4 mg SL tablet  Commonly known as: Nitrostat           pantoprazole 40 mg EC tablet  Commonly known  as: ProtoNix                  STOP taking these medications      allopurinol 300 mg tablet  Commonly known as: Zyloprim        potassium chloride CR 20 mEq ER tablet  Commonly known as: Klor-Con M20        tamsulosin 0.4 mg 24 hr capsule  Commonly known as: Flomax                  Where to Get Your Medications        Information about where to get these medications is not yet available    Ask your nurse or doctor about these medications  apixaban 5 mg tablet  metoprolol tartrate 25 mg tablet  oxygen gas therapy  polyethylene glycol 17 gram packet  QUEtiapine 25 mg tablet         Test Results Pending At Discharge  Pending Labs       No current pending labs.            Hospital Course   78-year-old gentleman who was initially admitted after prodrome of several days of diarrhea and poor p.o. intake and weakness.  This is in the setting of returning from a trip to [place].  Initially patient was thought to be dehydrated and potentially positive for C. difficile.  Throughout his first 24 hours the patient continued to drop his pressure and further workup revealed likely pneumonia, bilateral and multifocal.  Patient was treated with early goal-directed therapy from sepsis likely secondary to pneumonia.  C. difficile was negative.  Patient developed acute renal insufficiency as well.  Patient does have a history of HFrEF EF of 35 to 40%.  Was being managed in the intensive care unit with comanagement from pulmonary, cardiology, infectious disease, nephrology.  Patient continued full course of antibiotics all of his acute issues had resolved.  Patient was transferred to stepdown unit where he continued to convalesce well.  Due to his recent illness and intensive care unit stay he was deemed unsafe to be at home and skilled nursing was recommended.  Patient was restarted on all of his home medications except for allopurinol.    Skilled nursing facility should watch his blood sugars, I did reorder his Jardiance to start  again out of the hospital and skilled nursing facility will need to continue to adjust his blood sugars.    Otherwise patient will be discharged in stable and improved condition    Pertinent Physical Exam At Time of Discharge  Physical Exam  Exam stable for discharge, unchanged from previous day  Outpatient Follow-Up  No future appointments.  Time spent discharge was 35 minutes    Ulises Tariq MD

## 2024-01-20 LAB
ATRIAL RATE: 277 BPM
Q ONSET: 219 MS
QRS COUNT: 10 BEATS
QRS DURATION: 102 MS
QT INTERVAL: 408 MS
QTC CALCULATION(BAZETT): 400 MS
QTC FREDERICIA: 403 MS
R AXIS: 52 DEGREES
T AXIS: 37 DEGREES
T OFFSET: 423 MS
VENTRICULAR RATE: 58 BPM

## (undated) DEVICE — SHEATH INTRO SUPERSHEATH SHRT .035 4F 11CM

## (undated) DEVICE — LN FLTR ORL/NASL MICROSTREAM NONINTUB A/LNG

## (undated) DEVICE — CATH F5 INF JL 5 100CM: Brand: INFINITI

## (undated) DEVICE — MYNXGRIP 5F: Brand: MYNXGRIP

## (undated) DEVICE — 360 - 360I 10"/10" CMSQ 8RA: Brand: MEDLINE

## (undated) DEVICE — ASMBL SPK CONTRST CONTRL

## (undated) DEVICE — PAD, DEFIB, ADULT, RADIOTRANS, ZOLL: Brand: MEDLINE

## (undated) DEVICE — CATH F5 INF JL 4 100CM: Brand: INFINITI

## (undated) DEVICE — DRSNG SURESITE WNDW 4X4.5

## (undated) DEVICE — CATH F5 INF 3DRC 100CM: Brand: INFINITI

## (undated) DEVICE — ADULT DISPOSABLE SINGLE-PATIENT USE PULSE OXIMETER SENSOR: Brand: NONIN

## (undated) DEVICE — PK CATH CARD 70

## (undated) DEVICE — ST INF PRI SMRTSTE 20DRP 2VLV 24ML 117

## (undated) DEVICE — MANIFLD NAMIC PRECEPTOR INTEGR/TRANSD RT/HND 1/PRT 500PSI

## (undated) DEVICE — PINNACLE INTRODUCER SHEATH: Brand: PINNACLE

## (undated) DEVICE — GW J/TP MOVE/CORE 0.035 3MM/TP 145CM

## (undated) DEVICE — ST EXT IV SMRTSTE 2VLV FIX M LL 6ML 41